# Patient Record
Sex: FEMALE | Race: WHITE | NOT HISPANIC OR LATINO | Employment: OTHER | URBAN - NONMETROPOLITAN AREA
[De-identification: names, ages, dates, MRNs, and addresses within clinical notes are randomized per-mention and may not be internally consistent; named-entity substitution may affect disease eponyms.]

---

## 2023-12-07 ENCOUNTER — HOSPITAL ENCOUNTER (INPATIENT)
Facility: HOSPITAL | Age: 88
LOS: 6 days | Discharge: HOME | End: 2023-12-14
Attending: EMERGENCY MEDICINE | Admitting: INTERNAL MEDICINE
Payer: MEDICARE

## 2023-12-07 ENCOUNTER — APPOINTMENT (OUTPATIENT)
Dept: CT IMAGING | Facility: HOSPITAL | Age: 88
End: 2023-12-07
Payer: MEDICARE

## 2023-12-07 ENCOUNTER — APPOINTMENT (OUTPATIENT)
Dept: GENERAL RADIOLOGY | Facility: HOSPITAL | Age: 88
End: 2023-12-07
Payer: MEDICARE

## 2023-12-07 ENCOUNTER — APPOINTMENT (OUTPATIENT)
Dept: ULTRASOUND IMAGING | Facility: HOSPITAL | Age: 88
End: 2023-12-07
Payer: MEDICARE

## 2023-12-07 DIAGNOSIS — R53.1 WEAKNESS: ICD-10-CM

## 2023-12-07 DIAGNOSIS — R06.00 DYSPNEA, UNSPECIFIED TYPE: Primary | ICD-10-CM

## 2023-12-07 LAB
A-A DO2: 27.2 MMHG (ref 0–300)
ALBUMIN SERPL-MCNC: 3.4 G/DL (ref 3.5–5.2)
ALBUMIN/GLOB SERPL: 1.2 G/DL
ALP SERPL-CCNC: 80 U/L (ref 39–117)
ALT SERPL W P-5'-P-CCNC: 9 U/L (ref 1–33)
ANION GAP SERPL CALCULATED.3IONS-SCNC: 10.5 MMOL/L (ref 5–15)
APTT PPP: 41.2 SECONDS (ref 26.5–34.5)
ARTERIAL PATENCY WRIST A: POSITIVE
AST SERPL-CCNC: 17 U/L (ref 1–32)
ATMOSPHERIC PRESS: 729 MMHG
BACTERIA UR QL AUTO: ABNORMAL /HPF
BASE EXCESS BLDA CALC-SCNC: 4 MMOL/L (ref 0–2)
BASOPHILS # BLD AUTO: 0.02 10*3/MM3 (ref 0–0.2)
BASOPHILS NFR BLD AUTO: 0.3 % (ref 0–1.5)
BDY SITE: ABNORMAL
BILIRUB SERPL-MCNC: 1 MG/DL (ref 0–1.2)
BILIRUB UR QL STRIP: NEGATIVE
BUN SERPL-MCNC: 20 MG/DL (ref 8–23)
BUN/CREAT SERPL: 15.9 (ref 7–25)
CALCIUM SPEC-SCNC: 9.8 MG/DL (ref 8.2–9.6)
CHLORIDE SERPL-SCNC: 103 MMOL/L (ref 98–107)
CLARITY UR: CLEAR
CO2 BLDA-SCNC: 30.6 MMOL/L (ref 22–33)
CO2 SERPL-SCNC: 27.5 MMOL/L (ref 22–29)
COHGB MFR BLD: 1.2 % (ref 0–5)
COLOR UR: YELLOW
CREAT SERPL-MCNC: 1.26 MG/DL (ref 0.57–1)
CRP SERPL-MCNC: 3.89 MG/DL (ref 0–0.5)
D-LACTATE SERPL-SCNC: 1.5 MMOL/L (ref 0.5–2)
DEPRECATED RDW RBC AUTO: 46.3 FL (ref 37–54)
EGFRCR SERPLBLD CKD-EPI 2021: 39.4 ML/MIN/1.73
EOSINOPHIL # BLD AUTO: 0.03 10*3/MM3 (ref 0–0.4)
EOSINOPHIL NFR BLD AUTO: 0.5 % (ref 0.3–6.2)
ERYTHROCYTE [DISTWIDTH] IN BLOOD BY AUTOMATED COUNT: 13.7 % (ref 12.3–15.4)
FLUAV RNA RESP QL NAA+PROBE: NOT DETECTED
FLUBV RNA RESP QL NAA+PROBE: NOT DETECTED
GEN 5 2HR TROPONIN T REFLEX: 25 NG/L
GLOBULIN UR ELPH-MCNC: 2.9 GM/DL
GLUCOSE SERPL-MCNC: 124 MG/DL (ref 65–99)
GLUCOSE UR STRIP-MCNC: NEGATIVE MG/DL
HCO3 BLDA-SCNC: 29.2 MMOL/L (ref 20–26)
HCT VFR BLD AUTO: 42.8 % (ref 34–46.6)
HCT VFR BLD CALC: 42.6 % (ref 38–51)
HGB BLD-MCNC: 13.9 G/DL (ref 12–15.9)
HGB BLDA-MCNC: 13.9 G/DL (ref 13.5–17.5)
HGB UR QL STRIP.AUTO: NEGATIVE
HYALINE CASTS UR QL AUTO: ABNORMAL /LPF
IMM GRANULOCYTES # BLD AUTO: 0.02 10*3/MM3 (ref 0–0.05)
IMM GRANULOCYTES NFR BLD AUTO: 0.3 % (ref 0–0.5)
INHALED O2 CONCENTRATION: 21 %
INR PPP: 2.2 (ref 0.9–1.1)
KETONES UR QL STRIP: NEGATIVE
LEUKOCYTE ESTERASE UR QL STRIP.AUTO: ABNORMAL
LYMPHOCYTES # BLD AUTO: 1.24 10*3/MM3 (ref 0.7–3.1)
LYMPHOCYTES NFR BLD AUTO: 20.7 % (ref 19.6–45.3)
Lab: ABNORMAL
MCH RBC QN AUTO: 29.6 PG (ref 26.6–33)
MCHC RBC AUTO-ENTMCNC: 32.5 G/DL (ref 31.5–35.7)
MCV RBC AUTO: 91.1 FL (ref 79–97)
METHGB BLD QL: 0.3 % (ref 0–3)
MODALITY: ABNORMAL
MONOCYTES # BLD AUTO: 0.83 10*3/MM3 (ref 0.1–0.9)
MONOCYTES NFR BLD AUTO: 13.8 % (ref 5–12)
NEUTROPHILS NFR BLD AUTO: 3.86 10*3/MM3 (ref 1.7–7)
NEUTROPHILS NFR BLD AUTO: 64.4 % (ref 42.7–76)
NITRITE UR QL STRIP: POSITIVE
NRBC BLD AUTO-RTO: 0 /100 WBC (ref 0–0.2)
NT-PROBNP SERPL-MCNC: 2957 PG/ML (ref 0–1800)
OXYHGB MFR BLDV: 92.1 % (ref 94–99)
PCO2 BLDA: 45.4 MM HG (ref 35–45)
PCO2 TEMP ADJ BLD: ABNORMAL MM[HG]
PH BLDA: 7.42 PH UNITS (ref 7.35–7.45)
PH UR STRIP.AUTO: 6 [PH] (ref 5–8)
PH, TEMP CORRECTED: ABNORMAL
PLATELET # BLD AUTO: 136 10*3/MM3 (ref 140–450)
PMV BLD AUTO: 11.7 FL (ref 6–12)
PO2 BLDA: 64.8 MM HG (ref 83–108)
PO2 TEMP ADJ BLD: ABNORMAL MM[HG]
POTASSIUM SERPL-SCNC: 3.6 MMOL/L (ref 3.5–5.2)
PROCALCITONIN SERPL-MCNC: 0.06 NG/ML (ref 0–0.25)
PROT SERPL-MCNC: 6.3 G/DL (ref 6–8.5)
PROT UR QL STRIP: NEGATIVE
PROTHROMBIN TIME: 25.1 SECONDS (ref 12.1–14.7)
QT INTERVAL: 438 MS
QTC INTERVAL: 462 MS
RBC # BLD AUTO: 4.7 10*6/MM3 (ref 3.77–5.28)
RBC # UR STRIP: ABNORMAL /HPF
REF LAB TEST METHOD: ABNORMAL
SAO2 % BLDCOA: 93.5 % (ref 94–99)
SARS-COV-2 RNA RESP QL NAA+PROBE: NOT DETECTED
SODIUM SERPL-SCNC: 141 MMOL/L (ref 136–145)
SP GR UR STRIP: 1.01 (ref 1–1.03)
SQUAMOUS #/AREA URNS HPF: ABNORMAL /HPF
TROPONIN T DELTA: 1 NG/L
TROPONIN T SERPL HS-MCNC: 24 NG/L
UROBILINOGEN UR QL STRIP: ABNORMAL
VENTILATOR MODE: ABNORMAL
WBC # UR STRIP: ABNORMAL /HPF
WBC NRBC COR # BLD AUTO: 6 10*3/MM3 (ref 3.4–10.8)

## 2023-12-07 PROCEDURE — 82805 BLOOD GASES W/O2 SATURATION: CPT

## 2023-12-07 PROCEDURE — 36415 COLL VENOUS BLD VENIPUNCTURE: CPT

## 2023-12-07 PROCEDURE — 83050 HGB METHEMOGLOBIN QUAN: CPT

## 2023-12-07 PROCEDURE — 80053 COMPREHEN METABOLIC PANEL: CPT | Performed by: NURSE PRACTITIONER

## 2023-12-07 PROCEDURE — 76775 US EXAM ABDO BACK WALL LIM: CPT

## 2023-12-07 PROCEDURE — 87077 CULTURE AEROBIC IDENTIFY: CPT | Performed by: NURSE PRACTITIONER

## 2023-12-07 PROCEDURE — 87186 SC STD MICRODIL/AGAR DIL: CPT | Performed by: NURSE PRACTITIONER

## 2023-12-07 PROCEDURE — 87040 BLOOD CULTURE FOR BACTERIA: CPT | Performed by: NURSE PRACTITIONER

## 2023-12-07 PROCEDURE — 25010000002 FUROSEMIDE PER 20 MG: Performed by: NURSE PRACTITIONER

## 2023-12-07 PROCEDURE — 94799 UNLISTED PULMONARY SVC/PX: CPT

## 2023-12-07 PROCEDURE — 83605 ASSAY OF LACTIC ACID: CPT | Performed by: NURSE PRACTITIONER

## 2023-12-07 PROCEDURE — 84145 PROCALCITONIN (PCT): CPT | Performed by: NURSE PRACTITIONER

## 2023-12-07 PROCEDURE — 71045 X-RAY EXAM CHEST 1 VIEW: CPT | Performed by: RADIOLOGY

## 2023-12-07 PROCEDURE — 81001 URINALYSIS AUTO W/SCOPE: CPT | Performed by: NURSE PRACTITIONER

## 2023-12-07 PROCEDURE — 85610 PROTHROMBIN TIME: CPT | Performed by: NURSE PRACTITIONER

## 2023-12-07 PROCEDURE — G0378 HOSPITAL OBSERVATION PER HR: HCPCS

## 2023-12-07 PROCEDURE — 36600 WITHDRAWAL OF ARTERIAL BLOOD: CPT

## 2023-12-07 PROCEDURE — 86140 C-REACTIVE PROTEIN: CPT | Performed by: NURSE PRACTITIONER

## 2023-12-07 PROCEDURE — 87086 URINE CULTURE/COLONY COUNT: CPT | Performed by: NURSE PRACTITIONER

## 2023-12-07 PROCEDURE — 84484 ASSAY OF TROPONIN QUANT: CPT | Performed by: EMERGENCY MEDICINE

## 2023-12-07 PROCEDURE — 99285 EMERGENCY DEPT VISIT HI MDM: CPT

## 2023-12-07 PROCEDURE — 71250 CT THORAX DX C-: CPT

## 2023-12-07 PROCEDURE — 85730 THROMBOPLASTIN TIME PARTIAL: CPT | Performed by: NURSE PRACTITIONER

## 2023-12-07 PROCEDURE — 85025 COMPLETE CBC W/AUTO DIFF WBC: CPT | Performed by: NURSE PRACTITIONER

## 2023-12-07 PROCEDURE — 71045 X-RAY EXAM CHEST 1 VIEW: CPT

## 2023-12-07 PROCEDURE — 94640 AIRWAY INHALATION TREATMENT: CPT

## 2023-12-07 PROCEDURE — 76775 US EXAM ABDO BACK WALL LIM: CPT | Performed by: RADIOLOGY

## 2023-12-07 PROCEDURE — 83880 ASSAY OF NATRIURETIC PEPTIDE: CPT | Performed by: NURSE PRACTITIONER

## 2023-12-07 PROCEDURE — 71250 CT THORAX DX C-: CPT | Performed by: RADIOLOGY

## 2023-12-07 PROCEDURE — 25010000002 CEFTRIAXONE PER 250 MG: Performed by: NURSE PRACTITIONER

## 2023-12-07 PROCEDURE — 93005 ELECTROCARDIOGRAM TRACING: CPT | Performed by: EMERGENCY MEDICINE

## 2023-12-07 PROCEDURE — 99223 1ST HOSP IP/OBS HIGH 75: CPT | Performed by: INTERNAL MEDICINE

## 2023-12-07 PROCEDURE — 93010 ELECTROCARDIOGRAM REPORT: CPT | Performed by: SPECIALIST

## 2023-12-07 PROCEDURE — 87636 SARSCOV2 & INF A&B AMP PRB: CPT | Performed by: NURSE PRACTITIONER

## 2023-12-07 PROCEDURE — 82375 ASSAY CARBOXYHB QUANT: CPT

## 2023-12-07 RX ORDER — LOSARTAN POTASSIUM 50 MG/1
50 TABLET ORAL 2 TIMES DAILY
Status: CANCELLED | OUTPATIENT
Start: 2023-12-07

## 2023-12-07 RX ORDER — FUROSEMIDE 40 MG/1
40 TABLET ORAL DAILY
Status: CANCELLED | OUTPATIENT
Start: 2023-12-08

## 2023-12-07 RX ORDER — CETIRIZINE HYDROCHLORIDE 10 MG/1
10 TABLET ORAL DAILY
COMMUNITY

## 2023-12-07 RX ORDER — SODIUM CHLORIDE 9 MG/ML
40 INJECTION, SOLUTION INTRAVENOUS AS NEEDED
Status: DISCONTINUED | OUTPATIENT
Start: 2023-12-07 | End: 2023-12-14 | Stop reason: HOSPADM

## 2023-12-07 RX ORDER — IPRATROPIUM BROMIDE AND ALBUTEROL SULFATE 2.5; .5 MG/3ML; MG/3ML
3 SOLUTION RESPIRATORY (INHALATION) ONCE
Status: COMPLETED | OUTPATIENT
Start: 2023-12-07 | End: 2023-12-07

## 2023-12-07 RX ORDER — BISACODYL 5 MG/1
5 TABLET, DELAYED RELEASE ORAL DAILY PRN
Status: DISCONTINUED | OUTPATIENT
Start: 2023-12-07 | End: 2023-12-14 | Stop reason: HOSPADM

## 2023-12-07 RX ORDER — CEPHALEXIN 500 MG/1
500 CAPSULE ORAL 2 TIMES DAILY
Status: ON HOLD | COMMUNITY
End: 2023-12-07

## 2023-12-07 RX ORDER — CETIRIZINE HYDROCHLORIDE 10 MG/1
10 TABLET ORAL DAILY
Status: ON HOLD | COMMUNITY
End: 2023-12-07

## 2023-12-07 RX ORDER — CETIRIZINE HYDROCHLORIDE 10 MG/1
5 TABLET ORAL DAILY
Status: CANCELLED | OUTPATIENT
Start: 2023-12-08

## 2023-12-07 RX ORDER — LOSARTAN POTASSIUM 50 MG/1
50 TABLET ORAL 2 TIMES DAILY
COMMUNITY

## 2023-12-07 RX ORDER — BISACODYL 10 MG
10 SUPPOSITORY, RECTAL RECTAL DAILY PRN
Status: DISCONTINUED | OUTPATIENT
Start: 2023-12-07 | End: 2023-12-14 | Stop reason: HOSPADM

## 2023-12-07 RX ORDER — FUROSEMIDE 40 MG/1
40 TABLET ORAL DAILY
COMMUNITY

## 2023-12-07 RX ORDER — SODIUM CHLORIDE 0.9 % (FLUSH) 0.9 %
10 SYRINGE (ML) INJECTION EVERY 12 HOURS SCHEDULED
Status: DISCONTINUED | OUTPATIENT
Start: 2023-12-07 | End: 2023-12-14 | Stop reason: HOSPADM

## 2023-12-07 RX ORDER — POTASSIUM CHLORIDE 20 MEQ/1
10 TABLET, EXTENDED RELEASE ORAL DAILY
Status: CANCELLED | OUTPATIENT
Start: 2023-12-08

## 2023-12-07 RX ORDER — POLYETHYLENE GLYCOL 3350 17 G/17G
17 POWDER, FOR SOLUTION ORAL DAILY PRN
Status: DISCONTINUED | OUTPATIENT
Start: 2023-12-07 | End: 2023-12-14 | Stop reason: HOSPADM

## 2023-12-07 RX ORDER — SODIUM CHLORIDE 0.9 % (FLUSH) 0.9 %
10 SYRINGE (ML) INJECTION AS NEEDED
Status: DISCONTINUED | OUTPATIENT
Start: 2023-12-07 | End: 2023-12-14 | Stop reason: HOSPADM

## 2023-12-07 RX ORDER — METOPROLOL TARTRATE 100 MG/1
100 TABLET ORAL EVERY 12 HOURS SCHEDULED
Status: CANCELLED | OUTPATIENT
Start: 2023-12-07

## 2023-12-07 RX ORDER — CINACALCET 30 MG/1
30 TABLET, FILM COATED ORAL DAILY
Status: CANCELLED | OUTPATIENT
Start: 2023-12-08

## 2023-12-07 RX ORDER — PANTOPRAZOLE SODIUM 40 MG/1
40 TABLET, DELAYED RELEASE ORAL DAILY
Status: CANCELLED | OUTPATIENT
Start: 2023-12-08

## 2023-12-07 RX ORDER — CETIRIZINE HYDROCHLORIDE 10 MG/1
10 TABLET ORAL DAILY
Status: CANCELLED | OUTPATIENT
Start: 2023-12-08

## 2023-12-07 RX ORDER — AMOXICILLIN 250 MG
2 CAPSULE ORAL 2 TIMES DAILY
Status: DISCONTINUED | OUTPATIENT
Start: 2023-12-07 | End: 2023-12-14 | Stop reason: HOSPADM

## 2023-12-07 RX ORDER — ERGOCALCIFEROL 1.25 MG/1
50000 CAPSULE ORAL WEEKLY
COMMUNITY

## 2023-12-07 RX ORDER — FUROSEMIDE 10 MG/ML
80 INJECTION INTRAMUSCULAR; INTRAVENOUS ONCE
Status: COMPLETED | OUTPATIENT
Start: 2023-12-07 | End: 2023-12-07

## 2023-12-07 RX ORDER — ERYTHROMYCIN 5 MG/G
1 OINTMENT OPHTHALMIC 3 TIMES DAILY PRN
COMMUNITY

## 2023-12-07 RX ORDER — ERYTHROMYCIN 5 MG/G
1 OINTMENT OPHTHALMIC 3 TIMES DAILY PRN
Status: CANCELLED | OUTPATIENT
Start: 2023-12-07

## 2023-12-07 RX ORDER — EZETIMIBE 10 MG/1
10 TABLET ORAL DAILY
COMMUNITY

## 2023-12-07 RX ADMIN — CEFTRIAXONE 1000 MG: 1 INJECTION, POWDER, FOR SOLUTION INTRAMUSCULAR; INTRAVENOUS at 16:56

## 2023-12-07 RX ADMIN — Medication 10 ML: at 20:44

## 2023-12-07 RX ADMIN — IPRATROPIUM BROMIDE AND ALBUTEROL SULFATE 3 ML: .5; 2.5 SOLUTION RESPIRATORY (INHALATION) at 11:09

## 2023-12-07 RX ADMIN — DOXYCYCLINE 100 MG: 100 INJECTION, POWDER, LYOPHILIZED, FOR SOLUTION INTRAVENOUS at 20:44

## 2023-12-07 RX ADMIN — FUROSEMIDE 80 MG: 10 INJECTION, SOLUTION INTRAMUSCULAR; INTRAVENOUS at 14:43

## 2023-12-07 RX ADMIN — DOCUSATE SODIUM 50 MG AND SENNOSIDES 8.6 MG 2 TABLET: 8.6; 5 TABLET, FILM COATED ORAL at 20:44

## 2023-12-07 NOTE — H&P
"    [facility] HOSPITALIST HISTORY AND PHYSICAL    Patient Identification:  Name:  Zeenat Dong  Age:  89 y.o.  Sex:  female  :  1928  MRN:  [id number]   Admit Date: 2023   Visit Number:  [id number]  Room number:  403/03  Primary Care Physician:  Fatmata Dong APRN     Subjective     Chief complaint:    Chief Complaint   Patient presents with    Shortness of Breath    Cough     History of presenting illness:   89F PMH GERD, Hypertension, Hyperlipidemia, Chronic HFunknownEF, Paroxysmal Atrial Fibrillation on chronic anticoagulation, presented to [facility] emergency room with complaints of shortness of breath.  Upon arrival patient afebrile, heart rate 50-60's, respiratory rate 20, mildly hypertensive, satting low 90's on room air.  Labs showed WBC count 6K, hemoglobin 13.9, procalcitonin 0.06, lactate 1.5, CRP 3.89, creatinine 1.26, HS Troponins 24->25, proBNP 2900, covid/flu negative, blood cultures pending.  EKG showed normal sinus rhythm, LAD, left ventricle hypertrophy.  CT Chest showed scarring vs minimal airspace disease L lung base, coronary artery calcifications. Emergency room provider gave antibiotics and lasix. Hospital Medicine consulted for admission.  Patient lethargic for the most part, family at bedside and history obtained from them, family notes patient is generally very functional, has been doing well at home and even singing in Oriental orthodox but since this past  has been more shortness of breath, wearing her as needed oxygen at home all the time, using home nebs, didn't smoke but lived with a smoker for many years, endorse mild sputum production at home following neb treatments, mild yellow tint, denied any fevers or chills at home, denied any chest pain, weight gain, increased lower extremity swelling, note she chronically sleeps in a recliner at night, takes 40mg lasix at home, unsure of her baseline creatinine at home but believe it to be \"lower\" than her " 1.2 here. We briefly discussed code status as they had already told emergency room she wanted to be full code but in describing chest compressions and being on a ventilator they seemed less sure about putting her through that, asked them to think about what patient would want for herself if she could choose and will have Palliative Care follow up with them tomorrow.   ---------------------------------------------------------------------------------------------------------------------   Review of Systems   Unable to perform ROS: Mental status change     ---------------------------------------------------------------------------------------------------------------------   Past Medical History:   Diagnosis Date    Hyperlipidemia      Past Surgical History:   Procedure Laterality Date    OTHER SURGICAL HISTORY      ear surgery    SKIN BIOPSY       Family History   Problem Relation Age of Onset    Other Father         cardiovascular disease    Kidney disease Sister      Social History     Socioeconomic History    Marital status:    Tobacco Use    Smoking status: Never   Substance and Sexual Activity    Alcohol use: No     ---------------------------------------------------------------------------------------------------------------------   Allergies:  Patient has no known allergies.  ---------------------------------------------------------------------------------------------------------------------   Medications below are reported home medications pulling from within the system; at this time, these medications have not been reconciled unless otherwise specified and are in the verification process for further verifcation as current home medications.    Prior to Admission Medications       Prescriptions Last Dose Informant Patient Reported? Taking?    apixaban (ELIQUIS) 2.5 MG tablet tablet Patient Taking Differently  Yes Yes    Take 2 tablets by mouth 2 (Two) Times a Day. For unspecified atrial fibrillation.     furosemide (LASIX) 20 MG tablet Patient Taking Differently  Yes Yes    Take 2 tablets by mouth Daily. For cardiac failure.    losartan (COZAAR) 100 MG tablet Patient Taking Differently  No Yes    Take 1 tablet by mouth daily. For hypertension.    Patient taking differently:  Take 0.5 tablets by mouth 2 (Two) Times a Day. For hypertension.    amLODIPine (NORVASC) 5 MG tablet   No No    Take 1 tablet by mouth 2 (two) times a day. For hypertension.    cetirizine (zyrTEC) 10 MG tablet   Yes No    Take 1 tablet by mouth Daily.    cinacalcet (SENSIPAR) 30 MG tablet   No No    Take 1 tablet by mouth daily.    ezetimibe (ZETIA) 10 MG tablet   Yes No    Take 1 tablet by mouth Daily.    metoprolol tartrate (LOPRESSOR) 100 MG tablet   No No    Take 1 tablet by mouth 2 (two) times a day. For hypertension.    olopatadine (PATANOL) 0.1 % ophthalmic solution   Yes No    Apply 1 drop to eye 2 (two) times a day. Apply to affected eye(s) as directed  for seasonal allergies.    ondansetron (ZOFRAN) 4 MG tablet   No No    Take 1 tablet by mouth 2 (two) times a day as needed for nausea or vomiting. For nausea and vomiting.    OXYGEN-HELIUM IN   Yes No    Oxygen; Patient Sig: Oxygen AT 2 L PER NC @ HS; 1; 0; 29-Mar-2016; Active    pantoprazole (PROTONIX) 40 MG EC tablet   No No    Take 1 tablet by mouth daily. For gerd without esophagitis.    potassium chloride (K-DUR,KLOR-CON) 10 MEQ CR tablet   No No    Take 1 tablet by mouth daily. For hypokalemia.    Probiotic Product (PROBIOTIC COLON SUPPORT) capsule   Yes No    Take 1 capsule by mouth 2 (Two) Times a Day. For health maintenance.    vitamin D (ERGOCALCIFEROL) 1.25 MG (20261 UT) capsule capsule   Yes No    Take 1 capsule by mouth 1 (One) Time Per Week.          Objective     Vital Signs:  Temp:  [97.3 °F (36.3 °C)] 97.3 °F (36.3 °C)  Heart Rate:  [50-70] 56  Resp:  [20] 20  BP: ()/(46-76) 175/58    Mean Arterial Pressure (Non-Invasive) for the past 24 hrs (Last 3 readings):    Noninvasive MAP (mmHg)   12/07/23 1645 72   12/07/23 1630 122   12/07/23 1615 109     SpO2:  [90 %-100 %] 98 %  on  Flow (L/min):  [2] 2;   Device (Oxygen Therapy): nasal cannula  Body mass index is 25.39 kg/m².    Wt Readings from Last 3 Encounters:   12/07/23 59 kg (130 lb)   06/21/16 67.6 kg (149 lb)   05/11/16 68 kg (150 lb)      ---------------------------------------------------------------------------------------------------------------------   Physical Exam:  Constitutional:  Elderly.  Mild acute distress.      HENT:  Head:  Normocephalic and atraumatic.  Mouth:  Moist mucous membranes.    Eyes:  Conjunctivae and EOM are normal. No scleral icterus.    Neck:  Neck supple.  No JVD present.    Cardiovascular:  bradycardic, regular rhythm and normal heart sounds with no murmur.  Pulmonary/Chest:  Mild respiratory distress, labored breathing, bilateral wheezes, bilateral crackles, on 2LNC  Abdominal:  Soft. No distension and no tenderness.   Musculoskeletal:  No tenderness, and no deformity.  No red or swollen joints anywhere.    Neurological:  lethargic, unable to assess further  Skin:  Skin is warm and dry. No rash noted. No pallor.   Peripheral vascular:  No clubbing, no cyanosis, no edema.  Psychiatric: unable to assess due to mentation     Edited by: Francisco Choi MD at 12/7/2023 1174  ---------------------------------------------------------------------------------------------------------------------  EKG:    ECG 12 Lead Dyspnea   Final Result   Test Reason : Dyspnea   Blood Pressure :   */*   mmHG   Vent. Rate :  67 BPM     Atrial Rate :  67 BPM      P-R Int : 188 ms          QRS Dur : 114 ms       QT Int : 438 ms       P-R-T Axes :  71 -51 107 degrees      QTc Int : 462 ms      Sinus rhythm with frequent premature ventricular complexes   Left axis deviation   Left ventricular hypertrophy with repolarization abnormality   Abnormal ECG   No previous ECGs available   Confirmed by Juani Cherry ([id number]) on  "12/7/2023 3:36:49 PM      Referred By: CURD           Confirmed By: Juani Cherry        Telemetry:  sinus bradycardia     I have personally looked at both the EKG and the telemetry strips.    Last echocardiogram:  Ordered and pending   --------------------------------------------------------------------------------------------------------------------  Labs:  Results from last 7 days   Lab Units 12/07/23  1219 12/07/23  1125   PROCALCITONIN ng/mL 0.06  --    LACTATE mmol/L  --  1.5   CRP mg/dL 3.89*  --    WBC 10*3/mm3  --  6.00   HEMOGLOBIN g/dL  --  13.9   HEMATOCRIT %  --  42.8   MCV fL  --  91.1   MCHC g/dL  --  32.5   PLATELETS 10*3/mm3  --  136*   INR   --  2.20*     Results from last 7 days   Lab Units 12/07/23  1117   PH, ARTERIAL pH units 7.417   PO2 ART mm Hg 64.8*   PCO2, ARTERIAL mm Hg 45.4*   HCO3 ART mmol/L 29.2*     Results from last 7 days   Lab Units 12/07/23  1219   SODIUM mmol/L 141   POTASSIUM mmol/L 3.6   CHLORIDE mmol/L 103   CO2 mmol/L 27.5   BUN mg/dL 20   CREATININE mg/dL 1.26*   CALCIUM mg/dL 9.8*   GLUCOSE mg/dL 124*   ALBUMIN g/dL 3.4*   BILIRUBIN mg/dL 1.0   ALK PHOS U/L 80   AST (SGOT) U/L 17   ALT (SGPT) U/L 9   Estimated Creatinine Clearance: 21.5 mL/min (A) (by C-G formula based on SCr of 1.26 mg/dL (H)).  No results found for: \"AMMONIA\"  Results from last 7 days   Lab Units 12/07/23  1514 12/07/23  1219   HSTROP T ng/L 25* 24*   PROBNP pg/mL  --  2,957.0*         No results found for: \"HGBA1C\", \"POCGLU\"  Lab Results   Component Value Date    TSH 3.752 08/17/2015    FREET4 1.09 08/17/2015     No results found for: \"PREGTESTUR\", \"PREGSERUM\", \"HCG\", \"HCGQUANT\"  Pain Management Panel          Latest Ref Rng & Units 8/17/2015   Pain Management Panel   Creatinine, Urine mg/dL  mg/dL 129.2  130.0      Brief Urine Lab Results  (Last result in the past 365 days)        Color   Clarity   Blood   Leuk Est   Nitrite   Protein   CREAT   Urine HCG        12/07/23 1742 Yellow   Clear   Negative   " "Small (1+)   Positive   Negative                 No results found for: \"BLOODCX\"  No results found for: \"URINECX\"  No results found for: \"WOUNDCX\"  No results found for: \"STOOLCX\"    I have personally looked at the labs and they are summarized above.  ----------------------------------------------------------------------------------------------------------------------  Detailed radiology reports for the last 24 hours:    Imaging Results (Last 24 Hours)       Procedure Component Value Units Date/Time    CT Chest Without Contrast Diagnostic [[id number]] Collected: 12/07/23 1507     Updated: 12/07/23 1512    Narrative:      EXAM: CT CHEST WO CONTRAST DIAGNOSTIC-      CLINICAL INDICATION:shortness of breath      COMPARISON: 8/17/2015     TECHNIQUE: Multiple axial CT images were obtained from lung apex through  upper abdomen without the administration of IV contrast. Reformatted  images in the coronal and/or sagittal plane(s) were generated from the  axial data set to facilitate diagnostic accuracy and/or surgical  planning.     Radiation dose reduction techniques were utilized per ALARA protocol.  Automated exposure control was initiated through either or [employer] or  [employer] software packages by  protocol.    DOSE (DLP mGy-cm):        FINDINGS:     LUNGS: Minimal airspace density in the left lung base may represent  scarring. Pneumonia not completely excluded.     HEART: Coronary artery calcifications     MEDIASTINUM: No masses. No enlarged lymph nodes.  No fluid collections.     PLEURA: No pleural effusion. No pleural mass or abnormal calcification.  No pneumothorax.     VASCULATURE: No evidence of aneurysm.     BONES: No acute bony abnormality.     VISUALIZED UPPER ABDOMEN:The upper abdomen is unremarkable as  visualized.     Other: None.       Impression:         1. Scarring versus minimal airspace disease in the left lung base  2. Coronary artery calcifications                 This report was finalized " on 12/7/2023 3:10 PM by Dr. Dany Isbell MD.       XR Chest AP [[id number]] Collected: 12/07/23 1214     Updated: 12/07/23 1217    Narrative:      XR CHEST AP-     CLINICAL INDICATION: shortness of breath        COMPARISON: 8/23/2015     TECHNIQUE: Single frontal view of the chest.     FINDINGS:     LUNGS: Lungs are adequately aerated.      HEART AND MEDIASTINUM: Heart and mediastinal contours are unremarkable        SKELETON: Bony and soft tissue structures are unremarkable.             Impression:      No radiographic evidence of acute cardiac or pulmonary disease.           This report was finalized on 12/7/2023 12:15 PM by Dr. Dany Isbell MD.             I have personally looked at the radiology images and read the final radiology report.    Assessment & Plan    89F Second Hand Smoke Exposure PMH GERD, Hypertension, Hyperlipidemia, Chronic HFunknownEF, Paroxysmal Atrial Fibrillation on chronic anticoagulation, presented to [facility] emergency room with complaints of shortness of breath    #Acute on as needed Hypoxic Respiratory Failure due to Acute on Chronic HFunknownEF  #Hypertension/Hyperlipidemia/Coronary Artery Disease  #Paroxysmal Atrial Fibrillation, on chronic anticoagulation  #Mildly elevated HS Troponins, suspected NSTEMI type II due to CHF  - Labs showed HS Troponins 24->25, proBNP 2900  - EKG showed normal sinus rhythm, LAD, left ventricle hypertrophy  - Formal echocardiogram ordered and pending, last was in 2015 and reviewed  - CT Chest showed coronary artery calcifications   - Cardiology consulted; Recommendations pending  - status post lasix in emergency room, trend creatinine and urine output, further diuresis tomorrow pending volume evaluation  - Review home medications pending medication reconciliation and resume as indicated  - Continue home anticoagulation pending medication reconciliation   - Continue to monitor on telemetry, strict I/O's, trend heart rate and blood pressure      #Probable Pneumonia, Bacterial, treating for CAP  - Have started on Ceftriaxone and Doxycycline, follow up cultures  - Continue Tylenol as needed for fevers, Duonebs as needed  - Will admit and monitor on telemetry, continue 02 but wean as able    #Acute Metabolic Encephalopathy, multifactorial  - Address Pneumonia with antibiotics, hypoxia and Congestive Heart Failure with lasix and 02.     #Chronic Kidney Disease vs Nonoliguric Acute Kidney Injury due to CHF  - Baseline creatinine in 2016 around 0.6-0.9, was up 1.26 on admission  - Trend Cr and urine output, avoid nephrotoxins, NSAIDs, dehydration and contrast as able, will consider Nephrology consult if worsening    #Gastroesophageal Reflux Disease  - Continue home PPI pending medication reconciliation    #Advanced Age/Age Related Debility  - Supportive Care, Consult PT/OT    #Debility  - Consult PT/OT, Social Work if placement needed      F: Oral  E: Monitor & Replace as needed   N: Cardiac Diet   Ppx: Resume home oral anticoagulation   Code Status (Patient has no pulse and is not breathing): CPR (Attempt to Resuscitate)  Medical Interventions (Patient has pulse or is breathing): Full Support  *Palliative Care consulted for goals of care conversations      Dispo: Pending workup and clinical improvement     *This patient is considered high risk due to Acute Congestive Heart Failure exacerbation, Acute Kidney Injury, probable Pneumonia.    Edited by: Francisco Choi MD at 12/7/2023 1806    Francisco Choi MD  [facility]  12/07/23  18:09 EST

## 2023-12-07 NOTE — ED NOTES
Pts family is requesting a bedside commode, due to pt condition at this time, we have explained that it would not be safe to have pt get up repeatedly to use the bathroom. Purewick has been placed at this time. Pts daughter verbalized understanding

## 2023-12-07 NOTE — ED NOTES
Provider at bedside with RN to explain why pt cannot get up to pee. Pts daughter verbalized understanding with hesitation. Pt cannot hear well and doesn't understand. Pts daughter has been educated on reorienting her to use the purewick

## 2023-12-07 NOTE — CASE MANAGEMENT/SOCIAL WORK
Discharge Planning Assessment   [place]     Patient Name: Zeenat Dong  MRN: [id number]  Today's Date: 12/7/2023    Admit Date: 12/7/2023    Plan: Spoke with patients dena Aguilar [phone] at bedside. Patient lives with daughter and will return at discharge. Patient has no POA or Living Will. Patient uses Oxygen 2L from [employer] and quad cane family bought. Patient has no Home Health. Patients PCP Klarissa Cummins and pharmacy [address]. Patient will return home at discharge and family will transport.   Discharge Needs Assessment       Row Name 12/07/23 1704       Living Environment    People in Home child(juliet), adult    Name(s) of People in Home Jeff Deras Daughter [phone]    Potentially Unsafe Housing Conditions none    In the past 12 months has the electric, gas, oil, or water company threatened to shut off services in your home? No    Primary Care Provided by child(juliet)    Provides Primary Care For no one    Family Caregiver if Needed child(juliet), adult    Family Caregiver Names Jeff Deras [phone]    Quality of Family Relationships helpful;involved;supportive    Able to Return to Prior Arrangements yes       Resource/Environmental Concerns    Resource/Environmental Concerns none    Transportation Concerns none       Food Insecurity    Within the past 12 months, you worried that your food would run out before you got the money to buy more. Never true    Within the past 12 months, the food you bought just didn't last and you didn't have money to get more. Never true       Transition Planning    Patient/Family Anticipates Transition to home with family    Patient/Family Anticipated Services at Transition none    Transportation Anticipated family or friend will provide       Discharge Needs Assessment    Readmission Within the Last 30 Days no previous admission in last 30 days    Equipment Currently Used at Home cane, quad;oxygen    Concerns to be Addressed discharge  planning    Anticipated Changes Related to Illness none    Equipment Needed After Discharge none                   Discharge Plan       Row Name 12/07/23 1701       Plan    Plan Spoke with patients daughter Jeff [phone] at bedside. Patient lives with daughter and will return at discharge. Patient has no POA or Living Will. Patient uses Oxygen 2L from [employer] and quad cane family bought. Patient has no Home Health. Patients PCP Klarissa Cummins and pharmacy [address]. Patient will return home at discharge and family will transport.    Patient/Family in Agreement with Plan yes                  Continued Care and Services - Admitted Since 12/7/2023    Coordination has not been started for this encounter.          Demographic Summary       Row Name 12/07/23 1709       General Information    Admission Type observation    Arrived From home    Referral Source emergency department    Reason for Consult discharge planning    Preferred Language English             Jolene Rosenthal

## 2023-12-07 NOTE — ED PROVIDER NOTES
Subjective   History of Present Illness  Patient is a 89-year-old female presents today complaining of shortness of breath and congestion.  Patient's daughter reports that patient has had some congestion since Sunday states that last night the patient was having worsening trouble breathing.  States patient has had wheezing and chest congestion.  Patient's daughter reports that the patient has also had some decreased responsiveness.  She reports that patient does have as needed oxygen at home and states that she has not been wearing it and been taking over-the-counter medications at improvement.    Shortness of Breath      Review of Systems   Constitutional: Negative.    HENT: Negative.     Eyes: Negative.    Respiratory:  Positive for shortness of breath.    Cardiovascular:  Positive for leg swelling.   Gastrointestinal: Negative.    Endocrine: Negative.    Genitourinary: Negative.    Musculoskeletal: Negative.    Skin: Negative.    Allergic/Immunologic: Negative.    Neurological: Negative.    Hematological: Negative.    Psychiatric/Behavioral: Negative.         Past Medical History:   Diagnosis Date    Hyperlipidemia        No Known Allergies    Past Surgical History:   Procedure Laterality Date    OTHER SURGICAL HISTORY      ear surgery    SKIN BIOPSY         Family History   Problem Relation Age of Onset    Other Father         cardiovascular disease    Kidney disease Sister        Social History     Socioeconomic History    Marital status:    Tobacco Use    Smoking status: Never   Vaping Use    Vaping Use: Never used   Substance and Sexual Activity    Alcohol use: No    Drug use: Never           Objective   Physical Exam  Vitals and nursing note reviewed.   Constitutional:       Appearance: She is well-developed.   HENT:      Head: Normocephalic.      Right Ear: External ear normal.      Left Ear: External ear normal.   Eyes:      Conjunctiva/sclera: Conjunctivae normal.      Pupils: Pupils are equal,  round, and reactive to light.   Cardiovascular:      Rate and Rhythm: Normal rate and regular rhythm.      Heart sounds: Normal heart sounds.   Pulmonary:      Effort: Pulmonary effort is normal. Tachypnea present.      Breath sounds: Wheezing present.   Abdominal:      General: Bowel sounds are normal.      Palpations: Abdomen is soft.   Musculoskeletal:         General: Normal range of motion.      Cervical back: Normal range of motion and neck supple.   Skin:     General: Skin is warm and dry.      Capillary Refill: Capillary refill takes less than 2 seconds.   Neurological:      Mental Status: She is alert and oriented to person, place, and time.   Psychiatric:         Behavior: Behavior normal.         Thought Content: Thought content normal.         Procedures           ED Course  ED Course as of 12/07/23 2221   Thu Dec 07, 2023   1208 ECG 12 Lead Dyspnea  Sinus rhythm with frequent PVCs.  Rate 67.  Left axis deviation.  Normal QT interval.  LVH with repolarization changes.  No acute ischemic changes.  Abnormal EKG.  Interpreted by me.  Electronically signed by Ricardo Harding MD, 12/07/23, 12:09 PM EST.   [BC]      ED Course User Index  [BC] Ricardo Harding MD                                        Results for orders placed or performed during the hospital encounter of 12/07/23   COVID-19 and FLU A/B PCR, 1 HR TAT - Swab, Nasopharynx    Specimen: Nasopharynx; Swab   Result Value Ref Range    COVID19 Not Detected Not Detected - Ref. Range    Influenza A PCR Not Detected Not Detected    Influenza B PCR Not Detected Not Detected   High Sensitivity Troponin T    Specimen: Arm, Left; Blood   Result Value Ref Range    HS Troponin T 24 (H) <14 ng/L   Comprehensive Metabolic Panel    Specimen: Arm, Left; Blood   Result Value Ref Range    Glucose 124 (H) 65 - 99 mg/dL    BUN 20 8 - 23 mg/dL    Creatinine 1.26 (H) 0.57 - 1.00 mg/dL    Sodium 141 136 - 145 mmol/L    Potassium 3.6 3.5 - 5.2 mmol/L    Chloride 103 98 - 107  mmol/L    CO2 27.5 22.0 - 29.0 mmol/L    Calcium 9.8 (H) 8.2 - 9.6 mg/dL    Total Protein 6.3 6.0 - 8.5 g/dL    Albumin 3.4 (L) 3.5 - 5.2 g/dL    ALT (SGPT) 9 1 - 33 U/L    AST (SGOT) 17 1 - 32 U/L    Alkaline Phosphatase 80 39 - 117 U/L    Total Bilirubin 1.0 0.0 - 1.2 mg/dL    Globulin 2.9 gm/dL    A/G Ratio 1.2 g/dL    BUN/Creatinine Ratio 15.9 7.0 - 25.0    Anion Gap 10.5 5.0 - 15.0 mmol/L    eGFR 39.4 (L) >60.0 mL/min/1.73   Protime-INR    Specimen: Blood   Result Value Ref Range    Protime 25.1 (H) 12.1 - 14.7 Seconds    INR 2.20 (H) 0.90 - 1.10   aPTT    Specimen: Blood   Result Value Ref Range    PTT 41.2 (H) 26.5 - 34.5 seconds   Lactic Acid, Plasma    Specimen: Blood   Result Value Ref Range    Lactate 1.5 0.5 - 2.0 mmol/L   BNP    Specimen: Arm, Left; Blood   Result Value Ref Range    proBNP 2,957.0 (H) 0.0 - 1,800.0 pg/mL   Procalcitonin    Specimen: Arm, Left; Blood   Result Value Ref Range    Procalcitonin 0.06 0.00 - 0.25 ng/mL   C-reactive Protein    Specimen: Arm, Left; Blood   Result Value Ref Range    C-Reactive Protein 3.89 (H) 0.00 - 0.50 mg/dL   CBC Auto Differential    Specimen: Blood   Result Value Ref Range    WBC 6.00 3.40 - 10.80 10*3/mm3    RBC 4.70 3.77 - 5.28 10*6/mm3    Hemoglobin 13.9 12.0 - 15.9 g/dL    Hematocrit 42.8 34.0 - 46.6 %    MCV 91.1 79.0 - 97.0 fL    MCH 29.6 26.6 - 33.0 pg    MCHC 32.5 31.5 - 35.7 g/dL    RDW 13.7 12.3 - 15.4 %    RDW-SD 46.3 37.0 - 54.0 fl    MPV 11.7 6.0 - 12.0 fL    Platelets 136 (L) 140 - 450 10*3/mm3    Neutrophil % 64.4 42.7 - 76.0 %    Lymphocyte % 20.7 19.6 - 45.3 %    Monocyte % 13.8 (H) 5.0 - 12.0 %    Eosinophil % 0.5 0.3 - 6.2 %    Basophil % 0.3 0.0 - 1.5 %    Immature Grans % 0.3 0.0 - 0.5 %    Neutrophils, Absolute 3.86 1.70 - 7.00 10*3/mm3    Lymphocytes, Absolute 1.24 0.70 - 3.10 10*3/mm3    Monocytes, Absolute 0.83 0.10 - 0.90 10*3/mm3    Eosinophils, Absolute 0.03 0.00 - 0.40 10*3/mm3    Basophils, Absolute 0.02 0.00 - 0.20 10*3/mm3     Immature Grans, Absolute 0.02 0.00 - 0.05 10*3/mm3    nRBC 0.0 0.0 - 0.2 /100 WBC   Blood Gas, Arterial With Co-Ox    Specimen: Arterial Blood   Result Value Ref Range    Site Left Radial     Braeden's Test Positive     pH, Arterial 7.417 7.350 - 7.450 pH units    pCO2, Arterial 45.4 (H) 35.0 - 45.0 mm Hg    pO2, Arterial 64.8 (L) 83.0 - 108.0 mm Hg    HCO3, Arterial 29.2 (H) 20.0 - 26.0 mmol/L    Base Excess, Arterial 4.0 (H) 0.0 - 2.0 mmol/L    O2 Saturation, Arterial 93.5 (L) 94.0 - 99.0 %    Hemoglobin, Blood Gas 13.9 13.5 - 17.5 g/dL    Hematocrit, Blood Gas 42.6 38.0 - 51.0 %    Oxyhemoglobin 92.1 (L) 94 - 99 %    Methemoglobin 0.30 0.00 - 3.00 %    Carboxyhemoglobin 1.2 0 - 5 %    A-a DO2 27.2 0.0 - 300.0 mmHg    CO2 Content 30.6 22 - 33 mmol/L    Barometric Pressure for Blood Gas 729 mmHg    Modality Room Air     FIO2 21 %    Ventilator Mode NA     Collected by [id number]     pH, Temp Corrected      pCO2, Temperature Corrected      pO2, Temperature Corrected     High Sensitivity Troponin T 2Hr    Specimen: Arm, Left; Blood   Result Value Ref Range    HS Troponin T 25 (H) <14 ng/L    Troponin T Delta 1 >=-4 - <+4 ng/L   Urinalysis With Culture If Indicated - Urine, Clean Catch    Specimen: Urine, Clean Catch   Result Value Ref Range    Color, UA Yellow Yellow, Straw    Appearance, UA Clear Clear    pH, UA 6.0 5.0 - 8.0    Specific Gravity, UA 1.009 1.005 - 1.030    Glucose, UA Negative Negative    Ketones, UA Negative Negative    Bilirubin, UA Negative Negative    Blood, UA Negative Negative    Protein, UA Negative Negative    Leuk Esterase, UA Small (1+) (A) Negative    Nitrite, UA Positive (A) Negative    Urobilinogen, UA 0.2 E.U./dL 0.2 - 1.0 E.U./dL   Urinalysis, Microscopic Only - Urine, Clean Catch    Specimen: Urine, Clean Catch   Result Value Ref Range    RBC, UA 0-2 None Seen, 0-2 /HPF    WBC, UA 6-10 (A) None Seen, 0-2 /HPF    Bacteria, UA 4+ (A) None Seen /HPF    Squamous Epithelial Cells, UA 0-2 None  Seen, 0-2 /HPF    Hyaline Casts, UA None Seen None Seen /LPF    Methodology Automated Microscopy    ECG 12 Lead Dyspnea   Result Value Ref Range    QT Interval 438 ms    QTC Interval 462 ms     CT Chest Without Contrast Diagnostic   Final Result       1. Scarring versus minimal airspace disease in the left lung base   2. Coronary artery calcifications                       This report was finalized on 12/7/2023 3:10 PM by Dr. Dany Isbell MD.          XR Chest AP   Final Result   No radiographic evidence of acute cardiac or pulmonary disease.               This report was finalized on 12/7/2023 12:15 PM by Dr. Dany Isbell MD.          US Renal Bilateral    (Results Pending)            Medical Decision Making  Problems Addressed:  Dyspnea, unspecified type: complicated acute illness or injury    Amount and/or Complexity of Data Reviewed  Labs: ordered.  Radiology: ordered.  ECG/medicine tests: ordered. Decision-making details documented in ED Course.    Risk  Prescription drug management.  Decision regarding hospitalization.        Final diagnoses:   Dyspnea, unspecified type       ED Disposition  ED Disposition       ED Disposition   Decision to Admit    Condition   --    Comment   Level of Care: Telemetry [5]   Diagnosis: Dyspnea [021102]   Admitting Physician: SANJUANA JI [[id number]]   Attending Physician: SANJUANA JI [[id number]]                 No follow-up provider specified.       Medication List        ASK your doctor about these medications      apixaban 5 MG tablet tablet  Commonly known as: ELIQUIS  Ask about: Which instructions should I use?     cetirizine 10 MG tablet  Commonly known as: zyrTEC  Ask about: Which instructions should I use?     furosemide 40 MG tablet  Commonly known as: LASIX  Ask about: Which instructions should I use?     losartan 50 MG tablet  Commonly known as: COZAAR  Ask about: Which instructions should I use?                 Nikolai Mack P, APRN  12/07/23 2224

## 2023-12-08 ENCOUNTER — APPOINTMENT (OUTPATIENT)
Dept: CARDIOLOGY | Facility: HOSPITAL | Age: 88
End: 2023-12-08
Payer: MEDICARE

## 2023-12-08 PROBLEM — J96.01 ACUTE HYPOXEMIC RESPIRATORY FAILURE: Status: ACTIVE | Noted: 2023-12-08

## 2023-12-08 LAB
ALBUMIN SERPL-MCNC: 3.1 G/DL (ref 3.5–5.2)
ALBUMIN/GLOB SERPL: 1.1 G/DL
ALP SERPL-CCNC: 74 U/L (ref 39–117)
ALT SERPL W P-5'-P-CCNC: 8 U/L (ref 1–33)
ANION GAP SERPL CALCULATED.3IONS-SCNC: 11.6 MMOL/L (ref 5–15)
AST SERPL-CCNC: 19 U/L (ref 1–32)
BASOPHILS # BLD AUTO: 0.02 10*3/MM3 (ref 0–0.2)
BASOPHILS NFR BLD AUTO: 0.4 % (ref 0–1.5)
BILIRUB SERPL-MCNC: 0.7 MG/DL (ref 0–1.2)
BUN SERPL-MCNC: 19 MG/DL (ref 8–23)
BUN/CREAT SERPL: 16 (ref 7–25)
CALCIUM SPEC-SCNC: 9.3 MG/DL (ref 8.2–9.6)
CHLORIDE SERPL-SCNC: 104 MMOL/L (ref 98–107)
CO2 SERPL-SCNC: 26.4 MMOL/L (ref 22–29)
CREAT SERPL-MCNC: 1.19 MG/DL (ref 0.57–1)
DEPRECATED RDW RBC AUTO: 45.3 FL (ref 37–54)
EGFRCR SERPLBLD CKD-EPI 2021: 42.2 ML/MIN/1.73
EOSINOPHIL # BLD AUTO: 0.04 10*3/MM3 (ref 0–0.4)
EOSINOPHIL NFR BLD AUTO: 0.7 % (ref 0.3–6.2)
ERYTHROCYTE [DISTWIDTH] IN BLOOD BY AUTOMATED COUNT: 13.5 % (ref 12.3–15.4)
GLOBULIN UR ELPH-MCNC: 2.8 GM/DL
GLUCOSE SERPL-MCNC: 160 MG/DL (ref 65–99)
HCT VFR BLD AUTO: 39.8 % (ref 34–46.6)
HGB BLD-MCNC: 13 G/DL (ref 12–15.9)
IMM GRANULOCYTES # BLD AUTO: 0.02 10*3/MM3 (ref 0–0.05)
IMM GRANULOCYTES NFR BLD AUTO: 0.4 % (ref 0–0.5)
LYMPHOCYTES # BLD AUTO: 1.1 10*3/MM3 (ref 0.7–3.1)
LYMPHOCYTES NFR BLD AUTO: 20.3 % (ref 19.6–45.3)
MCH RBC QN AUTO: 29.7 PG (ref 26.6–33)
MCHC RBC AUTO-ENTMCNC: 32.7 G/DL (ref 31.5–35.7)
MCV RBC AUTO: 90.9 FL (ref 79–97)
MONOCYTES # BLD AUTO: 0.57 10*3/MM3 (ref 0.1–0.9)
MONOCYTES NFR BLD AUTO: 10.5 % (ref 5–12)
NEUTROPHILS NFR BLD AUTO: 3.66 10*3/MM3 (ref 1.7–7)
NEUTROPHILS NFR BLD AUTO: 67.7 % (ref 42.7–76)
NRBC BLD AUTO-RTO: 0 /100 WBC (ref 0–0.2)
PLATELET # BLD AUTO: 124 10*3/MM3 (ref 140–450)
PMV BLD AUTO: 11.3 FL (ref 6–12)
POTASSIUM SERPL-SCNC: 3.1 MMOL/L (ref 3.5–5.2)
PROT SERPL-MCNC: 5.9 G/DL (ref 6–8.5)
RBC # BLD AUTO: 4.38 10*6/MM3 (ref 3.77–5.28)
SODIUM SERPL-SCNC: 142 MMOL/L (ref 136–145)
WBC NRBC COR # BLD AUTO: 5.41 10*3/MM3 (ref 3.4–10.8)

## 2023-12-08 PROCEDURE — 25010000002 CEFTRIAXONE PER 250 MG: Performed by: INTERNAL MEDICINE

## 2023-12-08 PROCEDURE — 92610 EVALUATE SWALLOWING FUNCTION: CPT

## 2023-12-08 PROCEDURE — 99233 SBSQ HOSP IP/OBS HIGH 50: CPT | Performed by: INTERNAL MEDICINE

## 2023-12-08 PROCEDURE — 97162 PT EVAL MOD COMPLEX 30 MIN: CPT

## 2023-12-08 PROCEDURE — 85025 COMPLETE CBC W/AUTO DIFF WBC: CPT | Performed by: INTERNAL MEDICINE

## 2023-12-08 PROCEDURE — 80053 COMPREHEN METABOLIC PANEL: CPT | Performed by: INTERNAL MEDICINE

## 2023-12-08 PROCEDURE — 97166 OT EVAL MOD COMPLEX 45 MIN: CPT

## 2023-12-08 RX ORDER — ASPIRIN 81 MG/1
81 TABLET ORAL DAILY
Status: DISCONTINUED | OUTPATIENT
Start: 2023-12-08 | End: 2023-12-14 | Stop reason: HOSPADM

## 2023-12-08 RX ORDER — PANTOPRAZOLE SODIUM 40 MG/1
40 TABLET, DELAYED RELEASE ORAL DAILY
Status: DISCONTINUED | OUTPATIENT
Start: 2023-12-08 | End: 2023-12-14 | Stop reason: HOSPADM

## 2023-12-08 RX ORDER — METOPROLOL TARTRATE 1 MG/ML
5 INJECTION, SOLUTION INTRAVENOUS ONCE
Status: DISCONTINUED | OUTPATIENT
Start: 2023-12-08 | End: 2023-12-08

## 2023-12-08 RX ORDER — POTASSIUM CHLORIDE 20 MEQ/1
10 TABLET, EXTENDED RELEASE ORAL DAILY
Status: DISCONTINUED | OUTPATIENT
Start: 2023-12-08 | End: 2023-12-14 | Stop reason: HOSPADM

## 2023-12-08 RX ORDER — CINACALCET 30 MG/1
30 TABLET, FILM COATED ORAL DAILY
Status: DISCONTINUED | OUTPATIENT
Start: 2023-12-08 | End: 2023-12-14 | Stop reason: HOSPADM

## 2023-12-08 RX ORDER — FUROSEMIDE 40 MG/1
40 TABLET ORAL DAILY
Status: CANCELLED | OUTPATIENT
Start: 2023-12-08

## 2023-12-08 RX ORDER — ATORVASTATIN CALCIUM 20 MG/1
20 TABLET, FILM COATED ORAL NIGHTLY
Status: DISCONTINUED | OUTPATIENT
Start: 2023-12-08 | End: 2023-12-14 | Stop reason: HOSPADM

## 2023-12-08 RX ORDER — CETIRIZINE HYDROCHLORIDE 10 MG/1
10 TABLET ORAL DAILY
Status: CANCELLED | OUTPATIENT
Start: 2023-12-08

## 2023-12-08 RX ORDER — LOSARTAN POTASSIUM 50 MG/1
50 TABLET ORAL 2 TIMES DAILY
Status: CANCELLED | OUTPATIENT
Start: 2023-12-08

## 2023-12-08 RX ORDER — CEPHALEXIN 500 MG/1
500 CAPSULE ORAL 2 TIMES DAILY
COMMUNITY
End: 2023-12-14 | Stop reason: HOSPADM

## 2023-12-08 RX ADMIN — DOXYCYCLINE 100 MG: 100 INJECTION, POWDER, LYOPHILIZED, FOR SOLUTION INTRAVENOUS at 09:11

## 2023-12-08 RX ADMIN — CEFTRIAXONE 1000 MG: 1 INJECTION, POWDER, FOR SOLUTION INTRAMUSCULAR; INTRAVENOUS at 17:28

## 2023-12-08 RX ADMIN — METOPROLOL TARTRATE 25 MG: 25 TABLET, FILM COATED ORAL at 21:11

## 2023-12-08 RX ADMIN — ATORVASTATIN CALCIUM 20 MG: 20 TABLET, FILM COATED ORAL at 21:11

## 2023-12-08 RX ADMIN — DOXYCYCLINE 100 MG: 100 INJECTION, POWDER, LYOPHILIZED, FOR SOLUTION INTRAVENOUS at 21:11

## 2023-12-08 RX ADMIN — PANTOPRAZOLE SODIUM 40 MG: 40 TABLET, DELAYED RELEASE ORAL at 13:33

## 2023-12-08 RX ADMIN — CINACALCET HYDROCHLORIDE 30 MG: 30 TABLET, FILM COATED ORAL at 13:32

## 2023-12-08 RX ADMIN — POTASSIUM CHLORIDE 10 MEQ: 1500 TABLET, EXTENDED RELEASE ORAL at 13:33

## 2023-12-08 RX ADMIN — DOCUSATE SODIUM 50 MG AND SENNOSIDES 8.6 MG 2 TABLET: 8.6; 5 TABLET, FILM COATED ORAL at 09:15

## 2023-12-08 RX ADMIN — DOCUSATE SODIUM 50 MG AND SENNOSIDES 8.6 MG 2 TABLET: 8.6; 5 TABLET, FILM COATED ORAL at 21:11

## 2023-12-08 RX ADMIN — Medication 10 ML: at 09:15

## 2023-12-08 RX ADMIN — APIXABAN 2.5 MG: 2.5 TABLET, FILM COATED ORAL at 21:11

## 2023-12-08 RX ADMIN — ASPIRIN 81 MG: 81 TABLET, COATED ORAL at 10:20

## 2023-12-08 RX ADMIN — METOPROLOL TARTRATE 25 MG: 25 TABLET, FILM COATED ORAL at 13:33

## 2023-12-08 RX ADMIN — Medication 10 ML: at 09:16

## 2023-12-08 RX ADMIN — Medication 10 ML: at 21:12

## 2023-12-08 RX ADMIN — APIXABAN 2.5 MG: 2.5 TABLET, FILM COATED ORAL at 13:33

## 2023-12-08 NOTE — PLAN OF CARE
Goal Outcome Evaluation:   Pt admitted to [address] [place] [address] overnight. VSS on 2 L nasal cannula, SB on the monitor. No indicators of acute distress noted. Family at bedside. Zheng APRN aware of elevated BP overnight.

## 2023-12-08 NOTE — THERAPY EVALUATION
"Acute Care - Speech Language Pathology   Swallow Initial Evaluation [facility]  CLINICAL DYSPHAGIA ASSESSMENT     Patient Name: Zeenat Dong  : 1928  MRN: [id number]  Today's Date: 2023             Admit Date: 2023    Zeenat Dong  was seen at bedside this am on 3S to assess safety/efficacy of swallowing fnx, determine safest/least restrictive diet tolerance. Her family was present for this assessment.    She presented to [facility] ED 23 for evaluation of shortness of breath. Upon arrival patient afebrile, heart rate 50-60's, respiratory rate 20, mildly hypertensive, satting low 90's on room air.  Labs showed WBC count 6K, hemoglobin 13.9, procalcitonin 0.06, lactate 1.5, CRP 3.89, creatinine 1.26, HS Troponins 24->25, proBNP 2900, covid/flu negative, blood cultures pending.  EKG showed normal sinus rhythm, LAD, left ventricle hypertrophy.  CT Chest showed scarring vs minimal airspace disease L lung base, coronary artery calcifications. Emergency room provider gave antibiotics and lasix. Hospital Medicine consulted for admission.  Patient lethargic for the most part, family at bedside and history obtained from them, family notes patient is generally very functional, has been doing well at home and even singing in [employer] but since this past  has been more shortness of breath, wearing her as needed oxygen at home all the time, using home nebs, didn't smoke but lived with a smoker for many years, endorse mild sputum production at home following neb treatments, mild yellow tint, denied any fevers or chills at home, denied any chest pain, weight gain, increased lower extremity swelling, note she chronically sleeps in a recliner at night, takes 40mg lasix at home, unsure of her baseline creatinine at home but believe it to be \"lower\" than her 1.2 here. We briefly discussed code status as they had already told emergency room she wanted to be full code but in describing chest compressions and being on " "a ventilator they seemed less sure about putting her through that, asked them to think about what patient would want for herself if she could choose and will have Palliative Care follow up with them tomorrow. She was admitted for further evaluation and management.     PMH significant for GERD, Hypertension, Hyperlipidemia, Chronic HFunknownEF, Paroxysmal Atrial Fibrillation on chronic anticoagulation.    She was referred to rule out dysphagia.     Social History     Socioeconomic History    Marital status:    Tobacco Use    Smoking status: Never   Vaping Use    Vaping Use: Never used   Substance and Sexual Activity    Alcohol use: No    Drug use: Never      Diet Orders (active) (From admission, onward)       Start     Ordered    12/08/23 1102  Diet: Regular/House Diet; No Mixed Consistencies, Feeding Assistance - Nursing; Texture: Soft to Chew (NDD 3); Soft to Chew: Chopped Meat; Fluid Consistency: Thin (IDDSI 0)  Diet Effective Now         12/08/23 1102                  She was observed on O2 via NC 2L.     Ms. Dong was repositioned to upright and centered in bed to accept multiple po presentations of thin water via teaspoon and straw, along with oatmeal x1 trial only. She declined to accept all other po attempts, stating, \"I just don't want it.\" Her family reported limited po desire/acceptance premorbidly.     Facial/oral structures were symmetrical upon observation. Lingual protrusion revealed no deviation. Oral mucosa were mildly dry, pink, and clean. Secretions were clear, tacky, and controlled. OROM/MARY was generally weak to  to imitate oral postures. Gag was not assessed. Volitional cough was intact w/ adequate  intensity, clear in quality, non-productive. Voice was adequate in intensity, clear in quality w/ intelligible speech. She was severe [place] bilaterally with hearing amplification in place, continued to require direct face to face communications for lip reading.     Upon po presentations, " adequate bolus anticipation and acceptance w/ good labial seal for bolus clearance via spoon bowl, and suction via straw. Bolus formation, manipulation and control were generally weak with mixed phasic rotary mastication pattern. A-p transit was timely w/o significant oral residue appreciated. No overt s/s aspiration before the swallow per this limited assessment.     Pharyngeal swallow was timely w/ adequate hyolaryngeal elevation per palpation. No overt s/s aspiration evidenced across this evaluation. No silent aspiration suspected. She denied odynophagia.Again, she reported minimal desire for po intake at this time.     Visit Dx:     ICD-10-CM ICD-9-CM   1. Dyspnea, unspecified type  R06.00 786.09     Patient Active Problem List   Diagnosis    Abdominal distension    Heart failure    Dry skin dermatitis    Gastroesophageal reflux disease without esophagitis    Hypercalcemia    Hyperparathyroidism    Hypertension    Hypokalemia    Hypomagnesemia    Seasonal allergic rhinitis    Shortness of breath    Atrial fibrillation    Weakness    Dyspnea     Past Medical History:   Diagnosis Date    Hyperlipidemia      Past Surgical History:   Procedure Laterality Date    OTHER SURGICAL HISTORY      ear surgery    SKIN BIOPSY       EDUCATION  The patient has been educated in the following areas:   Dysphagia (Swallowing Impairment) Oral Care/Hydration Modified Diet Instruction.     Impression: Ms. Dong presented w/generalized weakness, fatigue, limited po acceptance with verbal declination of most po trials. Per this limited assessment, she presented with increased oral prep time, WFL pharyngeal swallow w/o s/s aspiration.No s/s indicative of silent aspiration.  No odynophagia reported. Her family denied any concerns of premorbid dysphagia.     She is recommended modified po diet of soft to chew textures, chopped meats, thin liquids. Medications whole in puree/thins. Medications whole in puree/single pill presentations.  Upright and centered for all po intake, 1:1 assistance with po intake. Universal aspiration precautions, oral care protocol.     SLP Recommendation and Plan   1. Mechanical soft textures, chopped meats, thin liquids.    2. Medications whole in puree/thins. Single pill presentations only, please.   3. Upright and centered for all po intake with assistance to encourage po intake.   4. Universal aspiration precautions.   5. CLYDE precautions.  6. Oral care protocol.    D/w patient  and her family results and recommendations w/ verbal agreement.    Thank you for allowing me to participate in the care of your patient-   Chantell Max M.S., CCC/SLP                                                                                                     Time Calculation:       Therapy Charges for Today       Code Description Service Date Service Provider Modifiers Qty    [id number] HC ST EVAL ORAL PHARYNG SWALLOW 4 12/8/2023 Chantell Max, MS CCC-SLP GN 1                 Chantell Max, MS CCC-SLP  12/8/2023

## 2023-12-08 NOTE — CONSULTS
Cardiology  CONSULT NOTE    Consults    Patient Identification:  Name:  Zeenat Dong  Age:  89 y.o.  Sex:  female  :  1928  MRN:  [id number]  Visit Number:  [id number]  Primary care provider:  Fatmata Dong APRN    Subjective       Reason for the consult:  Elevated troponin T levels and heart failure    Chief complaints:  Shortness of breath      History of presenting illness:    Zeenat Dong is a 89 y.o. female has been admitted with chief complaint of shortness of breath and acute on chronic hypoxic respiratory failure.    Patient is confused.  She has bilateral deafness.  Right tube history from patient's granddaughter who was at bedside.  She helped in the communication.    I have been consulted for elevated troponin T levels.  There are elevated.  The levels are 24 and 45.  ECG showed sinus bradycardia with LVH.  No ischemia.  Chest pain.    proBNP level was around 2900.  CT of the chest showed no evidence of heart failure.  There was evidence of coronary artery calcification.    No history of known CAD.  History of paroxysmal atrial fibrillation and anticoagulated on Eliquis.  History of heart failure.  LVEF is not known.    She has history of hypertension, hyperlipidemia and CKD.      --------------------------------------------------------------------------------------------------------------------  Review of Systems:  Unable to perform review of system as patient is currently and has severe hearing loss.  Unable to communicate effectively.    ---------------------------------------------------------------------------------------------------------------------   Past History:  Family History   Problem Relation Age of Onset    Other Father         cardiovascular disease    Kidney disease Sister      Past Medical History:   Diagnosis Date    Hyperlipidemia      Past Surgical History:   Procedure Laterality Date    OTHER SURGICAL HISTORY      ear surgery    SKIN BIOPSY       Social History      Socioeconomic History    Marital status:    Tobacco Use    Smoking status: Never   Vaping Use    Vaping Use: Never used   Substance and Sexual Activity    Alcohol use: No    Drug use: Never     ---------------------------------------------------------------------------------------------------------------------   Allergies:  Patient has no known allergies.  ---------------------------------------------------------------------------------------------------------------------     Hospital Meds:  aspirin, 81 mg, Oral, Daily  atorvastatin, 20 mg, Oral, Nightly  cefTRIAXone, 1,000 mg, Intravenous, Q24H  doxycycline, 100 mg, Intravenous, Q12H  senna-docusate sodium, 2 tablet, Oral, BID  sodium chloride, 10 mL, Intravenous, Q12H         ---------------------------------------------------------------------------------------------------------------------     Objective     Vital Signs:  Temp:  [96.2 °F (35.7 °C)-97.6 °F (36.4 °C)] 96.2 °F (35.7 °C)  Heart Rate:  [50-80] 80  Resp:  [16-20] 16  BP: ()/(46-82) 160/82      12/07/23  0959 12/07/23  1917 12/08/23  0500   Weight: 59 kg (130 lb) 59.1 kg (130 lb 3.2 oz) 59.1 kg (130 lb 3.2 oz) (new admit less than 24)     Body mass index is 25.43 kg/m².  ---------------------------------------------------------------------------------------------------------------------   Physical exam:       General Appearance:  HEENT:   Neck:     Alert, confused  Bilateral hearing loss  No JVD    Lungs:   Bilateral equal air entry.  Wheezes are heard but not rales.     Heart:  Regular rhythm.  Bradycardia.  No S3 or S4.  Grade complaint 6 ejection systolic therapy..    Chest Wall:  No abnormalities observed    Abdomen   Soft, nontender, no masses, no organomegaly and bowel sounds are heard.     Extremities: No pedal edema    Pulses: Pulses palpable and equal bilaterally    Neurologic: Deferred.        Telemetry:  Sinus bradycardia.  Heart rate  50s.    ---------------------------------------------------------------------------------------------------------------------   ECG:   ECG/EMG Results (last 24 hours)       Procedure Component Value Units Date/Time    ECG 12 Lead Dyspnea [[id number]] Collected: 12/07/23 1040     Updated: 12/07/23 1537     QT Interval 438 ms      QTC Interval 462 ms     Narrative:      Test Reason : Dyspnea  Blood Pressure :   */*   mmHG  Vent. Rate :  67 BPM     Atrial Rate :  67 BPM     P-R Int : 188 ms          QRS Dur : 114 ms      QT Int : 438 ms       P-R-T Axes :  71 -51 107 degrees     QTc Int : 462 ms    Sinus rhythm with frequent premature ventricular complexes  Left axis deviation  Left ventricular hypertrophy with repolarization abnormality  Abnormal ECG  No previous ECGs available  Confirmed by Juani Cherry ([id number]) on 12/7/2023 3:36:49 PM    Referred By: CURD           Confirmed By: Juani Cherry    SCANNED - TELEMETRY   [[id number]] Resulted: 12/07/23     Updated: 12/08/23 0810            --------------------------------------------------------------------------------------------------------------------   Results from last 7 days   Lab Units 12/07/23  1219 12/07/23  1125   CRP mg/dL 3.89*  --    LACTATE mmol/L  --  1.5   WBC 10*3/mm3  --  6.00   HEMOGLOBIN g/dL  --  13.9   HEMATOCRIT %  --  42.8   MCV fL  --  91.1   MCHC g/dL  --  32.5   PLATELETS 10*3/mm3  --  136*   INR   --  2.20*     Results from last 7 days   Lab Units 12/07/23  1117   PH, ARTERIAL pH units 7.417   PO2 ART mm Hg 64.8*   PCO2, ARTERIAL mm Hg 45.4*   HCO3 ART mmol/L 29.2*     Results from last 7 days   Lab Units 12/07/23  1219   SODIUM mmol/L 141   POTASSIUM mmol/L 3.6   CHLORIDE mmol/L 103   CO2 mmol/L 27.5   BUN mg/dL 20   CREATININE mg/dL 1.26*   CALCIUM mg/dL 9.8*   GLUCOSE mg/dL 124*   ALBUMIN g/dL 3.4*   BILIRUBIN mg/dL 1.0   ALK PHOS U/L 80   AST (SGOT) U/L 17   ALT (SGPT) U/L 9   Estimated Creatinine Clearance: 21.5 mL/min (A) (by C-G formula  "based on SCr of 1.26 mg/dL (H)).  No results found for: \"AMMONIA\"  Results from last 7 days   Lab Units 12/07/23  1514 12/07/23  1219   HSTROP T ng/L 25* 24*     Results from last 7 days   Lab Units 12/07/23  1219   PROBNP pg/mL 2,957.0*     Lab Results   Component Value Date    HGBA1C 6.9 (H) 08/18/2015     Lab Results   Component Value Date    TSH 3.752 08/17/2015    FREET4 1.09 08/17/2015     No results found for: \"PREGTESTUR\", \"PREGSERUM\", \"HCG\", \"HCGQUANT\"  Pain Management Panel          Latest Ref Rng & Units 8/17/2015   Pain Management Panel   Creatinine, Urine mg/dL  mg/dL 129.2  130.0      No results found for: \"BLOODCX\"  No results found for: \"URINECX\"  No results found for: \"WOUNDCX\"  No results found for: \"STOOLCX\"      ---------------------------------------------------------------------------------------------------------------------   Radiology:    No results found for this or any previous visit.      No results found for this or any previous visit.      No results found for this or any previous visit.      No results found for this or any previous visit.      I have personally reviewed the radiology images and read the final radiology report.        Assessment      1.  NSTEMI.  Type II.  Due to hypoxia.  No chest pain.  ECG showed normal ischemic changes.  2.  History of heart failure.  Compensated.  No evidence of pulmonary or peripheral edema.  3.  Coronary artery calcification on CT exam.  4.  History of paroxysmal atrial fibrillation.  In sinus rhythm.  5.  Sinus bradycardia.  Asymptomatic.  Due to metoprolol.  6.  Hyperlipidemia  7.  Acute on chronic hypoxic respiratory failure    Recommendations     1.  No further ischemic workup is recommended because presents with chest pain and presence of borderline arrhythmias.  Patient also does not want to persue.  2.  Evidence of CAD on CT exam, will start aspirin and statin.  3.  HF is compensated.  Will hold Lasix.  4.  She has sinus bradycardia.  " Recommend to reduce the dose of metoprolol.  5.  Anticoagulated on Eliquis for history of PAF.   6.  Echocardiogram was ordered to evaluate LV function and valves.  7.  I discussed my findings and recommendations with the patient's granddaughter was at bedside.    Thank you      for the opportunity to participate in the care of your patient. Please do not hesitate to call with any questions or concerns.     Fanny Benitez MD, [facility],  12/08/23  08:31 EST

## 2023-12-08 NOTE — PROGRESS NOTES
[facility] HOSPITALIST PROGRESS NOTE     Patient Identification:  Name:  Zeenat Dong  Age:  89 y.o.  Sex:  female  :  1928  MRN:  [id number]  Visit Number:  [id number]  ROOM: [address] [place] [address]     Primary Care Provider:  Fatmata Dong APRN    Length of stay in inpatient status:  0    Subjective     Chief Compliant:    Chief Complaint   Patient presents with    Shortness of Breath    Cough     History of Presenting Illness:    Patient remains ill but stable today, no acute events overnight, more awake this AM but still confused and not at baseline, granddaughter at bedside, udpated on plan of care, following admission urine was checked and concern for Urinary Tract Infection which is also likely contributing to altered mental status, remains on antibiotics, status post lasix and creatinine improved, sounds less wet today, Cardiology agrees is unlikely in Congestive Heart Failure today, discussed with Palliative Care, code status update, prognosis guarded but is improved some today.    Objective     Current Hospital Meds:  apixaban, 2.5 mg, Oral, BID  aspirin, 81 mg, Oral, Daily  atorvastatin, 20 mg, Oral, Nightly  cefTRIAXone, 1,000 mg, Intravenous, Q24H  [START ON 12/10/2023] cholecalciferol, 50,000 Units, Oral, Weekly  cinacalcet, 30 mg, Oral, Daily  doxycycline, 100 mg, Intravenous, Q12H  metoprolol tartrate, 25 mg, Oral, Q12H  pantoprazole, 40 mg, Oral, Daily  potassium chloride, 10 mEq, Oral, Daily  senna-docusate sodium, 2 tablet, Oral, BID  sodium chloride, 10 mL, Intravenous, Q12H         ----------------------------------------------------------------------------------------------------------------------  Vital Signs:  Temp:  [96.2 °F (35.7 °C)-97.6 °F (36.4 °C)] 96.2 °F (35.7 °C)  Heart Rate:  [50-80] 80  Resp:  [16-20] 16  BP: ()/(46-82) 160/82  SpO2:  [90 %-100 %] 96 %  on  Flow (L/min):  [2] 2;   Device (Oxygen Therapy): nasal cannula  Body mass index is 25.43  "kg/m².      Intake/Output Summary (Last 24 hours) at 12/8/2023 1125  Last data filed at 12/8/2023 0900  Gross per 24 hour   Intake 220 ml   Output 200 ml   Net 20 ml      ----------------------------------------------------------------------------------------------------------------------  Physical exam:  Constitutional:  Elderly.  Improved acute distress.      HENT:  Head:  Normocephalic and atraumatic.  Mouth:  Moist mucous membranes.    Eyes:  Conjunctivae and EOM are normal. No scleral icterus.    Neck:  Neck supple.  No JVD present.    Cardiovascular: Regular rate, regular rhythm and normal heart sounds with no murmur.  Pulmonary/Chest:  Mild respiratory distress, less labored breathing, mild wheezes, improved crackles, on 2LNC  Abdominal:  Soft. No distension and no tenderness.   Musculoskeletal:  No tenderness, and no deformity.  No red or swollen joints anywhere.    Neurological:  Awake and looking around room, not following commands or verbal but cranial nerves appear to be grossly in tact.   Skin:  Skin is warm and dry. No rash noted. No pallor.   Peripheral vascular:  No clubbing, no cyanosis, no edema.  Psychiatric: unable to assess due to mentation       Edited by: Francisco Choi MD at 12/8/2023 1122  ----------------------------------------------------------------------------------------------------------------------  WBC/HGB/HCT/PLT   5.41/13.0/39.8/124 (12/08 1031)  BUN/CREAT/GLUC/ALT/AST/RIOS/LIP    19/1.19/160/8/19/--/-- (12/08 1031)  LYTES - Na/K/Cl/CO2: 142/3.1*/104/26.4 (12/08 1031)        No results found for: \"URINECX\"  No results found for: \"BLOODCX\"    I have personally looked at the labs and they are summarized above.  ----------------------------------------------------------------------------------------------------------------------  Detailed radiology reports for the last 24 hours:  US Renal Bilateral    Result Date: 12/8/2023  1. Equivocal dilatation of the right renal pelvis. This may " be artifact. Not seen on recent CT of the chest.   This report was finalized on 12/8/2023 7:43 AM by Dr. Dany Isbell MD.      CT Chest Without Contrast Diagnostic    Result Date: 12/7/2023   1. Scarring versus minimal airspace disease in the left lung base 2. Coronary artery calcifications      This report was finalized on 12/7/2023 3:10 PM by Dr. Dany Isbell MD.      XR Chest AP    Result Date: 12/7/2023  No radiographic evidence of acute cardiac or pulmonary disease.    This report was finalized on 12/7/2023 12:15 PM by Dr. Dany Isbell MD.     Assessment & Plan    89F Second Hand Smoke Exposure PMH GERD, Hypertension, Hyperlipidemia, Chronic HFunknownEF, Paroxysmal Atrial Fibrillation on chronic anticoagulation, presented to [facility] emergency room with complaints of shortness of breath    #Acute Metabolic Encephalopathy due to Acute Urinary Tract Infection and Probable Pneumonia, Bacterial, treating for CAP complicated by Acute Hypoxic Respiratory Failure   - Continue Ceftriaxone and Doxycycline, follow up cultures  - Continue Tylenol as needed for fevers, Duonebs as needed  - Monitor on telemetry, continue 02 but wean as able, monitor for improved mentation     #Mild Acute on Chronic HFunknownEF - Acute Resolved   #Hypertension/Hyperlipidemia/Coronary Artery Disease  #Paroxysmal Atrial Fibrillation, on chronic anticoagulation  #Mildly elevated HS Troponins, suspected NSTEMI type II due to CHF  - Labs showed HS Troponins 24->25, proBNP 2900  - EKG showed normal sinus rhythm, LAD, left ventricle hypertrophy  - Formal echocardiogram ordered and pending, last was in 2015 and reviewed  - CT Chest showed coronary artery calcifications   - Cardiology consulted and following, recommended no ischemic evaluation, hold further lasix  - Continue home Aspirin 81, statin  - Resumed home beta blocker at much lower dose, holding home ARB due to renal dysfunction, resume as indicated   - Continue to monitor on  telemetry, strict I/O's, trend heart rate and blood pressure     #Chronic Kidney Disease vs Nonoliguric Acute Kidney Injury due to CHF  - Baseline creatinine in 2016 around 0.6-0.9, was up 1.26 on admission  - Trend Cr and urine output, avoid nephrotoxins, NSAIDs, dehydration and contrast as able, will consider Nephrology consult if worsening    #Gastroesophageal Reflux Disease  - Continue home PPI    #Advanced Age/Age Related Debility  - Supportive Care, Consult PT/OT    #Debility  - Consult PT/OT, Social Work if placement needed      F: Oral  E: Monitor & Replace as needed   N: Diet: Regular/House Diet; No Mixed Consistencies, Feeding Assistance - Nursing; Texture: Soft to Chew (NDD 3); Soft to Chew: Chopped Meat; Fluid Consistency: Thin (IDDSI 0)   Ppx: Low dose Eliquis  Code Status (Patient has no pulse and is not breathing): No CPR   Medical Interventions (Patient has pulse or is breathing): Limited Support  Medical Intervention Limits: NO intubation (DNI)  *Palliative Care consulted for goals of care conversations      Dispo: Pending workup and clinical improvement     *This patient is considered high risk due to Acute Congestive Heart Failure exacerbation, Acute Kidney Injury, probable Pneumonia, Urinary Tract Infection     Edited by: Francisco Choi MD at 12/8/2023 1125  [facility]

## 2023-12-08 NOTE — CONSULTS
Palliative Care Initial Consult     Attending Physician: Francisco Choi MD  Referring Provider: Francisco Choi  assistance with advance directives, assistance with clarification of goals of care, and psychosocial support  Code Status:   Code Status and Medical Interventions:   Ordered at: 12/08/23 1005     Medical Intervention Limits:    NO intubation (DNI)     Level Of Support Discussed With:    Next of Kin (If No Surrogate)     Code Status (Patient has no pulse and is not breathing):    No CPR (Do Not Attempt to Resuscitate)     Medical Interventions (Patient has pulse or is breathing):    Limited Support     Comments:    Daughter Jeff      Advanced Directives: Advance Directive Status: Patient has advance directive, copy requested   Healthcare surrogate: Jeff Deras daughter and HCS  Goals of Care: .As Zeenat is extremely [place] and pleasantly confused her daughter/HCS Jeff states her mother would not want to be resuscitated or to be intubated and placed on a ventilator, she also presented her mothers living will to palliative, which was scanned into medical records.    HPI:  Zeenat Dong is a 89 y.o. female admitted on 12/7/2023 due to shortness of breath and cough.Zeenat has a medical history of GERD, Hypertension, Hyperlipidemia, Chronic HFunknownEF, Paroxysmal Atrial Fibrillation on chronic anticoagulation. EKG showed normal sinus rhythm, LAD, left ventricle hypertrophy.  CT Chest showed scarring vs minimal airspace disease L lung base, coronary artery calcifications. Per discussion with Jeff, she states that Zeenat lives with her and is normally very independent, ambulates on her own, goes to bathroom, even making her own food, but states sincelast Sunday after [employer] she has been becoming more short of breath and has been wearing her oxygen all the time when it is only ordered as needed.        ROS: Negative except as above in HPI.     Past Medical History:   Diagnosis Date    Hyperlipidemia      Past Surgical  History:   Procedure Laterality Date    OTHER SURGICAL HISTORY      ear surgery    SKIN BIOPSY       Social History     Socioeconomic History    Marital status:    Tobacco Use    Smoking status: Never   Vaping Use    Vaping Use: Never used   Substance and Sexual Activity    Alcohol use: No    Drug use: Never     Family History   Problem Relation Age of Onset    Other Father         cardiovascular disease    Kidney disease Sister        No Known Allergies    Current Facility-Administered Medications   Medication Dose Route Frequency Provider Last Rate Last Admin    aspirin EC tablet 81 mg  81 mg Oral Daily Fanny Benitez MD        atorvastatin (LIPITOR) tablet 20 mg  20 mg Oral Nightly Fanny Benitez MD        sennosides-docusate (PERICOLACE) 8.6-50 MG per tablet 2 tablet  2 tablet Oral BID Francisco Choi MD   2 tablet at 12/08/23 0915    And    polyethylene glycol (MIRALAX) packet 17 g  17 g Oral Daily PRN Francisco Choi MD        And    bisacodyl (DULCOLAX) EC tablet 5 mg  5 mg Oral Daily PRN Francisco Choi MD        And    bisacodyl (DULCOLAX) suppository 10 mg  10 mg Rectal Daily PRN Francisco Choi MD        cefTRIAXone (ROCEPHIN) 1,000 mg in sodium chloride 0.9 % 100 mL IVPB-VTB  1,000 mg Intravenous Q24H Francisco Choi MD        doxycycline (VIBRAMYCIN) 100 mg in sodium chloride 0.9 % 100 mL IVPB-VTB  100 mg Intravenous Q12H Francisco Choi MD 0 mL/hr at 12/07/23 2130 100 mg at 12/08/23 0911    sodium chloride 0.9 % flush 10 mL  10 mL Intravenous PRN Francisco Choi MD   10 mL at 12/08/23 0915    sodium chloride 0.9 % flush 10 mL  10 mL Intravenous Q12H Francisco Choi MD   10 mL at 12/08/23 0916    sodium chloride 0.9 % flush 10 mL  10 mL Intravenous PRN Francisco Choi MD        sodium chloride 0.9 % infusion 40 mL  40 mL Intravenous PRN Francisco Choi MD              senna-docusate sodium **AND** polyethylene glycol **AND** bisacodyl **AND** bisacodyl    [COMPLETED] Insert peripheral IV **AND**  "sodium chloride    sodium chloride    sodium chloride    Current medication reviewed for route, type, dose and frequency and are current per MAR.    Palliative Performance Scale Score:     /82 (BP Location: Right arm, Patient Position: Lying)   Pulse 80   Temp 96.2 °F (35.7 °C) (Oral)   Resp 16   Ht 152.4 cm (60\")   Wt 59.1 kg (130 lb 3.2 oz) Comment: new admit less than 24  SpO2 96%   BMI 25.43 kg/m²     Intake/Output Summary (Last 24 hours) at 12/8/2023 1007  Last data filed at 12/8/2023 0900  Gross per 24 hour   Intake 220 ml   Output 200 ml   Net 20 ml       PE:  General Appearance:    Chronically ill appearing, alert to self , cooperative, NAD   HEENT:    NC/AT, without obvious abnormality, EOMI, anicteric    Neck:   supple, trachea midline, no JVD   Lungs:     Unlabored respirations, Bl wheezes noted and faint BL crackles on 2 L NC    Heart:    RRR, normal S1 and S2, no M/R/G   Abdomen:     Soft, NT, ND, NABS    Extremities:   Moves all extremities, no edema   Pulses:   Pulses palpable and equal bilaterally   Skin:   Warm, dry   Neurologic:   Alert to self, unable to determine if to place time, due to severe [place]   Psych:   Calm, pleasantly confused       Labs:   Results from last 7 days   Lab Units 12/07/23  1125   WBC 10*3/mm3 6.00   HEMOGLOBIN g/dL 13.9   HEMATOCRIT % 42.8   PLATELETS 10*3/mm3 136*     Results from last 7 days   Lab Units 12/07/23  1219   SODIUM mmol/L 141   POTASSIUM mmol/L 3.6   CHLORIDE mmol/L 103   CO2 mmol/L 27.5   BUN mg/dL 20   CREATININE mg/dL 1.26*   GLUCOSE mg/dL 124*   CALCIUM mg/dL 9.8*     Results from last 7 days   Lab Units 12/07/23  1219   SODIUM mmol/L 141   POTASSIUM mmol/L 3.6   CHLORIDE mmol/L 103   CO2 mmol/L 27.5   BUN mg/dL 20   CREATININE mg/dL 1.26*   CALCIUM mg/dL 9.8*   BILIRUBIN mg/dL 1.0   ALK PHOS U/L 80   ALT (SGPT) U/L 9   AST (SGOT) U/L 17   GLUCOSE mg/dL 124*     Imaging Results (Last 72 Hours)       Procedure Component Value Units Date/Time    " US Renal Bilateral [[id number]] Collected: 12/08/23 0741     Updated: 12/08/23 0745    Narrative:      EXAMINATION: US RENAL BILATERAL-      CLINICAL INDICATION: [place]; R06.00-Dyspnea, unspecified        COMPARISON: None immediately available     PROCEDURE: Sonographic imaging of the kidneys     FINDINGS:  Imaging of the right kidney demonstrates the right kidney measuring 6.68  x 3.12 x 3.57 cm.. No solid mass. Equivocal dilatation of the right  renal pelvis..     Left kidney measures 7.54 x 4.00 x 3.63 cm. No solid mass or  hydronephrosis       Impression:      1. Equivocal dilatation of the right renal pelvis. This may be artifact.  Not seen on recent CT of the chest.        This report was finalized on 12/8/2023 7:43 AM by Dr. Dany Isbell MD.       CT Chest Without Contrast Diagnostic [[id number]] Collected: 12/07/23 1507     Updated: 12/07/23 1512    Narrative:      EXAM: CT CHEST WO CONTRAST DIAGNOSTIC-      CLINICAL INDICATION:shortness of breath      COMPARISON: 8/17/2015     TECHNIQUE: Multiple axial CT images were obtained from lung apex through  upper abdomen without the administration of IV contrast. Reformatted  images in the coronal and/or sagittal plane(s) were generated from the  axial data set to facilitate diagnostic accuracy and/or surgical  planning.     Radiation dose reduction techniques were utilized per ALARA protocol.  Automated exposure control was initiated through either or [employer]ose or  [employer] software packages by  protocol.    DOSE (DLP mGy-cm):        FINDINGS:     LUNGS: Minimal airspace density in the left lung base may represent  scarring. Pneumonia not completely excluded.     HEART: Coronary artery calcifications     MEDIASTINUM: No masses. No enlarged lymph nodes.  No fluid collections.     PLEURA: No pleural effusion. No pleural mass or abnormal calcification.  No pneumothorax.     VASCULATURE: No evidence of aneurysm.     BONES: No acute bony abnormality.      VISUALIZED UPPER ABDOMEN:The upper abdomen is unremarkable as  visualized.     Other: None.       Impression:         1. Scarring versus minimal airspace disease in the left lung base  2. Coronary artery calcifications                 This report was finalized on 12/7/2023 3:10 PM by Dr. Dany Isbell MD.       XR Chest AP [[id number]] Collected: 12/07/23 1214     Updated: 12/07/23 1217    Narrative:      XR CHEST AP-     CLINICAL INDICATION: shortness of breath        COMPARISON: 8/23/2015     TECHNIQUE: Single frontal view of the chest.     FINDINGS:     LUNGS: Lungs are adequately aerated.      HEART AND MEDIASTINUM: Heart and mediastinal contours are unremarkable        SKELETON: Bony and soft tissue structures are unremarkable.             Impression:      No radiographic evidence of acute cardiac or pulmonary disease.           This report was finalized on 12/7/2023 12:15 PM by Dr. Dany Isbell MD.               Diagnostics: Reviewed    A: Zeenat Dong is a 89 y.o. female admitted on 12/7/2023 due to shortness of breath and cough.Zeenat has a medical history of GERD, Hypertension, Hyperlipidemia, Chronic HFunknownEF, Paroxysmal Atrial Fibrillation on chronic anticoagulation. EKG showed normal sinus rhythm, LAD, left ventricle hypertrophy.  CT Chest showed scarring vs minimal airspace disease L lung base, coronary artery calcifications. Per discussion with Jeff, she states that Zeenat lives with her and is normally very independent, ambulates on her own, goes to bathroom, even making her own food, but states sincelast Sunday after [employer] she has been becoming more short of breath and has been wearing her oxygen all the time when it is only ordered as needed.           P: Palliative was consulted for GOC/ACP and support for patient and family. As Zeenat is extremely [place] and pleasantly confused her daughter/HCS Jeff states her mother would not want to be resuscitated or to be intubated and placed on a  ventilator, she also presented her mothers living will to palliative, which was scanned into medical records. I discussed this conversation and code status changed with Dr Choi and RN Shweta.      We appreciate the consult and the opportunity to participate in Zeenat Dong's care. We will continue to follow along. Please do not hesitate to contact us regarding further symptom management or goals of care needs, including after hours or on weekends via our on call provider at [phone].     Time: 55 minutes spent reviewing medical and medication records, assessing and examining patient, discussing with family, answering questions, providing some guidance about a plan and documentation of care, and coordinating care with other healthcare members, with > 50% time spent face to face.     Kelly Pastrana, APRN    12/8/2023

## 2023-12-08 NOTE — PLAN OF CARE
Goal Outcome Evaluation:      Patient lying comfortably in bed at this time with family at bedside. Patient has been pleasant and cooperative with care this shift. No complaints or concerns currently being voiced. No s/s of acute distress at this time. Will continue with plan of care.

## 2023-12-08 NOTE — THERAPY EVALUATION
Acute Care - Physical Therapy Initial Evaluation   [facility]     Patient Name: Zeenat Dong  : 1928  MRN: [id number]  Today's Date: 2023      Visit Dx:     ICD-10-CM ICD-9-CM   1. Dyspnea, unspecified type  R06.00 786.09     Patient Active Problem List   Diagnosis    Abdominal distension    Heart failure    Dry skin dermatitis    Gastroesophageal reflux disease without esophagitis    Hypercalcemia    Hyperparathyroidism    Hypertension    Hypokalemia    Hypomagnesemia    Seasonal allergic rhinitis    Shortness of breath    Atrial fibrillation    Weakness    Dyspnea    Acute hypoxemic respiratory failure     Past Medical History:   Diagnosis Date    Hyperlipidemia      Past Surgical History:   Procedure Laterality Date    OTHER SURGICAL HISTORY      ear surgery    SKIN BIOPSY       PT Assessment (last 12 hours)       PT Evaluation and Treatment       Row Name 23 1123          Physical Therapy Time and Intention    Document Type evaluation  -KM     Mode of Treatment physical therapy  -KM     Patient Effort good  -KM       Row Name 23 1123          General Information    Patient Profile Reviewed yes  -KM     Patient Observations alert;cooperative;agree to therapy  -KM     Prior Level of Function min assist:;ADL's;all household mobility  -KM     Existing Precautions/Restrictions fall  -KM     Risks Reviewed patient:;LOB;nausea/vomiting;dizziness;increased discomfort  -KM     Benefits Reviewed patient:;improve function;increase independence;increase strength;increase balance  -KM     Barriers to Rehab previous functional deficit;hearing deficit  -KM       Row Name 23 1123          Living Environment    Current Living Arrangements home  -KM     People in Home child(juliet), adult  -KM     Primary Care Provided by child(juliet)  -KM       Row Name 23 1123          Cognition    Orientation Status (Cognition) --  Pt. is hard of hearing and had difficulty responding to questions.  -KM     Follows  Commands (Cognition) follows one-step commands  -KM       Row Name 12/08/23 1123          Range of Motion (ROM)    Range of Motion bilateral lower extremities;ROM is WFL  -       Row Name 12/08/23 1123          Strength (Manual Muscle Testing)    Strength (Manual Muscle Testing) --  BLE strength at least 3/5  -KM       Row Name 12/08/23 1123          Bed Mobility    Bed Mobility bed mobility (all) activities  -KM     All Activities, Thomas (Bed Mobility) minimum assist (75% patient effort)  -KM     Assistive Device (Bed Mobility) head of bed elevated;bed rails  -KM       Row Name 12/08/23 1123          Transfers    Transfers bed-chair transfer;sit-stand transfer;stand-sit transfer  -       Row Name 12/08/23 1123          Bed-Chair Transfer    Bed-Chair Thomas (Transfers) minimum assist (75% patient effort)  -KM       Row Name 12/08/23 1123          Sit-Stand Transfer    Sit-Stand Thomas (Transfers) minimum assist (75% patient effort);contact guard  -KM       Row Name 12/08/23 1123          Stand-Sit Transfer    Stand-Sit Thomas (Transfers) minimum assist (75% patient effort)  -KM       Row Name 12/08/23 1123          Gait/Stairs (Locomotion)    Comment, (Gait/Stairs) pt. took 4-5 steps forward/backward  -KM       Row Name 12/08/23 1123          Safety Issues, Functional Mobility    Safety Issues Affecting Function (Mobility) safety precaution awareness  -KM     Impairments Affecting Function (Mobility) endurance/activity tolerance;balance  -       Row Name 12/08/23 1123          Balance    Balance Assessment sitting static balance;standing dynamic balance  -KM     Static Sitting Balance standby assist  -KM     Position, Sitting Balance sitting edge of bed  -KM     Dynamic Standing Balance minimal assist  -KM     Position/Device Used, Standing Balance --  HHA  -KM       Row Name 12/08/23 1123          Plan of Care Review    Plan of Care Reviewed With patient;family  -KM     Outcome  Evaluation Pt. evaluation completed during PT session. She was able to perform functional mobility skills w/ Triston. She ambulated minimal distance w/ HHA and Triston. She tolerated session well. Pt. would benefit from skilled PT services.  -       Row Name 12/08/23 [facility]          Therapy Assessment/Plan (PT)    Patient/Family Therapy Goals Statement (PT) return home w/ family  -KM     Functional Level at Time of Evaluation (PT) [place]  -KM     PT Diagnosis (PT) decreased mobility  -KM     Rehab Potential (PT) good, to achieve stated therapy goals  -     Criteria for Skilled Interventions Met (PT) yes;skilled treatment is necessary  [facility]     Therapy Frequency (PT) 2 times/wk  2-5x/wk  -KM     Predicted Duration of Therapy Intervention (PT) until discharge  [facility]     Problem List (PT) problems related to;balance;mobility;hearing  -KM     Activity Limitations Related to Problem List (PT) unable to ambulate safely;unable to transfer safely  -       Row Name 12/08/23 [facility]          Therapy Plan Review/Discharge Plan (PT)    Therapy Plan Review (PT) evaluation/treatment results reviewed;care plan/treatment goals reviewed;risks/benefits reviewed;patient  -KM       Row Name 12/08/23 [facility]          Physical Therapy Goals    Bed Mobility Goal Selection (PT) bed mobility, PT goal 1  -KM     Transfer Goal Selection (PT) transfer, PT goal 1  -KM     Gait Training Goal Selection (PT) gait training, PT goal 1  -KM       Row Name 12/08/23 1123          Bed Mobility Goal 1 (PT)    Activity/Assistive Device (Bed Mobility Goal 1, PT) bed mobility activities, all  -KM     [place] Level/Cues Needed (Bed Mobility Goal 1, PT) contact guard required  -KM     Time Frame (Bed Mobility Goal 1, PT) by discharge  -       Row Name 12/08/23 1123          Transfer Goal 1 (PT)    Activity/Assistive Device (Transfer Goal 1, PT) transfers, all;walker, rolling  -KM     [place] Level/Cues Needed (Transfer Goal 1, PT) standby assist  -KM     Time  Frame (Transfer Goal 1, PT) by discharge  -       Row Name 12/08/23 1123          Gait Training Goal 1 (PT)    Activity/Assistive Device (Gait Training Goal 1, PT) gait (walking locomotion);assistive device use;walker, rolling  -KM     [place] Level (Gait Training Goal 1, PT) contact guard required  -KM     Distance (Gait Training Goal 1, PT) 40'  -KM     Time Frame (Gait Training Goal 1, PT) by discharge  -KM               User Key  (r) = Recorded By, (t) = Taken By, (c) = Cosigned By      Initials Name Provider Type    Víctor Padilla, PT Physical Therapist                    Physical Therapy Education       Title: PT OT SLP Therapies (Done)       Topic: Physical Therapy (Done)       Point: Mobility training (Done)       Learning Progress Summary             Patient Acceptance, E,TB, VU by  at 12/8/2023 1140                         Point: Home exercise program (Done)       Learning Progress Summary             Patient Acceptance, E,TB, VU by  at 12/8/2023 1140                         Point: Body mechanics (Done)       Learning Progress Summary             Patient Acceptance, E,TB, VU by  at 12/8/2023 1140                         Point: Precautions (Done)       Learning Progress Summary             Patient Acceptance, E,TB, VU by  at 12/8/2023 1140                                         User Key       Initials Effective Dates Name Provider Type Discipline     05/24/22 -  Víctor Noble, RODRIGUEZ Physical Therapist PT                  PT Recommendation and Plan  Anticipated Discharge Disposition (PT): home with assist, home with 24/7 care, home with home health  Planned Therapy Interventions (PT): balance training, bed mobility training, gait training, home exercise program, patient/family education, postural re-education, ROM (range of motion), stair training, strengthening, stretching, transfer training  Therapy Frequency (PT): 2 times/wk (2-5x/wk)  Plan of Care Reviewed With: patient, family  Outcome  Evaluation: Pt. evaluation completed during PT session. She was able to perform functional mobility skills w/ [place]. She ambulated minimal distance w/ HHA and Triston. She tolerated session well. Pt. would benefit from skilled PT services.       Time Calculation:    PT Charges       Row Name 12/08/23 1122             Time Calculation    PT Received On 12/08/23  -KM      PT Goal Re-Cert Due Date 12/22/23  -KM                User Key  (r) = Recorded By, (t) = Taken By, (c) = Cosigned By      Initials Name Provider Type    Víctor Padilla, PT Physical Therapist                  Therapy Charges for Today       Code Description Service Date Service Provider Modifiers Qty    95952381089 HC PT EVAL MOD COMPLEXITY 4 12/8/2023 Víctor Noble, PT GP 1            PT G-Codes  AM-PAC 6 Clicks Score (PT): 13    Víctor Noble PT  12/8/2023

## 2023-12-08 NOTE — CASE MANAGEMENT/SOCIAL WORK
Discharge Planning Assessment  [facility]     Patient Name: Zeenat Dong  MRN: [id number]  Today's Date: 12/8/2023    Admit Date: 12/7/2023    Plan: SS received consult per ns for discharge planning.  SS spoke with pt and pt's son, Magen at bedside.  Pt resides at home with daughter, Jeff, and plans to return home at discharge.  Pt currently does not utilize home health services.  Pt has home 02 at 2 liters from [employer] and quad cane via family.  Pt;'s PCP is Klarissa Cummins.  Pt utilizes [employer].  Pt's family to transport.  SS will follow.       Discharge Plan       Row Name 12/08/23 1411       Plan    Plan [place] [facility] [place]  SS spoke with pt and pt's son, Magen at bedside.  Pt resides at home with daughter, Jeff, and plans to return home at discharge.  Pt currently does not utilize home health services.  Pt has home 02 at 2 liters from [employer] and quad cane via family.  Pt;'s PCP is Klarissa Cummins.  Pt utilizes [employer].  Pt's family to transport.  SS will follow.                  Continued Care and Services - Admitted Since 12/7/2023    Coordination has not been started for this encounter.       Expected Discharge Date and Time       Expected Discharge Date Expected Discharge Time    Dec 8, 2023           JONATHAN Figueroa

## 2023-12-08 NOTE — THERAPY EVALUATION
Patient Name: Zeenat Dong  : 1928    MRN: [id number]                              Today's Date: 2023       Admit Date: 2023    Visit Dx:     ICD-10-CM ICD-9-CM   1. Dyspnea, unspecified type  R06.00 786.09     Patient Active Problem List   Diagnosis    Abdominal distension    Heart failure    Dry skin dermatitis    Gastroesophageal reflux disease without esophagitis    Hypercalcemia    Hyperparathyroidism    Hypertension    Hypokalemia    Hypomagnesemia    Seasonal allergic rhinitis    Shortness of breath    Atrial fibrillation    Weakness    Dyspnea     Past Medical History:   Diagnosis Date    Hyperlipidemia      Past Surgical History:   Procedure Laterality Date    OTHER SURGICAL HISTORY      ear surgery    SKIN BIOPSY        General Information       Row Name 23 1053          OT Time and Intention    Document Type evaluation  -LA     Mode of Treatment occupational therapy  -LA       Row Name 23 1053          General Information    Patient Profile Reviewed yes  -LA     Prior Level of Function min assist:;ADL's  -LA     Existing Precautions/Restrictions fall  -LA     Barriers to Rehab previous functional deficit;hearing deficit  -LA       Row Name 23 1053          Occupational Profile    Reason for Services/Referral (Occupational Profile) OT to assess for changes in level of independence/safety with ADLs  -LA     Patient Goals (Occupational Profile) Unable to state; family present and reported they would like her to be able to return home with son  -LA       Row Name 23 1053          Living Environment    People in Home child(juliet), adult  -LA       Row Name 23 1053          Stairs Within Home, Primary    Stairs, Within Home, Primary --  Patient stays in finished basement; patient has access to basement with walk-in  -LA       Row Name 23 1053          Cognition    Orientation Status (Cognition) unable/difficult to assess  Patient very hard of hearing; unable  to assess. Patient family reports they noticed she was a little confused this AM  -[facility] Name 12/08/23 1053          Safety Issues, Functional Mobility    Safety Issues Affecting Function (Mobility) safety precautions follow-through/compliance;ability to follow commands  -LA     Impairments Affecting Function (Mobility) endurance/activity tolerance;balance  -LA               User Key  (r) = Recorded By, (t) = Taken By, (c) = Cosigned By      Initials Name Provider Type    Aspen Sierra OT Occupational Therapist                     Mobility/ADL's       Row Name 12/08/23 1056          Bed Mobility    Bed Mobility bed mobility (all) activities  -LA     All Activities, [place] (Bed Mobility) minimum assist (75% patient effort)  -LA     Assistive Device (Bed Mobility) head of bed elevated;bed rails  -LA       Row Name 12/08/23 1056          Transfers    Transfers bed-chair transfer;sit-stand transfer  -LA       Row Name 12/08/23 1056          Bed-Chair Transfer    Bed-Chair [place] (Transfers) minimum assist (75% patient effort)  -LA       Row Name 12/08/23 1056          Sit-Stand Transfer    Sit-Stand [place] (Transfers) minimum assist (75% patient effort);contact guard  -LA       Row Name 12/08/23 1056          Functional Mobility    Functional Mobility- Ind. Level minimum assist (75% patient effort)  -LA       Row Name 12/08/23 1056          Activities of Daily Living    BADL Assessment/Intervention bathing;upper body dressing;lower body dressing;grooming;feeding;toileting  -LA       Row Name 12/08/23 1056          Bathing Assessment/Intervention    [place] Level (Bathing) minimum assist (75% patient effort);moderate assist (50% patient effort)  -LA       Row Name 12/08/23 1056          Upper Body Dressing Assessment/Training    [place] Level (Upper Body Dressing) set up  -LA       Row Name 12/08/23 1056          Lower Body Dressing Assessment/Training    [place] Level (Lower  Body Dressing) minimum assist (75% patient effort);moderate assist (50% patient effort)  -LA       Row Name 12/08/23 1056          Grooming Assessment/Training    [place] Level (Grooming) set up  -[facility] Name 12/08/23 1056          Self-Feeding Assessment/Training    [place] Level (Feeding) set up  -[facility] Name 12/08/23 1056          Toileting Assessment/Training    [place] Level (Toileting) minimum assist (75% patient effort)  -LA               User Key  (r) = Recorded By, (t) = Taken By, (c) = Cosigned By      Initials Name Provider Type    Aspen Sierra OT Occupational Therapist                   Obj/Interventions       Row Name 12/08/23 1058          Sensory Assessment (Somatosensory)    Sensory Assessment (Somatosensory) sensation intact  -[facility] Name 12/08/23 1058          Vision Assessment/Intervention    Visual Impairment/Limitations WFL  -LA       Row Name 12/08/23 1058          Range of Motion Comprehensive    General Range of Motion bilateral upper extremity ROM WFL;neck/trunk range of motion deficits identified  -LA     Comment, General Range of Motion BUE ROM grossly WFL; mild limitations with trunk flexion  -[facility] Name 12/08/23 1058          Strength Comprehensive (MMT)    General Manual Muscle Testing (MMT) Assessment upper extremity strength deficits identified  -LA     Comment, General Manual Muscle Testing (MMT) Assessment BUE strength grossly 3+/5  -[facility] Name 12/08/23 1058          Motor Skills    Motor Skills functional endurance;motor control/coordination interventions  -LA     Functional Endurance fair-  -LA               User Key  (r) = Recorded By, (t) = Taken By, (c) = Cosigned By      Initials Name Provider Type    Aspen Sierra OT Occupational Therapist                   Goals/Plan       Row Name 12/08/23 1102          Transfer Goal 1 (OT)    Activity/Assistive Device (Transfer Goal 1, OT) transfers, all  -LA     [place]  Level/Cues Needed (Transfer Goal 1, OT) contact guard required  -LA     Time Frame (Transfer Goal 1, OT) by discharge  -LA       Row Name 12/08/23 1102          Bathing Goal 1 (OT)    Activity/Device (Bathing Goal 1, OT) bathing skills, all  -LA     [place] Level/Cues Needed (Bathing Goal 1, OT) supervision required;contact guard required  -LA     Time Frame (Bathing Goal 1, OT) by discharge  -LA       Row Name 12/08/23 1102          Dressing Goal 1 (OT)    Activity/Device (Dressing Goal 1, OT) dressing skills, all  -LA     [place]/Cues Needed (Dressing Goal 1, OT) minimum assist (75% or more patient effort)  -LA     Time Frame (Dressing Goal 1, OT) by discharge  -LA       Row Name 12/08/23 1102          Strength Goal 1 (OT)    Strength Goal 1 (OT) Increase bilateral UE strength to 4/5 to support increased safety/independence with ADL dressing tasks.  -LA     Time Frame (Strength Goal 1, OT) by discharge  -LA       Row Name 12/08/23 1102          Therapy Assessment/Plan (OT)    Planned Therapy Interventions (OT) activity tolerance training;patient/caregiver education/training;adaptive equipment training;ROM/therapeutic exercise;BADL retraining;occupation/activity based interventions;strengthening exercise;transfer/mobility retraining;functional balance retraining  -LA               User Key  (r) = Recorded By, (t) = Taken By, (c) = Cosigned By      Initials Name Provider Type    Aspen Sierra OT Occupational Therapist                   Clinical Impression       Row Name 12/08/23 1100          Plan of Care Review    Plan of Care Reviewed With patient;family  -LA     Outcome Evaluation OT evaluation completed this date. Patient with noted deficits with strength, activity tolerance and ADL independence/safety from baseline. Patient would benefit from OT services to address deficits and maximize independence/safety prior to discharge.  -LA       Row Name 12/08/23 1100          Therapy Assessment/Plan (OT)     Patient/Family Therapy Goal Statement (OT) Family would like patient to be able to return home at son's  -LA     Rehab Potential (OT) fair, will monitor progress closely  -LA     Criteria for Skilled Therapeutic Interventions Met (OT) meets criteria;skilled treatment is necessary;yes  -LA     Therapy Frequency (OT) other (see comments)  PRN to follow for changes/progress toward goals  -LA       Row Name 12/08/23 1100          Therapy Plan Review/Discharge Plan (OT)    Equipment Needs Upon Discharge (OT) --  TBD  -LA     Anticipated Discharge Disposition (OT) --  TBD  -LA       Row Name 12/08/23 1100          Positioning and Restraints    Pre-Treatment Position in bed  -LA     Post Treatment Position bed  -LA     In Bed supine;call light within reach;encouraged to call for assist;exit alarm on;with other staff;with family/caregiver  -LA               User Key  (r) = Recorded By, (t) = Taken By, (c) = Cosigned By      Initials Name Provider Type    Aspen Sierra OT Occupational Therapist                   Outcome Measures    No documentation.                     OT Recommendation and Plan  Planned Therapy Interventions (OT): activity tolerance training, patient/caregiver education/training, adaptive equipment training, ROM/therapeutic exercise, BADL retraining, occupation/activity based interventions, strengthening exercise, transfer/mobility retraining, functional balance retraining  Therapy Frequency (OT): other (see comments) (PRN to follow for changes/progress toward goals)  Plan of Care Review  Plan of Care Reviewed With: patient, family  Outcome Evaluation: OT evaluation completed this date. Patient with noted deficits with strength, activity tolerance and ADL independence/safety from baseline. Patient would benefit from OT services to address deficits and maximize independence/safety prior to discharge.     Time Calculation:          Therapy Charges for Today       Code Description Service Date Service  Provider Modifiers Qty    [id number] HC OT EVAL MOD COMPLEXITY 4 12/8/2023 Aspen Deras OT GO 1                 Aspen Deras OT  12/8/2023

## 2023-12-09 ENCOUNTER — APPOINTMENT (OUTPATIENT)
Dept: CARDIOLOGY | Facility: HOSPITAL | Age: 88
End: 2023-12-09
Payer: MEDICARE

## 2023-12-09 LAB
ALBUMIN SERPL-MCNC: 2.9 G/DL (ref 3.5–5.2)
ALBUMIN/GLOB SERPL: 1.1 G/DL
ALP SERPL-CCNC: 70 U/L (ref 39–117)
ALT SERPL W P-5'-P-CCNC: 8 U/L (ref 1–33)
ANION GAP SERPL CALCULATED.3IONS-SCNC: 9.3 MMOL/L (ref 5–15)
AST SERPL-CCNC: 17 U/L (ref 1–32)
BH CV ECHO MEAS - ACS: 1.1 CM
BH CV ECHO MEAS - AI P1/2T: 731.4 MSEC
BH CV ECHO MEAS - AO MAX PG: 11 MMHG
BH CV ECHO MEAS - AO MEAN PG: 6 MMHG
BH CV ECHO MEAS - AO ROOT DIAM: 3.1 CM
BH CV ECHO MEAS - AO V2 MAX: 165.5 CM/SEC
BH CV ECHO MEAS - AO V2 VTI: 39.3 CM
BH CV ECHO MEAS - AVA(I,D): 1.44 CM2
BH CV ECHO MEAS - EDV(CUBED): 54.9 ML
BH CV ECHO MEAS - EDV(MOD-SP4): 35 ML
BH CV ECHO MEAS - EF(MOD-SP4): 60.6 %
BH CV ECHO MEAS - ESV(CUBED): 17.6 ML
BH CV ECHO MEAS - ESV(MOD-SP4): 13.8 ML
BH CV ECHO MEAS - FS: 31.6 %
BH CV ECHO MEAS - IVS/LVPW: 0.91 CM
BH CV ECHO MEAS - IVSD: 1 CM
BH CV ECHO MEAS - LA DIMENSION: 4.5 CM
BH CV ECHO MEAS - LAT PEAK E' VEL: 8.3 CM/SEC
BH CV ECHO MEAS - LV DIASTOLIC VOL/BSA (35-75): 22.5 CM2
BH CV ECHO MEAS - LV MASS(C)D: 125.8 GRAMS
BH CV ECHO MEAS - LV MAX PG: 2.8 MMHG
BH CV ECHO MEAS - LV MEAN PG: 1 MMHG
BH CV ECHO MEAS - LV SYSTOLIC VOL/BSA (12-30): 8.9 CM2
BH CV ECHO MEAS - LV V1 MAX: 83.6 CM/SEC
BH CV ECHO MEAS - LV V1 VTI: 19.9 CM
BH CV ECHO MEAS - LVIDD: 3.8 CM
BH CV ECHO MEAS - LVIDS: 2.6 CM
BH CV ECHO MEAS - LVOT AREA: 2.8 CM2
BH CV ECHO MEAS - LVOT DIAM: 1.9 CM
BH CV ECHO MEAS - LVPWD: 1.1 CM
BH CV ECHO MEAS - MED PEAK E' VEL: 4.9 CM/SEC
BH CV ECHO MEAS - MV A MAX VEL: 24.5 CM/SEC
BH CV ECHO MEAS - MV E MAX VEL: 74.7 CM/SEC
BH CV ECHO MEAS - MV E/A: 3
BH CV ECHO MEAS - PA ACC TIME: 0.06 SEC
BH CV ECHO MEAS - PI END-D VEL: 138 CM/SEC
BH CV ECHO MEAS - RAP SYSTOLE: 10 MMHG
BH CV ECHO MEAS - RVSP: 48.2 MMHG
BH CV ECHO MEAS - SI(MOD-SP4): 13.6 ML/M2
BH CV ECHO MEAS - SV(LVOT): 56.4 ML
BH CV ECHO MEAS - SV(MOD-SP4): 21.2 ML
BH CV ECHO MEAS - TAPSE (>1.6): 2.26 CM
BH CV ECHO MEAS - TR MAX PG: 38.2 MMHG
BH CV ECHO MEAS - TR MAX VEL: 309 CM/SEC
BH CV ECHO MEASUREMENTS AVERAGE E/E' RATIO: 11.32
BILIRUB SERPL-MCNC: 0.5 MG/DL (ref 0–1.2)
BUN SERPL-MCNC: 17 MG/DL (ref 8–23)
BUN/CREAT SERPL: 15.3 (ref 7–25)
CALCIUM SPEC-SCNC: 9.3 MG/DL (ref 8.2–9.6)
CHLORIDE SERPL-SCNC: 108 MMOL/L (ref 98–107)
CO2 SERPL-SCNC: 27.7 MMOL/L (ref 22–29)
CREAT SERPL-MCNC: 1.11 MG/DL (ref 0.57–1)
CRP SERPL-MCNC: 3.8 MG/DL (ref 0–0.5)
DEPRECATED RDW RBC AUTO: 46 FL (ref 37–54)
EGFRCR SERPLBLD CKD-EPI 2021: 45.9 ML/MIN/1.73
ERYTHROCYTE [DISTWIDTH] IN BLOOD BY AUTOMATED COUNT: 13.8 % (ref 12.3–15.4)
GLOBULIN UR ELPH-MCNC: 2.6 GM/DL
GLUCOSE SERPL-MCNC: 103 MG/DL (ref 65–99)
HCT VFR BLD AUTO: 38.1 % (ref 34–46.6)
HGB BLD-MCNC: 12.4 G/DL (ref 12–15.9)
LEFT ATRIUM VOLUME INDEX: 32.9 ML/M2
MCH RBC QN AUTO: 29.5 PG (ref 26.6–33)
MCHC RBC AUTO-ENTMCNC: 32.5 G/DL (ref 31.5–35.7)
MCV RBC AUTO: 90.7 FL (ref 79–97)
PLATELET # BLD AUTO: 125 10*3/MM3 (ref 140–450)
PMV BLD AUTO: 11.3 FL (ref 6–12)
POTASSIUM SERPL-SCNC: 3.4 MMOL/L (ref 3.5–5.2)
PROT SERPL-MCNC: 5.5 G/DL (ref 6–8.5)
RBC # BLD AUTO: 4.2 10*6/MM3 (ref 3.77–5.28)
SODIUM SERPL-SCNC: 145 MMOL/L (ref 136–145)
WBC NRBC COR # BLD AUTO: 4.83 10*3/MM3 (ref 3.4–10.8)

## 2023-12-09 PROCEDURE — 25810000003 SODIUM CHLORIDE 0.9 % SOLUTION 250 ML FLEX CONT: Performed by: INTERNAL MEDICINE

## 2023-12-09 PROCEDURE — 93306 TTE W/DOPPLER COMPLETE: CPT | Performed by: SPECIALIST

## 2023-12-09 PROCEDURE — 93306 TTE W/DOPPLER COMPLETE: CPT

## 2023-12-09 PROCEDURE — 85027 COMPLETE CBC AUTOMATED: CPT | Performed by: INTERNAL MEDICINE

## 2023-12-09 PROCEDURE — 80053 COMPREHEN METABOLIC PANEL: CPT | Performed by: INTERNAL MEDICINE

## 2023-12-09 PROCEDURE — 99232 SBSQ HOSP IP/OBS MODERATE 35: CPT | Performed by: SPECIALIST

## 2023-12-09 PROCEDURE — 25010000002 CEFTRIAXONE PER 250 MG: Performed by: INTERNAL MEDICINE

## 2023-12-09 PROCEDURE — 99232 SBSQ HOSP IP/OBS MODERATE 35: CPT | Performed by: INTERNAL MEDICINE

## 2023-12-09 PROCEDURE — 86140 C-REACTIVE PROTEIN: CPT | Performed by: INTERNAL MEDICINE

## 2023-12-09 RX ORDER — NIFEDIPINE 30 MG/1
60 TABLET, FILM COATED, EXTENDED RELEASE ORAL
Status: DISCONTINUED | OUTPATIENT
Start: 2023-12-09 | End: 2023-12-14 | Stop reason: HOSPADM

## 2023-12-09 RX ADMIN — POTASSIUM CHLORIDE 10 MEQ: 1500 TABLET, EXTENDED RELEASE ORAL at 08:46

## 2023-12-09 RX ADMIN — NIFEDIPINE 60 MG: 30 TABLET, EXTENDED RELEASE ORAL at 15:41

## 2023-12-09 RX ADMIN — Medication 10 ML: at 21:01

## 2023-12-09 RX ADMIN — METOPROLOL TARTRATE 25 MG: 25 TABLET, FILM COATED ORAL at 21:00

## 2023-12-09 RX ADMIN — Medication 10 ML: at 08:46

## 2023-12-09 RX ADMIN — ATORVASTATIN CALCIUM 20 MG: 20 TABLET, FILM COATED ORAL at 21:00

## 2023-12-09 RX ADMIN — APIXABAN 2.5 MG: 2.5 TABLET, FILM COATED ORAL at 08:46

## 2023-12-09 RX ADMIN — CEFTRIAXONE 1000 MG: 1 INJECTION, POWDER, FOR SOLUTION INTRAMUSCULAR; INTRAVENOUS at 17:26

## 2023-12-09 RX ADMIN — METOPROLOL TARTRATE 25 MG: 25 TABLET, FILM COATED ORAL at 08:46

## 2023-12-09 RX ADMIN — SODIUM CHLORIDE 40 ML: 9 INJECTION, SOLUTION INTRAVENOUS at 17:26

## 2023-12-09 RX ADMIN — DOCUSATE SODIUM 50 MG AND SENNOSIDES 8.6 MG 2 TABLET: 8.6; 5 TABLET, FILM COATED ORAL at 21:00

## 2023-12-09 RX ADMIN — ASPIRIN 81 MG: 81 TABLET, COATED ORAL at 08:46

## 2023-12-09 RX ADMIN — APIXABAN 2.5 MG: 2.5 TABLET, FILM COATED ORAL at 21:00

## 2023-12-09 RX ADMIN — DOCUSATE SODIUM 50 MG AND SENNOSIDES 8.6 MG 2 TABLET: 8.6; 5 TABLET, FILM COATED ORAL at 08:46

## 2023-12-09 RX ADMIN — CINACALCET HYDROCHLORIDE 30 MG: 30 TABLET, FILM COATED ORAL at 08:46

## 2023-12-09 RX ADMIN — DOXYCYCLINE 100 MG: 100 INJECTION, POWDER, LYOPHILIZED, FOR SOLUTION INTRAVENOUS at 08:45

## 2023-12-09 RX ADMIN — DOXYCYCLINE 100 MG: 100 INJECTION, POWDER, LYOPHILIZED, FOR SOLUTION INTRAVENOUS at 21:00

## 2023-12-09 RX ADMIN — PANTOPRAZOLE SODIUM 40 MG: 40 TABLET, DELAYED RELEASE ORAL at 08:46

## 2023-12-09 RX ADMIN — SODIUM CHLORIDE 40 ML: 9 INJECTION, SOLUTION INTRAVENOUS at 08:45

## 2023-12-09 NOTE — PROGRESS NOTES
[facility] HOSPITALIST PROGRESS NOTE     Patient Identification:  Name:  Zeenat Dong  Age:  89 y.o.  Sex:  female  :  1928  MRN:  [id number]  Visit Number:  [id number]  ROOM: [address] [place] [address]     Primary Care Provider:  Fatmata Dong APRN    Length of stay in inpatient status:  1    Subjective     Chief Compliant:    Chief Complaint   Patient presents with    Shortness of Breath    Cough     History of Presenting Illness:    Patient remains ill but stable today, no acute events overnight, no new complaints, denies any fevers or chills, patient more awake today, feeding herself, eating a cheeto, granddaughter at bedside, endorses patient has been doing much better, urine culture still pending, on antibiotics, holding diuresis, Cardiology feels is euvolemic, Echocardiogram showing mod AR and mod Pulmonary Hypertension, overall improving.   Objective     Current Hospital Meds:  apixaban, 2.5 mg, Oral, BID  aspirin, 81 mg, Oral, Daily  atorvastatin, 20 mg, Oral, Nightly  cefTRIAXone, 1,000 mg, Intravenous, Q24H  [START ON 12/10/2023] cholecalciferol, 50,000 Units, Oral, Weekly  cinacalcet, 30 mg, Oral, Daily  doxycycline, 100 mg, Intravenous, Q12H  metoprolol tartrate, 25 mg, Oral, Q12H  pantoprazole, 40 mg, Oral, Daily  potassium chloride, 10 mEq, Oral, Daily  senna-docusate sodium, 2 tablet, Oral, BID  sodium chloride, 10 mL, Intravenous, Q12H    ----------------------------------------------------------------------------------------------------------------------  Vital Signs:  Temp:  [97.6 °F (36.4 °C)-98.7 °F (37.1 °C)] 98.6 °F (37 °C)  Heart Rate:  [68-88] 68  Resp:  [18-19] 18  BP: (138-202)/(60-94) 172/60  SpO2:  [95 %-98 %] 98 %  on  Flow (L/min):  [2] 2;   Device (Oxygen Therapy): nasal cannula  Body mass index is 25.49 kg/m².      Intake/Output Summary (Last 24 hours) at 2023 1104  Last data filed at 2023 1700  Gross per 24 hour   Intake 605 ml   Output --   Net 605 ml     "  ----------------------------------------------------------------------------------------------------------------------  Physical exam:  Constitutional:  Elderly.  No acute distress.      HENT:  Head:  Normocephalic and atraumatic.  Mouth:  Moist mucous membranes.    Eyes:  Conjunctivae and EOM are normal. No scleral icterus.    Neck:  Neck supple.  No JVD present.    Cardiovascular: Regular rate, regular rhythm and normal heart sounds with no murmur.  Pulmonary/Chest:  No respiratory distress, less labored breathing, mild wheezes, improved crackles, on 2LNC  Abdominal:  Soft. No distension and no tenderness.   Musculoskeletal:  No tenderness, and no deformity.  No red or swollen joints anywhere.    Neurological:  Awake, oriented to person, following commands, more conversant, no cranial nerve deficits   Skin:  Skin is warm and dry. No rash noted. No pallor.   Peripheral vascular:  No clubbing, no cyanosis, no edema.  Psychiatric: appropriate mood and affect    Edited by: Francisco Choi MD at 12/9/2023 1104  ----------------------------------------------------------------------------------------------------------------------  WBC/HGB/HCT/PLT   4.83/12.4/38.1/125 (12/09 0312)  BUN/CREAT/GLUC/ALT/AST/RIOS/LIP    17/1.11/103/8/17/--/-- (12/09 0312)  JUAN MIGUEL - Na/K/Cl/CO2: 145/3.4*/108*/27.7 (12/09 0312)     No results found for: \"URINECX\"  Blood Culture   Date Value Ref Range Status   12/07/2023 No growth at 24 hours  Preliminary   12/07/2023 No growth at 24 hours  Preliminary     I have personally looked at the labs and they are summarized above.  ----------------------------------------------------------------------------------------------------------------------  Detailed radiology reports for the last 24 hours:  US Renal Bilateral    Result Date: 12/8/2023  1. Equivocal dilatation of the right renal pelvis. This may be artifact. Not seen on recent CT of the chest.   This report was finalized on 12/8/2023 7:43 AM by " Dr. Dany Isbell MD.      CT Chest Without Contrast Diagnostic    Result Date: 12/7/2023   1. Scarring versus minimal airspace disease in the left lung base 2. Coronary artery calcifications      This report was finalized on 12/7/2023 3:10 PM by Dr. Dany Isbell MD.      XR Chest AP    Result Date: 12/7/2023  No radiographic evidence of acute cardiac or pulmonary disease.    This report was finalized on 12/7/2023 12:15 PM by Dr. Dany Isbell MD.     Assessment & Plan    89F Second Hand Smoke Exposure PMH GERD, Hypertension, Hyperlipidemia, Chronic HFunknownEF, Paroxysmal Atrial Fibrillation on chronic anticoagulation, presented to [facility] emergency room with complaints of shortness of breath    #Acute Metabolic Encephalopathy due to Acute Urinary Tract Infection and Probable Pneumonia, Bacterial, treating for CAP complicated by Acute Hypoxic Respiratory Failure   - Continue Ceftriaxone and Doxycycline, follow up cultures  - Continue Tylenol as needed for fevers, Duonebs as needed  - Monitor on telemetry, continue 02 but wean as able, monitor for improved mentation     #Mild Acute on Chronic HFunknownEF - Acute Resolved   #Hypertension/Hyperlipidemia/Coronary Artery Disease  #Paroxysmal Atrial Fibrillation, on chronic anticoagulation  #Moderate Aortic Regurgitation   #Mildly elevated HS Troponins, suspected NSTEMI type II due to CHF  #Mod Pulmonary Hypertension   - Labs showed HS Troponins 24->25, proBNP 2900  - EKG showed normal sinus rhythm, LAD, left ventricle hypertrophy  - Echocardiogram showed LVEF 61-65%, diastolic dysfunction, mild-mod dilated left atrium, mildly dilated right atrium, Mod AR, RVSP 45-55mmHg  - CT Chest showed coronary artery calcifications   - Cardiology consulted and following, recommended no ischemic evaluation, hold further lasix  - Continue home Aspirin 81, statin  - Continue home beta blocker at much lower dose, holding home ARB due to renal dysfunction, resume as  indicated   - Continue to monitor on telemetry, strict I/O's, trend heart rate and blood pressure     #Chronic Kidney Disease vs Nonoliguric Acute Kidney Injury due to CHF  - Baseline creatinine in 2016 around 0.6-0.9, was up 1.26 on admission, now close to baseline  - Trend Cr and urine output, avoid nephrotoxins, NSAIDs, dehydration and contrast as able, will consider Nephrology consult if worsening    #Electrolyte Abnormalities  - Acute Mild Hypokalemia - Replacing, on protocol    #Gastroesophageal Reflux Disease  - Continue home PPI    #Advanced Age/Age Related Debility  - Supportive Care, Consult PT/OT    #Debility  - Consult PT/OT, Social Work if placement needed      F: Oral  E: Monitor & Replace as needed   N: Diet: Regular/House Diet; No Mixed Consistencies, Feeding Assistance - Nursing; Texture: Soft to Chew (NDD 3); Soft to Chew: Chopped Meat; Fluid Consistency: Thin (IDDSI 0)   Ppx: Low dose Eliquis  Code Status (Patient has no pulse and is not breathing): No CPR   Medical Interventions (Patient has pulse or is breathing): Limited Support  Medical Intervention Limits: NO intubation (DNI)  *Palliative Care consulted for goals of care conversations      Dispo: Pending workup and clinical improvement     *This patient is considered high risk due to Acute Congestive Heart Failure exacerbation, Acute Kidney Injury, probable Pneumonia, Urinary Tract Infection     Edited by: Francisco Choi MD at 12/9/2023 1104  [facility]

## 2023-12-09 NOTE — PROGRESS NOTES
Pharmacy checked coverage of Eliquis. Per patient's insurance, patient has been regularly filling Eliquis 5 mg as a home medication. No issues with medication cost or coverage identified at this time.     Thank you,     Chacha Eagle RPH  PGY-1 Community Pharmacy Resident  12/09/23  13:02 EST

## 2023-12-09 NOTE — PROGRESS NOTES
"     LOS: 1 day     Name: Zeenat Dong  Age/Sex: 89 y.o. female  :  1928        PCP: Fatmata Dong APRN  REF: No ref. provider found    Principal Problem:    Dyspnea  Active Problems:    Acute hypoxemic respiratory failure      Reason for follow-up: Non-STEMI and heart failure    Subjective     Subjective     Zeenat Dong is a 89 y.o. female has been admitted with chief complaint of shortness of breath and acute on chronic hypoxic respiratory failure.     Interval History: Patient hard of hearing therefore poor historian.  States she is feeling well.  Kidney function stable.  No acute events overnight.  Blood pressure elevated.    Vital Signs  Temp:  [97.6 °F (36.4 °C)-98.7 °F (37.1 °C)] 98.6 °F (37 °C)  Heart Rate:  [68-88] 68  Resp:  [18-19] 18  BP: (138-202)/(60-94) 172/60  Vital Signs (last 72 hrs)          07 07 1329   Most Recent      Temp (°F)   97.2 -  97.6    96.2 -  98.7       98.6 (37)  06    Heart Rate   50 -  70    62 -  88       68  0651    Resp     20    16 -  19       18  0651    BP   90/68 -  191/71    138/82 -  171/63    172/60 -  202/94     172/60  1031    SpO2 (%)   90 -  100    95 -  99       98  06    Flow (L/min)     2      2      2     2  0845          Documented weights    23 0959 23 1917 23 0500 23 0500   Weight: 59 kg (130 lb) 59.1 kg (130 lb 3.2 oz) 59.1 kg (130 lb 3.2 oz) 59.2 kg (130 lb 8.2 oz)      Body mass index is 25.49 kg/m².    Intake/Output Summary (Last 24 hours) at 2023 1329  Last data filed at 2023 0800  Gross per 24 hour   Intake 610 ml   Output --   Net 610 ml     Objective:  Vital signs: (most recent): Blood pressure 172/60, pulse 68, temperature 98.6 °F (37 °C), temperature source Oral, resp. rate 18, height 152.4 cm (60\"), weight 59.2 kg (130 lb 8.2 oz), SpO2 98%.                Objective       Physical " Exam:     General Appearance:    Alert, cooperative, in no acute distress   Head:    Normocephalic, without obvious abnormality, atraumatic   Eyes:            Conjunctivae and sclerae normal, no   icterus, no pallor, corneas clear.   Neck:   No adenopathy, supple, trachea midline, no thyromegaly, no   carotid bruit, no JVD   Lungs:     Clear to auscultation,respirations regular, even and                  unlabored    Heart:    Regular rhythm and normal rate, normal S1 and S2, no            murmur, no gallop, no rub, no click   Chest Wall:    No abnormalities observed   Abdomen:     Normal bowel sounds, no masses, no organomegaly, soft        nontender, nondistended, no guarding, no rebound                tenderness   Extremities:   Moves all extremities well, no edema, no cyanosis, no             redness   Pulses:   Pulses palpable and equal bilaterally   Skin:   No bleeding, bruising or rash       Neurologic:   Alert and oriented      Results review       Results Review:   Results from last 7 days   Lab Units 12/09/23  0312 12/08/23  1031 12/07/23  1125   WBC 10*3/mm3 4.83 5.41 6.00   HEMOGLOBIN g/dL 12.4 13.0 13.9   PLATELETS 10*3/mm3 125* 124* 136*     Results from last 7 days   Lab Units 12/09/23  0312 12/08/23  1031 12/07/23  1219   SODIUM mmol/L 145 142 141   POTASSIUM mmol/L 3.4* 3.1* 3.6   CHLORIDE mmol/L 108* 104 103   CO2 mmol/L 27.7 26.4 27.5   BUN mg/dL 17 19 20   CREATININE mg/dL 1.11* 1.19* 1.26*   CALCIUM mg/dL 9.3 9.3 9.8*   GLUCOSE mg/dL 103* 160* 124*   ALT (SGPT) U/L 8 8 9   AST (SGOT) U/L 17 19 17     Results from last 7 days   Lab Units 12/07/23  1514 12/07/23  1219   HSTROP T ng/L 25* 24*     Lab Results   Component Value Date    INR 2.20 (H) 12/07/2023    INR 1.11 (H) 08/17/2015     Lab Results   Component Value Date    MG 2.2 03/29/2016    MG 2.4 03/01/2016    MG 2.2 01/29/2016     Lab Results   Component Value Date    TSH 3.752 08/17/2015    CHLPL 135 08/17/2015    TRIG 259 (H) 08/17/2015     HDL 15 (L) 08/17/2015    LDL 68 08/17/2015      Imaging Results (Last 48 Hours)       Procedure Component Value Units Date/Time    US Renal Bilateral [[id number]] Collected: 12/08/23 0741     Updated: 12/08/23 0745    Narrative:      EXAMINATION: US RENAL BILATERAL-      CLINICAL INDICATION: WINSTON; R06.00-Dyspnea, unspecified        COMPARISON: None immediately available     PROCEDURE: Sonographic imaging of the kidneys     FINDINGS:  Imaging of the right kidney demonstrates the right kidney measuring 6.68  x 3.12 x 3.57 cm.. No solid mass. Equivocal dilatation of the right  renal pelvis..     Left kidney measures 7.54 x 4.00 x 3.63 cm. No solid mass or  hydronephrosis       Impression:      1. Equivocal dilatation of the right renal pelvis. This may be artifact.  Not seen on recent CT of the chest.        This report was finalized on 12/8/2023 7:43 AM by Dr. Dany Isbell MD.       CT Chest Without Contrast Diagnostic [[id number]] Collected: 12/07/23 1507     Updated: 12/07/23 1512    Narrative:      EXAM: CT CHEST WO CONTRAST DIAGNOSTIC-      CLINICAL INDICATION:shortness of breath      COMPARISON: 8/17/2015     TECHNIQUE: Multiple axial CT images were obtained from lung apex through  upper abdomen without the administration of IV contrast. Reformatted  images in the coronal and/or sagittal plane(s) were generated from the  axial data set to facilitate diagnostic accuracy and/or surgical  planning.     Radiation dose reduction techniques were utilized per ALARA protocol.  Automated exposure control was initiated through either or CareDose or  DoseRight software packages by  protocol.    DOSE (DLP mGy-cm):        FINDINGS:     LUNGS: Minimal airspace density in the left lung base may represent  scarring. Pneumonia not completely excluded.     HEART: Coronary artery calcifications     MEDIASTINUM: No masses. No enlarged lymph nodes.  No fluid collections.     PLEURA: No pleural effusion. No pleural mass or  abnormal calcification.  No pneumothorax.     VASCULATURE: No evidence of aneurysm.     BONES: No acute bony abnormality.     VISUALIZED UPPER ABDOMEN:The upper abdomen is unremarkable as  visualized.     Other: None.       Impression:         1. Scarring versus minimal airspace disease in the left lung base  2. Coronary artery calcifications                 This report was finalized on 12/7/2023 3:10 PM by Dr. Dany Isbell MD.             Lab Results   Component Value Date     (H) 03/29/2016              Echo   Results for orders placed during the hospital encounter of 12/07/23    Adult Transthoracic Echo Complete W/ Cont if Necessary Per Protocol    Interpretation Summary    Left ventricular systolic function is normal. Left ventricular ejection fraction appears to be 61 - 65%.    Left ventricular diastolic function is consistent with (grade II w/high LAP) pseudonormalization.    The left atrial cavity is mild to moderately dilated.    The right atrial cavity is mildly  dilated.    Moderate aortic valve regurgitation is present.    Estimated right ventricular systolic pressure from tricuspid regurgitation is moderately elevated (45-55 mmHg).       I reviewed the patient's new clinical results.    Telemetry: NSR 50-60 bpm      Medication Review:   apixaban, 2.5 mg, Oral, BID  aspirin, 81 mg, Oral, Daily  atorvastatin, 20 mg, Oral, Nightly  cefTRIAXone, 1,000 mg, Intravenous, Q24H  [START ON 12/10/2023] cholecalciferol, 50,000 Units, Oral, Weekly  cinacalcet, 30 mg, Oral, Daily  doxycycline, 100 mg, Intravenous, Q12H  metoprolol tartrate, 25 mg, Oral, Q12H  pantoprazole, 40 mg, Oral, Daily  potassium chloride, 10 mEq, Oral, Daily  senna-docusate sodium, 2 tablet, Oral, BID  sodium chloride, 10 mL, Intravenous, Q12H             Assessment    [Covering for Dr. Benitez]  Assessment:  NSTEMI type II secondary to hypoxia  Paroxysmal atrial fibrillation currently in sinus rhythm  Asymptomatic sinus bradycardia  secondary to metoprolol  Dyslipidemia  Acute on chronic hypoxic respiratory failure  Moderate aortic regurgitation  Essential hypertension        Plan     Recommendations:  Echocardiogram was done with normal left ventricular systolic function no clinical CHF  Now heart rate is in the 50s to 60s asymptomatic we will continue current management  Continue with anticoagulation for thromboembolic prophylaxis  Blood pressure is elevated will add nifedipine 60 mg/day    I discussed the patient's findings and my recommendations with patient and family    Electronically signed by FIORELLA Krishnamurthy, 12/09/23, 1:29 PM EST.     Electronically signed by Jo Ann Gloria MD, 12/09/23, 1:53 PM EST.    Please note that portions of this note were completed with a voice recognition program.

## 2023-12-09 NOTE — PLAN OF CARE
Problem: Fall Injury Risk  Goal: Absence of Fall and Fall-Related Injury  Intervention: Identify and Manage Contributors  Recent Flowsheet Documentation  Taken 12/8/2023 1905 by Shemar Pizarro RN  Medication Review/Management: medications reviewed     Problem: Adult Inpatient Plan of Care  Goal: Absence of Hospital-Acquired Illness or Injury  Intervention: Prevent Skin Injury  Recent Flowsheet Documentation  Taken 12/8/2023 1905 by Shemar Pizarro RN  Body Position: position changed independently  Skin Protection:   adhesive use limited   incontinence pads utilized   Goal Outcome Evaluation:

## 2023-12-10 LAB
ABSOLUTE LUNG FLUID CONTENT: 30 % (ref 20–35)
ALBUMIN SERPL-MCNC: 3 G/DL (ref 3.5–5.2)
ALBUMIN/GLOB SERPL: 1 G/DL
ALP SERPL-CCNC: 85 U/L (ref 39–117)
ALT SERPL W P-5'-P-CCNC: 9 U/L (ref 1–33)
ANION GAP SERPL CALCULATED.3IONS-SCNC: 10.1 MMOL/L (ref 5–15)
AST SERPL-CCNC: 18 U/L (ref 1–32)
BACTERIA SPEC AEROBE CULT: ABNORMAL
BILIRUB SERPL-MCNC: 0.5 MG/DL (ref 0–1.2)
BUN SERPL-MCNC: 19 MG/DL (ref 8–23)
BUN/CREAT SERPL: 18.1 (ref 7–25)
CALCIUM SPEC-SCNC: 9.9 MG/DL (ref 8.2–9.6)
CHLORIDE SERPL-SCNC: 110 MMOL/L (ref 98–107)
CO2 SERPL-SCNC: 25.9 MMOL/L (ref 22–29)
CREAT SERPL-MCNC: 1.05 MG/DL (ref 0.57–1)
DEPRECATED RDW RBC AUTO: 46.8 FL (ref 37–54)
EGFRCR SERPLBLD CKD-EPI 2021: 49 ML/MIN/1.73
ERYTHROCYTE [DISTWIDTH] IN BLOOD BY AUTOMATED COUNT: 13.9 % (ref 12.3–15.4)
GLOBULIN UR ELPH-MCNC: 3.1 GM/DL
GLUCOSE SERPL-MCNC: 127 MG/DL (ref 65–99)
HCT VFR BLD AUTO: 40.5 % (ref 34–46.6)
HGB BLD-MCNC: 13.1 G/DL (ref 12–15.9)
MCH RBC QN AUTO: 29.6 PG (ref 26.6–33)
MCHC RBC AUTO-ENTMCNC: 32.3 G/DL (ref 31.5–35.7)
MCV RBC AUTO: 91.6 FL (ref 79–97)
PLATELET # BLD AUTO: 157 10*3/MM3 (ref 140–450)
PMV BLD AUTO: 11.3 FL (ref 6–12)
POTASSIUM SERPL-SCNC: 3.6 MMOL/L (ref 3.5–5.2)
PROT SERPL-MCNC: 6.1 G/DL (ref 6–8.5)
RBC # BLD AUTO: 4.42 10*6/MM3 (ref 3.77–5.28)
SODIUM SERPL-SCNC: 146 MMOL/L (ref 136–145)
WBC NRBC COR # BLD AUTO: 7.97 10*3/MM3 (ref 3.4–10.8)

## 2023-12-10 PROCEDURE — 94726 PLETHYSMOGRAPHY LUNG VOLUMES: CPT

## 2023-12-10 PROCEDURE — 25010000002 LORAZEPAM PER 2 MG: Performed by: INTERNAL MEDICINE

## 2023-12-10 PROCEDURE — 99232 SBSQ HOSP IP/OBS MODERATE 35: CPT | Performed by: INTERNAL MEDICINE

## 2023-12-10 PROCEDURE — 25010000002 HALOPERIDOL LACTATE PER 5 MG: Performed by: INTERNAL MEDICINE

## 2023-12-10 PROCEDURE — 85027 COMPLETE CBC AUTOMATED: CPT | Performed by: INTERNAL MEDICINE

## 2023-12-10 PROCEDURE — 80053 COMPREHEN METABOLIC PANEL: CPT | Performed by: INTERNAL MEDICINE

## 2023-12-10 PROCEDURE — 25010000002 CEFTRIAXONE PER 250 MG: Performed by: INTERNAL MEDICINE

## 2023-12-10 PROCEDURE — 99232 SBSQ HOSP IP/OBS MODERATE 35: CPT | Performed by: SPECIALIST

## 2023-12-10 RX ORDER — LORAZEPAM 2 MG/ML
0.25 INJECTION INTRAMUSCULAR ONCE
Status: COMPLETED | OUTPATIENT
Start: 2023-12-10 | End: 2023-12-10

## 2023-12-10 RX ORDER — HALOPERIDOL 5 MG/ML
0.5 INJECTION INTRAMUSCULAR ONCE
Qty: 1 ML | Refills: 0 | Status: COMPLETED | OUTPATIENT
Start: 2023-12-10 | End: 2023-12-10

## 2023-12-10 RX ADMIN — HALOPERIDOL LACTATE 0.5 MG: 5 INJECTION, SOLUTION INTRAMUSCULAR at 12:05

## 2023-12-10 RX ADMIN — APIXABAN 2.5 MG: 2.5 TABLET, FILM COATED ORAL at 20:57

## 2023-12-10 RX ADMIN — DOCUSATE SODIUM 50 MG AND SENNOSIDES 8.6 MG 2 TABLET: 8.6; 5 TABLET, FILM COATED ORAL at 20:57

## 2023-12-10 RX ADMIN — POTASSIUM CHLORIDE 10 MEQ: 1500 TABLET, EXTENDED RELEASE ORAL at 08:36

## 2023-12-10 RX ADMIN — CEFTRIAXONE 1000 MG: 1 INJECTION, POWDER, FOR SOLUTION INTRAMUSCULAR; INTRAVENOUS at 17:06

## 2023-12-10 RX ADMIN — Medication 10 ML: at 08:35

## 2023-12-10 RX ADMIN — ATORVASTATIN CALCIUM 20 MG: 20 TABLET, FILM COATED ORAL at 20:57

## 2023-12-10 RX ADMIN — PANTOPRAZOLE SODIUM 40 MG: 40 TABLET, DELAYED RELEASE ORAL at 08:35

## 2023-12-10 RX ADMIN — METOPROLOL TARTRATE 25 MG: 25 TABLET, FILM COATED ORAL at 08:35

## 2023-12-10 RX ADMIN — DOXYCYCLINE 100 MG: 100 INJECTION, POWDER, LYOPHILIZED, FOR SOLUTION INTRAVENOUS at 08:34

## 2023-12-10 RX ADMIN — DOXYCYCLINE 100 MG: 100 INJECTION, POWDER, LYOPHILIZED, FOR SOLUTION INTRAVENOUS at 20:57

## 2023-12-10 RX ADMIN — SODIUM CHLORIDE 40 ML: 9 INJECTION, SOLUTION INTRAVENOUS at 20:57

## 2023-12-10 RX ADMIN — Medication 10 ML: at 20:58

## 2023-12-10 RX ADMIN — APIXABAN 2.5 MG: 2.5 TABLET, FILM COATED ORAL at 08:35

## 2023-12-10 RX ADMIN — ASPIRIN 81 MG: 81 TABLET, COATED ORAL at 08:35

## 2023-12-10 RX ADMIN — HALOPERIDOL LACTATE 0.5 MG: 5 INJECTION, SOLUTION INTRAMUSCULAR at 10:28

## 2023-12-10 RX ADMIN — CINACALCET HYDROCHLORIDE 30 MG: 30 TABLET, FILM COATED ORAL at 08:35

## 2023-12-10 RX ADMIN — NIFEDIPINE 60 MG: 30 TABLET, EXTENDED RELEASE ORAL at 08:35

## 2023-12-10 RX ADMIN — LORAZEPAM 0.25 MG: 2 INJECTION INTRAMUSCULAR; INTRAVENOUS at 13:16

## 2023-12-10 RX ADMIN — OFLOXACIN 50000 UNITS: 300 TABLET, COATED ORAL at 08:35

## 2023-12-10 RX ADMIN — LORAZEPAM 0.25 MG: 2 INJECTION INTRAMUSCULAR; INTRAVENOUS at 14:42

## 2023-12-10 RX ADMIN — DOCUSATE SODIUM 50 MG AND SENNOSIDES 8.6 MG 2 TABLET: 8.6; 5 TABLET, FILM COATED ORAL at 08:35

## 2023-12-10 NOTE — PLAN OF CARE
Goal Outcome Evaluation:      Patient resting comfortably in bed at this time with family at bedside. Patient has been pleasant and cooperative with care this shift. No complaints or concerns being voiced at this time. No s/s of acute distress noted at this time. Will continue with plan of care.

## 2023-12-10 NOTE — PROGRESS NOTES
LOS: 2 days     Name: Zeenat Dong  Age/Sex: 89 y.o. female  :  1928        PCP: Fatmata Dong APRN  REF: No ref. provider found    Principal Problem:    Dyspnea  Active Problems:    Acute hypoxemic respiratory failure      Reason for follow-up: NSTEMI and heart failure    Subjective     Subjective     Zeenat Dong is a 89 y.o. female has been admitted with chief complaint of shortness of breath and acute on chronic hypoxic respiratory failure.     Interval History: Patient with some mild confusion on examination today.  No acute events overnight.  Nifedipine was added yesterday with some improvement of blood pressure.  Kidney function stable.    Vital Signs  Temp:  [97.8 °F (36.6 °C)-99 °F (37.2 °C)] 98 °F (36.7 °C)  Heart Rate:  [70-96] 96  Resp:  [18] 18  BP: (107-172)/(46-77) 158/77  Vital Signs (last 72 hrs)         12/07 0700  12/08 0659 12/08 0700  12/09 0659 12/09 0700  12/10 0659 12/10 0700  12/10 0954   Most Recent      Temp (°F) 97.2 -  97.6    96.2 -  98.7    97.8 -  99      98     98 (36.7) 12/10 0727    Heart Rate 50 -  70    62 -  88    70 -  87      96     96 12/10 0727    Resp   20    16 -  19      18      18     18 12/10 0727    BP 90/68 -  191/71    138/82 -  171/63    107/46 -  202/94      158/77     158/77 12/10 0727    SpO2 (%) 90 -  100    95 -  99      95      97     97 12/10 0727    Flow (L/min)   2      2      2      2     2 12/10 0835          Documented weights    23 0959 23 1917 23 0500 23 050   Weight: 59 kg (130 lb) 59.1 kg (130 lb 3.2 oz) 59.1 kg (130 lb 3.2 oz) 59.2 kg (130 lb 8.2 oz)    12/10/23 050   Weight: 59.5 kg (131 lb 1.6 oz)      Body mass index is 25.6 kg/m².    Intake/Output Summary (Last 24 hours) at 12/10/2023 0954  Last data filed at 12/10/2023 0900  Gross per 24 hour   Intake 640 ml   Output --   Net 640 ml     Objective:  Vital signs: (most recent): Blood pressure 141/55, pulse 89, temperature 98 °F (36.7 °C), temperature  "source Oral, resp. rate 18, height 152.4 cm (60\"), weight 59.5 kg (131 lb 1.6 oz), SpO2 95%.                Objective       Physical Exam:     General Appearance:    Alert, cooperative, in no acute distress   Head:    Normocephalic, without obvious abnormality, atraumatic   Eyes:            Conjunctivae and sclerae normal, no   icterus, no pallor, corneas clear.   Neck:   No adenopathy, supple, trachea midline, no thyromegaly, no   carotid bruit, no JVD   Lungs:     Clear to auscultation,respirations regular, even and                  unlabored    Heart:    Regular rhythm and normal rate, normal S1 and S2, no            murmur, no gallop, no rub, no click   Chest Wall:    No abnormalities observed   Abdomen:     Normal bowel sounds, no masses, no organomegaly, soft        nontender, nondistended, no guarding, no rebound                tenderness   Extremities:   Moves all extremities well, no edema, no cyanosis, no             redness   Pulses:   Pulses palpable and equal bilaterally   Skin:   No bleeding, bruising or rash       Neurologic:   Alert with intermittent confusion     Results review       Results Review:   Results from last 7 days   Lab Units 12/10/23  0206 12/09/23  0312 12/08/23  1031 12/07/23  1125   WBC 10*3/mm3 7.97 4.83 5.41 6.00   HEMOGLOBIN g/dL 13.1 12.4 13.0 13.9   PLATELETS 10*3/mm3 157 125* 124* 136*     Results from last 7 days   Lab Units 12/10/23  0206 12/09/23  0312 12/08/23  1031 12/07/23  1219   SODIUM mmol/L 146* 145 142 141   POTASSIUM mmol/L 3.6 3.4* 3.1* 3.6   CHLORIDE mmol/L 110* 108* 104 103   CO2 mmol/L 25.9 27.7 26.4 27.5   BUN mg/dL 19 17 19 20   CREATININE mg/dL 1.05* 1.11* 1.19* 1.26*   CALCIUM mg/dL 9.9* 9.3 9.3 9.8*   GLUCOSE mg/dL 127* 103* 160* 124*   ALT (SGPT) U/L 9 8 8 9   AST (SGOT) U/L 18 17 19 17     Results from last 7 days   Lab Units 12/07/23  1514 12/07/23  1219   HSTROP T ng/L 25* 24*     Lab Results   Component Value Date    INR 2.20 (H) 12/07/2023    INR 1.11 " (H) 08/17/2015     Lab Results   Component Value Date    MG 2.2 03/29/2016    MG 2.4 03/01/2016    MG 2.2 01/29/2016     Lab Results   Component Value Date    TSH 3.752 08/17/2015    CHLPL 135 08/17/2015    TRIG 259 (H) 08/17/2015    HDL 15 (L) 08/17/2015    LDL 68 08/17/2015      Imaging Results (Last 48 Hours)       ** No results found for the last 48 hours. **          Lab Results   Component Value Date     (H) 03/29/2016              Echo   Results for orders placed during the hospital encounter of 12/07/23    Adult Transthoracic Echo Complete W/ Cont if Necessary Per Protocol    Interpretation Summary    Left ventricular systolic function is normal. Left ventricular ejection fraction appears to be 61 - 65%.    Left ventricular diastolic function is consistent with (grade II w/high LAP) pseudonormalization.    The left atrial cavity is mild to moderately dilated.    The right atrial cavity is mildly  dilated.    Moderate aortic valve regurgitation is present.    Estimated right ventricular systolic pressure from tricuspid regurgitation is moderately elevated (45-55 mmHg).       I reviewed the patient's new clinical results.    Telemetry: NSR 70-80 bpm      Medication Review:   apixaban, 2.5 mg, Oral, BID  aspirin, 81 mg, Oral, Daily  atorvastatin, 20 mg, Oral, Nightly  cefTRIAXone, 1,000 mg, Intravenous, Q24H  cholecalciferol, 50,000 Units, Oral, Weekly  cinacalcet, 30 mg, Oral, Daily  doxycycline, 100 mg, Intravenous, Q12H  metoprolol tartrate, 25 mg, Oral, Q12H  NIFEdipine CC, 60 mg, Oral, Q24H  pantoprazole, 40 mg, Oral, Daily  potassium chloride, 10 mEq, Oral, Daily  senna-docusate sodium, 2 tablet, Oral, BID  sodium chloride, 10 mL, Intravenous, Q12H             Assessment    [Covering for Dr. Benitez]  Assessment:  NSTEMI type II secondary to hypoxia  Paroxysmal atrial fibrillation currently in sinus rhythm  Asymptomatic sinus bradycardia secondary to metoprolol  Dyslipidemia  Acute on chronic  hypoxic respiratory failure  Moderate aortic regurgitation  Essential hypertension         Plan     Recommendations:  Nifedipine started for blood pressure control still blood pressure is a bit labile will monitor for 24 hours more consider advancing the medications further if blood pressure still not well-controlled  Continue anticoagulation for thromboembolic prophylaxis    I discussed the patient's findings and my recommendations with patient and family    Electronically signed by FIORELLA Krishnamurthy, 12/10/23, 9:54 AM EST.   Electronically signed by Jo Ann Gloria MD, 12/10/23, 1:17 PM EST.      Please note that portions of this note were completed with a voice recognition program.

## 2023-12-10 NOTE — PROGRESS NOTES
[facility] HOSPITALIST PROGRESS NOTE     Patient Identification:  Name:  Zeenat Dong  Age:  89 y.o.  Sex:  female  :  1928  MRN:  [id number]  Visit Number:  [id number]  ROOM: [address] [place] [address]     Primary Care Provider:  Fatmata Dong APRN    Length of stay in inpatient status:  2    Subjective     Chief Compliant:    Chief Complaint   Patient presents with    Shortness of Breath    Cough     History of Presenting Illness:    Patient remains ill but stable today, no acute events overnight, no new complaints, denies any fevers or chills, patient is much more alert and active today, breathing is improved, Cardiology added Nifedipine for blood pressure, creatinine is stable, volume is stable, on antibiotics for Pneumonia, though also Urinary Tract Infection as urine culture with > 100K CFU's GN Bacilli, speciation pending, blood cultures negative to date, granddaughter at bedside, updated on plan of care, patient getting more agitated as the morning went on, required low dose Haldol, discussed with family possibly home in a day or so if she has continued improvement, though also need to follow up how she works with therapy and if might need any placement.   Objective     Current Hospital Meds:  apixaban, 2.5 mg, Oral, BID  aspirin, 81 mg, Oral, Daily  atorvastatin, 20 mg, Oral, Nightly  cefTRIAXone, 1,000 mg, Intravenous, Q24H  cholecalciferol, 50,000 Units, Oral, Weekly  cinacalcet, 30 mg, Oral, Daily  doxycycline, 100 mg, Intravenous, Q12H  metoprolol tartrate, 25 mg, Oral, Q12H  NIFEdipine CC, 60 mg, Oral, Q24H  pantoprazole, 40 mg, Oral, Daily  potassium chloride, 10 mEq, Oral, Daily  senna-docusate sodium, 2 tablet, Oral, BID  sodium chloride, 10 mL, Intravenous, Q12H    ----------------------------------------------------------------------------------------------------------------------  Vital Signs:  Temp:  [97.8 °F (36.6 °C)-99 °F (37.2 °C)] 98 °F (36.7 °C)  Heart Rate:  [70-96] 96  Resp:   [18] 18  BP: (107-158)/(46-77) 158/77  SpO2:  [95 %-97 %] 97 %  on  Flow (L/min):  [2] 2;   Device (Oxygen Therapy): nasal cannula  Body mass index is 25.6 kg/m².      Intake/Output Summary (Last 24 hours) at 12/10/2023 1118  Last data filed at 12/10/2023 0900  Gross per 24 hour   Intake 640 ml   Output --   Net 640 ml      ----------------------------------------------------------------------------------------------------------------------  Physical exam:  Constitutional:  Elderly.  No acute distress.      HENT:  Head:  Normocephalic and atraumatic.  Mouth:  Moist mucous membranes.    Eyes:  Conjunctivae and EOM are normal. No scleral icterus.    Neck:  Neck supple.  No JVD present.    Cardiovascular: Regular rate, regular rhythm and normal heart sounds with no murmur.  Pulmonary/Chest:  No respiratory distress, no wheezes, no crackles, on 2LNC  Abdominal:  Soft. No distension and no tenderness.   Musculoskeletal:  No tenderness, and no deformity.  No red or swollen joints anywhere.    Neurological:  Awake, oriented to person, place, no gross focal deficits   Skin:  Skin is warm and dry. No rash noted. No pallor.   Peripheral vascular:  No clubbing, no cyanosis, no edema.  Psychiatric: appropriate mood and affect    Edited by: Francisco Choi MD at 12/10/2023 1117  ----------------------------------------------------------------------------------------------------------------------  WBC/HGB/HCT/PLT   7.97/13.1/40.5/157 (12/10 0206)  BUN/CREAT/GLUC/ALT/AST/RIOS/LIP    19/1.05/127/9/18/--/-- (12/10 0206)  LYTES - Na/K/Cl/CO2: 146*/3.6/110*/25.9 (12/10 0206)     Urine Culture   Date Value Ref Range Status   12/07/2023 >100,000 CFU/mL Gram Negative Bacilli (A)  Preliminary     Blood Culture   Date Value Ref Range Status   12/07/2023 No growth at 2 days  Preliminary   12/07/2023 No growth at 2 days  Preliminary     I have personally looked at the labs and they are summarized  above.  ----------------------------------------------------------------------------------------------------------------------  Detailed radiology reports for the last 24 hours:  No radiology results for the last day  Assessment & Plan    89F Second Hand Smoke Exposure PMH GERD, Hypertension, Hyperlipidemia, Chronic HFunknownEF, Paroxysmal Atrial Fibrillation on chronic anticoagulation, presented to [facility] emergency room with complaints of shortness of breath    #Acute Metabolic Encephalopathy due to Acute Urinary Tract Infection and Probable Pneumonia, Bacterial, treating for CAP complicated by Acute Hypoxic Respiratory Failure   - Continue Ceftriaxone and Doxycycline, follow up cultures  - Continue Tylenol as needed for fevers, Duonebs as needed  - Monitor on telemetry, continue 02 but wean as able, continues to improve    #Mild Acute on Chronic HFunknownEF - Acute Resolved   #Hypertension/Hyperlipidemia/Coronary Artery Disease  #Paroxysmal Atrial Fibrillation, on chronic anticoagulation  #Moderate Aortic Regurgitation   #Mildly elevated HS Troponins, suspected NSTEMI type II due to CHF  #Mod Pulmonary Hypertension   - Labs showed HS Troponins 24->25, proBNP 2900  - EKG showed normal sinus rhythm, LAD, left ventricle hypertrophy  - Echocardiogram showed LVEF 61-65%, diastolic dysfunction, mild-mod dilated left atrium, mildly dilated right atrium, Mod AR, RVSP 45-55mmHg  - CT Chest showed coronary artery calcifications   - Cardiology consulted and following, recommended no ischemic evaluation, hold further lasix  - Continue home Aspirin 81, statin  - Continue home beta blocker at much lower dose, holding home ARB due to renal dysfunction, Cardiology added new Nifedipine, titrate as indicated   - Continue to monitor on telemetry, strict I/O's, trend heart rate and blood pressure     #Chronic Kidney Disease vs Nonoliguric Acute Kidney Injury due to CHF  - Baseline creatinine in 2016 around 0.6-0.9, was  up 1.26 on admission, now close to baseline  - Trend Cr and urine output, avoid nephrotoxins, NSAIDs, dehydration and contrast as able, will consider Nephrology consult if worsening    #Electrolyte Abnormalities  - Acute Mild Hypokalemia - Replacing, on protocol    #Gastroesophageal Reflux Disease  - Continue home PPI    #Advanced Age/Age Related Debility  - Supportive Care, Consult PT/OT    #Debility  - Consult PT/OT, Social Work if placement needed      F: Oral  E: Monitor & Replace as needed   N: Diet: Regular/House Diet; No Mixed Consistencies, Feeding Assistance - Nursing; Texture: Soft to Chew (NDD 3); Soft to Chew: Chopped Meat; Fluid Consistency: Thin (IDDSI 0)   Ppx: Low dose Eliquis  Code Status (Patient has no pulse and is not breathing): No CPR   Medical Interventions (Patient has pulse or is breathing): Limited Support  Medical Intervention Limits: NO intubation (DNI)  *Palliative Care consulted for goals of care conversations      Dispo: Pending workup and clinical improvement     *This patient is considered high risk due to Acute Congestive Heart Failure exacerbation, Acute Kidney Injury, probable Pneumonia, Urinary Tract Infection     Edited by: Francisco Choi MD at 12/10/2023 1117  [facility]

## 2023-12-10 NOTE — PLAN OF CARE
Problem: Fall Injury Risk  Goal: Absence of Fall and Fall-Related Injury  Intervention: Identify and Manage Contributors  Recent Flowsheet Documentation  Taken 12/9/2023 1917 by Shemar Pizarro RN  Medication Review/Management: medications reviewed     Problem: Adult Inpatient Plan of Care  Goal: Absence of Hospital-Acquired Illness or Injury  Intervention: Prevent Skin Injury  Recent Flowsheet Documentation  Taken 12/9/2023 1917 by Shemar Pizarro RN  Body Position: supine  Skin Protection:   adhesive use limited   incontinence pads utilized   Goal Outcome Evaluation:

## 2023-12-11 LAB
ALBUMIN SERPL-MCNC: 3.2 G/DL (ref 3.5–5.2)
ALBUMIN/GLOB SERPL: 1 G/DL
ALP SERPL-CCNC: 84 U/L (ref 39–117)
ALT SERPL W P-5'-P-CCNC: 11 U/L (ref 1–33)
ANION GAP SERPL CALCULATED.3IONS-SCNC: 8.6 MMOL/L (ref 5–15)
AST SERPL-CCNC: 21 U/L (ref 1–32)
BILIRUB SERPL-MCNC: 0.5 MG/DL (ref 0–1.2)
BUN SERPL-MCNC: 16 MG/DL (ref 8–23)
BUN/CREAT SERPL: 17 (ref 7–25)
CALCIUM SPEC-SCNC: 9.7 MG/DL (ref 8.2–9.6)
CHLORIDE SERPL-SCNC: 110 MMOL/L (ref 98–107)
CO2 SERPL-SCNC: 24.4 MMOL/L (ref 22–29)
CREAT SERPL-MCNC: 0.94 MG/DL (ref 0.57–1)
DEPRECATED RDW RBC AUTO: 46.8 FL (ref 37–54)
EGFRCR SERPLBLD CKD-EPI 2021: 56 ML/MIN/1.73
ERYTHROCYTE [DISTWIDTH] IN BLOOD BY AUTOMATED COUNT: 13.9 % (ref 12.3–15.4)
GLOBULIN UR ELPH-MCNC: 3.2 GM/DL
GLUCOSE SERPL-MCNC: 135 MG/DL (ref 65–99)
HCT VFR BLD AUTO: 40.6 % (ref 34–46.6)
HGB BLD-MCNC: 13.1 G/DL (ref 12–15.9)
MCH RBC QN AUTO: 29.6 PG (ref 26.6–33)
MCHC RBC AUTO-ENTMCNC: 32.3 G/DL (ref 31.5–35.7)
MCV RBC AUTO: 91.6 FL (ref 79–97)
PLATELET # BLD AUTO: 152 10*3/MM3 (ref 140–450)
PMV BLD AUTO: 10.7 FL (ref 6–12)
POTASSIUM SERPL-SCNC: 3.4 MMOL/L (ref 3.5–5.2)
PROT SERPL-MCNC: 6.4 G/DL (ref 6–8.5)
RBC # BLD AUTO: 4.43 10*6/MM3 (ref 3.77–5.28)
SODIUM SERPL-SCNC: 143 MMOL/L (ref 136–145)
WBC NRBC COR # BLD AUTO: 6.2 10*3/MM3 (ref 3.4–10.8)

## 2023-12-11 PROCEDURE — 80053 COMPREHEN METABOLIC PANEL: CPT | Performed by: INTERNAL MEDICINE

## 2023-12-11 PROCEDURE — 25010000002 FUROSEMIDE PER 20 MG: Performed by: INTERNAL MEDICINE

## 2023-12-11 PROCEDURE — 25010000002 ZIPRASIDONE MESYLATE PER 10 MG

## 2023-12-11 PROCEDURE — 85027 COMPLETE CBC AUTOMATED: CPT | Performed by: INTERNAL MEDICINE

## 2023-12-11 PROCEDURE — 25010000002 CEFTRIAXONE PER 250 MG: Performed by: INTERNAL MEDICINE

## 2023-12-11 PROCEDURE — 99232 SBSQ HOSP IP/OBS MODERATE 35: CPT | Performed by: INTERNAL MEDICINE

## 2023-12-11 RX ORDER — FUROSEMIDE 10 MG/ML
20 INJECTION INTRAMUSCULAR; INTRAVENOUS ONCE
Status: COMPLETED | OUTPATIENT
Start: 2023-12-11 | End: 2023-12-11

## 2023-12-11 RX ADMIN — Medication 10 ML: at 09:24

## 2023-12-11 RX ADMIN — APIXABAN 2.5 MG: 2.5 TABLET, FILM COATED ORAL at 09:23

## 2023-12-11 RX ADMIN — Medication 10 ML: at 21:35

## 2023-12-11 RX ADMIN — ASPIRIN 81 MG: 81 TABLET, COATED ORAL at 09:23

## 2023-12-11 RX ADMIN — CINACALCET HYDROCHLORIDE 30 MG: 30 TABLET, FILM COATED ORAL at 09:23

## 2023-12-11 RX ADMIN — FUROSEMIDE 20 MG: 10 INJECTION, SOLUTION INTRAMUSCULAR; INTRAVENOUS at 12:00

## 2023-12-11 RX ADMIN — METOPROLOL TARTRATE 25 MG: 25 TABLET, FILM COATED ORAL at 21:35

## 2023-12-11 RX ADMIN — POTASSIUM CHLORIDE 10 MEQ: 1500 TABLET, EXTENDED RELEASE ORAL at 09:23

## 2023-12-11 RX ADMIN — PANTOPRAZOLE SODIUM 40 MG: 40 TABLET, DELAYED RELEASE ORAL at 09:23

## 2023-12-11 RX ADMIN — METOPROLOL TARTRATE 25 MG: 25 TABLET, FILM COATED ORAL at 09:23

## 2023-12-11 RX ADMIN — ATORVASTATIN CALCIUM 20 MG: 20 TABLET, FILM COATED ORAL at 21:35

## 2023-12-11 RX ADMIN — DOCUSATE SODIUM 50 MG AND SENNOSIDES 8.6 MG 2 TABLET: 8.6; 5 TABLET, FILM COATED ORAL at 21:35

## 2023-12-11 RX ADMIN — DOXYCYCLINE 100 MG: 100 INJECTION, POWDER, LYOPHILIZED, FOR SOLUTION INTRAVENOUS at 21:35

## 2023-12-11 RX ADMIN — NIFEDIPINE 60 MG: 30 TABLET, EXTENDED RELEASE ORAL at 09:23

## 2023-12-11 RX ADMIN — DOCUSATE SODIUM 50 MG AND SENNOSIDES 8.6 MG 2 TABLET: 8.6; 5 TABLET, FILM COATED ORAL at 09:23

## 2023-12-11 RX ADMIN — CEFTRIAXONE 1000 MG: 1 INJECTION, POWDER, FOR SOLUTION INTRAMUSCULAR; INTRAVENOUS at 17:42

## 2023-12-11 RX ADMIN — SODIUM CHLORIDE 40 ML: 9 INJECTION, SOLUTION INTRAVENOUS at 21:35

## 2023-12-11 RX ADMIN — APIXABAN 2.5 MG: 2.5 TABLET, FILM COATED ORAL at 21:35

## 2023-12-11 RX ADMIN — DOXYCYCLINE 100 MG: 100 INJECTION, POWDER, LYOPHILIZED, FOR SOLUTION INTRAVENOUS at 09:23

## 2023-12-11 RX ADMIN — WATER 10 MG: 1 INJECTION INTRAMUSCULAR; INTRAVENOUS; SUBCUTANEOUS at 02:04

## 2023-12-11 NOTE — NURSING NOTE
Received response from Belgica HERNÁNDEZ. Stated that she has a dose of Potassium scheduled for 0900 and not to replace the current Potassium of 3.4. Will pass along to next shift.

## 2023-12-11 NOTE — PLAN OF CARE
Goal Outcome Evaluation: Patient remains pleasantly confused. Compliant with all medications and care as ordered. She remains on 2L/NC, saturations maintaining well above 90%. Family remains at bedside, updated on plan of care. External cath placed this AM, good UO noted. K+ replaced per protocol. She has been setup and assisted with each meal, fair PO intake. She is currently resting in bed. Will continue with plan of care.

## 2023-12-11 NOTE — PLAN OF CARE
Goal Outcome Evaluation:  Plan of Care Reviewed With: patient, family  Progress: declining     Patient resting in bed. Family at bedside. Patient agitated earlier in shift. IM Geodon given. Patient oxygen 3L nasal cannula and tolerating well. Patient room air baseline. Patient has made multiple attempts out of bed this shift. IV antibiotics continued. Potassium resulted at 3.4. Firing a BPA under nurse driven protocol due to Creatinine Clearance being elevated. Awaiting response from [facility]. Plan for home on discharge. Will continue to follow with plan of care.

## 2023-12-11 NOTE — PROGRESS NOTES
"  Patient Identification:  Name:  Zeenat Dong  Age:  89 y.o.  Sex:  female  :  1928  MRN:  [id number]  Visit Number:  [id number]  Primary care provider:  Fatmata Dong APRN    Reason for follow-up:  Acute on chronic heart failure with preserved EF and NSTEMI type II    Subjective     History of confusion last night and was better when I evaluated her this morning around 8 AM.  Her daughter was at bedside.  She is concerned about' rattling sound and' being not received Lasix yesterday.  Patient was not in distress and laying down comfortably with no orthopnea.  Telemetry showed sinus rhythm with intermittent episodes of sinus tachycardia up to 130 bpm.      Medication Review:   apixaban, 2.5 mg, Oral, BID  aspirin, 81 mg, Oral, Daily  atorvastatin, 20 mg, Oral, Nightly  cefTRIAXone, 1,000 mg, Intravenous, Q24H  cholecalciferol, 50,000 Units, Oral, Weekly  cinacalcet, 30 mg, Oral, Daily  doxycycline, 100 mg, Intravenous, Q12H  metoprolol tartrate, 25 mg, Oral, Q12H  NIFEdipine CC, 60 mg, Oral, Q24H  pantoprazole, 40 mg, Oral, Daily  potassium chloride, 10 mEq, Oral, Daily  senna-docusate sodium, 2 tablet, Oral, BID  sodium chloride, 10 mL, Intravenous, Q12H              Vital Sign Min/Max for last 24 hours  Temp  Min: 97.3 °F (36.3 °C)  Max: 98.8 °F (37.1 °C)   BP  Min: 130/56  Max: 158/75   Pulse  Min: 92  Max: 108   Resp  Min: 20  Max: 22   SpO2  Min: 92 %  Max: 96 %   No data recorded   Weight  Min: 61.1 kg (134 lb 9.6 oz)  Max: 61.1 kg (134 lb 9.6 oz)     Flowsheet Rows      Flowsheet Row First Filed Value   Admission Height 152.4 cm (60\") Documented at 2023   Admission Weight 59 kg (130 lb) Documented at 2023 0959            Objective       Physical Exam:         General Appearance:  HEENT:   Neck:     Alert, cooperative, in no acute distress  Grossly normal   No JVD    Lungs:   Bilateral equal air entry.  Bibasilar rales were heard.     Heart:  Regular rhythm and normal rate, " normal S1 and S2,.  Systolic murmur was heard in the aortic area.    Chest Wall:  No abnormalities observed    Abdomen   Soft, nontender, no masses, no organomegaly and bowel sounds are heard.     Extremities: No pedal edema    Pulses: Deferred    Neurologic: Deferred         Telemetry:  Sinus rhythm    Chest x-ray done recently is reviewed.  It showed evidence of pulmonary edema.    Labs  Results from last 7 days   Lab Units 12/11/23  0503 12/10/23  0206 12/09/23 0312 12/08/23  1031 12/07/23  1125   WBC 10*3/mm3 6.20 7.97 4.83 5.41 6.00   HEMOGLOBIN g/dL 13.1 13.1 12.4 13.0 13.9   HEMATOCRIT % 40.6 40.5 38.1 39.8 42.8   PLATELETS 10*3/mm3 152 157 125* 124* 136*     Results from last 7 days   Lab Units 12/11/23  0503 12/10/23  0206 12/09/23 0312 12/08/23  1031 12/07/23  1219   SODIUM mmol/L 143 146* 145 142 141   POTASSIUM mmol/L 3.4* 3.6 3.4* 3.1* 3.6   CHLORIDE mmol/L 110* 110* 108* 104 103   CO2 mmol/L 24.4 25.9 27.7 26.4 27.5   BUN mg/dL 16 19 17 19 20   CREATININE mg/dL 0.94 1.05* 1.11* 1.19* 1.26*   CALCIUM mg/dL 9.7* 9.9* 9.3 9.3 9.8*   GLUCOSE mg/dL 135* 127* 103* 160* 124*     Results from last 7 days   Lab Units 12/11/23  0503 12/10/23  0206 12/09/23 0312 12/08/23  1031 12/07/23  1219   BILIRUBIN mg/dL 0.5 0.5 0.5 0.7 1.0   ALK PHOS U/L 84 85 70 74 80   AST (SGOT) U/L 21 18 17 19 17   ALT (SGPT) U/L 11 9 8 8 9         Results from last 7 days   Lab Units 12/07/23  1125   INR  2.20*     Results from last 7 days   Lab Units 12/09/23  0312 12/07/23  1219   CRP mg/dL 3.80* 3.89*     Results from last 7 days   Lab Units 12/07/23  1514 12/07/23  1219   HSTROP T ng/L 25* 24*     Results from last 7 days   Lab Units 12/07/23  1117   PH, ARTERIAL pH units 7.417   PO2 ART mm Hg 64.8*   PCO2, ARTERIAL mm Hg 45.4*   HCO3 ART mmol/L 29.2*       Assessment      1.  Acute on chronic heart failure with preserved EF.  Symptomatic.  2.  NSTEMI.  Type II.  Due to hypoxia.  3.  Coronary artery calcification on CT exam  4.   History of paroxysmal A-fib.  In sinus rhythm  5.  Sinus bradycardia-resolved.  6.  Acute on chronic hypoxic respiratory failure.    Plan     1.  Will administer Lasix 20 mg IV x 1.  Higher dose is not preferred because of poor oral intake and to avoid electrolyte imbalance as sodium level is already 134.  2.  Continue metoprolol and Eliquis for history of PAF.  3.  No further workup is considered for the evidence of CAD on CT exam due to comorbidities.  On aspirin and atorvastatin.  4.  I discussed my findings and plan of care with the patient's daughter was at bedside.      Fanny Benitez MD [facility]  12/11/23  14:52 EST

## 2023-12-11 NOTE — PROGRESS NOTES
[facility] HOSPITALIST PROGRESS NOTE     Patient Identification:  Name:  Zeenat Dong  Age:  89 y.o.  Sex:  female  :  1928  MRN:  [id number]  Visit Number:  [id number]  Primary Care Provider:  Fatmata Dong APRN    Length of stay:  3    Chief complaint: Altered mental status    Subjective:    Patient seen and examined at bedside with patient's family present.  Patient was altered overnight and did require IM Geodon.  Patient is very lethargic this morning and hard of hearing.  This makes communication quite difficult and patient is not able to answer questions appropriately this morning.  Did discuss with family member that patient's urine culture had resulted and patient can be de-escalated to Omnicef and Rocephin.  However, patient's family was concerned that the patient is nonambulatory and not back to her baseline physical status and is requesting inpatient rehab or possible skilled nursing facility placement as the family is concerned they cannot take care of her at home in her current condition.  ----------------------------------------------------------------------------------------------------------------------  Current Hospital Meds:  apixaban, 2.5 mg, Oral, BID  aspirin, 81 mg, Oral, Daily  atorvastatin, 20 mg, Oral, Nightly  cefTRIAXone, 1,000 mg, Intravenous, Q24H  cholecalciferol, 50,000 Units, Oral, Weekly  cinacalcet, 30 mg, Oral, Daily  doxycycline, 100 mg, Intravenous, Q12H  metoprolol tartrate, 25 mg, Oral, Q12H  NIFEdipine CC, 60 mg, Oral, Q24H  pantoprazole, 40 mg, Oral, Daily  potassium chloride, 10 mEq, Oral, Daily  senna-docusate sodium, 2 tablet, Oral, BID  sodium chloride, 10 mL, Intravenous, Q12H         ----------------------------------------------------------------------------------------------------------------------  Vital Signs:  Temp:  [97.3 °F (36.3 °C)-98.8 °F (37.1 °C)] 97.5 °F (36.4 °C)  Heart Rate:  [] 92  Resp:  [20-22] 20  BP:  (130-158)/(56-75) 146/61      12/09/23  0500 12/10/23  0500 12/11/23  0500   Weight: 59.2 kg (130 lb 8.2 oz) 59.5 kg (131 lb 1.6 oz) 61.1 kg (134 lb 9.6 oz)     Body mass index is 26.29 kg/m².    Intake/Output Summary (Last 24 hours) at 12/11/2023 1457  Last data filed at 12/11/2023 1312  Gross per 24 hour   Intake 541.02 ml   Output --   Net 541.02 ml     Diet: Regular/House Diet; No Mixed Consistencies, Feeding Assistance - Nursing; Texture: Soft to Chew (NDD 3); Soft to Chew: Chopped Meat; Fluid Consistency: Thin (IDDSI 0)  ----------------------------------------------------------------------------------------------------------------------  Physical exam:  Constitutional: Elderly appearing  female in no apparent distress, currently confused..     HENT:  Head:  Normocephalic and atraumatic.  Mouth:  Moist mucous membranes.    Eyes:  Conjunctivae and EOM are normal.  Pupils are equal, round, and reactive to light.  No scleral icterus.    Neck:  Neck supple. No thyromegaly.  No JVD present.    Cardiovascular:  Regular rate and rhythm with no murmurs, rubs, clicks or gallops appreciated.  Pulmonary/Chest:  Clear to auscultation bilaterally with no crackles, wheezes or rhonchi appreciated.  Abdominal:  Soft. Nontender. Nondistended  Bowel sounds are normal in all four quadrants. No organomegally appreciated.   Musculoskeletal:  No edema, no tenderness, and no deformity.  No red or swollen joints anywhere.    Neurological:  Alert, Cranial nerves II-XII intact with no focal deficits.  No facial droop.  No slurred speech.   Skin:  Warm and dry to palpation with no rashes or lesions appreciated.  Peripheral vascular:  2+ radial and pedal pulses in bilateral upper and lower extremities.  Psychiatric:  Alert but unable to answer orientation questions at this  "time  ------------------------------------------------------------------------------------------------------------  ----------------------------------------------------------------------------------------------------------------------  Results from last 7 days   Lab Units 12/07/23  1514 12/07/23  1219   HSTROP T ng/L 25* 24*     Results from last 7 days   Lab Units 12/11/23  0503 12/10/23  0206 12/09/23  0312 12/08/23  1031 12/07/23  1219 12/07/23  1125   CRP mg/dL  --   --  3.80*  --  3.89*  --    LACTATE mmol/L  --   --   --   --   --  1.5   WBC 10*3/mm3 6.20 7.97 4.83   < >  --  6.00   HEMOGLOBIN g/dL 13.1 13.1 12.4   < >  --  13.9   HEMATOCRIT % 40.6 40.5 38.1   < >  --  42.8   MCV fL 91.6 91.6 90.7   < >  --  91.1   MCHC g/dL 32.3 32.3 32.5   < >  --  32.5   PLATELETS 10*3/mm3 152 157 125*   < >  --  136*   INR   --   --   --   --   --  2.20*    < > = values in this interval not displayed.     Results from last 7 days   Lab Units 12/07/23  1117   PH, ARTERIAL pH units 7.417   PO2 ART mm Hg 64.8*   PCO2, ARTERIAL mm Hg 45.4*   HCO3 ART mmol/L 29.2*     Results from last 7 days   Lab Units 12/11/23  0503 12/10/23  0206 12/09/23  0312   SODIUM mmol/L 143 146* 145   POTASSIUM mmol/L 3.4* 3.6 3.4*   CHLORIDE mmol/L 110* 110* 108*   CO2 mmol/L 24.4 25.9 27.7   BUN mg/dL 16 19 17   CREATININE mg/dL 0.94 1.05* 1.11*   CALCIUM mg/dL 9.7* 9.9* 9.3   GLUCOSE mg/dL 135* 127* 103*   ALBUMIN g/dL 3.2* 3.0* 2.9*   BILIRUBIN mg/dL 0.5 0.5 0.5   ALK PHOS U/L 84 85 70   AST (SGOT) U/L 21 18 17   ALT (SGPT) U/L 11 9 8   Estimated Creatinine Clearance: 29.2 mL/min (by C-G formula based on SCr of 0.94 mg/dL).    No results found for: \"AMMONIA\"      Blood Culture   Date Value Ref Range Status   12/07/2023 No growth at 4 days  Preliminary   12/07/2023 No growth at 4 days  Preliminary     Urine Culture   Date Value Ref Range Status   12/07/2023 >100,000 CFU/mL Escherichia coli (A)  Final     No results found for: \"WOUNDCX\"  No results " "found for: \"STOOLCX\"    I have personally looked at the labs and they are summarized above.  ----------------------------------------------------------------------------------------------------------------------  Imaging Results (Last 24 Hours)       ** No results found for the last 24 hours. **          ----------------------------------------------------------------------------------------------------------------------  Assessment and Plan:    Acute metabolic encephalopathy -patient did require IM Geodon overnight.  Patient likely suffering from acute encephalopathy from acute cystitis in combination with hospital delirium.  Will continue to attempt reorientation and keep lights on during the day with shades up allowing sunlight in and turning lights off and keeping the room quiet during the night.    2.  E. coli acute cystitis -urine culture sensitivities demonstrate pan sensitivity, will continue Rocephin 1 g IV every 24 hours.    3.  Suspected pneumonia -will repeat chest x-ray tomorrow, continue empiric antibiotic therapy with Rocephin and doxycycline today.    4.  Acute on chronic HFpEF -have given Lasix 20 mg IV x 1 dose today, will repeat chest x-ray in the morning.  Appreciate cardiology recommendations.    5.  Essential hypertension -well-controlled    6.  Hyperlipidemia -statin    7.  General debility -continue PT/OT, will consult case management to assist with potential need for placement    Disposition will likely benefit from an additional 24 to 48 hours of hospitalization.    Sunil Craig,    12/11/23   14:57 EST     "

## 2023-12-11 NOTE — CASE MANAGEMENT/SOCIAL WORK
Discharge Planning Assessment  [facility]     Patient Name: Zeenat Dong  MRN: [id number]  Today's Date: 12/11/2023    Admit Date: 12/7/2023    Plan: SS spoke with pt's daughter, Jeff, at bedside.  Pt resides at home with daughter Jeff in [place].  Pt currently does not utilize home health services.  Pt has home 02 at 2 liters from [facility] and quad cane via family.  SS discussed discharge plans with daughter Jeff.  Pt's daughter requests inpt rehab consult rather than short term nursing home placement for rehab.  Pt's daughter stated goal would be for pt to be able to return home with her at discharge.  SS notified Physician. SS will follow.       Discharge Plan       Row Name 12/11/23 7916       Plan    Plan SS spoke with pt's daughter, Jeff, at bedside.  Pt resides at home with daughter Jeff in [place].  Pt currently does not utilize home health services.  Pt has home 02 at 2 liters from [facility] and quad cane via family.  SS discussed discharge plans with daughter Jeff.  Pt's daughter requests inpt rehab consult rather than short term nursing home placement for rehab.  Pt's daughter stated goal would be for pt to be able to return home with her at discharge.  SS notified Physician. SS will follow.      Row Name 12/11/23 0807                  Continued Care and Services - Admitted Since 12/7/2023    Coordination has not been started for this encounter.       Expected Discharge Date and Time       Expected Discharge Date Expected Discharge Time    Dec 12, 2023             Azul Hand, JONATHAN

## 2023-12-12 ENCOUNTER — APPOINTMENT (OUTPATIENT)
Dept: GENERAL RADIOLOGY | Facility: HOSPITAL | Age: 88
End: 2023-12-12
Payer: MEDICARE

## 2023-12-12 LAB
ANION GAP SERPL CALCULATED.3IONS-SCNC: 9.4 MMOL/L (ref 5–15)
BACTERIA SPEC AEROBE CULT: NORMAL
BACTERIA SPEC AEROBE CULT: NORMAL
BUN SERPL-MCNC: 20 MG/DL (ref 8–23)
BUN/CREAT SERPL: 19 (ref 7–25)
CALCIUM SPEC-SCNC: 9.7 MG/DL (ref 8.2–9.6)
CHLORIDE SERPL-SCNC: 108 MMOL/L (ref 98–107)
CO2 SERPL-SCNC: 23.6 MMOL/L (ref 22–29)
CREAT SERPL-MCNC: 1.05 MG/DL (ref 0.57–1)
DEPRECATED RDW RBC AUTO: 48.3 FL (ref 37–54)
EGFRCR SERPLBLD CKD-EPI 2021: 49 ML/MIN/1.73
ERYTHROCYTE [DISTWIDTH] IN BLOOD BY AUTOMATED COUNT: 13.9 % (ref 12.3–15.4)
GLUCOSE SERPL-MCNC: 134 MG/DL (ref 65–99)
HCT VFR BLD AUTO: 45.3 % (ref 34–46.6)
HGB BLD-MCNC: 14.1 G/DL (ref 12–15.9)
MCH RBC QN AUTO: 29.4 PG (ref 26.6–33)
MCHC RBC AUTO-ENTMCNC: 31.1 G/DL (ref 31.5–35.7)
MCV RBC AUTO: 94.6 FL (ref 79–97)
PLATELET # BLD AUTO: 146 10*3/MM3 (ref 140–450)
PMV BLD AUTO: 11.6 FL (ref 6–12)
POTASSIUM SERPL-SCNC: 4 MMOL/L (ref 3.5–5.2)
RBC # BLD AUTO: 4.79 10*6/MM3 (ref 3.77–5.28)
SODIUM SERPL-SCNC: 141 MMOL/L (ref 136–145)
WBC NRBC COR # BLD AUTO: 9.66 10*3/MM3 (ref 3.4–10.8)

## 2023-12-12 PROCEDURE — 80048 BASIC METABOLIC PNL TOTAL CA: CPT | Performed by: INTERNAL MEDICINE

## 2023-12-12 PROCEDURE — 85027 COMPLETE CBC AUTOMATED: CPT | Performed by: INTERNAL MEDICINE

## 2023-12-12 PROCEDURE — 97116 GAIT TRAINING THERAPY: CPT

## 2023-12-12 PROCEDURE — 97535 SELF CARE MNGMENT TRAINING: CPT

## 2023-12-12 PROCEDURE — 71045 X-RAY EXAM CHEST 1 VIEW: CPT | Performed by: RADIOLOGY

## 2023-12-12 PROCEDURE — 97530 THERAPEUTIC ACTIVITIES: CPT

## 2023-12-12 PROCEDURE — 97110 THERAPEUTIC EXERCISES: CPT

## 2023-12-12 PROCEDURE — 25010000002 ZIPRASIDONE MESYLATE PER 10 MG

## 2023-12-12 PROCEDURE — 94799 UNLISTED PULMONARY SVC/PX: CPT

## 2023-12-12 PROCEDURE — 99232 SBSQ HOSP IP/OBS MODERATE 35: CPT | Performed by: INTERNAL MEDICINE

## 2023-12-12 PROCEDURE — 71045 X-RAY EXAM CHEST 1 VIEW: CPT

## 2023-12-12 RX ADMIN — POTASSIUM CHLORIDE 10 MEQ: 1500 TABLET, EXTENDED RELEASE ORAL at 09:33

## 2023-12-12 RX ADMIN — Medication 10 ML: at 21:12

## 2023-12-12 RX ADMIN — WATER 10 MG: 1 INJECTION INTRAMUSCULAR; INTRAVENOUS; SUBCUTANEOUS at 02:14

## 2023-12-12 RX ADMIN — DOCUSATE SODIUM 50 MG AND SENNOSIDES 8.6 MG 2 TABLET: 8.6; 5 TABLET, FILM COATED ORAL at 09:33

## 2023-12-12 RX ADMIN — CINACALCET HYDROCHLORIDE 30 MG: 30 TABLET, FILM COATED ORAL at 09:32

## 2023-12-12 RX ADMIN — APIXABAN 2.5 MG: 2.5 TABLET, FILM COATED ORAL at 09:33

## 2023-12-12 RX ADMIN — PANTOPRAZOLE SODIUM 40 MG: 40 TABLET, DELAYED RELEASE ORAL at 09:33

## 2023-12-12 RX ADMIN — SODIUM CHLORIDE 40 ML: 9 INJECTION, SOLUTION INTRAVENOUS at 09:35

## 2023-12-12 RX ADMIN — METOPROLOL TARTRATE 25 MG: 25 TABLET, FILM COATED ORAL at 21:12

## 2023-12-12 RX ADMIN — NIFEDIPINE 60 MG: 30 TABLET, EXTENDED RELEASE ORAL at 09:33

## 2023-12-12 RX ADMIN — DOXYCYCLINE 100 MG: 100 INJECTION, POWDER, LYOPHILIZED, FOR SOLUTION INTRAVENOUS at 09:34

## 2023-12-12 RX ADMIN — APIXABAN 2.5 MG: 2.5 TABLET, FILM COATED ORAL at 21:12

## 2023-12-12 RX ADMIN — DOCUSATE SODIUM 50 MG AND SENNOSIDES 8.6 MG 2 TABLET: 8.6; 5 TABLET, FILM COATED ORAL at 21:12

## 2023-12-12 RX ADMIN — Medication 10 ML: at 09:37

## 2023-12-12 RX ADMIN — METOPROLOL TARTRATE 25 MG: 25 TABLET, FILM COATED ORAL at 09:33

## 2023-12-12 RX ADMIN — ASPIRIN 81 MG: 81 TABLET, COATED ORAL at 09:33

## 2023-12-12 RX ADMIN — ATORVASTATIN CALCIUM 20 MG: 20 TABLET, FILM COATED ORAL at 21:12

## 2023-12-12 NOTE — CASE MANAGEMENT/SOCIAL WORK
Discharge Planning Assessment  [facility]     Patient Name: Zeenat Dong  MRN: [id number]  Today's Date: 12/12/2023    Admit Date: 12/7/2023    Plan: SS spoke with inpt rehab per Memo who stated that inpt rehab has received pt's referral and will observe pt's progress for possible admit to [facility] inpt rehab.  SS will follow.       Discharge Plan       Row Name 12/12/23 1151       Plan    Plan SS spoke with inpt rehab per Memo who stated that inpt rehab has received pt's referral and will observe pt's progress for possible admit to [facility] inpt rehab.  SS will follow.                  Continued Care and Services - Admitted Since 12/7/2023    Coordination has not been started for this encounter.       Expected Discharge Date and Time       Expected Discharge Date Expected Discharge Time    Dec 12, 2023             PAT FigueroaW

## 2023-12-12 NOTE — PLAN OF CARE
Goal Outcome Evaluation:  Plan of Care Reviewed With: patient  Progress: no change   Patient resting in bed. Agitated during the night that has since resolved. PRN medication provided. Patient remains disoriented. Patient frequently reoriented this shift. Educated and encouraged to ring for assistance. Bed alarm remains in place. Patient on 3L and tolerating well; maintaining oxygen saturation above 90 percent. Unable to titrate down at this time. Room air is patient baseline. Potassium currently 4.0. Plan for inpatient rehab or home with family per MD note. Will continue to follow with plan of care.

## 2023-12-12 NOTE — CASE MANAGEMENT/SOCIAL WORK
Discharge Planning Assessment  [facility]     Patient Name: Zeenat Dong  MRN: [id number]  Today's Date: 12/12/2023    Admit Date: 12/7/2023    Plan: SS spoke with pt's daughter Jeff at bedside.  Pt's family stated discharge plan is for pt to return home at discharge with daughter Jeff in [facility] with home health.  SS will provide pt family referral to [facility] and assist with resources.  SS notified Physician.  SS will follow.       Discharge Plan       Row Name 12/12/23 1301       Plan    Plan SS spoke with pt's daughter Jeff at bedside.  Pt's family stated discharge plan is for pt to return home at discharge with daughter Jeff in [facility] with home health.  SS will provide pt family referral to [facility] and assist with resources.  SS notified Physician.  SS will follow.      Row Name 12/12/23 1638       Plan    Plan [facility] inpt rehab per Memo declined pt admit.  SS spoke with pt's family at bedside and attempted several times to contact pt's daughter/caregiver Jeff without success to further discuss discharge options.  SS will continue attempts to contact Jeff.                  Continued Care and Services - Admitted Since 12/7/2023    Coordination has not been started for this encounter.       Expected Discharge Date and Time       Expected Discharge Date Expected Discharge Time    Dec 12, 2023           JONATHAN Figueroa

## 2023-12-12 NOTE — THERAPY TREATMENT NOTE
Acute Care - Physical Therapy Treatment Note   [place]     Patient Name: Zeenat Dong  : 1928  MRN: [id number]  Today's Date: 2023      Visit Dx:     ICD-10-CM ICD-9-CM   1. Dyspnea, unspecified type  R06.00 786.09     Patient Active Problem List   Diagnosis    Abdominal distension    Heart failure    Dry skin dermatitis    Gastroesophageal reflux disease without esophagitis    Hypercalcemia    Hyperparathyroidism    Hypertension    Hypokalemia    Hypomagnesemia    Seasonal allergic rhinitis    Shortness of breath    Atrial fibrillation    Weakness    Dyspnea    Acute hypoxemic respiratory failure     Past Medical History:   Diagnosis Date    Hyperlipidemia      Past Surgical History:   Procedure Laterality Date    OTHER SURGICAL HISTORY      ear surgery    SKIN BIOPSY       PT Assessment (last 12 hours)       PT Evaluation and Treatment       Row Name 23 1129          Physical Therapy Time and Intention    Document Type therapy note (daily note)  -KM     Mode of Treatment individual therapy;physical therapy  -KM     Patient Effort good  -KM     Symptoms Noted During/After Treatment fatigue  -KM       Row Name 23 1129          General Information    Patient Profile Reviewed yes  -KM     Patient Observations alert;cooperative;agree to therapy  -KM     Existing Precautions/Restrictions fall  -KM       Row Name 23 1129          Cognition    Orientation Status (Cognition) --  difficulty hearing makes orientation difficult to assess  -KM     Follows Commands (Cognition) follows one-step commands;physical/tactile prompts required;verbal cues/prompting required;visual cue  -KM       Row Name 23 1129          Bed Mobility    Bed Mobility bed mobility (all) activities  -KM     All Activities, [place] (Bed Mobility) minimum assist (75% patient effort);moderate assist (50% patient effort)  -KM     Assistive Device (Bed Mobility) head of bed elevated;bed rails  -KM       Row Name  12/12/23 1129          Transfers    Transfers sit-stand transfer;stand-sit transfer;bed-chair transfer;chair-bed transfer  -KM       Row Name 12/12/23 1129          Bed-Chair Transfer    Bed-Chair [place] (Transfers) minimum assist (75% patient effort);moderate assist (50% patient effort)  -KM       Row Name 12/12/23 1129          Chair-Bed Transfer    Chair-Bed [place] (Transfers) minimum assist (75% patient effort);moderate assist (50% patient effort)  -KM       Row Name 12/12/23 1129          Sit-Stand Transfer    Sit-Stand [place] (Transfers) minimum assist (75% patient effort);moderate assist (50% patient effort)  -KM       Row Name 12/12/23 1129          Stand-Sit Transfer    Stand-Sit [place] (Transfers) minimum assist (75% patient effort)  -       Row Name 12/12/23 1129          Gait/Stairs (Locomotion)    Gait/Stairs Locomotion gait/ambulation independence;gait/ambulation assistive device;distance ambulated  -KM     [place] Level (Gait) minimum assist (75% patient effort)  -KM     Assistive Device (Gait) cane, quad tip  -KM     Patient was able to Ambulate yes  -KM     Distance in Feet (Gait) 6  -KM     Pattern (Gait) step-to  -KM     Deviations/Abnormal Patterns (Gait) gait speed decreased;festinating/shuffling;base of support, narrow;stride length decreased  -KM     Bilateral Gait Deviations forward flexed posture  -KM       Row Name 12/12/23 1129          Safety Issues, Functional Mobility    Impairments Affecting Function (Mobility) endurance/activity tolerance;balance  -KM       Row Name 12/12/23 1129          Motor Skills    Comments, Therapeutic Exercise seated ther-ex  -KM     Additional Documentation Comments, Therapeutic Exercise (Row)  -       Row Name 12/12/23 1129          Progress Summary (PT)    Daily Progress Summary (PT) Pt. was able to perform functional mobility skills w/ Triston-modA. She ambulated short distance w/ QPC and Triston for steadying. Pt. would continue  to benefit from PT services.  -               User Key  (r) = Recorded By, (t) = Taken By, (c) = Cosigned By      Initials Name Provider Type    Víctor Padilla, RODRIGUEZ Physical Therapist                    Physical Therapy Education       Title: PT OT SLP Therapies (In Progress)       Topic: Physical Therapy (In Progress)       Point: Mobility training (In Progress)       Learning Progress Summary             Patient Acceptance, E, NR by RD at 12/11/2023 0948    Acceptance, E,TB, NR by JOSE E at 12/10/2023 0935    Acceptance, E,TB, VU by KM at 12/8/2023 1140                         Point: Home exercise program (In Progress)       Learning Progress Summary             Patient Acceptance, E, NR by RD at 12/11/2023 0948    Acceptance, E,TB, NR by JOSE E at 12/10/2023 0935    Acceptance, E,TB, VU by KM at 12/8/2023 1140                         Point: Body mechanics (In Progress)       Learning Progress Summary             Patient Acceptance, E, NR by RD at 12/11/2023 0948    Acceptance, E,TB, NR by JOSE E at 12/10/2023 0935    Acceptance, E,TB, VU by KM at 12/8/2023 1140                         Point: Precautions (In Progress)       Learning Progress Summary             Patient Acceptance, E, NR by RD at 12/11/2023 0948    Acceptance, E,TB, NR by MP at 12/10/2023 0935    Acceptance, E,TB, VU by KM at 12/8/2023 1140                                         User Key       Initials Effective Dates Name Provider Type Discipline     06/16/21 -  Deborah Alvarez, RN Registered Nurse Nurse    ISRRAEL 06/16/21 -  Meggan Hope RN Registered Nurse Nurse    HUNG 05/24/22 -  Víctor Noble, RODRIGUEZ Physical Therapist PT                  PT Recommendation and Plan  Anticipated Discharge Disposition (PT): home with assist, home with 24/7 care, home with home health  Planned Therapy Interventions (PT): balance training, bed mobility training, gait training, home exercise program, patient/family education, postural re-education, ROM (range of motion), stair  training, strengthening, stretching, transfer training  Therapy Frequency (PT): 2 times/wk (2-5x/wk)  Progress Summary (PT)  Daily Progress Summary (PT): Pt. was able to perform functional mobility skills w/ Triston-modA. She ambulated short distance w/ QPC and Triston for steadying. Pt. would continue to benefit from PT services.  Plan of Care Reviewed With: patient, family  Outcome Evaluation: Pt. evaluation completed during PT session. She was able to perform functional mobility skills w/ Tr[place]. She ambulated minimal distance w/ HHA and Triston. She tolerated session well. Pt. would benefit from skilled PT services.       Time Calculation:    PT Charges       Row Name 12/12/23 1128             Time Calculation    PT Received On 12/12/23  -KM         Time Calculation- PT    Total Timed Code Minutes- PT 38 minute(s)  -KM                User Key  (r) = Recorded By, (t) = Taken By, (c) = Cosigned By      Initials Name Provider Type     Víctor Noble, PT Physical Therapist                  Therapy Charges for Today       Code Description Service Date Service Provider Modifiers Qty    47767526359 HC GAIT TRAINING EA 15 MIN 12/12/2023 Víctor Noble, PT GP 1    74690874645 HC PT THER PROC EA 15 MIN 12/12/2023 Víctor Noble, PT GP 1    08167447739 HC PT THERAPEUTIC ACT EA 15 MIN 12/12/2023 Víctor Noble, PT GP 1            PT G-Codes  AM-PAC 6 Clicks Score (PT): 17    Víctor Noble PT  12/12/2023

## 2023-12-12 NOTE — PROGRESS NOTES
"  Patient Identification:  Name:  Zeenat Dong  Age:  89 y.o.  Sex:  female  :  1928  MRN:  [id number]  Visit Number:  [id number]  Primary care provider:  Fatmata Dong APRN    Reason for follow-up:  Acute on chronic heart failure with preserved EF and NSTEMI type II    Subjective     He is still confused and not fully oriented.  Not in apparent distress.  No major events happened in the past 24 hours.  She had adequate urinary output-1.45 L in 24 hours.  Electrolytes and renal function are stable.  Telemetry showed sinus rhythm.  BP is high.      Medication Review:   apixaban, 2.5 mg, Oral, BID  aspirin, 81 mg, Oral, Daily  atorvastatin, 20 mg, Oral, Nightly  cholecalciferol, 50,000 Units, Oral, Weekly  cinacalcet, 30 mg, Oral, Daily  doxycycline, 100 mg, Intravenous, Q12H  metoprolol tartrate, 25 mg, Oral, Q12H  NIFEdipine CC, 60 mg, Oral, Q24H  pantoprazole, 40 mg, Oral, Daily  potassium chloride, 10 mEq, Oral, Daily  senna-docusate sodium, 2 tablet, Oral, BID  sodium chloride, 10 mL, Intravenous, Q12H              Vital Sign Min/Max for last 24 hours  Temp  Min: 97.5 °F (36.4 °C)  Max: 98.4 °F (36.9 °C)   BP  Min: 146/61  Max: 175/68   Pulse  Min: 81  Max: 92   Resp  Min: 20  Max: 20   SpO2  Min: 92 %  Max: 94 %   No data recorded   No data recorded     Flowsheet Rows      Flowsheet Row First Filed Value   Admission Height 152.4 cm (60\") Documented at 2023 0959   Admission Weight 59 kg (130 lb) Documented at 2023 0959            Objective       Physical Exam:         General Appearance:  HEENT:   Neck:     Alert, confused.  Not in apparent distress.  Grossly normal   No JVD    Lungs:   Bilateral equal air entry.  No rales are heard today.     Heart:  Regular rhythm and normal rate, normal S1 and S2, no        murmur, no gallop, no rub, no click    Chest Wall:  No abnormalities observed    Abdomen   Soft, nontender, no masses, no organomegaly and bowel sounds are heard.     Extremities: " No pedal edema    Pulses: Deferred    Neurologic: Deferred       1.  Acute on chronic  Telemetry:  Sinus rhythm      Labs  Results from last 7 days   Lab Units 12/12/23  0149 12/11/23  0503 12/10/23  0206 12/09/23 0312 12/08/23  1031 12/07/23  1125   WBC 10*3/mm3 9.66 6.20 7.97 4.83 5.41 6.00   HEMOGLOBIN g/dL 14.1 13.1 13.1 12.4 13.0 13.9   HEMATOCRIT % 45.3 40.6 40.5 38.1 39.8 42.8   PLATELETS 10*3/mm3 146 152 157 125* 124* 136*     Results from last 7 days   Lab Units 12/12/23  0149 12/11/23  0503 12/10/23  0206 12/09/23 0312 12/08/23  1031 12/07/23  1219   SODIUM mmol/L 141 143 146* 145 142 141   POTASSIUM mmol/L 4.0 3.4* 3.6 3.4* 3.1* 3.6   CHLORIDE mmol/L 108* 110* 110* 108* 104 103   CO2 mmol/L 23.6 24.4 25.9 27.7 26.4 27.5   BUN mg/dL 20 16 19 17 19 20   CREATININE mg/dL 1.05* 0.94 1.05* 1.11* 1.19* 1.26*   CALCIUM mg/dL 9.7* 9.7* 9.9* 9.3 9.3 9.8*   GLUCOSE mg/dL 134* 135* 127* 103* 160* 124*     Results from last 7 days   Lab Units 12/11/23  0503 12/10/23  0206 12/09/23 0312 12/08/23  1031 12/07/23  1219   BILIRUBIN mg/dL 0.5 0.5 0.5 0.7 1.0   ALK PHOS U/L 84 85 70 74 80   AST (SGOT) U/L 21 18 17 19 17   ALT (SGPT) U/L 11 9 8 8 9         Results from last 7 days   Lab Units 12/07/23  1125   INR  2.20*     Results from last 7 days   Lab Units 12/09/23  0312 12/07/23  1219   CRP mg/dL 3.80* 3.89*     Results from last 7 days   Lab Units 12/07/23  1514 12/07/23  1219   HSTROP T ng/L 25* 24*     Results from last 7 days   Lab Units 12/07/23  1117   PH, ARTERIAL pH units 7.417   PO2 ART mm Hg 64.8*   PCO2, ARTERIAL mm Hg 45.4*   HCO3 ART mmol/L 29.2*       Assessment      1.  Acute on chronic heart failure with preserved EF.  Clinically improved with diuresis done yesterday.  2.  NSTEMI type II.  Due to hypoxia.  3.  History of PAF-in sinus rhythm  4.  Acute on chronic hypoxic respiratory failure  5.  Dementia with episodes of confusional status  6.  Hypertension    Plan     1.  Heart failure-clinically  improved.  Will hold diuretic today.  2.  BP is high today.  On nifedipine and metoprolol.  Will monitor closely and will adjust the drug therapy accordingly.  3.  Anticoagulated on Eliquis for history of A-fib.  4.  I discussed my findings and recommendations with patient's son who was at bedside.      Fanny Benitez MD [facility]  12/12/23  09:20 EST

## 2023-12-12 NOTE — PROGRESS NOTES
"Palliative Care Daily Progress Note     S: Medical record reviewed, followed up with Primary RN Khushi and Dr Craig regarding patient's condition. When palliative entered the room Zeenat was awake and was playing with her baby doll. She was able to tell me that she was not in pain and did not have an upset stomach. Se did not appear to be short of breath nor was she in distress otherwise. Son Magen was at bedside      O:   Palliative Performance Scale Score:     /48 (BP Location: Right arm, Patient Position: Lying)   Pulse 95   Temp 98 °F (36.7 °C) (Oral)   Resp 18   Ht 152.4 cm (60\")   Wt 61.1 kg (134 lb 9.6 oz)   SpO2 96%   BMI 26.29 kg/m²     Intake/Output Summary (Last 24 hours) at 12/12/2023 1720  Last data filed at 12/12/2023 0400  Gross per 24 hour   Intake 575.76 ml   Output 670 ml   Net -94.24 ml       PE:  General Appearance:    Chronically ill appearing, alert to self , cooperative, NAD   HEENT:    NC/AT, without obvious abnormality, EOMI, anicteric    Neck:   supple, trachea midline, no JVD   Lungs:     Unlabored respirations, Bl wheezes noted and faint BL crackles on 2 L NC    Heart:    RRR, normal S1 and S2, no M/R/G   Abdomen:     Soft, NT, ND, NABS    Extremities:   Moves all extremities, no edema   Pulses:   Pulses palpable and equal bilaterally   Skin:   Warm, dry   Neurologic:   Alert to self, unable to determine if to place time, due to severe [place], she is pleasantly confused   Psych:   Calm, pleasantly confused          Meds: Reviewed and changes noted    Labs:   Results from last 7 days   Lab Units 12/12/23 0149   WBC 10*3/mm3 9.66   HEMOGLOBIN g/dL 14.1   HEMATOCRIT % 45.3   PLATELETS 10*3/mm3 146     Results from last 7 days   Lab Units 12/12/23 0149   SODIUM mmol/L 141   POTASSIUM mmol/L 4.0   CHLORIDE mmol/L 108*   CO2 mmol/L 23.6   BUN mg/dL 20   CREATININE mg/dL 1.05*   GLUCOSE mg/dL 134*   CALCIUM mg/dL 9.7*     Results from last 7 days   Lab Units 12/12/23  0149 " 12/11/23  0503   SODIUM mmol/L 141 143   POTASSIUM mmol/L 4.0 3.4*   CHLORIDE mmol/L 108* 110*   CO2 mmol/L 23.6 24.4   BUN mg/dL 20 16   CREATININE mg/dL 1.05* 0.94   CALCIUM mg/dL 9.7* 9.7*   BILIRUBIN mg/dL  --  0.5   ALK PHOS U/L  --  84   ALT (SGPT) U/L  --  11   AST (SGOT) U/L  --  21   GLUCOSE mg/dL 134* 135*     Imaging Results (Last 72 Hours)       Procedure Component Value Units Date/Time    XR Chest 1 View [[id number]] Collected: 12/12/23 0701     Updated: 12/12/23 0704    Narrative:      EXAM:    XR Chest, 1 View     EXAM DATE:    12/12/2023 6:45 AM     CLINICAL HISTORY:    possible pna; R06.00-Dyspnea, unspecified     TECHNIQUE:    Frontal view of the chest.     COMPARISON:    12/7/2023     FINDINGS:    Lungs and pleural spaces:  Interstitial edema.  Pulmonary vascular  congestion.  No pneumothorax.    Heart:  Cardiomegaly is noted.    Mediastinum:  Unremarkable as visualized.    Bones/joints:  Bony structures are stable.  No effusions.       Impression:      1.  Changes of mild CHF stable from previous exam.  2.  No focal consolidative airspace disease identified.        This report was finalized on 12/12/2023 7:02 AM by Dr. Marquis Ruiz MD.                 Diagnostics: Reviewed    A: Zeenat Dong is a 89 y.o. female admitted on 12/7/2023 due to shortness of breath and cough.Zeenat has a medical history of GERD, Hypertension, Hyperlipidemia, Chronic HFunknownEF, Paroxysmal Atrial Fibrillation on chronic anticoagulation. EKG showed normal sinus rhythm, LAD, left ventricle hypertrophy.  CT Chest showed scarring vs minimal airspace disease L lung base, coronary artery calcifications. Per discussion with Jeff, she states that Zeenat lives with her and is normally very independent, ambulates on her own, goes to bathroom, even making her own food, but states sincelast Sunday after [facility] she has been becoming more short of breath and has been wearing her oxygen all the time when it is only ordered as  needed.       P:  I was able to touch base with patients son Magen at bedside, he stated that he felt Zeenat was slowly becoming less confused, however had her moments. He did say that they were planning on at least trying to get short term rehab, as prior to this Zeenat had been able get around on her own at home with her cane.  is working with family for rehab placement.    We will continue to follow along. Please do not hesitate to contact us regarding further sx mgmt or GOC needs, including after hours or on weekends via our on call provider at [phone].     Kelly Pastrana, APRN    12/12/2023

## 2023-12-12 NOTE — PROGRESS NOTES
[facility] HOSPITALIST PROGRESS NOTE     Patient Identification:  Name:  Zeenat Dong  Age:  89 y.o.  Sex:  female  :  1928  MRN:  [id number]  Visit Number:  [id number]  Primary Care Provider:  Fatmata Dong APRN    Length of stay:  4    Chief complaint: Altered mental status    Subjective:    Patient seen and examined at bedside with no nursing staff present.  CNA is present as well as patient's son.  Patient is awake and appears calm and comfortable.  Patient's hearing aid has malfunctioned and she is unable to communicate effectively.  When son was asked how they communicate with the patient when her hearing aid malfunctions, he simply replied they do not communicate.  Overall, mentation appears to be slightly improving.  Patient's son states the patient asked how his wife was doing, naming his wife but her actual name.  This is improvement considering patient did not recognize her own son earlier this morning.  ----------------------------------------------------------------------------------------------------------------------  Current [facility] Meds:  apixaban, 2.5 mg, Oral, BID  aspirin, 81 mg, Oral, Daily  atorvastatin, 20 mg, Oral, Nightly  cholecalciferol, 50,000 Units, Oral, Weekly  cinacalcet, 30 mg, Oral, Daily  metoprolol tartrate, 25 mg, Oral, Q12H  NIFEdipine CC, 60 mg, Oral, Q24H  pantoprazole, 40 mg, Oral, Daily  potassium chloride, 10 mEq, Oral, Daily  senna-docusate sodium, 2 tablet, Oral, BID  sodium chloride, 10 mL, Intravenous, Q12H         ----------------------------------------------------------------------------------------------------------------------  Vital Signs:  Temp:  [98 °F (36.7 °C)-98.4 °F (36.9 °C)] 98 °F (36.7 °C)  Heart Rate:  [81-95] 95  Resp:  [18-20] 18  BP: (144-175)/(48-74) 144/48      23  0500 12/10/23  0500 23  0500   Weight: 59.2 kg (130 lb 8.2 oz) 59.5 kg (131 lb 1.6 oz) 61.1 kg (134 lb 9.6 oz)     Body mass index is 26.29  kg/m².    Intake/Output Summary (Last 24 hours) at 12/12/2023 1225  Last data filed at 12/12/2023 0400  Gross per 24 hour   Intake 815.76 ml   Output 670 ml   Net 145.76 ml     Diet: Regular/House Diet; No Mixed Consistencies, Feeding Assistance - Nursing; Texture: Soft to Chew (NDD 3); Soft to Chew: Chopped Meat; Fluid Consistency: Thin (IDDSI 0)  ----------------------------------------------------------------------------------------------------------------------  Physical exam:  Constitutional: Elderly appearing  female in no apparent distress, currently confused..     HENT:  Head:  Normocephalic and atraumatic.  Mouth:  Moist mucous membranes.    Eyes:  Conjunctivae and EOM are normal.  Pupils are equal, round, and reactive to light.  No scleral icterus.    Neck:  Neck supple. No thyromegaly.  No JVD present.    Cardiovascular:  Regular rate and rhythm with no murmurs, rubs, clicks or gallops appreciated.  Pulmonary/Chest:  Clear to auscultation bilaterally with no crackles, wheezes or rhonchi appreciated.  Abdominal:  Soft. Nontender. Nondistended  Bowel sounds are normal in all four quadrants. No organomegally appreciated.   Musculoskeletal:  No edema, no tenderness, and no deformity.  No red or swollen joints anywhere.    Neurological:  Alert, Cranial nerves II-XII intact with no focal deficits.  No facial droop.  No slurred speech.   Skin:  Warm and dry to palpation with no rashes or lesions appreciated.  Peripheral vascular:  2+ radial and pedal pulses in bilateral upper and lower extremities.  ------------------------------------------------------------------------------------------------------------  ----------------------------------------------------------------------------------------------------------------------  Results from last 7 days   Lab Units 12/07/23  1514 12/07/23  1219   HSTROP T ng/L 25* 24*     Results from last 7 days   Lab Units 12/12/23  0149 12/11/23  0503 12/10/23  0206  "12/09/23  0312 12/08/23  1031 12/07/23  1219 12/07/23  1125   CRP mg/dL  --   --   --  3.80*  --  3.89*  --    LACTATE mmol/L  --   --   --   --   --   --  1.5   WBC 10*3/mm3 9.66 6.20 7.97 4.83   < >  --  6.00   HEMOGLOBIN g/dL 14.1 13.1 13.1 12.4   < >  --  13.9   HEMATOCRIT % 45.3 40.6 40.5 38.1   < >  --  42.8   MCV fL 94.6 91.6 91.6 90.7   < >  --  91.1   MCHC g/dL 31.1* 32.3 32.3 32.5   < >  --  32.5   PLATELETS 10*3/mm3 146 152 157 125*   < >  --  136*   INR   --   --   --   --   --   --  2.20*    < > = values in this interval not displayed.     Results from last 7 days   Lab Units 12/07/23  1117   PH, ARTERIAL pH units 7.417   PO2 ART mm Hg 64.8*   PCO2, ARTERIAL mm Hg 45.4*   HCO3 ART mmol/L 29.2*     Results from last 7 days   Lab Units 12/12/23  0149 12/11/23  0503 12/10/23  0206 12/09/23  0312   SODIUM mmol/L 141 143 146* 145   POTASSIUM mmol/L 4.0 3.4* 3.6 3.4*   CHLORIDE mmol/L 108* 110* 110* 108*   CO2 mmol/L 23.6 24.4 25.9 27.7   BUN mg/dL 20 16 19 17   CREATININE mg/dL 1.05* 0.94 1.05* 1.11*   CALCIUM mg/dL 9.7* 9.7* 9.9* 9.3   GLUCOSE mg/dL 134* 135* 127* 103*   ALBUMIN g/dL  --  3.2* 3.0* 2.9*   BILIRUBIN mg/dL  --  0.5 0.5 0.5   ALK PHOS U/L  --  84 85 70   AST (SGOT) U/L  --  21 18 17   ALT (SGPT) U/L  --  11 9 8   Estimated Creatinine Clearance: 26.2 mL/min (A) (by C-G formula based on SCr of 1.05 mg/dL (H)).    No results found for: \"AMMONIA\"      Blood Culture   Date Value Ref Range Status   12/07/2023 No growth at 4 days  Preliminary   12/07/2023 No growth at 4 days  Preliminary     Urine Culture   Date Value Ref Range Status   12/07/2023 >100,000 CFU/mL Escherichia coli (A)  Final     No results found for: \"WOUNDCX\"  No results found for: \"STOOLCX\"    I have personally looked at the labs and they are summarized above.  ----------------------------------------------------------------------------------------------------------------------  Imaging Results (Last 24 Hours)       Procedure " Component Value Units Date/Time    XR Chest 1 View [[id number]] Collected: 12/12/23 0701     Updated: 12/12/23 0704    Narrative:      EXAM:    XR Chest, 1 View     EXAM DATE:    12/12/2023 6:45 AM     CLINICAL HISTORY:    possible pna; R06.00-Dyspnea, unspecified     TECHNIQUE:    Frontal view of the chest.     COMPARISON:    12/7/2023     FINDINGS:    Lungs and pleural spaces:  Interstitial edema.  Pulmonary vascular  congestion.  No pneumothorax.    Heart:  Cardiomegaly is noted.    Mediastinum:  Unremarkable as visualized.    Bones/joints:  Bony structures are stable.  No effusions.       Impression:      1.  Changes of mild CHF stable from previous exam.  2.  No focal consolidative airspace disease identified.        This report was finalized on 12/12/2023 7:02 AM by Dr. Marquis Ruiz MD.             ----------------------------------------------------------------------------------------------------------------------  Assessment and Plan:    Acute metabolic encephalopathy -encephalopathy appears to be slowly improving.  Patient likely suffering from acute encephalopathy from acute cystitis in combination with hospital delirium.  Will continue to attempt reorientation and keep lights on during the day with shades up allowing sunlight in and turning lights off and keeping the room quiet during the night.    2.  E. coli acute cystitis -urine culture sensitivities demonstrate pan sensitive E. coli, will continue Rocephin 1 g IV every 24 hours.    3.  Suspected pneumonia -repeat chest x-ray demonstrates no evidence of pneumonia, there is some evidence of mild volume overload.  Will discontinue doxycycline today and continue Rocephin for treatment of acute cystitis.      4.  Acute on chronic HFpEF -patient responded well to Lasix 20 mg IV x 1 dose yesterday.  Patient has slight increase in serum creatinine today, will hold all diuretic therapy at this time.    5.  Essential hypertension -well-controlled    6.   Hyperlipidemia -statin    7.  General debility -continue PT/OT, will consult case management to assist with potential need for placement    Disposition patient appears stable from a medical standpoint.  Given overall general decline, patient would likely benefit from inpatient rehab or skilled nursing facility placement.  Case management has been consulted and is currently assisting in placement.    Sunil Craig, DO   12/12/23   12:25 EST

## 2023-12-12 NOTE — THERAPY TREATMENT NOTE
Acute Care - Occupational Therapy Treatment Note   [place]     Patient Name: Zeenat Dong  : 1928  MRN: [id number]  Today's Date: 2023             Admit Date: 2023       ICD-10-CM ICD-9-CM   1. Dyspnea, unspecified type  R06.00 786.09     Patient Active Problem List   Diagnosis    Abdominal distension    Heart failure    Dry skin dermatitis    Gastroesophageal reflux disease without esophagitis    Hypercalcemia    Hyperparathyroidism    Hypertension    Hypokalemia    Hypomagnesemia    Seasonal allergic rhinitis    Shortness of breath    Atrial fibrillation    Weakness    Dyspnea    Acute hypoxemic respiratory failure     Past Medical History:   Diagnosis Date    Hyperlipidemia      Past Surgical History:   Procedure Laterality Date    OTHER SURGICAL HISTORY      ear surgery    SKIN BIOPSY           OT ASSESSMENT FLOWSHEET (last 12 hours)       OT Evaluation and Treatment       Row Name 23 1137                   OT Time and Intention    Subjective Information no complaints  -LM        Document Type therapy note (daily note)  -LM        Mode of Treatment occupational therapy  -LM        Patient Effort good  -LM        Comment Patient seen this date for adl retraining/education and fxl mobility.  son present and supportive.  son provides history that patient was able to perform fxl transfers to bathroom using quad cane with modified independence and perform basic adl tasks with modified independence/supervision  prior to admission.  Patient currently requires min/mod assist with transfer, max assist with feeding, grooming, bathing, dressing, and toileting tasks overall.  [place] and requires max cues.  -LM           Cognition    Affect/Mental Status (Cognition) confused  -LM        Orientation Status (Cognition) oriented to;person  -LM           Positioning and Restraints    Post Treatment Position chair  -LM        In Chair call light within reach;encouraged to call for assist;with  family/caregiver;patient within staff view  -LM                  User Key  (r) = Recorded By, (t) = Taken By, (c) = Cosigned By      Initials Name Effective Dates    Stephanie Cheung OT 06/16/21 -                            OT Recommendation and Plan              Time Calculation:     Therapy Charges for Today       Code Description Service Date Service Provider Modifiers Qty    [id number] HC OT SELF CARE/MGMT/TRAIN EA 15 MIN 12/12/2023 Stephanie Hills OT GO 2                 Stephanie Hills OT  12/12/2023

## 2023-12-12 NOTE — CASE MANAGEMENT/SOCIAL WORK
Discharge Planning Assessment  [facility]     Patient Name: Zeenat Dong  MRN: [id number]  Today's Date: 12/12/2023    Admit Date: 12/7/2023    Plan: [facility] inpt rehab per Memo declined pt admit.  SS spoke with pt's family at bedside and attempted several times to contact pt's daughter/caregiver Jeff without success to further discuss discharge options.  SS will continue attempts to contact Jeff.       Discharge Plan       Row Name 12/12/23 1633       Plan    Plan [facility] inpt rehab per Memo declined pt admit.  SS spoke with pt's family at bedside and attempted several times to contact pt's daughter/caregiver Jeff without success to further discuss discharge options.  SS will continue attempts to contact Jeff.                  Continued Care and Services - Admitted Since 12/7/2023    Coordination has not been started for this encounter.       Expected Discharge Date and Time       Expected Discharge Date Expected Discharge Time    Dec 12, 2023           Azul Hand, BSW

## 2023-12-12 NOTE — CASE MANAGEMENT/SOCIAL WORK
Discharge Planning Assessment  [facility]     Patient Name: Zeenat Dong  MRN: [id number]  Today's Date: 12/12/2023    Admit Date: 12/7/2023    Plan: Pt's georgeugher Jeff is aware of pt possible discharge in am.  SS will follow.       Discharge Plan       Row Name 12/12/23 1740       Plan    Plan Pt's asiya Armstrongne is aware of pt possible discharge in am.  SS will follow.      Row Name 12/12/23 3864       Plan    Plan SS spoke with pt's daughter Jeff at bedside.  Pt's family stated discharge plan is for pt to return home at discharge with daughter Jeff in [facility] with home health.  SS will provide pt family referral to [facility] and assist with resources.  SS notified Physician.  SS will follow.      Row Name 12/12/23 1635       Plan    Plan [facility] inpt rehab per Memo declined pt admit.  SS spoke with pt's family at bedside and attempted several times to contact pt's daughter/caregiver Jeff without success to further discuss discharge options.  SS will continue attempts to contact Jeff.                  Continued Care and Services - Admitted Since 12/7/2023    Coordination has not been started for this encounter.       Expected Discharge Date and Time       Expected Discharge Date Expected Discharge Time    Dec 12, 2023           JONATHAN Figueroa

## 2023-12-13 ENCOUNTER — APPOINTMENT (OUTPATIENT)
Dept: GENERAL RADIOLOGY | Facility: HOSPITAL | Age: 88
End: 2023-12-13
Payer: MEDICARE

## 2023-12-13 LAB
ANION GAP SERPL CALCULATED.3IONS-SCNC: 12.2 MMOL/L (ref 5–15)
BUN SERPL-MCNC: 24 MG/DL (ref 8–23)
BUN/CREAT SERPL: 25.3 (ref 7–25)
CALCIUM SPEC-SCNC: 10 MG/DL (ref 8.2–9.6)
CHLORIDE SERPL-SCNC: 113 MMOL/L (ref 98–107)
CO2 SERPL-SCNC: 18.8 MMOL/L (ref 22–29)
CREAT SERPL-MCNC: 0.95 MG/DL (ref 0.57–1)
DEPRECATED RDW RBC AUTO: 47.2 FL (ref 37–54)
EGFRCR SERPLBLD CKD-EPI 2021: 55.3 ML/MIN/1.73
ERYTHROCYTE [DISTWIDTH] IN BLOOD BY AUTOMATED COUNT: 13.9 % (ref 12.3–15.4)
GLUCOSE SERPL-MCNC: 105 MG/DL (ref 65–99)
HCT VFR BLD AUTO: 42.8 % (ref 34–46.6)
HGB BLD-MCNC: 13.7 G/DL (ref 12–15.9)
MCH RBC QN AUTO: 29.5 PG (ref 26.6–33)
MCHC RBC AUTO-ENTMCNC: 32 G/DL (ref 31.5–35.7)
MCV RBC AUTO: 92.2 FL (ref 79–97)
PLATELET # BLD AUTO: 185 10*3/MM3 (ref 140–450)
PMV BLD AUTO: 11.3 FL (ref 6–12)
POTASSIUM SERPL-SCNC: 4.5 MMOL/L (ref 3.5–5.2)
RBC # BLD AUTO: 4.64 10*6/MM3 (ref 3.77–5.28)
SODIUM SERPL-SCNC: 144 MMOL/L (ref 136–145)
WBC NRBC COR # BLD AUTO: 11.56 10*3/MM3 (ref 3.4–10.8)

## 2023-12-13 PROCEDURE — 93005 ELECTROCARDIOGRAM TRACING: CPT

## 2023-12-13 PROCEDURE — 25010000002 ZIPRASIDONE MESYLATE PER 10 MG

## 2023-12-13 PROCEDURE — 93010 ELECTROCARDIOGRAM REPORT: CPT | Performed by: INTERNAL MEDICINE

## 2023-12-13 PROCEDURE — 80048 BASIC METABOLIC PNL TOTAL CA: CPT | Performed by: INTERNAL MEDICINE

## 2023-12-13 PROCEDURE — 85027 COMPLETE CBC AUTOMATED: CPT | Performed by: INTERNAL MEDICINE

## 2023-12-13 PROCEDURE — 25010000002 LABETALOL 5 MG/ML SOLUTION: Performed by: INTERNAL MEDICINE

## 2023-12-13 PROCEDURE — 99232 SBSQ HOSP IP/OBS MODERATE 35: CPT | Performed by: INTERNAL MEDICINE

## 2023-12-13 PROCEDURE — 71045 X-RAY EXAM CHEST 1 VIEW: CPT | Performed by: RADIOLOGY

## 2023-12-13 PROCEDURE — 25010000002 HYDRALAZINE PER 20 MG

## 2023-12-13 PROCEDURE — 71045 X-RAY EXAM CHEST 1 VIEW: CPT

## 2023-12-13 PROCEDURE — 94799 UNLISTED PULMONARY SVC/PX: CPT

## 2023-12-13 RX ORDER — LABETALOL HYDROCHLORIDE 5 MG/ML
10 INJECTION, SOLUTION INTRAVENOUS EVERY 6 HOURS PRN
Status: DISCONTINUED | OUTPATIENT
Start: 2023-12-13 | End: 2023-12-14 | Stop reason: HOSPADM

## 2023-12-13 RX ORDER — HYDRALAZINE HYDROCHLORIDE 20 MG/ML
10 INJECTION INTRAMUSCULAR; INTRAVENOUS ONCE
Status: COMPLETED | OUTPATIENT
Start: 2023-12-13 | End: 2023-12-13

## 2023-12-13 RX ORDER — METOPROLOL TARTRATE 50 MG/1
50 TABLET, FILM COATED ORAL EVERY 12 HOURS SCHEDULED
Status: DISCONTINUED | OUTPATIENT
Start: 2023-12-13 | End: 2023-12-14 | Stop reason: HOSPADM

## 2023-12-13 RX ADMIN — LABETALOL HYDROCHLORIDE 10 MG: 5 INJECTION, SOLUTION INTRAVENOUS at 14:52

## 2023-12-13 RX ADMIN — WATER 10 MG: 1 INJECTION INTRAMUSCULAR; INTRAVENOUS; SUBCUTANEOUS at 07:21

## 2023-12-13 RX ADMIN — Medication 10 ML: at 21:31

## 2023-12-13 RX ADMIN — HYDRALAZINE HYDROCHLORIDE 10 MG: 20 INJECTION INTRAMUSCULAR; INTRAVENOUS at 00:39

## 2023-12-13 RX ADMIN — METOPROLOL TARTRATE 50 MG: 50 TABLET, FILM COATED ORAL at 21:31

## 2023-12-13 RX ADMIN — ATORVASTATIN CALCIUM 20 MG: 20 TABLET, FILM COATED ORAL at 21:31

## 2023-12-13 RX ADMIN — Medication 10 ML: at 09:04

## 2023-12-13 RX ADMIN — DOCUSATE SODIUM 50 MG AND SENNOSIDES 8.6 MG 2 TABLET: 8.6; 5 TABLET, FILM COATED ORAL at 21:31

## 2023-12-13 RX ADMIN — APIXABAN 2.5 MG: 2.5 TABLET, FILM COATED ORAL at 21:31

## 2023-12-13 NOTE — PROGRESS NOTES
"  Patient Identification:  Name:  Zeenat Dong  Age:  89 y.o.  Sex:  female  :  1928  MRN:  [id number]  Visit Number:  [id number]  Primary care provider:  Fatmata Dong APRN    Reason for follow-up:  Acute on chronic heart failure with preserved EF and NSTEMI type II    Subjective     She was drowsy when I evaluated her around 7:30 AM today.Geodon was administered and she was combative.  No distress.  O2 sat > 95%.  Chest x-ray showed mild CHF.  Telemetry showed sinus rhythm and episodes of sinus tachycardia.  BP is elevated.  Renal function and electrolytes are normal.      Medication Review:   apixaban, 2.5 mg, Oral, BID  aspirin, 81 mg, Oral, Daily  atorvastatin, 20 mg, Oral, Nightly  cholecalciferol, 50,000 Units, Oral, Weekly  cinacalcet, 30 mg, Oral, Daily  metoprolol tartrate, 25 mg, Oral, Q12H  NIFEdipine CC, 60 mg, Oral, Q24H  pantoprazole, 40 mg, Oral, Daily  potassium chloride, 10 mEq, Oral, Daily  senna-docusate sodium, 2 tablet, Oral, BID  sodium chloride, 10 mL, Intravenous, Q12H              Vital Sign Min/Max for last 24 hours  Temp  Min: 97.1 °F (36.2 °C)  Max: 98.2 °F (36.8 °C)   BP  Min: 144/48  Max: 176/62   Pulse  Min: 68  Max: 95   Resp  Min: 16  Max: 18   SpO2  Min: 96 %  Max: 99 %   No data recorded   Weight  Min: 61 kg (134 lb 6.4 oz)  Max: 61 kg (134 lb 6.4 oz)     Flowsheet Rows      Flowsheet Row First Filed Value   Admission Height 152.4 cm (60\") Documented at 2023 0959   Admission Weight 59 kg (130 lb) Documented at 2023 0959            Objective       Physical Exam:         General Appearance:  HEENT:   Neck:     Drowsy due to the influence of Geodon.  No distress.  Grossly normal   No JVD    Lungs:   Bilateral equal air entry.  I did not hear any rales.     Heart:  Regular rhythm and normal rate, normal S1 and S2, no        murmur, no gallop, no rub, no click    Chest Wall:  No abnormalities observed    Abdomen   Soft, nontender, no masses, no organomegaly and " bowel sounds are heard.     Extremities: No pedal edema    Pulses: Deferred    Neurologic: Deferred due to AMS         Telemetry:  Sinus rhythm/sinus tachycardia      Labs  Results from last 7 days   Lab Units 12/13/23 0224 12/12/23 0149 12/11/23  0503 12/10/23  0206 12/09/23  0312 12/08/23  1031 12/07/23  1125   WBC 10*3/mm3 11.56* 9.66 6.20 7.97 4.83 5.41 6.00   HEMOGLOBIN g/dL 13.7 14.1 13.1 13.1 12.4 13.0 13.9   HEMATOCRIT % 42.8 45.3 40.6 40.5 38.1 39.8 42.8   PLATELETS 10*3/mm3 185 146 152 157 125* 124* 136*     Results from last 7 days   Lab Units 12/13/23 0224 12/12/23 0149 12/11/23  0503 12/10/23  0206 12/09/23  0312 12/08/23  1031 12/07/23  1219   SODIUM mmol/L 144 141 143 146* 145 142 141   POTASSIUM mmol/L 4.5 4.0 3.4* 3.6 3.4* 3.1* 3.6   CHLORIDE mmol/L 113* 108* 110* 110* 108* 104 103   CO2 mmol/L 18.8* 23.6 24.4 25.9 27.7 26.4 27.5   BUN mg/dL 24* 20 16 19 17 19 20   CREATININE mg/dL 0.95 1.05* 0.94 1.05* 1.11* 1.19* 1.26*   CALCIUM mg/dL 10.0* 9.7* 9.7* 9.9* 9.3 9.3 9.8*   GLUCOSE mg/dL 105* 134* 135* 127* 103* 160* 124*     Results from last 7 days   Lab Units 12/11/23  0503 12/10/23  0206 12/09/23 0312 12/08/23  1031 12/07/23  1219   BILIRUBIN mg/dL 0.5 0.5 0.5 0.7 1.0   ALK PHOS U/L 84 85 70 74 80   AST (SGOT) U/L 21 18 17 19 17   ALT (SGPT) U/L 11 9 8 8 9         Results from last 7 days   Lab Units 12/07/23  1125   INR  2.20*     Results from last 7 days   Lab Units 12/09/23  0312 12/07/23  1219   CRP mg/dL 3.80* 3.89*     Results from last 7 days   Lab Units 12/07/23  1514 12/07/23  1219   HSTROP T ng/L 25* 24*     Results from last 7 days   Lab Units 12/07/23  1117   PH, ARTERIAL pH units 7.417   PO2 ART mm Hg 64.8*   PCO2, ARTERIAL mm Hg 45.4*   HCO3 ART mmol/L 29.2*       Assessment      1.  Acute on chronic heart failure with preserved EF.  Clinically stable.  2.  Hypertension-uncontrolled  3.  History of PAF-in sinus rhythm  4.  NSTEMI type II.  Due to hypoxia.  5.  Dementia with  confusional status    Plan     1.  HF is clinically stable.  Although chest x-ray showed mild CHF, she is not in respiratory distress and O2 sat is good.  History of poor oral intake.  Will hold diuretic today.  2.  BP is high and slightly tachycardic.  Increased  metoprolol to tartrate to 50 mg twice daily.  3.  Anticoagulated on Eliquis for history of PAF.      Fanny Benitez MD [facility]  12/13/23  09:08 EST

## 2023-12-13 NOTE — NURSING NOTE
Received consult for fissure to sacrum.      Perineal dermatitis orders initiated.    Jarrett score 14.  PI prevention orders initiated.

## 2023-12-13 NOTE — PROGRESS NOTES
[facility] HOSPITALIST PROGRESS NOTE     Patient Identification:  Name:  Zeenat Dong  Age:  89 y.o.  Sex:  female  :  1928  MRN:  [id number]  Visit Number:  [id number]  Primary Care Provider:  Fatmata Dong APRN    Length of stay:  5    Chief complaint: Altered mental status    Subjective:    Patient seen and examined at bedside with patient's daughter and grandson present.  Patient is acutely altered and lying in bed moaning and will not follow commands.  She is easily agitated but not combative.  After further discussion with patient's grandson, it does appear patient has had a slowly declining mental status over the past 5 to 6 years with intermittent confusion and patient seeing people and hearing things that are not real, especially at night.  Did have lengthy discussion with patient's family regarding suspected underlying diagnosis of dementia which was previously undiagnosed.  Also discussed the difficult situation of patients with acute illnesses with underlying dementia which may exacerbate hospital delirium.  Patient does appear to be in the throes of hospital delirium and has not responded to attempts to reorient her to her immediate surroundings.  Patient's family states that the patient will sleep the majority of the day and has had some decreased oral intake over the past several months.  ----------------------------------------------------------------------------------------------------------------------  Current Hospital Meds:  apixaban, 2.5 mg, Oral, BID  aspirin, 81 mg, Oral, Daily  atorvastatin, 20 mg, Oral, Nightly  cholecalciferol, 50,000 Units, Oral, Weekly  cinacalcet, 30 mg, Oral, Daily  metoprolol tartrate, 50 mg, Oral, Q12H  NIFEdipine CC, 60 mg, Oral, Q24H  pantoprazole, 40 mg, Oral, Daily  potassium chloride, 10 mEq, Oral, Daily  senna-docusate sodium, 2 tablet, Oral, BID  sodium chloride, 10 mL, Intravenous, Q12H          ----------------------------------------------------------------------------------------------------------------------  Vital Signs:  Temp:  [97.1 °F (36.2 °C)-98.2 °F (36.8 °C)] 97.5 °F (36.4 °C)  Heart Rate:  [] 98  Resp:  [16-18] 18  BP: (154-197)/(60-78) 180/76      12/10/23  0500 12/11/23  0500 12/13/23  0500   Weight: 59.5 kg (131 lb 1.6 oz) 61.1 kg (134 lb 9.6 oz) 61 kg (134 lb 6.4 oz)     Body mass index is 26.25 kg/m².    Intake/Output Summary (Last 24 hours) at 12/13/2023 1621  Last data filed at 12/13/2023 1409  Gross per 24 hour   Intake 50 ml   Output 1850 ml   Net -1800 ml     Diet: Regular/House Diet; No Mixed Consistencies, Feeding Assistance - Nursing; Texture: Soft to Chew (NDD 3); Soft to Chew: Chopped Meat; Fluid Consistency: Thin (IDDSI 0)  ----------------------------------------------------------------------------------------------------------------------  Physical exam:  Constitutional: Elderly appearing  female in no apparent distress, currently confused, lying in bed and occasionally moaning  HENT:  Head:  Normocephalic and atraumatic.  Mouth:  Moist mucous membranes.    Eyes:  Conjunctivae and EOM are normal.  Pupils are equal, round, and reactive to light.  No scleral icterus.    Neck:  Neck supple. No thyromegaly.  No JVD present.    Cardiovascular:  Regular rate and rhythm with no murmurs, rubs, clicks or gallops appreciated.  Pulmonary/Chest:  Clear to auscultation bilaterally with no crackles, wheezes or rhonchi appreciated.  Abdominal:  Soft. Nontender. Nondistended  Bowel sounds are normal in all four quadrants. No organomegally appreciated.   Musculoskeletal:  No edema, no tenderness, and no deformity.  No red or swollen joints anywhere.    Neurological: Patient will not participate with cranial nerve testing, no facial droop.  No slurred speech.   Skin:  Warm and dry to palpation with no rashes or lesions appreciated.  Peripheral vascular:  2+ radial and pedal  pulses in bilateral upper and lower extremities.  ------------------------------------------------------------------------------------------------------------  ----------------------------------------------------------------------------------------------------------------------  Results from last 7 days   Lab Units 12/07/23  1514 12/07/23  1219   HSTROP T ng/L 25* 24*     Results from last 7 days   Lab Units 12/13/23  0224 12/12/23  0149 12/11/23  0503 12/10/23  0206 12/09/23  0312 12/08/23  1031 12/07/23  1219 12/07/23  1125   CRP mg/dL  --   --   --   --  3.80*  --  3.89*  --    LACTATE mmol/L  --   --   --   --   --   --   --  1.5   WBC 10*3/mm3 11.56* 9.66 6.20   < > 4.83   < >  --  6.00   HEMOGLOBIN g/dL 13.7 14.1 13.1   < > 12.4   < >  --  13.9   HEMATOCRIT % 42.8 45.3 40.6   < > 38.1   < >  --  42.8   MCV fL 92.2 94.6 91.6   < > 90.7   < >  --  91.1   MCHC g/dL 32.0 31.1* 32.3   < > 32.5   < >  --  32.5   PLATELETS 10*3/mm3 185 146 152   < > 125*   < >  --  136*   INR   --   --   --   --   --   --   --  2.20*    < > = values in this interval not displayed.     Results from last 7 days   Lab Units 12/07/23  1117   PH, ARTERIAL pH units 7.417   PO2 ART mm Hg 64.8*   PCO2, ARTERIAL mm Hg 45.4*   HCO3 ART mmol/L 29.2*     Results from last 7 days   Lab Units 12/13/23  0224 12/12/23  0149 12/11/23  0503 12/10/23  0206 12/09/23  0312   SODIUM mmol/L 144 141 143 146* 145   POTASSIUM mmol/L 4.5 4.0 3.4* 3.6 3.4*   CHLORIDE mmol/L 113* 108* 110* 110* 108*   CO2 mmol/L 18.8* 23.6 24.4 25.9 27.7   BUN mg/dL 24* 20 16 19 17   CREATININE mg/dL 0.95 1.05* 0.94 1.05* 1.11*   CALCIUM mg/dL 10.0* 9.7* 9.7* 9.9* 9.3   GLUCOSE mg/dL 105* 134* 135* 127* 103*   ALBUMIN g/dL  --   --  3.2* 3.0* 2.9*   BILIRUBIN mg/dL  --   --  0.5 0.5 0.5   ALK PHOS U/L  --   --  84 85 70   AST (SGOT) U/L  --   --  21 18 17   ALT (SGPT) U/L  --   --  11 9 8   Estimated Creatinine Clearance: 28.9 mL/min (by C-G formula based on SCr of 0.95  "mg/dL).    No results found for: \"AMMONIA\"      Blood Culture   Date Value Ref Range Status   12/07/2023 No growth at 4 days  Preliminary   12/07/2023 No growth at 4 days  Preliminary     Urine Culture   Date Value Ref Range Status   12/07/2023 >100,000 CFU/mL Escherichia coli (A)  Final     No results found for: \"WOUNDCX\"  No results found for: \"STOOLCX\"    I have personally looked at the labs and they are summarized above.  ----------------------------------------------------------------------------------------------------------------------  Imaging Results (Last 24 Hours)       ** No results found for the last 24 hours. **          ----------------------------------------------------------------------------------------------------------------------  Assessment and Plan:    Acute metabolic encephalopathy -unfortunately, patient's mentation appears slightly worse today in comparison to yesterday.  She does not recognize any family members, not even her daughter at bedside.  Patient will not follow commands and is resistant to instruction.  However, patient is not combative and is content to lie in bed undisturbed.  Suspect etiology of encephalopathy likely a complication from acute cystitis with underlying previously undiagnosed dementia resulting in acute hospital delirium.  Will continue to attempt reorientation and keep lights on during the day with shades up allowing sunlight in and turning lights off and keeping the room quiet during the night.    2.  E. coli acute cystitis -urine culture sensitivities demonstrate pan sensitive E. coli, will continue Rocephin 1 g IV every 24 hours.    3.  Suspected pneumonia -repeat chest x-ray demonstrates no evidence of pneumonia, there is some evidence of mild volume overload.  Will discontinue doxycycline today and continue Rocephin for treatment of acute cystitis.      4.  Acute on chronic HFpEF -acute phase appears resolved, will hold diuretics today.    5.  Essential " hypertension -well-controlled    6.  Hyperlipidemia -statin    7.  General debility -continue PT/OT, will consult case management to assist with potential need for placement    Disposition patient appears stable from a medical standpoint.  Given overall general decline, patient would likely benefit from inpatient rehab or skilled nursing facility placement.  Case management has been consulted and is currently assisting in placement.    Sunil Craig, DO   12/13/23   16:21 EST

## 2023-12-13 NOTE — PLAN OF CARE
Goal Outcome Evaluation:      At beginning of shift, patient was combative, anxious, and agitated, PRN meds given; see MAR. Patient refused to take PO medications, Craig, DO made aware. Patient was hypertensive this shift, PRN meds given; see MAR. Patient is currently pleasantly resting in bed with family at bedside. No s/s of acute distress noted at this time. Will continue to follow plan of care.

## 2023-12-13 NOTE — PLAN OF CARE
Pt rested in bed this shift without complaints. Pt moved to different room; family at bedside during transfer and aware. No s/s of acute distress. VSS

## 2023-12-13 NOTE — CASE MANAGEMENT/SOCIAL WORK
Discharge Planning Assessment   [facility]     Patient Name: Zeenat Dong  MRN: [id number]  Today's Date: 12/13/2023    Admit Date: 12/7/2023    Plan: SS spoke with pt's daughter Jeff at bedside.  Pt's daughter continues to refuse nursing home placement and stated pt to return home at discharge.  Pt will need home health services.  SS will make referral to [facility] at discharge.  SS will follow and assist with discharge needs.       Discharge Plan       Row Name 12/13/23 1801       Plan    Plan SS spoke with pt's daughter Jeff at bedside.  Pt's daughter continues to refuse nursing home placement and stated pt to return home at discharge.  Pt will need home health services.  SS will make referral to [facility] at discharge.  SS will follow and assist with discharge needs.                  Continued Care and Services - Admitted Since 12/7/2023    Coordination has not been started for this encounter.       Expected Discharge Date and Time       Expected Discharge Date Expected Discharge Time    Dec 12, 2023           PAT [email or web address]

## 2023-12-14 ENCOUNTER — READMISSION MANAGEMENT (OUTPATIENT)
Dept: CALL CENTER | Facility: HOSPITAL | Age: 88
End: 2023-12-14
Payer: MEDICARE

## 2023-12-14 VITALS
HEART RATE: 106 BPM | HEIGHT: 60 IN | WEIGHT: 133.8 LBS | OXYGEN SATURATION: 95 % | BODY MASS INDEX: 26.27 KG/M2 | DIASTOLIC BLOOD PRESSURE: 81 MMHG | RESPIRATION RATE: 20 BRPM | SYSTOLIC BLOOD PRESSURE: 173 MMHG | TEMPERATURE: 98.2 F

## 2023-12-14 PROBLEM — J96.01 ACUTE HYPOXEMIC RESPIRATORY FAILURE: Status: RESOLVED | Noted: 2023-12-08 | Resolved: 2023-12-14

## 2023-12-14 PROBLEM — R06.00 DYSPNEA: Status: RESOLVED | Noted: 2023-12-07 | Resolved: 2023-12-14

## 2023-12-14 LAB
ANION GAP SERPL CALCULATED.3IONS-SCNC: 8.8 MMOL/L (ref 5–15)
BUN SERPL-MCNC: 24 MG/DL (ref 8–23)
BUN/CREAT SERPL: 25.8 (ref 7–25)
CALCIUM SPEC-SCNC: 10.1 MG/DL (ref 8.2–9.6)
CHLORIDE SERPL-SCNC: 113 MMOL/L (ref 98–107)
CO2 SERPL-SCNC: 27.2 MMOL/L (ref 22–29)
CREAT SERPL-MCNC: 0.93 MG/DL (ref 0.57–1)
DEPRECATED RDW RBC AUTO: 46.3 FL (ref 37–54)
EGFRCR SERPLBLD CKD-EPI 2021: 56.7 ML/MIN/1.73
ERYTHROCYTE [DISTWIDTH] IN BLOOD BY AUTOMATED COUNT: 13.9 % (ref 12.3–15.4)
GLUCOSE SERPL-MCNC: 138 MG/DL (ref 65–99)
HCT VFR BLD AUTO: 40.4 % (ref 34–46.6)
HGB BLD-MCNC: 13.1 G/DL (ref 12–15.9)
MCH RBC QN AUTO: 29.4 PG (ref 26.6–33)
MCHC RBC AUTO-ENTMCNC: 32.4 G/DL (ref 31.5–35.7)
MCV RBC AUTO: 90.8 FL (ref 79–97)
PLATELET # BLD AUTO: 193 10*3/MM3 (ref 140–450)
PMV BLD AUTO: 11.1 FL (ref 6–12)
POTASSIUM SERPL-SCNC: 3.8 MMOL/L (ref 3.5–5.2)
QT INTERVAL: 398 MS
QTC INTERVAL: 417 MS
RBC # BLD AUTO: 4.45 10*6/MM3 (ref 3.77–5.28)
SODIUM SERPL-SCNC: 149 MMOL/L (ref 136–145)
WBC NRBC COR # BLD AUTO: 9.73 10*3/MM3 (ref 3.4–10.8)

## 2023-12-14 PROCEDURE — 85027 COMPLETE CBC AUTOMATED: CPT | Performed by: INTERNAL MEDICINE

## 2023-12-14 PROCEDURE — 25010000002 LABETALOL 5 MG/ML SOLUTION: Performed by: INTERNAL MEDICINE

## 2023-12-14 PROCEDURE — 99239 HOSP IP/OBS DSCHRG MGMT >30: CPT | Performed by: INTERNAL MEDICINE

## 2023-12-14 PROCEDURE — 80048 BASIC METABOLIC PNL TOTAL CA: CPT | Performed by: INTERNAL MEDICINE

## 2023-12-14 RX ORDER — SODIUM CHLORIDE 9 MG/ML
75 INJECTION, SOLUTION INTRAVENOUS CONTINUOUS
Status: DISCONTINUED | OUTPATIENT
Start: 2023-12-14 | End: 2023-12-14 | Stop reason: HOSPADM

## 2023-12-14 RX ADMIN — PANTOPRAZOLE SODIUM 40 MG: 40 TABLET, DELAYED RELEASE ORAL at 08:15

## 2023-12-14 RX ADMIN — DOCUSATE SODIUM 50 MG AND SENNOSIDES 8.6 MG 2 TABLET: 8.6; 5 TABLET, FILM COATED ORAL at 08:15

## 2023-12-14 RX ADMIN — ASPIRIN 81 MG: 81 TABLET, COATED ORAL at 08:15

## 2023-12-14 RX ADMIN — CINACALCET HYDROCHLORIDE 30 MG: 30 TABLET, FILM COATED ORAL at 08:15

## 2023-12-14 RX ADMIN — APIXABAN 2.5 MG: 2.5 TABLET, FILM COATED ORAL at 08:15

## 2023-12-14 RX ADMIN — METOPROLOL TARTRATE 50 MG: 50 TABLET, FILM COATED ORAL at 08:15

## 2023-12-14 RX ADMIN — Medication 10 ML: at 08:16

## 2023-12-14 RX ADMIN — LABETALOL HYDROCHLORIDE 10 MG: 5 INJECTION, SOLUTION INTRAVENOUS at 05:37

## 2023-12-14 RX ADMIN — NIFEDIPINE 60 MG: 30 TABLET, EXTENDED RELEASE ORAL at 08:15

## 2023-12-14 RX ADMIN — POTASSIUM CHLORIDE 10 MEQ: 1500 TABLET, EXTENDED RELEASE ORAL at 08:15

## 2023-12-14 NOTE — DISCHARGE PLACEMENT REQUEST
"Zeenat Henriquez (89 y.o. Female)       Date of Birth   06/09/1928    Social Security Number       Address   [address] [place] [address]    Home Phone   [phone]    MRN   [id number]       [facility]   [facility]    Marital Status                               Admission Date   12/7/23    Admission Type   Emergency    Admitting Provider   Francisco Choi MD    Attending Provider   Sunil Craig DO    Department, Room/Bed   [address], 3311/1S       Discharge Date       Discharge Disposition   Home or Self Care    Discharge Destination                                 Attending Provider: Sunil Craig DO    Allergies: No Known Allergies    Isolation: None   Infection: None   Code Status: No CPR    Ht: 152.4 cm (60\")   Wt: 60.7 kg (133 lb 12.8 oz)    Admission Cmt: None   Principal Problem: Dyspnea [R06.00]                   Active Insurance as of 12/7/2023       Primary Coverage       Payor Plan Insurance Group Employer/Plan Group    MEDICARE MEDICARE A & B        Payor Plan Address Payor Plan Phone Number Payor Plan Fax Number Effective Dates    [address] [phone]  6/1/1993 - None Entered    [place] [id number]         Subscriber Name Subscriber Birth Date Member ID       ZEENAT HENRIQUEZ 6/9/1928 [id number]                     Emergency Contacts        (Rel.) Home Phone Work Phone Mobile Phone    Jeff Deras (Daughter) [phone] -- --              Emergency Contact Information       Name Relation Home Work Mobile    Jeff Deras Daughter [phone]            Insurance Information                  MEDICARE/MEDICARE A & B Phone: [phone]    Subscriber: Letitia, Zeenat Subscriber#: [id number]    Group#: -- Precert#: --          Problem List             Codes Noted - Resolved       Non-Hospital    Abdominal distension ICD-10-CM: R14.0  ICD-9-CM: 787.3 5/12/2016 - Present    Heart failure ICD-10-CM: I50.9  ICD-9-CM: 428.9 5/12/2016 - " Present    Dry skin dermatitis ICD-10-CM: L85.3  ICD-9-CM: 692.89 2016 - Present    Gastroesophageal reflux disease without esophagitis ICD-10-CM: K21.9  ICD-9-CM: 530.81 2016 - Present    Hypercalcemia ICD-10-CM: E83.52  ICD-9-CM: 275.42 2016 - Present    Hyperparathyroidism ICD-10-CM: E21.3  ICD-9-CM: 252.00 2016 - Present    Hypertension ICD-10-CM: I10  ICD-9-CM: 401.9 2016 - Present    Hypokalemia ICD-10-CM: E87.6  ICD-9-CM: 276.8 2016 - Present    Hypomagnesemia ICD-10-CM: E83.42  ICD-9-CM: 275.2 2016 - Present    Seasonal allergic rhinitis ICD-10-CM: J30.2  ICD-9-CM: 477.9 2016 - Present    Shortness of breath ICD-10-CM: R06.02  ICD-9-CM: 786.05 2016 - Present    Atrial fibrillation ICD-10-CM: I48.91  ICD-9-CM: 427.31 2016 - Present    Weakness ICD-10-CM: R53.1  ICD-9-CM: 780.79 2016 - Present        History & Physical        Francisco Choi MD at 23 1809              [facility] HOSPITALIST HISTORY AND PHYSICAL    Patient Identification:  Name:  Zeenat Dong  Age:  89 y.o.  Sex:  female  :  1928  MRN:  [id number]   Admit Date: 2023   Visit Number:  [id number]  Room number:  403/03  Primary Care Physician:  Fatmata Dong APRN     Subjective     Chief complaint:    Chief Complaint   Patient presents with    Shortness of Breath    Cough     History of presenting illness:   89F PMH GERD, Hypertension, Hyperlipidemia, Chronic HFunknownEF, Paroxysmal Atrial Fibrillation on chronic anticoagulation, presented to [facility] emergency room with complaints of shortness of breath.  Upon arrival patient afebrile, heart rate 50-60's, respiratory rate 20, mildly hypertensive, satting low 90's on room air.  Labs showed WBC count 6K, hemoglobin 13.9, procalcitonin 0.06, lactate 1.5, CRP 3.89, creatinine 1.26, HS Troponins 24->25, proBNP 2900, covid/flu negative, blood cultures pending.  EKG showed normal sinus rhythm, LAD, left  "ventricle hypertrophy.  CT Chest showed scarring vs minimal airspace disease L lung base, coronary artery calcifications. Emergency room provider gave antibiotics and lasix. Hospital Medicine consulted for admission.  Patient lethargic for the most part, family at bedside and history obtained from them, family notes patient is generally very functional, has been doing well at home and even singing in Sikh but since this past Sunday has been more shortness of breath, wearing her as needed oxygen at home all the time, using home nebs, didn't smoke but lived with a smoker for many years, endorse mild sputum production at home following neb treatments, mild yellow tint, denied any fevers or chills at home, denied any chest pain, weight gain, increased lower extremity swelling, note she chronically sleeps in a recliner at night, takes 40mg lasix at home, unsure of her baseline creatinine at home but believe it to be \"lower\" than her 1.2 here. We briefly discussed code status as they had already told emergency room she wanted to be full code but in describing chest compressions and being on a ventilator they seemed less sure about putting her through that, asked them to think about what patient would want for herself if she could choose and will have Palliative Care follow up with them tomorrow.   ---------------------------------------------------------------------------------------------------------------------   Review of Systems   Unable to perform ROS: Mental status change     ---------------------------------------------------------------------------------------------------------------------   Past Medical History:   Diagnosis Date    Hyperlipidemia      Past Surgical History:   Procedure Laterality Date    OTHER SURGICAL HISTORY      ear surgery    SKIN BIOPSY       Family History   Problem Relation Age of Onset    Other Father         cardiovascular disease    Kidney disease Sister      Social History "     Socioeconomic History    Marital status:    Tobacco Use    Smoking status: Never   Substance and Sexual Activity    Alcohol use: No     ---------------------------------------------------------------------------------------------------------------------   Allergies:  Patient has no known allergies.  ---------------------------------------------------------------------------------------------------------------------   Medications below are reported home medications pulling from within the system; at this time, these medications have not been reconciled unless otherwise specified and are in the verification process for further verifcation as current home medications.    Prior to Admission Medications       Prescriptions Last Dose Informant Patient Reported? Taking?    apixaban (ELIQUIS) 2.5 MG tablet tablet Patient Taking Differently  Yes Yes    Take 2 tablets by mouth 2 (Two) Times a Day. For unspecified atrial fibrillation.    furosemide (LASIX) 20 MG tablet Patient Taking Differently  Yes Yes    Take 2 tablets by mouth Daily. For cardiac failure.    losartan (COZAAR) 100 MG tablet Patient Taking Differently  No Yes    Take 1 tablet by mouth daily. For hypertension.    Patient taking differently:  Take 0.5 tablets by mouth 2 (Two) Times a Day. For hypertension.    amLODIPine (NORVASC) 5 MG tablet   No No    Take 1 tablet by mouth 2 (two) times a day. For hypertension.    cetirizine (zyrTEC) 10 MG tablet   Yes No    Take 1 tablet by mouth Daily.    cinacalcet (SENSIPAR) 30 MG tablet   No No    Take 1 tablet by mouth daily.    ezetimibe (ZETIA) 10 MG tablet   Yes No    Take 1 tablet by mouth Daily.    metoprolol tartrate (LOPRESSOR) 100 MG tablet   No No    Take 1 tablet by mouth 2 (two) times a day. For hypertension.    olopatadine (PATANOL) 0.1 % ophthalmic solution   Yes No    Apply 1 drop to eye 2 (two) times a day. Apply to affected eye(s) as directed  for seasonal allergies.    ondansetron (ZOFRAN) 4  MG tablet   No No    Take 1 tablet by mouth 2 (two) times a day as needed for nausea or vomiting. For nausea and vomiting.    OXYGEN-HELIUM IN   Yes No    Oxygen; Patient Sig: Oxygen AT 2 L PER NC @ HS; 1; 0; 29-Mar-2016; Active    pantoprazole (PROTONIX) 40 MG EC tablet   No No    Take 1 tablet by mouth daily. For gerd without esophagitis.    potassium chloride (K-DUR,KLOR-CON) 10 MEQ CR tablet   No No    Take 1 tablet by mouth daily. For hypokalemia.    Probiotic Product (PROBIOTIC COLON SUPPORT) capsule   Yes No    Take 1 capsule by mouth 2 (Two) Times a Day. For health maintenance.    vitamin D (ERGOCALCIFEROL) 1.25 MG (16512 UT) capsule capsule   Yes No    Take 1 capsule by mouth 1 (One) Time Per Week.          Objective     Vital Signs:  Temp:  [97.3 °F (36.3 °C)] 97.3 °F (36.3 °C)  Heart Rate:  [50-70] 56  Resp:  [20] 20  BP: ()/(46-76) 175/58    Mean Arterial Pressure (Non-Invasive) for the past 24 hrs (Last 3 readings):   Noninvasive MAP (mmHg)   12/07/23 1645 72   12/07/23 1630 122   12/07/23 1615 109     SpO2:  [90 %-100 %] 98 %  on  Flow (L/min):  [2] 2;   Device (Oxygen Therapy): nasal cannula  Body mass index is 25.39 kg/m².    Wt Readings from Last 3 Encounters:   12/07/23 59 kg (130 lb)   06/21/16 67.6 kg (149 lb)   05/11/16 68 kg (150 lb)      ---------------------------------------------------------------------------------------------------------------------   Physical Exam:  Constitutional:  Elderly.  Mild acute distress.      HENT:  Head:  Normocephalic and atraumatic.  Mouth:  Moist mucous membranes.    Eyes:  Conjunctivae and EOM are normal. No scleral icterus.    Neck:  Neck supple.  No JVD present.    Cardiovascular:  bradycardic, regular rhythm and normal heart sounds with no murmur.  Pulmonary/Chest:  Mild respiratory distress, labored breathing, bilateral wheezes, bilateral crackles, on 2LNC  Abdominal:  Soft. No distension and no tenderness.   Musculoskeletal:  No tenderness, and no  deformity.  No red or swollen joints anywhere.    Neurological:  lethargic, unable to assess further  Skin:  Skin is warm and dry. No rash noted. No pallor.   Peripheral vascular:  No clubbing, no cyanosis, no edema.  Psychiatric: unable to assess due to mentation     Edited by: Francisco Choi MD at 12/7/2023 1809  ---------------------------------------------------------------------------------------------------------------------  EKG:    ECG 12 Lead Dyspnea   Final Result   Test Reason : Dyspnea   Blood Pressure :   */*   mmHG   Vent. Rate :  67 BPM     Atrial Rate :  67 BPM      P-R Int : 188 ms          QRS Dur : 114 ms       QT Int : 438 ms       P-R-T Axes :  71 -51 107 degrees      QTc Int : 462 ms      Sinus rhythm with frequent premature ventricular complexes   Left axis deviation   Left ventricular hypertrophy with repolarization abnormality   Abnormal ECG   No previous ECGs available   Confirmed by Juani Cherry ([id number]) on 12/7/2023 3:36:49 PM      Referred By: CURD           Confirmed By: Juani Cherry        Telemetry:  sinus bradycardia     I have personally looked at both the EKG and the telemetry strips.    Last echocardiogram:  Ordered and pending   --------------------------------------------------------------------------------------------------------------------  Labs:  Results from last 7 days   Lab Units 12/07/23  1219 12/07/23  1125   PROCALCITONIN ng/mL 0.06  --    LACTATE mmol/L  --  1.5   CRP mg/dL 3.89*  --    WBC 10*3/mm3  --  6.00   HEMOGLOBIN g/dL  --  13.9   HEMATOCRIT %  --  42.8   MCV fL  --  91.1   MCHC g/dL  --  32.5   PLATELETS 10*3/mm3  --  136*   INR   --  2.20*     Results from last 7 days   Lab Units 12/07/23  1117   PH, ARTERIAL pH units 7.417   PO2 ART mm Hg 64.8*   PCO2, ARTERIAL mm Hg 45.4*   HCO3 ART mmol/L 29.2*     Results from last 7 days   Lab Units 12/07/23  1219   SODIUM mmol/L 141   POTASSIUM mmol/L 3.6   CHLORIDE mmol/L 103   CO2 mmol/L 27.5   BUN mg/dL 20  "  CREATININE mg/dL 1.26*   CALCIUM mg/dL 9.8*   GLUCOSE mg/dL 124*   ALBUMIN g/dL 3.4*   BILIRUBIN mg/dL 1.0   ALK PHOS U/L 80   AST (SGOT) U/L 17   ALT (SGPT) U/L 9   Estimated Creatinine Clearance: 21.5 mL/min (A) (by C-G formula based on SCr of 1.26 mg/dL (H)).  No results found for: \"AMMONIA\"  Results from last 7 days   Lab Units 12/07/23  1514 12/07/23  1219   HSTROP T ng/L 25* 24*   PROBNP pg/mL  --  2,957.0*         No results found for: \"HGBA1C\", \"POCGLU\"  Lab Results   Component Value Date    TSH 3.752 08/17/2015    FREET4 1.09 08/17/2015     No results found for: \"PREGTESTUR\", \"PREGSERUM\", \"HCG\", \"HCGQUANT\"  Pain Management Panel          Latest Ref Rng & Units 8/17/2015   Pain Management Panel   Creatinine, Urine mg/dL  mg/dL 129.2  130.0      Brief Urine Lab Results  (Last result in the past 365 days)        Color   Clarity   Blood   Leuk Est   Nitrite   Protein   CREAT   Urine HCG        12/07/23 1742 Yellow   Clear   Negative   Small (1+)   Positive   Negative                 No results found for: \"BLOODCX\"  No results found for: \"URINECX\"  No results found for: \"WOUNDCX\"  No results found for: \"STOOLCX\"    I have personally looked at the labs and they are summarized above.  ----------------------------------------------------------------------------------------------------------------------  Detailed radiology reports for the last 24 hours:    Imaging Results (Last 24 Hours)       Procedure Component Value Units Date/Time    CT Chest Without Contrast Diagnostic [[id number]] Collected: 12/07/23 1507     Updated: 12/07/23 1512    Narrative:      EXAM: CT CHEST WO CONTRAST DIAGNOSTIC-      CLINICAL INDICATION:shortness of breath      COMPARISON: 8/17/2015     TECHNIQUE: Multiple axial CT images were obtained from lung apex through  upper abdomen without the administration of IV contrast. Reformatted  images in the coronal and/or sagittal plane(s) were generated from the  axial data set to facilitate " diagnostic accuracy and/or surgical  planning.     Radiation dose reduction techniques were utilized per ALARA protocol.  Automated exposure control was initiated through either or Numerify or  DoseRight software packages by  protocol.    DOSE (DLP mGy-cm):        FINDINGS:     LUNGS: Minimal airspace density in the left lung base may represent  scarring. Pneumonia not completely excluded.     HEART: Coronary artery calcifications     MEDIASTINUM: No masses. No enlarged lymph nodes.  No fluid collections.     PLEURA: No pleural effusion. No pleural mass or abnormal calcification.  No pneumothorax.     VASCULATURE: No evidence of aneurysm.     BONES: No acute bony abnormality.     VISUALIZED UPPER ABDOMEN:The upper abdomen is unremarkable as  visualized.     Other: None.       Impression:         1. Scarring versus minimal airspace disease in the left lung base  2. Coronary artery calcifications                 This report was finalized on 12/7/2023 3:10 PM by Dr. Dany Isbell MD.       XR Chest AP [[id number]] Collected: 12/07/23 1214     Updated: 12/07/23 1217    Narrative:      XR CHEST AP-     CLINICAL INDICATION: shortness of breath        COMPARISON: 8/23/2015     TECHNIQUE: Single frontal view of the chest.     FINDINGS:     LUNGS: Lungs are adequately aerated.      HEART AND MEDIASTINUM: Heart and mediastinal contours are unremarkable        SKELETON: Bony and soft tissue structures are unremarkable.             Impression:      No radiographic evidence of acute cardiac or pulmonary disease.           This report was finalized on 12/7/2023 12:15 PM by Dr. Dany Isbell MD.             I have personally looked at the radiology images and read the final radiology report.    Assessment & Plan    89F Second Hand Smoke Exposure PMH GERD, Hypertension, Hyperlipidemia, Chronic HFunknownEF, Paroxysmal Atrial Fibrillation on chronic anticoagulation, presented to [facility] emergency room with  complaints of shortness of breath    #Acute on as needed Hypoxic Respiratory Failure due to Acute on Chronic HFunknownEF  #Hypertension/Hyperlipidemia/Coronary Artery Disease  #Paroxysmal Atrial Fibrillation, on chronic anticoagulation  #Mildly elevated HS Troponins, suspected NSTEMI type II due to CHF  - Labs showed HS Troponins 24->25, proBNP 2900  - EKG showed normal sinus rhythm, LAD, left ventricle hypertrophy  - Formal echocardiogram ordered and pending, last was in 2015 and reviewed  - CT Chest showed coronary artery calcifications   - Cardiology consulted; Recommendations pending  - status post lasix in emergency room, trend creatinine and urine output, further diuresis tomorrow pending volume evaluation  - Review home medications pending medication reconciliation and resume as indicated  - Continue home anticoagulation pending medication reconciliation   - Continue to monitor on telemetry, strict I/O's, trend heart rate and blood pressure     #Probable Pneumonia, Bacterial, treating for CAP  - Have started on Ceftriaxone and Doxycycline, follow up cultures  - Continue Tylenol as needed for fevers, Duonebs as needed  - Will admit and monitor on telemetry, continue 02 but wean as able    #Acute Metabolic Encephalopathy, multifactorial  - Address Pneumonia with antibiotics, hypoxia and Congestive Heart Failure with lasix and 02.     #Chronic Kidney Disease vs Nonoliguric Acute Kidney Injury due to CHF  - Baseline creatinine in 2016 around 0.6-0.9, was up 1.26 on admission  - Trend Cr and urine output, avoid nephrotoxins, NSAIDs, dehydration and contrast as able, will consider Nephrology consult if worsening    #Gastroesophageal Reflux Disease  - Continue home PPI pending medication reconciliation    #Advanced Age/Age Related Debility  - Supportive Care, Consult PT/OT    #Debility  - Consult PT/OT, Social Work if placement needed      F: Oral  E: Monitor & Replace as needed   N: Cardiac Diet   Ppx: Resume home  oral anticoagulation   Code Status (Patient has no pulse and is not breathing): CPR (Attempt to Resuscitate)  Medical Interventions (Patient has pulse or is breathing): Full Support  *Palliative Care consulted for goals of care conversations      Dispo: Pending workup and clinical improvement     *This patient is considered high risk due to Acute Congestive Heart Failure exacerbation, Acute Kidney Injury, probable Pneumonia.    Edited by: Francisco Choi MD at 12/7/2023 1809    Francisco Choi MD  [facility] Hospitalist  12/07/23  18:09 EST        Electronically signed by Francisco Choi MD at 12/07/23 1810       Lines, Drains & Airways       Active LDAs       Name Placement date Placement time Site Days    Peripheral IV 12/11/23 0351 Posterior;Right Forearm 12/11/23  0351  Forearm  3    External Urinary Catheter 12/11/23  0910  --  3                  Current Facility-Administered Medications   Medication Dose Route Frequency Provider Last Rate Last Admin    apixaban (ELIQUIS) tablet 2.5 mg  2.5 mg Oral BID Francisco Choi MD   2.5 mg at 12/14/23 0815    aspirin EC tablet 81 mg  81 mg Oral Daily Fanny Benitez MD   81 mg at 12/14/23 0815    atorvastatin (LIPITOR) tablet 20 mg  20 mg Oral Nightly Fanny Benitez MD   20 mg at 12/13/23 2131    sennosides-docusate (PERICOLACE) 8.6-50 MG per tablet 2 tablet  2 tablet Oral BID Francisco Choi MD   2 tablet at 12/14/23 0815    And    polyethylene glycol (MIRALAX) packet 17 g  17 g Oral Daily PRN Francisco Choi MD        And    bisacodyl (DULCOLAX) EC tablet 5 mg  5 mg Oral Daily PRN Francisco Choi MD        And    bisacodyl (DULCOLAX) suppository 10 mg  10 mg Rectal Daily PRN Francisco Choi MD        Calcium Replacement - Follow Nurse / BPA Driven Protocol   Does not apply PRN Francisco Choi MD        cholecalciferol (VITAMIN D3) capsule 50,000 Units  50,000 Units Oral Weekly Francisco Choi MD   50,000 Units at 12/10/23 0835    cinacalcet (SENSIPAR) tablet 30 mg   30 mg Oral Daily Francisco Choi MD   30 mg at 12/14/23 0815    labetalol (NORMODYNE,TRANDATE) injection 10 mg  10 mg Intravenous Q6H PRN Sunil Craig DO   10 mg at 12/14/23 0537    Magnesium Standard Dose Replacement - Follow Nurse / BPA Driven Protocol   Does not apply PRN Francisco Choi MD        metoprolol tartrate (LOPRESSOR) tablet 50 mg  50 mg Oral Q12H Fanny Benitez MD   50 mg at 12/14/23 0815    NIFEdipine CC (ADALAT CC) 24 hr tablet 60 mg  60 mg Oral Q24H Jo Ann Gloria MD   60 mg at 12/14/23 0815    pantoprazole (PROTONIX) EC tablet 40 mg  40 mg Oral Daily Francisco Choi MD   40 mg at 12/14/23 0815    Phosphorus Replacement - Follow Nurse / BPA Driven Protocol   Does not apply PRN Francisco Choi MD        potassium chloride (K-DUR,KLOR-CON) CR tablet 10 mEq  10 mEq Oral Daily Francisco Choi MD   10 mEq at 12/14/23 0815    Potassium Replacement - Follow Nurse / BPA Driven Protocol   Does not apply PRN Francisco Choi MD        sodium chloride 0.9 % flush 10 mL  10 mL Intravenous PRN Francicso Choi MD   10 mL at 12/08/23 0915    sodium chloride 0.9 % flush 10 mL  10 mL Intravenous Q12H Francisco Choi MD   10 mL at 12/14/23 0816    sodium chloride 0.9 % flush 10 mL  10 mL Intravenous PRN Francisco Choi MD        sodium chloride 0.9 % infusion 40 mL  40 mL Intravenous PRN Francisco Choi MD   40 mL at 12/12/23 0935    sodium chloride 0.9 % infusion  75 mL/hr Intravenous Continuous Sunil Craig DO        ziprasidone (GEODON) 10 mg in sterile water (preservative free) 0.5 mL injection  10 mg Intramuscular Q4H PRN Lilli Sharpe PA-C   10 mg at 12/13/23 0721     Lab Results (most recent)       Procedure Component Value Units Date/Time    Basic Metabolic Panel [[id number]]  (Abnormal) Collected: 12/14/23 0044    Specimen: Blood Updated: 12/14/23 0143     Glucose 138 mg/dL      BUN 24 mg/dL      Creatinine 0.93 mg/dL      Sodium 149 mmol/L      Potassium 3.8 mmol/L      Chloride 113  mmol/L      CO2 27.2 mmol/L      Calcium 10.1 mg/dL      BUN/Creatinine Ratio 25.8     Anion Gap 8.8 mmol/L      eGFR 56.7 mL/min/1.73     Narrative:      GFR Normal >60  Chronic Kidney Disease <60  Kidney Failure <15    The GFR formula is only valid for adults with stable renal function between ages 18 and 70.    CBC (No Diff) [[id number]]  (Normal) Collected: 12/14/23 0044    Specimen: Blood Updated: 12/14/23 0125     WBC 9.73 10*3/mm3      RBC 4.45 10*6/mm3      Hemoglobin 13.1 g/dL      Hematocrit 40.4 %      MCV 90.8 fL      MCH 29.4 pg      MCHC 32.4 g/dL      RDW 13.9 %      RDW-SD 46.3 fl      MPV 11.1 fL      Platelets 193 10*3/mm3     Basic Metabolic Panel [[id number]]  (Abnormal) Collected: 12/13/23 0224    Specimen: Blood Updated: 12/13/23 0340     Glucose 105 mg/dL      BUN 24 mg/dL      Creatinine 0.95 mg/dL      Sodium 144 mmol/L      Potassium 4.5 mmol/L      Comment: 1+ Specimen hemolyzed.  Result may be falsely elevated.        Chloride 113 mmol/L      CO2 18.8 mmol/L      Calcium 10.0 mg/dL      BUN/Creatinine Ratio 25.3     Anion Gap 12.2 mmol/L      eGFR 55.3 mL/min/1.73     Narrative:      GFR Normal >60  Chronic Kidney Disease <60  Kidney Failure <15    The GFR formula is only valid for adults with stable renal function between ages 18 and 70.    CBC (No Diff) [[id number]]  (Abnormal) Collected: 12/13/23 0224    Specimen: Blood Updated: 12/13/23 0311     WBC 11.56 10*3/mm3      RBC 4.64 10*6/mm3      Hemoglobin 13.7 g/dL      Hematocrit 42.8 %      MCV 92.2 fL      MCH 29.5 pg      MCHC 32.0 g/dL      RDW 13.9 %      RDW-SD 47.2 fl      MPV 11.3 fL      Platelets 185 10*3/mm3     Blood Culture - Blood, Arm, Left [[id number]]  (Normal) Collected: 12/07/23 1133    Specimen: Blood from Arm, Left Updated: 12/12/23 1200     Blood Culture No growth at 5 days    Blood Culture - Blood, Arm, Right [[id number]]  (Normal) Collected: 12/07/23 1133    Specimen: Blood from Arm, Right Updated: 12/12/23 1200      Blood Culture No growth at 5 days    Comprehensive Metabolic Panel [[id number]]  (Abnormal) Collected: 12/11/23 0503    Specimen: Blood Updated: 12/11/23 0537     Glucose 135 mg/dL      BUN 16 mg/dL      Creatinine 0.94 mg/dL      Sodium 143 mmol/L      Potassium 3.4 mmol/L      Comment: Slight hemolysis detected by analyzer. Result may be falsely elevated.        Chloride 110 mmol/L      CO2 24.4 mmol/L      Calcium 9.7 mg/dL      Total Protein 6.4 g/dL      Albumin 3.2 g/dL      ALT (SGPT) 11 U/L      AST (SGOT) 21 U/L      Alkaline Phosphatase 84 U/L      Total Bilirubin 0.5 mg/dL      Globulin 3.2 gm/dL      A/G Ratio 1.0 g/dL      BUN/Creatinine Ratio 17.0     Anion Gap 8.6 mmol/L      eGFR 56.0 mL/min/1.73     Narrative:      GFR Normal >60  Chronic Kidney Disease <60  Kidney Failure <15    The GFR formula is only valid for adults with stable renal function between ages 18 and 70.    Urine Culture - Urine, Urine, Clean Catch [[id number]]  (Abnormal)  (Susceptibility) Collected: 12/07/23 1742    Specimen: Urine, Clean Catch Updated: 12/10/23 1211     Urine Culture >100,000 CFU/mL Escherichia coli    Narrative:      Colonization of the urinary tract without infection is common. Treatment is discouraged unless the patient is symptomatic, pregnant, or undergoing an invasive urologic procedure.      Susceptibility        Escherichia coli      VÍCTOR      Ampicillin Intermediate      Ampicillin + Sulbactam Susceptible      Cefazolin Susceptible      Cefepime Susceptible      Ceftazidime Susceptible      Ceftriaxone Susceptible      Gentamicin Susceptible      Levofloxacin Susceptible      Nitrofurantoin Susceptible      Piperacillin + Tazobactam Susceptible      Trimethoprim + Sulfamethoxazole Susceptible                           Comprehensive Metabolic Panel [[id number]]  (Abnormal) Collected: 12/10/23 0206    Specimen: Blood Updated: 12/10/23 0333     Glucose 127 mg/dL      BUN 19 mg/dL      Creatinine 1.05 mg/dL       Sodium 146 mmol/L      Potassium 3.6 mmol/L      Chloride 110 mmol/L      CO2 25.9 mmol/L      Calcium 9.9 mg/dL      Total Protein 6.1 g/dL      Albumin 3.0 g/dL      ALT (SGPT) 9 U/L      AST (SGOT) 18 U/L      Alkaline Phosphatase 85 U/L      Total Bilirubin 0.5 mg/dL      Globulin 3.1 gm/dL      A/G Ratio 1.0 g/dL      BUN/Creatinine Ratio 18.1     Anion Gap 10.1 mmol/L      eGFR 49.0 mL/min/1.73     Narrative:      GFR Normal >60  Chronic Kidney Disease <60  Kidney Failure <15    The GFR formula is only valid for adults with stable renal function between ages 18 and 70.    C-reactive Protein [[id number]]  (Abnormal) Collected: 12/09/23 0312    Specimen: Blood Updated: 12/09/23 0757     C-Reactive Protein 3.80 mg/dL     CBC & Differential [[id number]]  (Abnormal) Collected: 12/08/23 1031    Specimen: Blood Updated: 12/08/23 1043    Narrative:      The following orders were created for panel order CBC & Differential.  Procedure                               Abnormality         Status                     ---------                               -----------         ------                     CBC Auto Differential[[id number]]        Abnormal            Final result               Scan Slide[[id number]]                                                                    Please view results for these tests on the individual orders.    CBC Auto Differential [[id number]]  (Abnormal) Collected: 12/08/23 1031    Specimen: Blood Updated: 12/08/23 1043     WBC 5.41 10*3/mm3      RBC 4.38 10*6/mm3      Hemoglobin 13.0 g/dL      Hematocrit 39.8 %      MCV 90.9 fL      MCH 29.7 pg      MCHC 32.7 g/dL      RDW 13.5 %      RDW-SD 45.3 fl      MPV 11.3 fL      Platelets 124 10*3/mm3      Neutrophil % 67.7 %      Lymphocyte % 20.3 %      Monocyte % 10.5 %      Eosinophil % 0.7 %      Basophil % 0.4 %      Immature Grans % 0.4 %      Neutrophils, Absolute 3.66 10*3/mm3      Lymphocytes, Absolute 1.10 10*3/mm3      Monocytes, Absolute 0.57  10*3/mm3      Eosinophils, Absolute 0.04 10*3/mm3      Basophils, Absolute 0.02 10*3/mm3      Immature Grans, Absolute 0.02 10*3/mm3      nRBC 0.0 /100 WBC     Urinalysis With Culture If Indicated - Urine, Clean Catch [[id number]]  (Abnormal) Collected: 12/07/23 1742    Specimen: Urine, Clean Catch Updated: 12/07/23 1807     Color, UA Yellow     Appearance, UA Clear     pH, UA 6.0     Specific Gravity, UA 1.009     Glucose, UA Negative     Ketones, UA Negative     Bilirubin, UA Negative     Blood, UA Negative     Protein, UA Negative     Leuk Esterase, UA Small (1+)     Nitrite, UA Positive     Urobilinogen, UA 0.2 E.U./dL    Narrative:      In absence of clinical symptoms, the presence of pyuria, bacteria, and/or nitrites on the urinalysis result does not correlate with infection.    Urinalysis, Microscopic Only - Urine, Clean Catch [[id number]]  (Abnormal) Collected: 12/07/23 1742    Specimen: Urine, Clean Catch Updated: 12/07/23 1807     RBC, UA 0-2 /HPF      WBC, UA 6-10 /HPF      Bacteria, UA 4+ /HPF      Squamous Epithelial Cells, UA 0-2 /HPF      Hyaline Casts, UA None Seen /LPF      Methodology Automated Microscopy    High Sensitivity Troponin T 2Hr [[id number]]  (Abnormal) Collected: 12/07/23 1514    Specimen: Blood from Arm, Left Updated: 12/07/23 1547     HS Troponin T 25 ng/L      Troponin T Delta 1 ng/L     Narrative:      High Sensitive Troponin T Reference Range:  <14.0 ng/L- Negative Female for AMI  <22.0 ng/L- Negative Male for AMI  >=14 - Abnormal Female indicating possible myocardial injury.  >=22 - Abnormal Male indicating possible myocardial injury.   Clinicians would have to utilize clinical acumen, EKG, Troponin, and serial changes to determine if it is an Acute Myocardial Infarction or myocardial injury due to an underlying chronic condition.         Procalcitonin [[id number]]  (Normal) Collected: 12/07/23 1219    Specimen: Blood from Arm, Left Updated: 12/07/23 1303     Procalcitonin 0.06 ng/mL   "   Narrative:      As a Marker for Sepsis (Non-Neonates):    1. <0.5 ng/mL represents a low risk of severe sepsis and/or septic shock.  2. >2 ng/mL represents a high risk of severe sepsis and/or septic shock.    As a Marker for Lower Respiratory Tract Infections that require antibiotic therapy:    PCT on Admission    Antibiotic Therapy       6-12 Hrs later    >0.5                Strongly Recommended  >0.25 - <0.5        Recommended   0.1 - 0.25          Discouraged              Remeasure/reassess PCT  <0.1                Strongly Discouraged     Remeasure/reassess PCT    As 28 day mortality risk marker: \"Change in Procalcitonin Result\" (>80% or <=80%) if Day 0 (or Day 1) and Day 4 values are available. Refer to http://[email or web address] [email or web address]p[email or web address]    Change in PCT <=80%  A decrease of PCT levels below or equal to 80% defines a positive change in PCT test result representing a higher risk for 28-day all-cause mortality of patients diagnosed with severe sepsis for septic shock.    Change in PCT >80%  A decrease of PCT levels of more than 80% defines a negative change in PCT result representing a lower risk for 28-day all-cause mortality of patients diagnosed with severe sepsis or septic shock.       High Sensitivity Troponin T [[id number]]  (Abnormal) Collected: 12/07/23 1219    Specimen: Blood from Arm, Left Updated: 12/07/23 1257     HS Troponin T 24 ng/L     Narrative:      High Sensitive Troponin T Reference Range:  <14.0 ng/L- Negative Female for AMI  <22.0 ng/L- Negative Male for AMI  >=14 - Abnormal Female indicating possible myocardial injury.  >=22 - Abnormal Male indicating possible myocardial injury.   Clinicians would have to utilize clinical acumen, EKG, Troponin, and serial changes to determine if it is an Acute Myocardial Infarction or myocardial injury due to an underlying chronic condition.         BNP [[id number]]  (Abnormal) Collected: 12/07/23 1219    Specimen: Blood from Arm, Left Updated: 12/07/23 " 1257     proBNP 2,957.0 pg/mL     Narrative:      This assay is used as an aid in the diagnosis of individuals suspected of having heart failure. It can be used as an aid in the diagnosis of acute decompensated heart failure (ADHF) in patients presenting with signs and symptoms of ADHF to the emergency department (ED). In addition, NT-proBNP of <300 pg/mL indicates ADHF is not likely.    Age Range Result Interpretation  NT-proBNP Concentration (pg/mL:      <50             Positive            >450                   Gray                 300-450                    Negative             <300    50-75           Positive            >900                  Gray                300-900                  Negative            <300      >75             Positive            >1800                  Gray                300-1800                  Negative            <300    C-reactive Protein [[id number]]  (Abnormal) Collected: 12/07/23 1219    Specimen: Blood from Arm, Left Updated: 12/07/23 1253     C-Reactive Protein 3.89 mg/dL     COVID-19 and FLU A/B PCR, 1 HR TAT - Swab, Nasopharynx [[id number]]  (Normal) Collected: 12/07/23 1134    Specimen: Swab from Nasopharynx Updated: 12/07/23 1231     COVID19 Not Detected     Influenza A PCR Not Detected     Influenza B PCR Not Detected    Narrative:      Fact sheet for providers: https://www.fda.gov/media/628346/download    Fact sheet for patients: https://www.fda.gov/media/124133/download    Test performed by PCR.    Lactic Acid, Plasma [[id number]]  (Normal) Collected: 12/07/23 1125    Specimen: Blood Updated: 12/07/23 1216     Lactate 1.5 mmol/L     Protime-INR [[id number]]  (Abnormal) Collected: 12/07/23 1125    Specimen: Blood Updated: 12/07/23 1157     Protime 25.1 Seconds      INR 2.20    Narrative:      Suggested INR therapeutic range for stable oral anticoagulant therapy:    Low Intensity therapy:   1.5-2.0  Moderate Intensity therapy:   2.0-3.0  High Intensity therapy:   2.5-4.0    aPTT  [[id number]]  (Abnormal) Collected: 12/07/23 1125    Specimen: Blood Updated: 12/07/23 1157     PTT 41.2 seconds     Narrative:      PTT Heparin Therapeutic Range:  59 - 95 seconds      CBC & Differential [[id number]]  (Abnormal) Collected: 12/07/23 1125    Specimen: Blood Updated: 12/07/23 1151    Narrative:      The following orders were created for panel order CBC & Differential.  Procedure                               Abnormality         Status                     ---------                               -----------         ------                     CBC Auto Differential[[id number]]        Abnormal            Final result               Scan Slide[[id number]]                                                                    Please view results for these tests on the individual orders.    CBC Auto Differential [[id number]]  (Abnormal) Collected: 12/07/23 1125    Specimen: Blood Updated: 12/07/23 1151     WBC 6.00 10*3/mm3      RBC 4.70 10*6/mm3      Hemoglobin 13.9 g/dL      Hematocrit 42.8 %      MCV 91.1 fL      MCH 29.6 pg      MCHC 32.5 g/dL      RDW 13.7 %      RDW-SD 46.3 fl      MPV 11.7 fL      Platelets 136 10*3/mm3      Neutrophil % 64.4 %      Lymphocyte % 20.7 %      Monocyte % 13.8 %      Eosinophil % 0.5 %      Basophil % 0.3 %      Immature Grans % 0.3 %      Neutrophils, Absolute 3.86 10*3/mm3      Lymphocytes, Absolute 1.24 10*3/mm3      Monocytes, Absolute 0.83 10*3/mm3      Eosinophils, Absolute 0.03 10*3/mm3      Basophils, Absolute 0.02 10*3/mm3      Immature Grans, Absolute 0.02 10*3/mm3      nRBC 0.0 /100 WBC     Blood Gas, Arterial With Co-Ox [[id number]]  (Abnormal) Collected: 12/07/23 1117    Specimen: Arterial Blood Updated: 12/07/23 1117     Site Left Radial     Braeden's Test Positive     pH, Arterial 7.417 pH units      pCO2, Arterial 45.4 mm Hg      pO2, Arterial 64.8 mm Hg      Comment: 84 Value below reference range        HCO3, Arterial 29.2 mmol/L      Comment: 83 Value above reference  range        Base Excess, Arterial 4.0 mmol/L      O2 Saturation, Arterial 93.5 %      Comment: 84 Value below reference range        Hemoglobin, Blood Gas 13.9 g/dL      Hematocrit, Blood Gas 42.6 %      Oxyhemoglobin 92.1 %      Comment: 84 Value below reference range        Methemoglobin 0.30 %      Carboxyhemoglobin 1.2 %      A-a DO2 27.2 mmHg      CO2 Content 30.6 mmol/L      Barometric Pressure for Blood Gas 729 mmHg      Modality Room Air     FIO2 21 %      Ventilator Mode NA     Collected by [id number]     Comment: Meter: [id number]     :  [id number]        pH, Temp Corrected --     pCO2, Temperature Corrected --     pO2, Temperature Corrected --          Orders (last 24 hrs)        Start     Ordered    12/14/23 0900  sodium chloride 0.9 % infusion  Continuous         12/14/23 0830    12/14/23 0852  Discontinue IV  Once         12/14/23 0853    12/14/23 0849  Discharge patient  Once         12/14/23 0853    12/14/23 0600  CBC (No Diff)  Morning Draw         12/13/23 1626    12/14/23 0600  Basic Metabolic Panel  Morning Draw         12/13/23 1626    12/14/23 0000  Discharge Follow-up with PCP         12/14/23 0853    12/14/23 0000  Ambulatory Referral to Home Health         12/14/23 0853    12/13/23 2305  ECG 12 Lead Rhythm Change  STAT         12/13/23 2305    12/13/23 2100  metoprolol tartrate (LOPRESSOR) tablet 50 mg  Every 12 Hours Scheduled         12/13/23 0913    12/13/23 1627  XR Chest 1 View  1 Time Imaging         12/13/23 1626    12/13/23 1417  labetalol (NORMODYNE,TRANDATE) injection 10 mg  Every 6 Hours PRN         12/13/23 1421    12/13/23 1400  Elevate Heels Off of Bed  Until Discontinued         12/13/23 1359    12/13/23 1400  Turn Patient  Now Then Every 2 Hours         12/13/23 1359    12/13/23 1400  Use Repositioning Wedge to Position Patient  Continuous         12/13/23 1359    12/13/23 1400  Use Seat Cushion When Up In Chair  Continuous         12/13/23 1359    12/13/23 1400   Head of Bed 30 Degrees or Less  Until Discontinued         12/13/23 1359    12/13/23 1400  Do NOT Rub or Massage Any Bony Prominence  Continuous         12/13/23 1359    12/13/23 1400  Perineal Dermatitis Care Q12H  Every 12 Hours        Comments: - Gently Cleanse Area With Perineal Foam Cleanser or Gentle Perineal Cleanser  - Pat Dry  - Gently Apply Protective Moisture Barrier BID & After Each Incontinence Episode  - Notify Wound Care if No Improvement After 3 Days    12/13/23 1359    12/11/23 0642  Calcium Replacement - Follow Nurse / BPA Driven Protocol  As Needed         12/11/23 0642    12/11/23 0642  Phosphorus Replacement - Follow Nurse / BPA Driven Protocol  As Needed         12/11/23 0642    12/11/23 0642  Magnesium Standard Dose Replacement - Follow Nurse / BPA Driven Protocol  As Needed         12/11/23 0642    12/11/23 0642  Potassium Replacement - Follow Nurse / BPA Driven Protocol  As Needed         12/11/23 0642    12/11/23 0149  ziprasidone (GEODON) 10 mg in sterile water (preservative free) 0.5 mL injection  Every 4 Hours PRN         12/11/23 0150    12/10/23 0900  cholecalciferol (VITAMIN D3) capsule 50,000 Units  Weekly         12/08/23 1123    12/09/23 1500  NIFEdipine CC (ADALAT CC) 24 hr tablet 60 mg  Every 24 Hours Scheduled         12/09/23 1359    12/08/23 2100  atorvastatin (LIPITOR) tablet 20 mg  Nightly         12/08/23 0831    12/08/23 1300  apixaban (ELIQUIS) tablet 2.5 mg  2 Times Daily         12/08/23 1123    12/08/23 1300  cinacalcet (SENSIPAR) tablet 30 mg  Daily         12/08/23 1123    12/08/23 1300  pantoprazole (PROTONIX) EC tablet 40 mg  Daily         12/08/23 1123    12/08/23 1300  potassium chloride (K-DUR,KLOR-CON) CR tablet 10 mEq  Daily         12/08/23 1123    12/08/23 1030  aspirin EC tablet 81 mg  Daily         12/08/23 0831    12/08/23 0800  Oral Care  2 Times Daily       12/07/23 1932    12/07/23 2100  sodium chloride 0.9 % flush 10 mL  Every 12 Hours Scheduled          12/07/23 1932 12/07/23 2100  sennosides-docusate (PERICOLACE) 8.6-50 MG per tablet 2 tablet  2 Times Daily        See Hyperspace for full Linked Orders Report.    12/07/23 1932    12/07/23 2000  Vital Signs  Every 4 Hours       12/07/23 1932 12/07/23 1933  Intake & Output  Every Shift       12/07/23 1932 12/07/23 1932  sodium chloride 0.9 % flush 10 mL  As Needed         12/07/23 1932 12/07/23 1932  sodium chloride 0.9 % infusion 40 mL  As Needed         12/07/23 1932 12/07/23 1932  polyethylene glycol (MIRALAX) packet 17 g  Daily PRN        See Hyperspace for full Linked Orders Report.    12/07/23 1932 12/07/23 1932  bisacodyl (DULCOLAX) EC tablet 5 mg  Daily PRN        See Hyperspace for full Linked Orders Report.    12/07/23 1932 12/07/23 1932  bisacodyl (DULCOLAX) suppository 10 mg  Daily PRN        See Hyperspace for full Linked Orders Report.    12/07/23 1932 12/07/23 1102  sodium chloride 0.9 % flush 10 mL  As Needed        See Hyperspace for full Linked Orders Report.    12/07/23 1103    Unscheduled  Oxygen Therapy- Nasal Cannula; Titrate 1-6 LPM Per SpO2; 90 - 95%  Continuous PRN       12/07/23 1424    Unscheduled  Wound Care  As Needed       12/13/23 1359    Unscheduled  Perineal Dermatitis Care PRN  As Needed      Comments: - Gently Cleanse Area With Perineal Foam Cleanser or Gentle Perineal Cleanser  - Pat Dry  - Gently Apply Protective Moisture Barrier BID & After Each Incontinence Episode  - Notify Wound Care if No Improvement After 3 Days    12/13/23 1359    --  ezetimibe (ZETIA) 10 MG tablet  Daily         12/07/23 1108    --  vitamin D (ERGOCALCIFEROL) 1.25 MG (08294 UT) capsule capsule  Weekly         12/07/23 1108    --  apixaban (ELIQUIS) 5 MG tablet tablet  2 Times Daily         12/07/23 1943    --  erythromycin (ROMYCIN) 5 MG/GM ophthalmic ointment  3 Times Daily PRN         12/07/23 1945    --  furosemide (LASIX) 40 MG tablet  Daily         12/07/23 1946    --   losartan (COZAAR) 50 MG tablet  2 Times Daily         23    --  cetirizine (zyrTEC) 10 MG tablet  Daily         23    Pending  cinacalcet (SENSIPAR) tablet 30 mg  Daily         Pending    Pending  erythromycin (ROMYCIN) ophthalmic ointment 1 application   3 Times Daily PRN         Pending    Pending  furosemide (LASIX) tablet 40 mg  Daily         Pending    Pending  metoprolol tartrate (LOPRESSOR) tablet 100 mg  Every 12 Hours Scheduled         Pending    Pending  pantoprazole (PROTONIX) EC tablet 40 mg  Daily         Pending    Pending  potassium chloride (K-DUR,KLOR-CON) CR tablet 10 mEq  Daily         Pending    Pending  cholecalciferol (VITAMIN D3) capsule 50,000 Units  Weekly         Pending    Pending  apixaban (ELIQUIS) tablet 2.5 mg  Every 12 Hours Scheduled         Pending    Pending  cetirizine (zyrTEC) tablet 5 mg  Daily         Pending    --  cephalexin (KEFLEX) 500 MG capsule  2 Times Daily         23    --  SCANNED - TELEMETRY           23 0000    --  SCANNED - TELEMETRY           23 0000    --  SCANNED - TELEMETRY           23 0000    --  SCANNED - TELEMETRY           23 0000    --  SCANNED - TELEMETRY           23 0000    --  SCANNED - TELEMETRY           23 0000    --  SCANNED - TELEMETRY           23 0000    --  SCANNED - TELEMETRY           23 0000    --  SCANNED - TELEMETRY           23 0000                     Physician Progress Notes (most recent note)        Sunil Craig DO at 23 1621              [facility]     Patient Identification:  Name:  Zeenat Dong  Age:  89 y.o.  Sex:  female  :  1928  MRN:  [id number]  Visit Number:  [id number]  Primary Care Provider:  Fatmata Dong APRN    Length of stay:  5    Chief complaint: Altered mental status    Subjective:    Patient seen and examined at bedside with patient's daughter and grandson  present.  Patient is acutely altered and lying in bed moaning and will not follow commands.  She is easily agitated but not combative.  After further discussion with patient's grandson, it does appear patient has had a slowly declining mental status over the past 5 to 6 years with intermittent confusion and patient seeing people and hearing things that are not real, especially at night.  Did have lengthy discussion with patient's family regarding suspected underlying diagnosis of dementia which was previously undiagnosed.  Also discussed the difficult situation of patients with acute illnesses with underlying dementia which may exacerbate hospital delirium.  Patient does appear to be in the throes of hospital delirium and has not responded to attempts to reorient her to her immediate surroundings.  Patient's family states that the patient will sleep the majority of the day and has had some decreased oral intake over the past several months.  ----------------------------------------------------------------------------------------------------------------------  Current Hospital Meds:  apixaban, 2.5 mg, Oral, BID  aspirin, 81 mg, Oral, Daily  atorvastatin, 20 mg, Oral, Nightly  cholecalciferol, 50,000 Units, Oral, Weekly  cinacalcet, 30 mg, Oral, Daily  metoprolol tartrate, 50 mg, Oral, Q12H  NIFEdipine CC, 60 mg, Oral, Q24H  pantoprazole, 40 mg, Oral, Daily  potassium chloride, 10 mEq, Oral, Daily  senna-docusate sodium, 2 tablet, Oral, BID  sodium chloride, 10 mL, Intravenous, Q12H         ----------------------------------------------------------------------------------------------------------------------  Vital Signs:  Temp:  [97.1 °F (36.2 °C)-98.2 °F (36.8 °C)] 97.5 °F (36.4 °C)  Heart Rate:  [] 98  Resp:  [16-18] 18  BP: (154-197)/(60-78) 180/76      12/10/23  0500 12/11/23  0500 12/13/23  0500   Weight: 59.5 kg (131 lb 1.6 oz) 61.1 kg (134 lb 9.6 oz) 61 kg (134 lb 6.4 oz)     Body mass index is 26.25  kg/m².    Intake/Output Summary (Last 24 hours) at 12/13/2023 1621  Last data filed at 12/13/2023 1409  Gross per 24 hour   Intake 50 ml   Output 1850 ml   Net -1800 ml     Diet: Regular/House Diet; No Mixed Consistencies, Feeding Assistance - Nursing; Texture: Soft to Chew (NDD 3); Soft to Chew: Chopped Meat; Fluid Consistency: Thin (IDDSI 0)  ----------------------------------------------------------------------------------------------------------------------  Physical exam:  Constitutional: Elderly appearing  female in no apparent distress, currently confused, lying in bed and occasionally moaning  HENT:  Head:  Normocephalic and atraumatic.  Mouth:  Moist mucous membranes.    Eyes:  Conjunctivae and EOM are normal.  Pupils are equal, round, and reactive to light.  No scleral icterus.    Neck:  Neck supple. No thyromegaly.  No JVD present.    Cardiovascular:  Regular rate and rhythm with no murmurs, rubs, clicks or gallops appreciated.  Pulmonary/Chest:  Clear to auscultation bilaterally with no crackles, wheezes or rhonchi appreciated.  Abdominal:  Soft. Nontender. Nondistended  Bowel sounds are normal in all four quadrants. No organomegally appreciated.   Musculoskeletal:  No edema, no tenderness, and no deformity.  No red or swollen joints anywhere.    Neurological: Patient will not participate with cranial nerve testing, no facial droop.  No slurred speech.   Skin:  Warm and dry to palpation with no rashes or lesions appreciated.  Peripheral vascular:  2+ radial and pedal pulses in bilateral upper and lower extremities.  ------------------------------------------------------------------------------------------------------------  ----------------------------------------------------------------------------------------------------------------------  Results from last 7 days   Lab Units 12/07/23  1514 12/07/23  1219   HSTROP T ng/L 25* 24*     Results from last 7 days   Lab Units 12/13/23  0221  "12/12/23  0149 12/11/23  0503 12/10/23  0206 12/09/23  0312 12/08/23  1031 12/07/23  1219 12/07/23  1125   CRP mg/dL  --   --   --   --  3.80*  --  3.89*  --    LACTATE mmol/L  --   --   --   --   --   --   --  1.5   WBC 10*3/mm3 11.56* 9.66 6.20   < > 4.83   < >  --  6.00   HEMOGLOBIN g/dL 13.7 14.1 13.1   < > 12.4   < >  --  13.9   HEMATOCRIT % 42.8 45.3 40.6   < > 38.1   < >  --  42.8   MCV fL 92.2 94.6 91.6   < > 90.7   < >  --  91.1   MCHC g/dL 32.0 31.1* 32.3   < > 32.5   < >  --  32.5   PLATELETS 10*3/mm3 185 146 152   < > 125*   < >  --  136*   INR   --   --   --   --   --   --   --  2.20*    < > = values in this interval not displayed.     Results from last 7 days   Lab Units 12/07/23  1117   PH, ARTERIAL pH units 7.417   PO2 ART mm Hg 64.8*   PCO2, ARTERIAL mm Hg 45.4*   HCO3 ART mmol/L 29.2*     Results from last 7 days   Lab Units 12/13/23  0224 12/12/23  0149 12/11/23  0503 12/10/23  0206 12/09/23  0312   SODIUM mmol/L 144 141 143 146* 145   POTASSIUM mmol/L 4.5 4.0 3.4* 3.6 3.4*   CHLORIDE mmol/L 113* 108* 110* 110* 108*   CO2 mmol/L 18.8* 23.6 24.4 25.9 27.7   BUN mg/dL 24* 20 16 19 17   CREATININE mg/dL 0.95 1.05* 0.94 1.05* 1.11*   CALCIUM mg/dL 10.0* 9.7* 9.7* 9.9* 9.3   GLUCOSE mg/dL 105* 134* 135* 127* 103*   ALBUMIN g/dL  --   --  3.2* 3.0* 2.9*   BILIRUBIN mg/dL  --   --  0.5 0.5 0.5   ALK PHOS U/L  --   --  84 85 70   AST (SGOT) U/L  --   --  21 18 17   ALT (SGPT) U/L  --   --  11 9 8   Estimated Creatinine Clearance: 28.9 mL/min (by C-G formula based on SCr of 0.95 mg/dL).    No results found for: \"AMMONIA\"      Blood Culture   Date Value Ref Range Status   12/07/2023 No growth at 4 days  Preliminary   12/07/2023 No growth at 4 days  Preliminary     Urine Culture   Date Value Ref Range Status   12/07/2023 >100,000 CFU/mL Escherichia coli (A)  Final     No results found for: \"WOUNDCX\"  No results found for: \"STOOLCX\"    I have personally looked at the labs and they are summarized " above.  ----------------------------------------------------------------------------------------------------------------------  Imaging Results (Last 24 Hours)       ** No results found for the last 24 hours. **          ----------------------------------------------------------------------------------------------------------------------  Assessment and Plan:    Acute metabolic encephalopathy -unfortunately, patient's mentation appears slightly worse today in comparison to yesterday.  She does not recognize any family members, not even her daughter at bedside.  Patient will not follow commands and is resistant to instruction.  However, patient is not combative and is content to lie in bed undisturbed.  Suspect etiology of encephalopathy likely a complication from acute cystitis with underlying previously undiagnosed dementia resulting in acute hospital delirium.  Will continue to attempt reorientation and keep lights on during the day with shades up allowing sunlight in and turning lights off and keeping the room quiet during the night.    2.  E. coli acute cystitis -urine culture sensitivities demonstrate pan sensitive E. coli, will continue Rocephin 1 g IV every 24 hours.    3.  Suspected pneumonia -repeat chest x-ray demonstrates no evidence of pneumonia, there is some evidence of mild volume overload.  Will discontinue doxycycline today and continue Rocephin for treatment of acute cystitis.      4.  Acute on chronic HFpEF -acute phase appears resolved, will hold diuretics today.    5.  Essential hypertension -well-controlled    6.  Hyperlipidemia -statin    7.  General debility -continue PT/OT, will consult case management to assist with potential need for placement    Disposition patient appears stable from a medical standpoint.  Given overall general decline, patient would likely benefit from inpatient rehab or skilled nursing facility placement.  Case management has been consulted and is currently assisting  in placement.    Sunil Craig DO   12/13/23   16:21 EST       Electronically signed by Sunil Craig DO at 12/13/23 1626          Consult Notes (most recent note)        Kelly Pastrana APRN at 12/08/23 0950        Consult Orders    1. Inpatient Palliative Care MD Consult [[id number]] ordered by Francisco Choi MD at 12/07/23 1641                 Palliative Care Initial Consult     Attending Physician: Francisco Choi MD  Referring Provider: Francisco Choi  assistance with advance directives, assistance with clarification of goals of care, and psychosocial support  Code Status:   Code Status and Medical Interventions:   Ordered at: 12/08/23 1005     Medical Intervention Limits:    NO intubation (DNI)     Level Of Support Discussed With:    Next of Kin (If No Surrogate)     Code Status (Patient has no pulse and is not breathing):    No CPR (Do Not Attempt to Resuscitate)     Medical Interventions (Patient has pulse or is breathing):    Limited Support     Comments:    Daughter Jeff      Advanced Directives: Advance Directive Status: Patient has advance directive, copy requested   Healthcare surrogate: Jeff Deras daughter and [facility]  Goals of Care: .As Zeenat is extremely [place] and pleasantly confused her daughter/HCS Jeff states her mother would not want to be resuscitated or to be intubated and placed on a ventilator, she also presented her mothers living will to palliative, which was scanned into medical records.    HPI:  Zeenat Dong is a 89 y.o. female admitted on 12/7/2023 due to shortness of breath and cough.Zeenat has a medical history of GERD, Hypertension, Hyperlipidemia, Chronic HFunknownEF, Paroxysmal Atrial Fibrillation on chronic anticoagulation. EKG showed normal sinus rhythm, LAD, left ventricle hypertrophy.  CT Chest showed scarring vs minimal airspace disease L lung base, coronary artery calcifications. Per discussion with Jeff, she states that Zeenat lives with her and is normally  very independent, ambulates on her own, goes to bathroom, even making her own food, but states sincelast Sunday after [place] she has been becoming more short of breath and has been wearing her oxygen all the time when it is only ordered as needed.        ROS: Negative except as above in HPI.     Past Medical History:   Diagnosis Date    Hyperlipidemia      Past Surgical History:   Procedure Laterality Date    OTHER SURGICAL HISTORY      ear surgery    SKIN BIOPSY       Social History     Socioeconomic History    Marital status:    Tobacco Use    Smoking status: Never   Vaping Use    Vaping Use: Never used   Substance and Sexual Activity    Alcohol use: No    Drug use: Never     Family History   Problem Relation Age of Onset    Other Father         cardiovascular disease    Kidney disease Sister        No Known Allergies    Current Facility-Administered Medications   Medication Dose Route Frequency Provider Last Rate Last Admin    aspirin EC tablet 81 mg  81 mg Oral Daily Fanny Benitez MD        atorvastatin (LIPITOR) tablet 20 mg  20 mg Oral Nightly Fanny Benitez MD        sennosides-docusate (PERICOLACE) 8.6-50 MG per tablet 2 tablet  2 tablet Oral BID Francisco Choi MD   2 tablet at 12/08/23 0915    And    polyethylene glycol (MIRALAX) packet 17 g  17 g Oral Daily PRN Francisco Choi MD        And    bisacodyl (DULCOLAX) EC tablet 5 mg  5 mg Oral Daily PRN Francisco Choi MD        And    bisacodyl (DULCOLAX) suppository 10 mg  10 mg Rectal Daily PRN Francisco Choi MD        cefTRIAXone (ROCEPHIN) 1,000 mg in sodium chloride 0.9 % 100 mL IVPB-VTB  1,000 mg Intravenous Q24H Francisco Choi MD        doxycycline (VIBRAMYCIN) 100 mg in sodium chloride 0.9 % 100 mL IVPB-VTB  100 mg Intravenous Q12H Francisco Choi MD 0 mL/hr at 12/07/23 2130 100 mg at 12/08/23 0911    sodium chloride 0.9 % flush 10 mL  10 mL Intravenous PRN Francisco Choi MD   10 mL at 12/08/23 0915    sodium chloride 0.9 % flush 10 mL  10  "mL Intravenous Q12H Francisco Choi MD   10 mL at 12/08/23 0916    sodium chloride 0.9 % flush 10 mL  10 mL Intravenous PRN Francisco Choi MD        sodium chloride 0.9 % infusion 40 mL  40 mL Intravenous PRN Francisco Choi MD              senna-docusate sodium **AND** polyethylene glycol **AND** bisacodyl **AND** bisacodyl    [COMPLETED] Insert peripheral IV **AND** sodium chloride    sodium chloride    sodium chloride    Current medication reviewed for route, type, dose and frequency and are current per MAR.    Palliative Performance Scale Score:     /82 (BP Location: Right arm, Patient Position: Lying)   Pulse 80   Temp 96.2 °F (35.7 °C) (Oral)   Resp 16   Ht 152.4 cm (60\")   Wt 59.1 kg (130 lb 3.2 oz) Comment: new admit less than 24  SpO2 96%   BMI 25.43 kg/m²     Intake/Output Summary (Last 24 hours) at 12/8/2023 1007  Last data filed at 12/8/2023 0900  Gross per 24 hour   Intake 220 ml   Output 200 ml   Net 20 ml       PE:  General Appearance:    Chronically ill appearing, alert to self , cooperative, NAD   HEENT:    NC/AT, without obvious abnormality, EOMI, anicteric    Neck:   supple, trachea midline, no JVD   Lungs:     Unlabored respirations, Bl wheezes noted and faint BL crackles on 2 L NC    Heart:    RRR, normal S1 and S2, no M/R/G   Abdomen:     Soft, NT, ND, NABS    Extremities:   Moves all extremities, no edema   Pulses:   Pulses palpable and equal bilaterally   Skin:   Warm, dry   Neurologic:   Alert to self, unable to determine if to place time, due to severe [place]   Psych:   Calm, pleasantly confused       Labs:   Results from last 7 days   Lab Units 12/07/23  1125   WBC 10*3/mm3 6.00   HEMOGLOBIN g/dL 13.9   HEMATOCRIT % 42.8   PLATELETS 10*3/mm3 136*     Results from last 7 days   Lab Units 12/07/23  1219   SODIUM mmol/L 141   POTASSIUM mmol/L 3.6   CHLORIDE mmol/L 103   CO2 mmol/L 27.5   BUN mg/dL 20   CREATININE mg/dL 1.26*   GLUCOSE mg/dL 124*   CALCIUM mg/dL 9.8*     Results from " last 7 days   Lab Units 12/07/23  1219   SODIUM mmol/L 141   POTASSIUM mmol/L 3.6   CHLORIDE mmol/L 103   CO2 mmol/L 27.5   BUN mg/dL 20   CREATININE mg/dL 1.26*   CALCIUM mg/dL 9.8*   BILIRUBIN mg/dL 1.0   ALK PHOS U/L 80   ALT (SGPT) U/L 9   AST (SGOT) U/L 17   GLUCOSE mg/dL 124*     Imaging Results (Last 72 Hours)       Procedure Component Value Units Date/Time    US Renal Bilateral [[id number]] Collected: 12/08/23 0741     Updated: 12/08/23 0745    Narrative:      EXAMINATION: US RENAL BILATERAL-      CLINICAL INDICATION: [place]; R06.00-Dyspnea, unspecified        COMPARISON: None immediately available     PROCEDURE: Sonographic imaging of the kidneys     FINDINGS:  Imaging of the right kidney demonstrates the right kidney measuring 6.68  x 3.12 x 3.57 cm.. No solid mass. Equivocal dilatation of the right  renal pelvis..     Left kidney measures 7.54 x 4.00 x 3.63 cm. No solid mass or  hydronephrosis       Impression:      1. Equivocal dilatation of the right renal pelvis. This may be artifact.  Not seen on recent CT of the chest.        This report was finalized on 12/8/2023 7:43 AM by Dr. Dany Isbell MD.       CT Chest Without Contrast Diagnostic [[id number]] Collected: 12/07/23 1507     Updated: 12/07/23 1512    Narrative:      EXAM: CT CHEST WO CONTRAST DIAGNOSTIC-      CLINICAL INDICATION:shortness of breath      COMPARISON: 8/17/2015     TECHNIQUE: Multiple axial CT images were obtained from lung apex through  upper abdomen without the administration of IV contrast. Reformatted  images in the coronal and/or sagittal plane(s) were generated from the  axial data set to facilitate diagnostic accuracy and/or surgical  planning.     Radiation dose reduction techniques were utilized per ALARA protocol.  Automated exposure control was initiated through either or [employer] or  [employer] software packages by  protocol.    DOSE (DLP mGy-cm):        FINDINGS:     LUNGS: Minimal airspace density in the left  lung base may represent  scarring. Pneumonia not completely excluded.     HEART: Coronary artery calcifications     MEDIASTINUM: No masses. No enlarged lymph nodes.  No fluid collections.     PLEURA: No pleural effusion. No pleural mass or abnormal calcification.  No pneumothorax.     VASCULATURE: No evidence of aneurysm.     BONES: No acute bony abnormality.     VISUALIZED UPPER ABDOMEN:The upper abdomen is unremarkable as  visualized.     Other: None.       Impression:         1. Scarring versus minimal airspace disease in the left lung base  2. Coronary artery calcifications                 This report was finalized on 12/7/2023 3:10 PM by Dr. Dany Isbell MD.       XR Chest AP [[id number]] Collected: 12/07/23 1214     Updated: 12/07/23 1217    Narrative:      XR CHEST AP-     CLINICAL INDICATION: shortness of breath        COMPARISON: 8/23/2015     TECHNIQUE: Single frontal view of the chest.     FINDINGS:     LUNGS: Lungs are adequately aerated.      HEART AND MEDIASTINUM: Heart and mediastinal contours are unremarkable        SKELETON: Bony and soft tissue structures are unremarkable.             Impression:      No radiographic evidence of acute cardiac or pulmonary disease.           This report was finalized on 12/7/2023 12:15 PM by Dr. Dany Isbell MD.               Diagnostics: Reviewed    A: Zeenat Dong is a 89 y.o. female admitted on 12/7/2023 due to shortness of breath and cough.Zeenat has a medical history of GERD, Hypertension, Hyperlipidemia, Chronic HFunknownEF, Paroxysmal Atrial Fibrillation on chronic anticoagulation. EKG showed normal sinus rhythm, LAD, left ventricle hypertrophy.  CT Chest showed scarring vs minimal airspace disease L lung base, coronary artery calcifications. Per discussion with Jeff, she states that Zeenat lives with her and is normally very independent, ambulates on her own, goes to bathroom, even making her own food, but states sincelast Sunday after [place] she has been  becoming more short of breath and has been wearing her oxygen all the time when it is only ordered as needed.           P: Palliative was consulted for GOC/ACP and support for patient and family. As Zeenat is extremely [place] and pleasantly confused her daughter/HCS Jeff states her mother would not want to be resuscitated or to be intubated and placed on a ventilator, she also presented her mothers living will to palliative, which was scanned into medical records. I discussed this conversation and code status changed with Dr Choi and MARIO Rock.      We appreciate the consult and the opportunity to participate in Zeenat Dong's care. We will continue to follow along. Please do not hesitate to contact us regarding further symptom management or goals of care needs, including after hours or on weekends via our on call provider at [phone].     Time: 55 minutes spent reviewing medical and medication records, assessing and examining patient, discussing with family, answering questions, providing some guidance about a plan and documentation of care, and coordinating care with other healthcare members, with > 50% time spent face to face.     FIORELLA Casiano    2023      Electronically signed by Kelly Pastrana APRN at 23 1445          Physical Therapy Notes (most recent note)        Víctor Noble, PT at 23 1133  Version 1 of 1         Acute Care - Physical Therapy Treatment Note  FAIZA Clinton     Patient Name: Zeenat Dong  : 1928  MRN: [id number]  Today's Date: 2023      Visit Dx:     ICD-10-CM ICD-9-CM   1. Dyspnea, unspecified type  R06.00 786.09     Patient Active Problem List   Diagnosis    Abdominal distension    Heart failure    Dry skin dermatitis    Gastroesophageal reflux disease without esophagitis    Hypercalcemia    Hyperparathyroidism    Hypertension    Hypokalemia    Hypomagnesemia    Seasonal allergic rhinitis    Shortness of breath    Atrial fibrillation    Weakness     Dyspnea    Acute hypoxemic respiratory failure     Past Medical History:   Diagnosis Date    Hyperlipidemia      Past Surgical History:   Procedure Laterality Date    OTHER SURGICAL HISTORY      ear surgery    SKIN BIOPSY       PT Assessment (last 12 hours)       PT Evaluation and Treatment       Row Name 12/12/23 [facility]9          Physical Therapy Time and Intention    Document Type therapy note (daily note)  -KM     Mode of Treatment individual therapy;physical therapy  -KM     Patient Effort good  -KM     Symptoms Noted During/After Treatment fatigue  -KM       Row Name 12/12/23 1129          General Information    Patient Profile Reviewed yes  -KM     Patient Observations alert;cooperative;agree to therapy  -KM     Existing Precautions/Restrictions fall  -KM       Row Name 12/12/23 1129          Cognition    Orientation Status (Cognition) --  difficulty hearing makes orientation difficult to assess  -KM     Follows Commands (Cognition) follows one-step commands;physical/tactile prompts required;verbal cues/prompting required;visual cue  -       Row Name 12/12/23 1129          Bed Mobility    Bed Mobility bed mobility (all) activities  -KM     All Activities, [place] (Bed Mobility) minimum assist (75% patient effort);moderate assist (50% patient effort)  -     Assistive Device (Bed Mobility) head of bed elevated;bed rails  -       Row Name 12/12/23 1129          Transfers    Transfers sit-stand transfer;stand-sit transfer;bed-chair transfer;chair-bed transfer  -       Row Name 12/12/23 1129          Bed-Chair Transfer    Bed-Chair [place] (Transfers) minimum assist (75% patient effort);moderate assist (50% patient effort)  -       Row Name 12/12/23 1129          Chair-Bed Transfer    Chair-Bed [place] (Transfers) minimum assist (75% patient effort);moderate assist (50% patient effort)  -       Row Name 12/12/23 1129          Sit-Stand Transfer    Sit-Stand [place] (Transfers)  minimum assist (75% patient effort);moderate assist (50% patient effort)  -KM       Row Name 12/12/23 1129          Stand-Sit Transfer    Stand-Sit [place] (Transfers) minimum assist (75% patient effort)  -KM       Row Name 12/12/23 1129          Gait/Stairs (Locomotion)    Gait/Stairs Locomotion gait/ambulation independence;gait/ambulation assistive device;distance ambulated  -KM     [place] Level (Gait) minimum assist (75% patient effort)  -KM     Assistive Device (Gait) cane, quad tip  -KM     Patient was able to Ambulate yes  -KM     Distance in Feet (Gait) 6  -KM     Pattern (Gait) step-to  -KM     Deviations/Abnormal Patterns (Gait) gait speed decreased;festinating/shuffling;base of support, narrow;stride length decreased  -KM     Bilateral Gait Deviations forward flexed posture  -KM       Row Name 12/12/23 1129          Safety Issues, Functional Mobility    Impairments Affecting Function (Mobility) endurance/activity tolerance;balance  -KM       Row Name 12/12/23 1129          Motor Skills    Comments, Therapeutic Exercise seated ther-ex  -KM     Additional Documentation Comments, Therapeutic Exercise (Row)  -       Row Name 12/12/23 1129          Progress Summary (PT)    Daily Progress Summary (PT) Pt. was able to perform functional mobility skills w/ Tr[place]-modA. She ambulated short distance w/ QPC and Triston for steadying. Pt. would continue to benefit from PT services.  -               User Key  (r) = Recorded By, (t) = Taken By, (c) = Cosigned By      Initials Name Provider Type    Víctor Padilla, RODRIGUEZ Physical Therapist                    Physical Therapy Education       Title: PT OT SLP Therapies (In Progress)       Topic: Physical Therapy (In Progress)       Point: Mobility training (In Progress)       Learning Progress Summary             Patient Acceptance, E, NR by RD at 12/11/2023 0948    Acceptance, E,TB, NR by JOSE E at 12/10/2023 0935    Acceptance, E,TB, VU by HUNG at 12/8/2023 1140                          Point: Home exercise program (In Progress)       Learning Progress Summary             Patient Acceptance, E, NR by RD at 12/11/2023 0948    Acceptance, E,TB, NR by MP at 12/10/2023 0935    Acceptance, E,TB, VU by KM at 12/8/2023 1140                         Point: Body mechanics (In Progress)       Learning Progress Summary             Patient Acceptance, E, NR by RD at 12/11/2023 0948    Acceptance, E,TB, NR by MP at 12/10/2023 0935    Acceptance, E,TB, VU by KM at 12/8/2023 1140                         Point: Precautions (In Progress)       Learning Progress Summary             Patient Acceptance, E, NR by RD at 12/11/2023 0948    Acceptance, E,TB, NR by JOSE E at 12/10/2023 0935    Acceptance, E,TB, VU by KM at 12/8/2023 1140                                         User Key       Initials Effective Dates Name Provider Type Discipline    JOSE E 06/16/21 -  Deborah Alvarez, RN Registered Nurse Nurse    ISRRAEL 06/16/21 -  Meggan Hope RN Registered Nurse Nurse    KM 05/24/22 -  Víctor Noble, PT Physical Therapist PT                  PT Recommendation and Plan  Anticipated Discharge Disposition (PT): home with assist, home with 24/7 care, home with home health  Planned Therapy Interventions (PT): balance training, bed mobility training, gait training, home exercise program, patient/family education, postural re-education, ROM (range of motion), stair training, strengthening, stretching, transfer training  Therapy Frequency (PT): 2 times/wk (2-5x/wk)  Progress Summary (PT)  Daily Progress Summary (PT): Pt. was able to perform functional mobility skills w/ Tr[place]-modA. She ambulated short distance w/ QPC and Triston for steadying. Pt. would continue to benefit from PT services.  Plan of Care Reviewed With: patient, family  Outcome Evaluation: Pt. evaluation completed during PT session. She was able to perform functional mobility skills w/ [place]. She ambulated minimal distance w/ HHA and Triston. She tolerated session  well. Pt. would benefit from skilled PT services.       Time Calculation:    PT Charges       Row Name 23 1128             Time Calculation    PT Received On 23  -KM         Time Calculation- PT    Total Timed Code Minutes- PT 38 minute(s)  -KM                User Key  (r) = Recorded By, (t) = Taken By, (c) = Cosigned By      Initials Name Provider Type    KM Víctor Noble, PT Physical Therapist                  Therapy Charges for Today       Code Description Service Date Service Provider Modifiers Qty    62144959918 HC GAIT TRAINING EA 15 MIN 2023 Víctor Noble, PT GP 1    91146816043 HC PT THER PROC EA 15 MIN 2023 Víctor Noble, PT GP 1    06802240065 HC PT THERAPEUTIC ACT EA 15 MIN 2023 Víctor Noble, PT GP 1            PT G-Codes  AM-PAC 6 Clicks Score (PT): 17    Víctor Noble PT  2023      Electronically signed by Víctor Noble, PT at 23 1133          Occupational Therapy Notes (most recent note)        Stephanie Hills, OT at 23 1145          Acute Care - Occupational Therapy Treatment Note  FAIZA Cassatt     Patient Name: Zeenat Dong  : 1928  MRN: [id number]  Today's Date: 2023             Admit Date: 2023       ICD-10-CM ICD-9-CM   1. Dyspnea, unspecified type  R06.00 786.09     Patient Active Problem List   Diagnosis    Abdominal distension    Heart failure    Dry skin dermatitis    Gastroesophageal reflux disease without esophagitis    Hypercalcemia    Hyperparathyroidism    Hypertension    Hypokalemia    Hypomagnesemia    Seasonal allergic rhinitis    Shortness of breath    Atrial fibrillation    Weakness    Dyspnea    Acute hypoxemic respiratory failure     Past Medical History:   Diagnosis Date    Hyperlipidemia      Past Surgical History:   Procedure Laterality Date    OTHER SURGICAL HISTORY      ear surgery    SKIN BIOPSY           OT ASSESSMENT FLOWSHEET (last 12 hours)       OT Evaluation and Treatment       Row Name 23 1133                    OT Time and Intention    Subjective Information no complaints  -LM        Document Type therapy note (daily note)  -LM        Mode of Treatment occupational therapy  -LM        Patient Effort good  -LM        Comment Patient seen this date for adl retraining/education and fxl mobility.  son present and supportive.  son provides history that patient was able to perform fxl transfers to bathroom using quad cane with modified independence and perform basic adl tasks with modified independence/supervision  prior to admission.  Patient currently requires min/mod assist with transfer, max assist with feeding, grooming, bathing, dressing, and toileting tasks overall.  Council and requires max cues.  -LM           Cognition    Affect/Mental Status (Cognition) confused  -LM        Orientation Status (Cognition) oriented to;person  -LM           Positioning and Restraints    Post Treatment Position chair  -LM        In Chair call light within reach;encouraged to call for assist;with family/caregiver;patient within staff view  -LM                  User Key  (r) = Recorded By, (t) = Taken By, (c) = Cosigned By      Initials Name Effective Dates    LM Stephanie Hills OT 21 -                            OT Recommendation and Plan              Time Calculation:     Therapy Charges for Today       Code Description Service Date Service Provider Modifiers Qty    74086403004  OT SELF CARE/MGMT/TRAIN EA 15 MIN 2023 Stephanie Hills OT GO 2                 Stephanie Hills OT  2023    Electronically signed by Stephanie Hills OT at 23 1146          Speech Language Pathology Notes (most recent note)        Chantell Max, MS CCC-SLP at 23 1102          Acute Care - Speech Language Pathology   Swallow Initial Evaluation  [place]  CLINICAL DYSPHAGIA ASSESSMENT     Patient Name: Zeenat Dong  : 1928  MRN: [id number]  Today's Date: 2023             Admit Date: 2023    Zeenat  "Letitia  was seen at bedside this am on 3S to assess safety/efficacy of swallowing fnx, determine safest/least restrictive diet tolerance. Her family was present for this assessment.    She presented to [facility] ED 12/7/23 for evaluation of shortness of breath. Upon arrival patient afebrile, heart rate 50-60's, respiratory rate 20, mildly hypertensive, satting low 90's on room air.  Labs showed WBC count 6K, hemoglobin 13.9, procalcitonin 0.06, lactate 1.5, CRP 3.89, creatinine 1.26, HS Troponins 24->25, proBNP 2900, covid/flu negative, blood cultures pending.  EKG showed normal sinus rhythm, LAD, left ventricle hypertrophy.  CT Chest showed scarring vs minimal airspace disease L lung base, coronary artery calcifications. Emergency room provider gave antibiotics and lasix. Hospital Medicine consulted for admission.  Patient lethargic for the most part, family at bedside and history obtained from them, family notes patient is generally very functional, has been doing well at home and even singing in [employer] but since this past Sunday has been more shortness of breath, wearing her as needed oxygen at home all the time, using home nebs, didn't smoke but lived with a smoker for many years, endorse mild sputum production at home following neb treatments, mild yellow tint, denied any fevers or chills at home, denied any chest pain, weight gain, increased lower extremity swelling, note she chronically sleeps in a recliner at night, takes 40mg lasix at home, unsure of her baseline creatinine at home but believe it to be \"lower\" than her 1.2 here. We briefly discussed code status as they had already told emergency room she wanted to be full code but in describing chest compressions and being on a ventilator they seemed less sure about putting her through that, asked them to think about what patient would want for herself if she could choose and will have Palliative Care follow up with them tomorrow. She was admitted for further " "evaluation and management.     PMH significant for GERD, Hypertension, Hyperlipidemia, Chronic HFunknownEF, Paroxysmal Atrial Fibrillation on chronic anticoagulation.    She was referred to rule out dysphagia.     Social History     Socioeconomic History    Marital status:    Tobacco Use    Smoking status: Never   Vaping Use    Vaping Use: Never used   Substance and Sexual Activity    Alcohol use: No    Drug use: Never      Diet Orders (active) (From admission, onward)       Start     Ordered    12/08/23 1102  Diet: Regular/House Diet; No Mixed Consistencies, Feeding Assistance - Nursing; Texture: Soft to Chew (NDD 3); Soft to Chew: Chopped Meat; Fluid Consistency: Thin (IDDSI 0)  Diet Effective Now         12/08/23 1102                  She was observed on O2 via NC 2L.     Ms. Dong was repositioned to upright and centered in bed to accept multiple po presentations of thin water via teaspoon and straw, along with oatmeal x1 trial only. She declined to accept all other po attempts, stating, \"I just don't want it.\" Her family reported limited po desire/acceptance premorbidly.     Facial/oral structures were symmetrical upon observation. Lingual protrusion revealed no deviation. Oral mucosa were mildly dry, pink, and clean. Secretions were clear, tacky, and controlled. OROM/MARY was generally weak to  to imitate oral postures. Gag was not assessed. Volitional cough was intact w/ adequate  intensity, clear in quality, non-productive. Voice was adequate in intensity, clear in quality w/ intelligible speech. She was severe [place] bilaterally with hearing amplification in place, continued to require direct face to face communications for lip reading.     Upon po presentations, adequate bolus anticipation and acceptance w/ good labial seal for bolus clearance via spoon bowl, and suction via straw. Bolus formation, manipulation and control were generally weak with mixed phasic rotary mastication pattern. A-p transit " was timely w/o significant oral residue appreciated. No overt s/s aspiration before the swallow per this limited assessment.     Pharyngeal swallow was timely w/ adequate hyolaryngeal elevation per palpation. No overt s/s aspiration evidenced across this evaluation. No silent aspiration suspected. She denied odynophagia.Again, she reported minimal desire for po intake at this time.     Visit Dx:     ICD-10-CM ICD-9-CM   1. Dyspnea, unspecified type  R06.00 786.09     Patient Active Problem List   Diagnosis    Abdominal distension    Heart failure    Dry skin dermatitis    Gastroesophageal reflux disease without esophagitis    Hypercalcemia    Hyperparathyroidism    Hypertension    Hypokalemia    Hypomagnesemia    Seasonal allergic rhinitis    Shortness of breath    Atrial fibrillation    Weakness    Dyspnea     Past Medical History:   Diagnosis Date    Hyperlipidemia      Past Surgical History:   Procedure Laterality Date    OTHER SURGICAL HISTORY      ear surgery    SKIN BIOPSY       EDUCATION  The patient has been educated in the following areas:   Dysphagia (Swallowing Impairment) Oral Care/Hydration Modified Diet Instruction.     Impression: Ms. Dong presented w/generalized weakness, fatigue, limited po acceptance with verbal declination of most po trials. Per this limited assessment, she presented with increased oral prep time, WFL pharyngeal swallow w/o s/s aspiration.No s/s indicative of silent aspiration.  No odynophagia reported. Her family denied any concerns of premorbid dysphagia.     She is recommended modified po diet of soft to chew textures, chopped meats, thin liquids. Medications whole in puree/thins. Medications whole in puree/single pill presentations. Upright and centered for all po intake, 1:1 assistance with po intake. Universal aspiration precautions, oral care protocol.     SLP Recommendation and Plan   1. Mechanical soft textures, chopped meats, thin liquids.    2. Medications whole in  puree/thins. Single pill presentations only, please.   3. Upright and centered for all po intake with assistance to encourage po intake.   4. Universal aspiration precautions.   5. CLYDE precautions.  6. Oral care protocol.    D/w patient  and her family results and recommendations w/ verbal agreement.    Thank you for allowing me to participate in the care of your patient-   Chantell Max M.S., CCC/SLP                                                                                                     Time Calculation:       Therapy Charges for Today       Code Description Service Date Service Provider Modifiers Qty    [id number] HC ST EVAL ORAL PHARYNG SWALLOW 4 2023 Chantell Max, MS CCC-SLP GN 1                 Chantell Max MS CCC-SLP  2023    Electronically signed by Chantell Max, MS CCC-SLP at 23 1115       ADL Documentation (most recent)      Flowsheet Row Most Recent Value   Transferring 3 - assistive equipment and person   Toileting 3 - assistive equipment and person   Bathing 2 - assistive person   Dressing 2 - assistive person   Eating 2 - assistive person   Communication 2 - difficulty understanding (not related to language barrier)   Swallowing 2 - difficulty swallowing liquids/foods   Equipment Currently Used at Home wheelchair, cane, quad, oxygen          [facility]  [address] [place] [id number]  Phone:  [phone]  Fax:  [phone] Date: Dec 14, 2023      Ambulatory Referral to Home Health     Patient:  Zeenat Dong MRN:  [id number]   [address]   [facility] [address] :  1928  SSN:    Phone: [phone] Sex:  F      INSURANCE PAYOR PLAN GROUP # SUBSCRIBER ID   Primary:    MEDICARE [id number]   [id number]      Referring Provider Information:  MARTÍNEZ BENNETT Phone: [phone] Fax: [phone]       Referral Information:   # Visits:  999 Referral Type: Home Health [42]   Urgency:  Routine Referral Reason: Specialty  Services Required   Start Date: Dec 14, 2023 End Date:  To be determined by Insurer   Diagnosis: Weakness (R53.1 [ICD-10-CM] 780.79 [ICD-9-CM])      Refer to Dept:   Refer to Provider:   Refer to Provider Phone:   Refer to Facility:       Face to Face Visit Date: 12/14/2023  Follow-up provider for Plan of Care? I treated the patient in an acute care facility and will not continue treatment after discharge.  Follow-up provider: JUSTIN NUÑEZ [[id number]]  Reason/Clinical Findings: general debility  Describe mobility limitations that make leaving home difficult: general debility  Nursing/Therapeutic Services Requested: Skilled Nursing  Nursing/Therapeutic Services Requested: Physical Therapy  Skilled nursing orders: CHF management  PT orders: Strengthening  Frequency: 1 Week 1     This document serves as a request of services and does not constitute Insurance authorization or approval of services.  To determine eligibility, please contact the members Insurance carrier to verify and review coverage.     If you have medical questions regarding this request for services. Please contact [address] at [phone] during normal business hours.        Authorizing Provider:Sunil Bennett DO  Authorizing Provider's NPI: [id number]  Order Entered By: Sunil Bennett DO 12/14/2023  8:53 AM     Electronically signed by: Sunil Bennett DO 12/14/2023  8:53 AM       Discharge Summary    No notes of this type exist for this encounter.       Discharge Order (From admission, onward)       Start     Ordered    12/14/23 0849  Discharge patient  Once        Expected Discharge Date: 12/14/23   Expected Discharge Time: Morning   Discharge Disposition: Home or Self Care   Physician of Record for Attribution - Please select from Treatment Team: SUNIL BENNETT [[id number]]   Review needed by CMO to determine Physician of Record: No      Question Answer Comment   Physician of Record for  Attribution - Please select from Treatment Team MARTÍNEZ BENNETT    Review needed by CMO to determine Physician of Record No        12/14/23 0867

## 2023-12-14 NOTE — CASE MANAGEMENT/SOCIAL WORK
Discharge Planning Assessment   [facility]     Patient Name: Zeenat Dong  MRN: [id number]  Today's Date: 12/14/2023    Admit Date: 12/7/2023    Plan: SS spoke with pt's family at bedside.  Pt to be discharged home on this date with Physician ordered home health. Pt family stated no preference on home health provider.  SS made referral to [facility] at [phone] and faxed pt information to [phone].  SS provided pt's family with community resources including [employer], [facility], [employer], [place] Medicaid contact numbers. Pt family stated understanding.       Discharge Plan       Row Name 12/14/23 6325       Plan    Plan SS spoke with pt's family at bedside.  Pt to be discharged home on this date with Physician ordered home health. Pt family stated no preference on home health provider.  SS made referral to [facility] at [phone] and faxed pt information to [phone].  SS provided pt's family with community resources including [employer], [facility], [employer], [place] Medicaid contact numbers. Pt family stated understanding.    Final Discharge Disposition Code 06 - home with home health care    Final Note Pt to be discharged home with [facility].                  Continued Care and Services - Admitted Since 12/7/2023    Coordination has not been started for this encounter.       Expected Discharge Date and Time       Expected Discharge Date Expected Discharge Time    Dec 14, 2023               JONATHAN Figueroa

## 2023-12-14 NOTE — PLAN OF CARE
Problem: Fall Injury Risk  Goal: Absence of Fall and Fall-Related Injury  Intervention: Identify and Manage Contributors  Recent Flowsheet Documentation  Taken 12/13/2023 1919 by Shemar Pizarro RN  Medication Review/Management: medications reviewed     Problem: Adult Inpatient Plan of Care  Goal: Absence of Hospital-Acquired Illness or Injury  Intervention: Prevent Skin Injury  Recent Flowsheet Documentation  Taken 12/13/2023 1919 by Shemar Pizarro RN  Body Position: supine  Skin Protection:   adhesive use limited   incontinence pads utilized   Goal Outcome Evaluation:

## 2023-12-14 NOTE — DISCHARGE INSTR - APPOINTMENTS
Pt  has  an  apointment  with  cruz guajardo  for  December 22  at  1:30  in  [place]  office

## 2023-12-14 NOTE — DISCHARGE SUMMARY
[facility] HOSPITALIST MEDICINE DISCHARGE SUMMARY    Patient Identification:  Name:  Zeenat Dong  Age:  89 y.o.  Sex:  female  :  1928  MRN:  [id number]  Visit Number:  [id number]    Date of Admission: 2023  Date of Discharge: 2023    PCP: Fatmata Dong APRN    DISCHARGE DIAGNOSIS   Acute metabolic encephalopathy  E. coli acute cystitis  Community-acquired pneumonia  Acute on chronic HFpEF  Essential hypertension  Hyperlipidemia  General debility      CONSULTS  Dr. Benitez, Cardiology      PROCEDURES PERFORMED   None      HOSPITAL COURSE  Ms. Dong is a 89 y.o. female who presented to [facility] ED on 2023 with a chief complaint of shortness of breath with cough.  Patient has a past medical history remarkable for essential hypertension, hyperlipidemia, paroxysmal A-fib and GERD.  Patient did report several day history of shortness of breath with nonproductive cough and for this reason did present to the emergency department for further treatment and evaluation.  During initial interview, patient was quite lethargic and most history is obtained from family.  Patient's family reported she was generally very active and had been doing well at home but since the  prior to admission she had been weak with increasing shortness of breath and wearing oxygen at home .  Patient's family denied any fevers, chills, chest pain, weight gain or increased lower extremity edema.  Secondary to shortness of breath, patient did present to the emergency department for further treatment and evaluation.  Initial evaluation in the emergency department did consist of basic laboratory work as well as physical exam and vital signs.  Please see initial H&P for specific details.  After thorough evaluation, patient was diagnosed with acute on chronic hypoxic respiratory failure secondary to suspected acute CHF.  Patient was admitted for further treatment and  evaluation.    Transthoracic echocardiogram was obtained and cardiology consultation was obtained as well.  Transthoracic echocardiogram demonstrates normal left ventricular systolic function with estimated EF 60 to 65% with grade 2 diastolic dysfunction.  Cardiology did evaluate the patient and felt no further ischemic workup was warranted.  Secondary to evidence of CAD on CT exam, recommendation was made to start aspirin and statin.  Patient's heart failure appears compensated on admission and recommendation was made to hold diuretics at that time.  Chest x-ray was obtained which appeared suspicious for possible community-acquired pneumonia.  As such, Rocephin and doxycycline were started.  Urine analysis was obtained which was suspicious for acute cystitis and urine culture did grow greater than 100,000 CFU of E. coli.  Patient was continued on Rocephin and doxycycline throughout the remainder of her hospital stay and she did complete a full course of antibiotic therapy prior to discharge.  Patient did require intermittent diuresis throughout her hospitalization for waxing and waning volume status with known history of chronic HFpEF.  Unfortunately, during patient's hospitalization she did become encephalopathic which was felt secondary to hospital delirium.  Upon initial discussion, patient's family did report the patient was fully functional and had no evidence of underlying dementia.  However, as patient's hospitalization progressed, further conversations with the patient's family reveals patient does appear to have symptoms suggestive of previously undiagnosed dementia.  Patient's family did report decrease in oral intake over the past several months as well as sleeping throughout the majority of the day and then having both auditory and visual hallucinations, especially in the evening/night hours.  Attempts were made at reorienting the patient for several days which were unsuccessful.  However, patient did  have dramatic improvement in overall mental status on 12/14/2023 where she was awake, alert and able to answer all questions appropriately.  She was oriented to her immediate surroundings although did demonstrate some mild residual confusion.  Multiple discussions were held with patient's family regarding discharge disposition.  Patient's family has requested to take patient back home with home health.  As such, it is felt patient has reached maximum medical benefit of current hospitalization and will be discharged home in stable condition today.  The beforementioned plan was thoroughly discussed with the patient and her family at bedside who both expressed their understanding and willingness to proceed with the beforementioned plan.    VITAL SIGNS:      12/11/23  0500 12/13/23  0500 12/14/23  0500   Weight: 61.1 kg (134 lb 9.6 oz) 61 kg (134 lb 6.4 oz) 60.7 kg (133 lb 12.8 oz)     Body mass index is 26.13 kg/m².    PHYSICAL EXAM:  Constitutional: Elderly appearing  female in no apparent distress, much more awake today in comparison to yesterday.  Conversant and oriented to immediate surroundings  HENT:  Head:  Normocephalic and atraumatic.  Mouth:  Moist mucous membranes.    Eyes:  Conjunctivae and EOM are normal.  Pupils are equal, round, and reactive to light.  No scleral icterus.    Neck:  Neck supple. No thyromegaly.  No JVD present.    Cardiovascular:  Regular rate and rhythm with no murmurs, rubs, clicks or gallops appreciated.  Pulmonary/Chest:  Clear to auscultation bilaterally with no crackles, wheezes or rhonchi appreciated.  Abdominal:  Soft. Nontender. Nondistended  Bowel sounds are normal in all four quadrants. No organomegally appreciated.   Musculoskeletal:  No edema, no tenderness, and no deformity.  No red or swollen joints anywhere.    Neurological: Cranial nerves II through XII intact with no focal deficit or unintentional motor movement appreciated  Skin:  Warm and dry to palpation with  no rashes or lesions appreciated.  Peripheral vascular:  2+ radial and pedal pulses in bilateral upper and lower extremities.    DISCHARGE DISPOSITION   Stable    DISCHARGE MEDICATIONS:     Discharge Medications        Continue These Medications        Instructions Start Date   apixaban 5 MG tablet tablet  Commonly known as: ELIQUIS   5 mg, Oral, 2 Times Daily      cetirizine 10 MG tablet  Commonly known as: zyrTEC   10 mg, Oral, Daily      cinacalcet 30 MG tablet  Commonly known as: Sensipar   30 mg, Oral, Daily      erythromycin 5 MG/GM ophthalmic ointment  Commonly known as: ROMYCIN   1 g, Both Eyes, 3 Times Daily PRN      ezetimibe 10 MG tablet  Commonly known as: ZETIA   10 mg, Oral, Daily      furosemide 40 MG tablet  Commonly known as: LASIX   40 mg, Oral, Daily      losartan 50 MG tablet  Commonly known as: COZAAR   50 mg, Oral, 2 Times Daily      metoprolol tartrate 100 MG tablet  Commonly known as: LOPRESSOR   100 mg, Oral, 2 Times Daily, For hypertension.       pantoprazole 40 MG EC tablet  Commonly known as: PROTONIX   40 mg, Oral, Daily, For gerd without esophagitis.       potassium chloride 10 MEQ CR tablet  Commonly known as: K-DUR,KLOR-CON   10 mEq, Oral, Daily, For hypokalemia.       vitamin D 1.25 MG (82135 UT) capsule capsule  Commonly known as: ERGOCALCIFEROL   50,000 Units, Oral, Weekly             Stop These Medications      cephalexin 500 MG capsule  Commonly known as: KEFLEX              Diet Instructions    Continue Regular diet with chopped meats         Your Scheduled Appointments    Pt  has  an  apointment  with  cruz guajardo  for  December 22  at  1:30  in  [place]  office         Activity Instructions    Advance activity as tolerated          Additional Instructions for the Follow-ups that You Need to Schedule       Ambulatory Referral to Home Health   As directed      Face to Face Visit Date: 12/14/2023   Follow-up provider for Plan of Care?: I treated the patient in an acute care  facility and will not continue treatment after discharge.   Follow-up provider: JUSTIN NUÑEZ [[id number]]   Reason/Clinical Findings: general debility   Describe mobility limitations that make leaving home difficult: general debility   Nursing/Therapeutic Services Requested: Skilled Nursing Physical Therapy   Skilled nursing orders: CHF management   PT orders: Strengthening   Frequency: 1 Week 1        Discharge Follow-up with PCP   As directed       Currently Documented PCP:    Fatmata Dong APRN    PCP Phone Number:    [phone]     Follow Up Details: please follow up with pcp in 1 week               Follow-up Information       Fatmata Dong APRN .    Specialty: Family Medicine  Why: please follow up with pcp in 1 week  Contact information:  [address]   [facility] [address] [phone]               Justin Nuñez MD .    Specialty: Internal Medicine  Why: please follow up with pcp in 1 week  Contact information:  [address]  [facility] [address] [phone]               Megan Bui MD Follow up in 1 week(s).    Specialty: Family Medicine  Why: pt  has  an  apointment  with  megan verduzco  for  december 22   at  1;30  in  [place]  office  Contact information:  [address]  [address]  [facility] [id number] [phone]                             TEST  RESULTS PENDING AT DISCHARGE       Sunil Craig DO  12/14/23  11:04 EST    Please note that this discharge summary required more than 30 minutes to complete.    Please send a copy of this dictation to the following providers:  Fatmata Dong APRN

## 2023-12-14 NOTE — PLAN OF CARE
Goal Outcome Evaluation:   Patient is being DC on this date. IV and tele box removed as ordered. Called and gave report to [facility]. Went over DC paperwork with patient and family.       Patient ambulated to the bathroom, x2 assist tolerated well, O2 saturations maintaining above 95%, on RA.

## 2023-12-15 NOTE — OUTREACH NOTE
Prep Survey      Flowsheet Row Responses   [facility] facility patient discharged from? [facility]   Is LACE score < 7 ? No   Eligibility Readm Mgmt   Discharge diagnosis Acute metabolic encephalopathy  E. coli acute cystitis  Community-acquired pneumonia  Acute on chronic HFpEF   Does the patient have one of the following disease processes/diagnoses(primary or secondary)? Pneumonia   Does the patient have Home health ordered? Yes   What is the Home health agency?  [facility]   Is there a DME ordered? No   Prep survey completed? Yes            GLENNY OLGUIN - Registered Nurse

## 2023-12-18 ENCOUNTER — READMISSION MANAGEMENT (OUTPATIENT)
Dept: CALL CENTER | Facility: HOSPITAL | Age: 88
End: 2023-12-18
Payer: MEDICARE

## 2023-12-18 NOTE — OUTREACH NOTE
COPD/PN Week 1 Survey      Flowsheet Row Responses   [facility] patient discharged from? [place]   Does the patient have one of the following disease processes/diagnoses(primary or secondary)? Pneumonia   Week 1 attempt successful? Yes   Call start time 1603   Call end time 1607   Discharge diagnosis Acute metabolic encephalopathy  E. coli acute cystitis  Community-acquired pneumonia  Acute on chronic HFpEF   Person spoke with today (if not patient) and relationship Daughter   Meds reviewed with patient/caregiver? Yes   Is the patient having any side effects they believe may be caused by any medication additions or changes? No   Does the patient have all medications ordered at discharge? Yes   Is the patient taking all medications as directed (includes completed medication regime)? Yes   Does the patient have a primary care provider?  Yes   Does the patient have an appointment with their PCP or specialist within 7 days of discharge? No   Comments regarding PCP dtr states previous NP has left the practice and patient has been assigned   What is preventing the patient from scheduling follow up appointments within 7 days of discharge? Haven't had time   What is the Home health agency?  [facility]   Has home health visited the patient within 72 hours of discharge? Yes   Home health comments  is visiting   Psychosocial issues? No   Did the patient receive a copy of their discharge instructions? Yes   Nursing interventions Reviewed instructions with patient   What is the patient's perception of their health status since discharge? Same   Is the patient/caregiver able to teach back the hierarchy of who to call/visit for symptoms/problems? PCP, Specialist, Home health nurse, Urgent Care, ED, 911 Yes   Additional teach back comments Patient isnt eating very much per daughter.   Week 1 call completed? Yes   Wrap up additional comments Dtr states HH is on her way.  She states her mother is doing ok, better  than when in hospital.  No needs.   Call end time [id number]            SEAN GUTIERREZ - Registered Nurse

## 2024-01-04 ENCOUNTER — READMISSION MANAGEMENT (OUTPATIENT)
Dept: CALL CENTER | Facility: HOSPITAL | Age: 89
End: 2024-01-04
Payer: MEDICARE

## 2024-01-04 NOTE — OUTREACH NOTE
COPD/PN Week 3 Survey      Flowsheet Row Responses   [facility] patient discharged from? [place]   Does the patient have one of the following disease processes/diagnoses(primary or secondary)? Pneumonia   Week 3 attempt successful? Yes   Call start time 1506   Call end time 1509   Discharge diagnosis Acute metabolic encephalopathy  E. coli acute cystitis  Community-acquired pneumonia  Acute on chronic HFpEF   Person spoke with today (if not patient) and relationship Daughter   Meds reviewed with patient/caregiver? Yes   Prescription comments another round of abx for uti from [facility] in [place]   What is the Home health agency?  [facility]   Has home health visited the patient within 72 hours of discharge? Yes   Psychosocial issues? No   Did the patient receive a copy of their discharge instructions? Yes   Nursing interventions Reviewed instructions with patient   What is the patient's perception of their health status since discharge? Improving   Is the patient/caregiver able to teach back the hierarchy of who to call/visit for symptoms/problems? PCP, Specialist, Home health nurse, Urgent Care, ED, 911 Yes   Week 3 call completed? Yes   Graduated Yes   Wrap up additional comments Daughter states patient is doing okay however noted patient has another uti and on another abx. We discussed SEPSIS related to UTI and when to have her seen. Daughter understands. no other concerns or questions noted.   Call end time 1509            Akua PETTY - Registered Nurse

## 2024-06-10 ENCOUNTER — APPOINTMENT (OUTPATIENT)
Dept: CT IMAGING | Facility: HOSPITAL | Age: 89
End: 2024-06-10
Payer: MEDICARE

## 2024-06-10 ENCOUNTER — HOSPITAL ENCOUNTER (INPATIENT)
Facility: HOSPITAL | Age: 89
LOS: 9 days | Discharge: HOSPICE | End: 2024-06-20
Attending: STUDENT IN AN ORGANIZED HEALTH CARE EDUCATION/TRAINING PROGRAM | Admitting: INTERNAL MEDICINE
Payer: MEDICARE

## 2024-06-10 ENCOUNTER — APPOINTMENT (OUTPATIENT)
Dept: GENERAL RADIOLOGY | Facility: HOSPITAL | Age: 89
End: 2024-06-10
Payer: MEDICARE

## 2024-06-10 DIAGNOSIS — N17.9 ACUTE RENAL FAILURE, UNSPECIFIED ACUTE RENAL FAILURE TYPE: Primary | ICD-10-CM

## 2024-06-10 DIAGNOSIS — I50.9 HEART FAILURE, UNSPECIFIED HF CHRONICITY, UNSPECIFIED HEART FAILURE TYPE: ICD-10-CM

## 2024-06-10 DIAGNOSIS — E83.42 HYPOMAGNESEMIA: ICD-10-CM

## 2024-06-10 DIAGNOSIS — R44.3 HALLUCINATIONS, UNSPECIFIED: ICD-10-CM

## 2024-06-10 DIAGNOSIS — I31.39 PERICARDIAL EFFUSION: ICD-10-CM

## 2024-06-10 DIAGNOSIS — J18.9 COMMUNITY ACQUIRED PNEUMONIA OF LEFT LUNG, UNSPECIFIED PART OF LUNG: ICD-10-CM

## 2024-06-10 LAB
ALBUMIN SERPL-MCNC: 3 G/DL (ref 3.5–5.2)
ALBUMIN/GLOB SERPL: 0.8 G/DL
ALP SERPL-CCNC: 73 U/L (ref 39–117)
ALT SERPL W P-5'-P-CCNC: 14 U/L (ref 1–33)
AMMONIA BLD-SCNC: 57 UMOL/L (ref 11–51)
AMPHET+METHAMPHET UR QL: NEGATIVE
AMPHETAMINES UR QL: NEGATIVE
ANION GAP SERPL CALCULATED.3IONS-SCNC: 13.4 MMOL/L (ref 5–15)
AST SERPL-CCNC: 13 U/L (ref 1–32)
B PARAPERT DNA SPEC QL NAA+PROBE: NOT DETECTED
B PERT DNA SPEC QL NAA+PROBE: NOT DETECTED
BARBITURATES UR QL SCN: NEGATIVE
BENZODIAZ UR QL SCN: NEGATIVE
BILIRUB SERPL-MCNC: 0.4 MG/DL (ref 0–1.2)
BILIRUB UR QL STRIP: NEGATIVE
BUN SERPL-MCNC: 29 MG/DL (ref 8–23)
BUN/CREAT SERPL: 18.1 (ref 7–25)
BUPRENORPHINE SERPL-MCNC: NEGATIVE NG/ML
C PNEUM DNA NPH QL NAA+NON-PROBE: NOT DETECTED
CALCIUM SPEC-SCNC: 9.8 MG/DL (ref 8.2–9.6)
CANNABINOIDS SERPL QL: NEGATIVE
CHLORIDE SERPL-SCNC: 107 MMOL/L (ref 98–107)
CLARITY UR: CLEAR
CO2 SERPL-SCNC: 20.6 MMOL/L (ref 22–29)
COCAINE UR QL: NEGATIVE
COLOR UR: YELLOW
CREAT SERPL-MCNC: 1.6 MG/DL (ref 0.57–1)
CRP SERPL-MCNC: 7.27 MG/DL (ref 0–0.5)
D-LACTATE SERPL-SCNC: 2.1 MMOL/L (ref 0.5–2)
DEPRECATED RDW RBC AUTO: 48.1 FL (ref 37–54)
EGFRCR SERPLBLD CKD-EPI 2021: 29.4 ML/MIN/1.73
ERYTHROCYTE [DISTWIDTH] IN BLOOD BY AUTOMATED COUNT: 13.9 % (ref 12.3–15.4)
ERYTHROCYTE [SEDIMENTATION RATE] IN BLOOD: 43 MM/HR (ref 0–30)
ETHANOL BLD-MCNC: <10 MG/DL (ref 0–10)
ETHANOL UR QL: <0.01 %
FENTANYL UR-MCNC: NEGATIVE NG/ML
FLUAV SUBTYP SPEC NAA+PROBE: NOT DETECTED
FLUBV RNA ISLT QL NAA+PROBE: NOT DETECTED
GLOBULIN UR ELPH-MCNC: 3.6 GM/DL
GLUCOSE BLDC GLUCOMTR-MCNC: 244 MG/DL (ref 70–130)
GLUCOSE SERPL-MCNC: 281 MG/DL (ref 65–99)
GLUCOSE UR STRIP-MCNC: NEGATIVE MG/DL
HADV DNA SPEC NAA+PROBE: NOT DETECTED
HCOV 229E RNA SPEC QL NAA+PROBE: NOT DETECTED
HCOV HKU1 RNA SPEC QL NAA+PROBE: NOT DETECTED
HCOV NL63 RNA SPEC QL NAA+PROBE: NOT DETECTED
HCOV OC43 RNA SPEC QL NAA+PROBE: NOT DETECTED
HCT VFR BLD AUTO: 38.8 % (ref 34–46.6)
HGB BLD-MCNC: 12.3 G/DL (ref 12–15.9)
HGB UR QL STRIP.AUTO: NEGATIVE
HMPV RNA NPH QL NAA+NON-PROBE: NOT DETECTED
HOLD SPECIMEN: NORMAL
HOLD SPECIMEN: NORMAL
HPIV1 RNA ISLT QL NAA+PROBE: NOT DETECTED
HPIV2 RNA SPEC QL NAA+PROBE: NOT DETECTED
HPIV3 RNA NPH QL NAA+PROBE: NOT DETECTED
HPIV4 P GENE NPH QL NAA+PROBE: NOT DETECTED
KETONES UR QL STRIP: NEGATIVE
LEUKOCYTE ESTERASE UR QL STRIP.AUTO: NEGATIVE
LYMPHOCYTES # BLD MANUAL: 0.83 10*3/MM3 (ref 0.7–3.1)
LYMPHOCYTES NFR BLD MANUAL: 4 % (ref 5–12)
M PNEUMO IGG SER IA-ACNC: NOT DETECTED
MAGNESIUM SERPL-MCNC: 1.3 MG/DL (ref 1.7–2.3)
MCH RBC QN AUTO: 30.1 PG (ref 26.6–33)
MCHC RBC AUTO-ENTMCNC: 31.7 G/DL (ref 31.5–35.7)
MCV RBC AUTO: 94.9 FL (ref 79–97)
METHADONE UR QL SCN: NEGATIVE
MONOCYTES # BLD: 0.83 10*3/MM3 (ref 0.1–0.9)
NEUTROPHILS # BLD AUTO: 18.99 10*3/MM3 (ref 1.7–7)
NEUTROPHILS NFR BLD MANUAL: 90 % (ref 42.7–76)
NEUTS BAND NFR BLD MANUAL: 2 % (ref 0–5)
NITRITE UR QL STRIP: NEGATIVE
NT-PROBNP SERPL-MCNC: 9871 PG/ML (ref 0–1800)
OPIATES UR QL: NEGATIVE
OXYCODONE UR QL SCN: NEGATIVE
PCP UR QL SCN: NEGATIVE
PH UR STRIP.AUTO: <=5 [PH] (ref 5–8)
PLAT MORPH BLD: NORMAL
PLATELET # BLD AUTO: 228 10*3/MM3 (ref 140–450)
PMV BLD AUTO: 10.5 FL (ref 6–12)
POTASSIUM SERPL-SCNC: 4.5 MMOL/L (ref 3.5–5.2)
PROT SERPL-MCNC: 6.6 G/DL (ref 6–8.5)
PROT UR QL STRIP: NEGATIVE
RBC # BLD AUTO: 4.09 10*6/MM3 (ref 3.77–5.28)
RBC MORPH BLD: NORMAL
RHINOVIRUS RNA SPEC NAA+PROBE: NOT DETECTED
RSV RNA NPH QL NAA+NON-PROBE: NOT DETECTED
SARS-COV-2 RNA NPH QL NAA+NON-PROBE: NOT DETECTED
SCAN SLIDE: NORMAL
SODIUM SERPL-SCNC: 141 MMOL/L (ref 136–145)
SP GR UR STRIP: 1.02 (ref 1–1.03)
TRICYCLICS UR QL SCN: NEGATIVE
TROPONIN T SERPL HS-MCNC: 29 NG/L
UROBILINOGEN UR QL STRIP: NORMAL
VARIANT LYMPHS NFR BLD MANUAL: 4 % (ref 19.6–45.3)
WBC NRBC COR # BLD AUTO: 20.64 10*3/MM3 (ref 3.4–10.8)
WHOLE BLOOD HOLD COAG: NORMAL
WHOLE BLOOD HOLD SPECIMEN: NORMAL

## 2024-06-10 PROCEDURE — 80053 COMPREHEN METABOLIC PANEL: CPT | Performed by: STUDENT IN AN ORGANIZED HEALTH CARE EDUCATION/TRAINING PROGRAM

## 2024-06-10 PROCEDURE — 84484 ASSAY OF TROPONIN QUANT: CPT | Performed by: STUDENT IN AN ORGANIZED HEALTH CARE EDUCATION/TRAINING PROGRAM

## 2024-06-10 PROCEDURE — 70450 CT HEAD/BRAIN W/O DYE: CPT | Performed by: RADIOLOGY

## 2024-06-10 PROCEDURE — 25010000002 MAGNESIUM SULFATE IN D5W 1G/100ML (PREMIX) 1-5 GM/100ML-% SOLUTION: Performed by: STUDENT IN AN ORGANIZED HEALTH CARE EDUCATION/TRAINING PROGRAM

## 2024-06-10 PROCEDURE — 71045 X-RAY EXAM CHEST 1 VIEW: CPT

## 2024-06-10 PROCEDURE — 85025 COMPLETE CBC W/AUTO DIFF WBC: CPT | Performed by: STUDENT IN AN ORGANIZED HEALTH CARE EDUCATION/TRAINING PROGRAM

## 2024-06-10 PROCEDURE — 93010 ELECTROCARDIOGRAM REPORT: CPT | Performed by: INTERNAL MEDICINE

## 2024-06-10 PROCEDURE — 82948 REAGENT STRIP/BLOOD GLUCOSE: CPT

## 2024-06-10 PROCEDURE — 71045 X-RAY EXAM CHEST 1 VIEW: CPT | Performed by: RADIOLOGY

## 2024-06-10 PROCEDURE — 85007 BL SMEAR W/DIFF WBC COUNT: CPT | Performed by: STUDENT IN AN ORGANIZED HEALTH CARE EDUCATION/TRAINING PROGRAM

## 2024-06-10 PROCEDURE — 81003 URINALYSIS AUTO W/O SCOPE: CPT | Performed by: STUDENT IN AN ORGANIZED HEALTH CARE EDUCATION/TRAINING PROGRAM

## 2024-06-10 PROCEDURE — 74176 CT ABD & PELVIS W/O CONTRAST: CPT | Performed by: RADIOLOGY

## 2024-06-10 PROCEDURE — 71250 CT THORAX DX C-: CPT | Performed by: RADIOLOGY

## 2024-06-10 PROCEDURE — 83880 ASSAY OF NATRIURETIC PEPTIDE: CPT | Performed by: STUDENT IN AN ORGANIZED HEALTH CARE EDUCATION/TRAINING PROGRAM

## 2024-06-10 PROCEDURE — 70450 CT HEAD/BRAIN W/O DYE: CPT

## 2024-06-10 PROCEDURE — 83036 HEMOGLOBIN GLYCOSYLATED A1C: CPT

## 2024-06-10 PROCEDURE — 71250 CT THORAX DX C-: CPT

## 2024-06-10 PROCEDURE — 80307 DRUG TEST PRSMV CHEM ANLYZR: CPT | Performed by: STUDENT IN AN ORGANIZED HEALTH CARE EDUCATION/TRAINING PROGRAM

## 2024-06-10 PROCEDURE — 82077 ASSAY SPEC XCP UR&BREATH IA: CPT | Performed by: STUDENT IN AN ORGANIZED HEALTH CARE EDUCATION/TRAINING PROGRAM

## 2024-06-10 PROCEDURE — 83605 ASSAY OF LACTIC ACID: CPT | Performed by: STUDENT IN AN ORGANIZED HEALTH CARE EDUCATION/TRAINING PROGRAM

## 2024-06-10 PROCEDURE — 36415 COLL VENOUS BLD VENIPUNCTURE: CPT | Performed by: STUDENT IN AN ORGANIZED HEALTH CARE EDUCATION/TRAINING PROGRAM

## 2024-06-10 PROCEDURE — 83735 ASSAY OF MAGNESIUM: CPT | Performed by: STUDENT IN AN ORGANIZED HEALTH CARE EDUCATION/TRAINING PROGRAM

## 2024-06-10 PROCEDURE — 82140 ASSAY OF AMMONIA: CPT | Performed by: STUDENT IN AN ORGANIZED HEALTH CARE EDUCATION/TRAINING PROGRAM

## 2024-06-10 PROCEDURE — 85652 RBC SED RATE AUTOMATED: CPT | Performed by: STUDENT IN AN ORGANIZED HEALTH CARE EDUCATION/TRAINING PROGRAM

## 2024-06-10 PROCEDURE — 93005 ELECTROCARDIOGRAM TRACING: CPT | Performed by: STUDENT IN AN ORGANIZED HEALTH CARE EDUCATION/TRAINING PROGRAM

## 2024-06-10 PROCEDURE — 74176 CT ABD & PELVIS W/O CONTRAST: CPT

## 2024-06-10 PROCEDURE — 86140 C-REACTIVE PROTEIN: CPT | Performed by: STUDENT IN AN ORGANIZED HEALTH CARE EDUCATION/TRAINING PROGRAM

## 2024-06-10 PROCEDURE — 99291 CRITICAL CARE FIRST HOUR: CPT

## 2024-06-10 PROCEDURE — 0202U NFCT DS 22 TRGT SARS-COV-2: CPT | Performed by: STUDENT IN AN ORGANIZED HEALTH CARE EDUCATION/TRAINING PROGRAM

## 2024-06-10 PROCEDURE — P9612 CATHETERIZE FOR URINE SPEC: HCPCS

## 2024-06-10 RX ORDER — SODIUM CHLORIDE 0.9 % (FLUSH) 0.9 %
10 SYRINGE (ML) INJECTION AS NEEDED
Status: DISCONTINUED | OUTPATIENT
Start: 2024-06-10 | End: 2024-06-20 | Stop reason: HOSPADM

## 2024-06-10 RX ORDER — MAGNESIUM SULFATE 1 G/100ML
1 INJECTION INTRAVENOUS ONCE
Status: COMPLETED | OUTPATIENT
Start: 2024-06-10 | End: 2024-06-10

## 2024-06-10 RX ADMIN — MAGNESIUM SULFATE HEPTAHYDRATE 1 G: 1 INJECTION, SOLUTION INTRAVENOUS at 22:17

## 2024-06-11 ENCOUNTER — APPOINTMENT (OUTPATIENT)
Dept: CARDIOLOGY | Facility: HOSPITAL | Age: 89
End: 2024-06-11
Payer: MEDICARE

## 2024-06-11 PROBLEM — A41.9 SEPSIS: Status: ACTIVE | Noted: 2024-06-11

## 2024-06-11 PROBLEM — E44.0 MODERATE MALNUTRITION: Status: ACTIVE | Noted: 2024-06-11

## 2024-06-11 LAB
A-A DO2: 34.5 MMHG (ref 0–300)
ANION GAP SERPL CALCULATED.3IONS-SCNC: 11.4 MMOL/L (ref 5–15)
ARTERIAL PATENCY WRIST A: ABNORMAL
ATMOSPHERIC PRESS: 725 MMHG
BASE EXCESS BLDA CALC-SCNC: 2.5 MMOL/L (ref 0–2)
BDY SITE: ABNORMAL
BH CV ECHO MEAS - ACS: 1.1 CM
BH CV ECHO MEAS - AI P1/2T: 415.1 MSEC
BH CV ECHO MEAS - AO MAX PG: 12.9 MMHG
BH CV ECHO MEAS - AO MEAN PG: 6.5 MMHG
BH CV ECHO MEAS - AO ROOT DIAM: 2.7 CM
BH CV ECHO MEAS - AO V2 MAX: 179 CM/SEC
BH CV ECHO MEAS - AO V2 VTI: 28.5 CM
BH CV ECHO MEAS - AVA(I,D): 1.57 CM2
BH CV ECHO MEAS - EDV(CUBED): 97.3 ML
BH CV ECHO MEAS - EDV(MOD-SP4): 23.8 ML
BH CV ECHO MEAS - EF(MOD-SP4): 53.8 %
BH CV ECHO MEAS - ESV(CUBED): 19.7 ML
BH CV ECHO MEAS - ESV(MOD-SP4): 11 ML
BH CV ECHO MEAS - FS: 41.3 %
BH CV ECHO MEAS - IVS/LVPW: 1.08 CM
BH CV ECHO MEAS - IVSD: 1.3 CM
BH CV ECHO MEAS - LAT PEAK E' VEL: 10.1 CM/SEC
BH CV ECHO MEAS - LV DIASTOLIC VOL/BSA (35-75): 14.7 CM2
BH CV ECHO MEAS - LV MASS(C)D: 217.4 GRAMS
BH CV ECHO MEAS - LV MAX PG: 2.7 MMHG
BH CV ECHO MEAS - LV MEAN PG: 2 MMHG
BH CV ECHO MEAS - LV SYSTOLIC VOL/BSA (12-30): 6.8 CM2
BH CV ECHO MEAS - LV V1 MAX: 81.8 CM/SEC
BH CV ECHO MEAS - LV V1 VTI: 14.2 CM
BH CV ECHO MEAS - LVIDD: 4.6 CM
BH CV ECHO MEAS - LVIDS: 2.7 CM
BH CV ECHO MEAS - LVOT AREA: 3.1 CM2
BH CV ECHO MEAS - LVOT DIAM: 2 CM
BH CV ECHO MEAS - LVPWD: 1.2 CM
BH CV ECHO MEAS - MED PEAK E' VEL: 3.6 CM/SEC
BH CV ECHO MEAS - MV E MAX VEL: 105 CM/SEC
BH CV ECHO MEAS - PA ACC TIME: 0.11 SEC
BH CV ECHO MEAS - SV(LVOT): 44.6 ML
BH CV ECHO MEAS - SV(MOD-SP4): 12.8 ML
BH CV ECHO MEAS - SVI(LVOT): 27.5 ML/M2
BH CV ECHO MEAS - SVI(MOD-SP4): 7.9 ML/M2
BH CV ECHO MEAS - TAPSE (>1.6): 1.3 CM
BH CV ECHO MEASUREMENTS AVERAGE E/E' RATIO: 15.33
BUN SERPL-MCNC: 28 MG/DL (ref 8–23)
BUN/CREAT SERPL: 19.7 (ref 7–25)
CALCIUM SPEC-SCNC: 9.7 MG/DL (ref 8.2–9.6)
CHLORIDE SERPL-SCNC: 107 MMOL/L (ref 98–107)
CO2 BLDA-SCNC: 26.6 MMOL/L (ref 22–33)
CO2 SERPL-SCNC: 22.6 MMOL/L (ref 22–29)
COHGB MFR BLD: 1.4 % (ref 0–5)
CREAT SERPL-MCNC: 1.42 MG/DL (ref 0.57–1)
D-LACTATE SERPL-SCNC: 1.8 MMOL/L (ref 0.5–2)
EGFRCR SERPLBLD CKD-EPI 2021: 33.9 ML/MIN/1.73
GEN 5 2HR TROPONIN T REFLEX: 27 NG/L
GLUCOSE BLDC GLUCOMTR-MCNC: 145 MG/DL (ref 70–130)
GLUCOSE BLDC GLUCOMTR-MCNC: 179 MG/DL (ref 70–130)
GLUCOSE BLDC GLUCOMTR-MCNC: 223 MG/DL (ref 70–130)
GLUCOSE BLDC GLUCOMTR-MCNC: 250 MG/DL (ref 70–130)
GLUCOSE SERPL-MCNC: 216 MG/DL (ref 65–99)
HBA1C MFR BLD: 6.6 % (ref 4.8–5.6)
HCO3 BLDA-SCNC: 25.5 MMOL/L (ref 20–26)
HCT VFR BLD CALC: 36.8 % (ref 38–51)
HGB BLDA-MCNC: 12 G/DL (ref 13.5–17.5)
INHALED O2 CONCENTRATION: 21 %
L PNEUMO1 AG UR QL IA: NEGATIVE
LEFT ATRIUM VOLUME INDEX: 42.2 ML/M2
Lab: ABNORMAL
MAGNESIUM SERPL-MCNC: 1.8 MG/DL (ref 1.7–2.3)
METHGB BLD QL: 0.1 % (ref 0–3)
MODALITY: ABNORMAL
OXYHGB MFR BLDV: 94.2 % (ref 94–99)
PCO2 BLDA: 33.7 MM HG (ref 35–45)
PCO2 TEMP ADJ BLD: ABNORMAL MM[HG]
PH BLDA: 7.49 PH UNITS (ref 7.35–7.45)
PH, TEMP CORRECTED: ABNORMAL
PHOSPHATE SERPL-MCNC: 3.1 MG/DL (ref 2.5–4.5)
PO2 BLDA: 69.8 MM HG (ref 83–108)
PO2 TEMP ADJ BLD: ABNORMAL MM[HG]
POTASSIUM SERPL-SCNC: 4.5 MMOL/L (ref 3.5–5.2)
QT INTERVAL: 322 MS
QTC INTERVAL: 404 MS
S PNEUM AG SPEC QL LA: NEGATIVE
SAO2 % BLDCOA: 95.6 % (ref 94–99)
SODIUM SERPL-SCNC: 141 MMOL/L (ref 136–145)
TROPONIN T DELTA: 1 NG/L
TROPONIN T SERPL HS-MCNC: 26 NG/L
VENTILATOR MODE: ABNORMAL

## 2024-06-11 PROCEDURE — 87040 BLOOD CULTURE FOR BACTERIA: CPT | Performed by: STUDENT IN AN ORGANIZED HEALTH CARE EDUCATION/TRAINING PROGRAM

## 2024-06-11 PROCEDURE — 94664 DEMO&/EVAL PT USE INHALER: CPT

## 2024-06-11 PROCEDURE — 92610 EVALUATE SWALLOWING FUNCTION: CPT

## 2024-06-11 PROCEDURE — 25810000003 SODIUM CHLORIDE 0.9 % SOLUTION: Performed by: INTERNAL MEDICINE

## 2024-06-11 PROCEDURE — 82948 REAGENT STRIP/BLOOD GLUCOSE: CPT

## 2024-06-11 PROCEDURE — 87899 AGENT NOS ASSAY W/OPTIC: CPT

## 2024-06-11 PROCEDURE — 94640 AIRWAY INHALATION TREATMENT: CPT

## 2024-06-11 PROCEDURE — 63710000001 INSULIN LISPRO (HUMAN) PER 5 UNITS

## 2024-06-11 PROCEDURE — 84100 ASSAY OF PHOSPHORUS: CPT | Performed by: INTERNAL MEDICINE

## 2024-06-11 PROCEDURE — 84484 ASSAY OF TROPONIN QUANT: CPT

## 2024-06-11 PROCEDURE — 93306 TTE W/DOPPLER COMPLETE: CPT

## 2024-06-11 PROCEDURE — 82375 ASSAY CARBOXYHB QUANT: CPT

## 2024-06-11 PROCEDURE — 87449 NOS EACH ORGANISM AG IA: CPT

## 2024-06-11 PROCEDURE — 97162 PT EVAL MOD COMPLEX 30 MIN: CPT

## 2024-06-11 PROCEDURE — 25010000002 CEFTRIAXONE PER 250 MG: Performed by: STUDENT IN AN ORGANIZED HEALTH CARE EDUCATION/TRAINING PROGRAM

## 2024-06-11 PROCEDURE — 82805 BLOOD GASES W/O2 SATURATION: CPT

## 2024-06-11 PROCEDURE — 36600 WITHDRAWAL OF ARTERIAL BLOOD: CPT

## 2024-06-11 PROCEDURE — 94799 UNLISTED PULMONARY SVC/PX: CPT

## 2024-06-11 PROCEDURE — 93306 TTE W/DOPPLER COMPLETE: CPT | Performed by: SPECIALIST

## 2024-06-11 PROCEDURE — 94761 N-INVAS EAR/PLS OXIMETRY MLT: CPT

## 2024-06-11 PROCEDURE — 99223 1ST HOSP IP/OBS HIGH 75: CPT

## 2024-06-11 PROCEDURE — 80048 BASIC METABOLIC PNL TOTAL CA: CPT | Performed by: INTERNAL MEDICINE

## 2024-06-11 PROCEDURE — 83605 ASSAY OF LACTIC ACID: CPT | Performed by: STUDENT IN AN ORGANIZED HEALTH CARE EDUCATION/TRAINING PROGRAM

## 2024-06-11 PROCEDURE — 83735 ASSAY OF MAGNESIUM: CPT | Performed by: INTERNAL MEDICINE

## 2024-06-11 PROCEDURE — 83050 HGB METHEMOGLOBIN QUAN: CPT

## 2024-06-11 PROCEDURE — 63710000001 PREDNISONE PER 1 MG: Performed by: STUDENT IN AN ORGANIZED HEALTH CARE EDUCATION/TRAINING PROGRAM

## 2024-06-11 RX ORDER — NICOTINE POLACRILEX 4 MG
15 LOZENGE BUCCAL
Status: DISCONTINUED | OUTPATIENT
Start: 2024-06-11 | End: 2024-06-20 | Stop reason: HOSPADM

## 2024-06-11 RX ORDER — BUDESONIDE AND FORMOTEROL FUMARATE DIHYDRATE 160; 4.5 UG/1; UG/1
2 AEROSOL RESPIRATORY (INHALATION)
Status: DISCONTINUED | OUTPATIENT
Start: 2024-06-11 | End: 2024-06-20 | Stop reason: HOSPADM

## 2024-06-11 RX ORDER — PANTOPRAZOLE SODIUM 40 MG/1
40 TABLET, DELAYED RELEASE ORAL
Status: CANCELLED | OUTPATIENT
Start: 2024-06-12

## 2024-06-11 RX ORDER — PREDNISONE 20 MG/1
40 TABLET ORAL DAILY
Status: DISPENSED | OUTPATIENT
Start: 2024-06-11 | End: 2024-06-16

## 2024-06-11 RX ORDER — BISACODYL 5 MG/1
5 TABLET, DELAYED RELEASE ORAL DAILY PRN
Status: DISCONTINUED | OUTPATIENT
Start: 2024-06-11 | End: 2024-06-20 | Stop reason: HOSPADM

## 2024-06-11 RX ORDER — NITROGLYCERIN 0.4 MG/1
0.4 TABLET SUBLINGUAL
Status: DISCONTINUED | OUTPATIENT
Start: 2024-06-11 | End: 2024-06-20 | Stop reason: HOSPADM

## 2024-06-11 RX ORDER — DIAPER,BRIEF,INFANT-TODD,DISP
1 EACH MISCELLANEOUS 2 TIMES DAILY
COMMUNITY

## 2024-06-11 RX ORDER — SODIUM CHLORIDE 0.9 % (FLUSH) 0.9 %
10 SYRINGE (ML) INJECTION AS NEEDED
Status: DISCONTINUED | OUTPATIENT
Start: 2024-06-11 | End: 2024-06-20 | Stop reason: HOSPADM

## 2024-06-11 RX ORDER — LANOLIN ALCOHOL/MO/W.PET/CERES
400 CREAM (GRAM) TOPICAL DAILY
COMMUNITY

## 2024-06-11 RX ORDER — BISACODYL 10 MG
10 SUPPOSITORY, RECTAL RECTAL DAILY PRN
Status: DISCONTINUED | OUTPATIENT
Start: 2024-06-11 | End: 2024-06-20 | Stop reason: HOSPADM

## 2024-06-11 RX ORDER — SODIUM CHLORIDE 9 MG/ML
40 INJECTION, SOLUTION INTRAVENOUS AS NEEDED
Status: DISCONTINUED | OUTPATIENT
Start: 2024-06-11 | End: 2024-06-20 | Stop reason: HOSPADM

## 2024-06-11 RX ORDER — DEXTROSE MONOHYDRATE 25 G/50ML
25 INJECTION, SOLUTION INTRAVENOUS
Status: DISCONTINUED | OUTPATIENT
Start: 2024-06-11 | End: 2024-06-20 | Stop reason: HOSPADM

## 2024-06-11 RX ORDER — INSULIN LISPRO 100 [IU]/ML
2-7 INJECTION, SOLUTION INTRAVENOUS; SUBCUTANEOUS
Status: DISCONTINUED | OUTPATIENT
Start: 2024-06-11 | End: 2024-06-20 | Stop reason: HOSPADM

## 2024-06-11 RX ORDER — SODIUM CHLORIDE 0.9 % (FLUSH) 0.9 %
10 SYRINGE (ML) INJECTION EVERY 12 HOURS SCHEDULED
Status: DISCONTINUED | OUTPATIENT
Start: 2024-06-11 | End: 2024-06-20 | Stop reason: HOSPADM

## 2024-06-11 RX ORDER — GUAIFENESIN 600 MG/1
1200 TABLET, EXTENDED RELEASE ORAL EVERY 12 HOURS SCHEDULED
Status: DISCONTINUED | OUTPATIENT
Start: 2024-06-11 | End: 2024-06-20 | Stop reason: HOSPADM

## 2024-06-11 RX ORDER — ONDANSETRON 4 MG/1
4 TABLET, ORALLY DISINTEGRATING ORAL EVERY 8 HOURS PRN
Status: CANCELLED | OUTPATIENT
Start: 2024-06-11

## 2024-06-11 RX ORDER — LANOLIN ALCOHOL/MO/W.PET/CERES
400 CREAM (GRAM) TOPICAL DAILY
Status: CANCELLED | OUTPATIENT
Start: 2024-06-11

## 2024-06-11 RX ORDER — ONDANSETRON 4 MG/1
4 TABLET, FILM COATED ORAL EVERY 8 HOURS PRN
COMMUNITY

## 2024-06-11 RX ORDER — AMOXICILLIN 250 MG
2 CAPSULE ORAL 2 TIMES DAILY PRN
Status: DISCONTINUED | OUTPATIENT
Start: 2024-06-11 | End: 2024-06-17

## 2024-06-11 RX ORDER — SODIUM CHLORIDE 9 MG/ML
75 INJECTION, SOLUTION INTRAVENOUS CONTINUOUS
Status: DISCONTINUED | OUTPATIENT
Start: 2024-06-11 | End: 2024-06-13

## 2024-06-11 RX ORDER — METOPROLOL TARTRATE 100 MG/1
100 TABLET ORAL 2 TIMES DAILY
Status: CANCELLED | OUTPATIENT
Start: 2024-06-11

## 2024-06-11 RX ORDER — GLUCAGON 1 MG/ML
1 KIT INJECTION
Status: DISCONTINUED | OUTPATIENT
Start: 2024-06-11 | End: 2024-06-20 | Stop reason: HOSPADM

## 2024-06-11 RX ORDER — LANOLIN ALCOHOL/MO/W.PET/CERES
1000 CREAM (GRAM) TOPICAL
Status: CANCELLED | OUTPATIENT
Start: 2024-06-11

## 2024-06-11 RX ORDER — FUROSEMIDE 40 MG/1
40 TABLET ORAL DAILY
Status: CANCELLED | OUTPATIENT
Start: 2024-06-11

## 2024-06-11 RX ORDER — ERYTHROMYCIN 5 MG/G
1 OINTMENT OPHTHALMIC 2 TIMES DAILY PRN
Status: CANCELLED | OUTPATIENT
Start: 2024-06-11

## 2024-06-11 RX ORDER — POTASSIUM CHLORIDE 20 MEQ/1
10 TABLET, EXTENDED RELEASE ORAL DAILY
Status: CANCELLED | OUTPATIENT
Start: 2024-06-11

## 2024-06-11 RX ORDER — LANOLIN ALCOHOL/MO/W.PET/CERES
1000 CREAM (GRAM) TOPICAL
COMMUNITY

## 2024-06-11 RX ORDER — DIAPER,BRIEF,INFANT-TODD,DISP
1 EACH MISCELLANEOUS 2 TIMES DAILY
Status: CANCELLED | OUTPATIENT
Start: 2024-06-11

## 2024-06-11 RX ORDER — CINACALCET 30 MG/1
30 TABLET, FILM COATED ORAL DAILY
Status: CANCELLED | OUTPATIENT
Start: 2024-06-11

## 2024-06-11 RX ORDER — POLYETHYLENE GLYCOL 3350 17 G/17G
17 POWDER, FOR SOLUTION ORAL DAILY PRN
Status: DISCONTINUED | OUTPATIENT
Start: 2024-06-11 | End: 2024-06-17

## 2024-06-11 RX ORDER — CETIRIZINE HYDROCHLORIDE 10 MG/1
10 TABLET ORAL DAILY PRN
Status: CANCELLED | OUTPATIENT
Start: 2024-06-11

## 2024-06-11 RX ORDER — LOSARTAN POTASSIUM 50 MG/1
50 TABLET ORAL 2 TIMES DAILY
Status: CANCELLED | OUTPATIENT
Start: 2024-06-11

## 2024-06-11 RX ADMIN — CEFTRIAXONE 1000 MG: 1 INJECTION, POWDER, FOR SOLUTION INTRAMUSCULAR; INTRAVENOUS at 02:20

## 2024-06-11 RX ADMIN — INSULIN LISPRO 2 UNITS: 100 INJECTION, SOLUTION INTRAVENOUS; SUBCUTANEOUS at 09:24

## 2024-06-11 RX ADMIN — Medication 10 ML: at 03:20

## 2024-06-11 RX ADMIN — DOXYCYCLINE 100 MG: 100 INJECTION, POWDER, LYOPHILIZED, FOR SOLUTION INTRAVENOUS at 14:03

## 2024-06-11 RX ADMIN — INSULIN LISPRO 3 UNITS: 100 INJECTION, SOLUTION INTRAVENOUS; SUBCUTANEOUS at 17:27

## 2024-06-11 RX ADMIN — SODIUM CHLORIDE 75 ML/HR: 9 INJECTION, SOLUTION INTRAVENOUS at 14:17

## 2024-06-11 RX ADMIN — BUDESONIDE AND FORMOTEROL FUMARATE DIHYDRATE 2 PUFF: 160; 4.5 AEROSOL RESPIRATORY (INHALATION) at 19:43

## 2024-06-11 RX ADMIN — SODIUM CHLORIDE 75 ML/HR: 9 INJECTION, SOLUTION INTRAVENOUS at 03:19

## 2024-06-11 RX ADMIN — PREDNISONE 40 MG: 20 TABLET ORAL at 19:41

## 2024-06-11 RX ADMIN — Medication 10 ML: at 21:26

## 2024-06-11 RX ADMIN — GUAIFENESIN 1200 MG: 600 TABLET, EXTENDED RELEASE ORAL at 21:25

## 2024-06-11 RX ADMIN — INSULIN LISPRO 4 UNITS: 100 INJECTION, SOLUTION INTRAVENOUS; SUBCUTANEOUS at 21:26

## 2024-06-11 RX ADMIN — DOXYCYCLINE 100 MG: 100 INJECTION, POWDER, LYOPHILIZED, FOR SOLUTION INTRAVENOUS at 02:27

## 2024-06-11 NOTE — H&P
[facility] Medicine Services  HISTORY & PHYSICAL    Patient Identification:  Name:  Zeenat Dong  Age:  89 y.o.  Sex:  female  :  1928  MRN:  [id number]   Visit Number:  [id number]  Admit Date: 6/10/2024   Primary Care Physician:  Fatmata Dong APRN     Subjective     Chief complaint:   Chief Complaint   Patient presents with    Abnormal Lab    Weakness - Generalized    Altered Mental Status     History of presenting illness:   Patient is a 89 y.o. female with past medical history significant for hypertension, hyperlipidemia, hypercalcemia, hyperparathyroidism, GERD, CHF, atrial fibrillation, chronic kidney disease stage IIIa, type II DM that presented to the [facility] emergency department for evaluation of altered mental status.    Patient examined at bedside in ED.  Patient present presented to the ED with daughter and granddaughter due to concerns of worsening altered mental status and hallucinations over the past week.  Said similar symptoms in the past when diagnosed with UTI, however she was negative for UTI at her PCP earlier this week..  Daughter reports the patient has had a poor appetite as well, and does not eat or drink very much.  Reports patient is typically on 40 mg of Lasix daily, however given worsening weakness there was concern she may have been dehydrated so she was decreased to 20 mg of Lasix daily saw her PCP last week.  However, patient did take full 40 mg the past 2 days.  Granddaughter Notes that patient has become significantly weaker over the past few days.  At the time of my exam, patient was lethargic.  She does arouse to verbal or tactile stimuli, and occasionally responded verbally.  She would not answer any orientation questions or ROS.  Patient does live with her daughter and has multiple other family numbers to help provide care for her.    Upon arrival to the ED, vitals were temperature 98.1, , RR 18, /69, SpO2 97% on  room air.  Significant for initial troponin 29, repeat pending.  proBNP 9871.  BUN 29, creatinine 1.60.  Magnesium 1.3.  Ammonia 57, CRP 7.27, lactate 2.1.  WBC 20.64 with left shift.  CT abdomen/pelvis with no acute intra-abdominal process.  CT chest shows focal airspace opacity left lower lobe favors atelectasis over infiltrate.  Also small left pleural effusion.  Cardiomegaly.  Small pericardial effusion.  CT head no acute intracranial process.    Patient has been admitted to the medical telemetry.  ---------------------------------------------------------------------------------------------------------------------   Review of Systems   Unable to perform ROS: Mental status change      ---------------------------------------------------------------------------------------------------------------------   Past Medical History:   Diagnosis Date    Hyperlipidemia      Past Surgical History:   Procedure Laterality Date    OTHER SURGICAL HISTORY      ear surgery    SKIN BIOPSY       Family History   Problem Relation Age of Onset    Other Father         cardiovascular disease    Kidney disease Sister      Social History     Socioeconomic History    Marital status:    Tobacco Use    Smoking status: Never   Vaping Use    Vaping status: Never Used   Substance and Sexual Activity    Alcohol use: No    Drug use: Never     ---------------------------------------------------------------------------------------------------------------------   Allergies:  Patient has no known allergies.  ---------------------------------------------------------------------------------------------------------------------   Medications below are reported home medications pulling from within the system; at this time, these medications have not been reconciled unless otherwise specified and are in the verification process for further verifcation as current home medications.    Prior to Admission Medications       Prescriptions Last Dose  Informant Patient Reported? Taking?    apixaban (ELIQUIS) 5 MG tablet tablet  Family Member, Other Yes No    Take 1 tablet by mouth 2 (Two) Times a Day.    cetirizine (zyrTEC) 10 MG tablet  Family Member, Other Yes No    Take 1 tablet by mouth Daily.    cinacalcet (SENSIPAR) 30 MG tablet  Family Member, Other No No    Take 1 tablet by mouth daily.    erythromycin (ROMYCIN) 5 MG/GM ophthalmic ointment  Family Member, Other Yes No    Administer 1 application  to both eyes 3 (Three) Times a Day As Needed (for redness/dryness).    ezetimibe (ZETIA) 10 MG tablet  Family Member, Other Yes No    Take 1 tablet by mouth Daily.    furosemide (LASIX) 40 MG tablet  Family Member, Other Yes No    Take 1 tablet by mouth Daily.    losartan (COZAAR) 50 MG tablet  Family Member, Other Yes No    Take 1 tablet by mouth 2 (Two) Times a Day.    metoprolol tartrate (LOPRESSOR) 100 MG tablet  Family Member, Other No No    Take 1 tablet by mouth 2 (two) times a day. For hypertension.    pantoprazole (PROTONIX) 40 MG EC tablet  Family Member, Other No No    Take 1 tablet by mouth daily. For gerd without esophagitis.    potassium chloride (K-DUR,KLOR-CON) 10 MEQ CR tablet  Family Member, Other No No    Take 1 tablet by mouth daily. For hypokalemia.    vitamin D (ERGOCALCIFEROL) 1.25 MG (89011 UT) capsule capsule  Family Member, Other Yes No    Take 1 capsule by mouth 1 (One) Time Per Week.          ---------------------------------------------------------------------------------------------------------------------    Objective     Hospital Scheduled Meds:  sodium chloride, 10 mL, Intravenous, Q12H      sodium chloride, 75 mL/hr, Last Rate: 75 mL/hr (06/11/24 0319)        Current listed hospital scheduled medications may not yet reflect those currently placed in orders that are signed and held, awaiting patient's arrival to floor/unit.     ---------------------------------------------------------------------------------------------------------------------   Vital Signs:  Temp:  [98.9 °F (37.2 °C)] 98.9 °F (37.2 °C)  Heart Rate:  [] 93  Resp:  [18] 18  BP: (127-165)/(68-94) 162/68  Mean Arterial Pressure (Non-Invasive) for the past 24 hrs (Last 3 readings):   Noninvasive MAP (mmHg)   06/11/24 0215 117   06/11/24 0200 91   06/11/24 0145 105     SpO2 Percentage    06/11/24 0145 06/11/24 0200 06/11/24 0215   SpO2: 98% 98% 98%     SpO2:  [96 %-100 %] 98 %  on   ;   Device (Oxygen Therapy): room air    Body mass index is 25.78 kg/m².  Wt Readings from Last 3 Encounters:   06/10/24 59.9 kg (132 lb)   12/14/23 60.7 kg (133 lb 12.8 oz)   06/21/16 67.6 kg (149 lb)     ---------------------------------------------------------------------------------------------------------------------   Physical Exam:  Physical Exam  Constitutional:       General: She is not in acute distress.     Appearance: Normal appearance. She is ill-appearing.   HENT:      Head: Normocephalic and atraumatic.      Right Ear: External ear normal.      Left Ear: External ear normal.      Nose: No nasal deformity.      Mouth/Throat:      Lips: Pink.      Mouth: Mucous membranes are moist.   Eyes:      Conjunctiva/sclera: Conjunctivae normal.      Pupils: Pupils are equal, round, and reactive to light.   Cardiovascular:      Rate and Rhythm: Normal rate. Rhythm irregularly irregular.      Pulses:           Dorsalis pedis pulses are 2+ on the right side and 2+ on the left side.      Heart sounds: Normal heart sounds.   Pulmonary:      Effort: Pulmonary effort is normal.      Breath sounds: Examination of the right-lower field reveals decreased breath sounds. Examination of the left-lower field reveals decreased breath sounds. Decreased breath sounds present. No wheezing, rhonchi or rales.   Abdominal:      General: Abdomen is flat. Bowel sounds are normal.      Palpations: Abdomen is  soft.      Tenderness: There is no guarding or rebound.   Genitourinary:     Comments: No calhoun catheter in place   Musculoskeletal:      Cervical back: Neck supple. Normal range of motion.      Right lower leg: No edema.      Left lower leg: No edema.   Skin:     General: Skin is warm and dry.   Neurological:      General: No focal deficit present.      Mental Status: She is alert and easily aroused. She is lethargic and confused.   Psychiatric:         Mood and Affect: Mood normal.         Behavior: Behavior normal.       ---------------------------------------------------------------------------------------------------------------------  EKG: Atrial fibrillation.  Heart rate 95.          I have personally reviewed the EKG/Telemetry strip  ---------------------------------------------------------------------------------------------------------------------   Results from last 7 days   Lab Units 06/10/24  2101   HSTROP T ng/L 29*     Results from last 7 days   Lab Units 06/10/24  2101   PROBNP pg/mL 9,871.0*       Results from last 7 days   Lab Units 06/11/24  0329   PH, ARTERIAL pH units 7.489*   PO2 ART mm Hg 69.8*   PCO2, ARTERIAL mm Hg 33.7*   HCO3 ART mmol/L 25.5     Results from last 7 days   Lab Units 06/11/24  0047 06/10/24  2101   CRP mg/dL  --  7.27*   LACTATE mmol/L 1.8 2.1*   WBC 10*3/mm3  --  20.64*   HEMOGLOBIN g/dL  --  12.3   HEMATOCRIT %  --  38.8   MCV fL  --  94.9   MCHC g/dL  --  31.7   PLATELETS 10*3/mm3  --  228     Results from last 7 days   Lab Units 06/10/24  2101   SODIUM mmol/L 141   POTASSIUM mmol/L 4.5   MAGNESIUM mg/dL 1.3*   CHLORIDE mmol/L 107   CO2 mmol/L 20.6*   BUN mg/dL 29*   CREATININE mg/dL 1.60*   CALCIUM mg/dL 9.8*   GLUCOSE mg/dL 281*   ALBUMIN g/dL 3.0*   BILIRUBIN mg/dL 0.4   ALK PHOS U/L 73   AST (SGOT) U/L 13   ALT (SGPT) U/L 14   Estimated Creatinine Clearance: 16.7 mL/min (A) (by C-G formula based on SCr of 1.6 mg/dL (H)).  Ammonia   Date Value Ref Range Status  "  06/10/2024 57 (H) 11 - 51 umol/L Final       Glucose   Date/Time Value Ref Range Status   06/10/2024 2159 244 (H) 70 - 130 mg/dL Final     Lab Results   Component Value Date    HGBA1C 6.9 (H) 08/18/2015     Lab Results   Component Value Date    TSH 3.752 08/17/2015    FREET4 1.09 08/17/2015       No results found for: \"BLOODCX\"  No results found for: \"URINECX\"  No results found for: \"WOUNDCX\"  No results found for: \"STOOLCX\"  No results found for: \"RESPCX\"  No results found for: \"MRSACX\"  Pain Management Panel  More data may exist         Latest Ref Rng & Units 6/10/2024 8/17/2015   Pain Management Panel   Creatinine, Urine mg/dL  mg/dL - 129.2  130.0    Amphetamine, Urine Qual Negative Negative  -   Barbiturates Screen, Urine Negative Negative  -   Benzodiazepine Screen, Urine Negative Negative  -   Buprenorphine, Screen, Urine Negative Negative  -   Cocaine Screen, Urine Negative Negative  -   Fentanyl, Urine Negative Negative  -   Methadone Screen , Urine Negative Negative  -   Methamphetamine, Ur Negative Negative  -     I have personally reviewed the above laboratory results.   ---------------------------------------------------------------------------------------------------------------------  Imaging Results (Last 7 Days)       Procedure Component Value Units Date/Time    CT Abdomen Pelvis Without Contrast [[id number]] Collected: 06/10/24 2338     Updated: 06/10/24 2340    Narrative:      Noncontrast CT abdomen and pelvis     Indications: Altered mental status     Technique: Noncontrast CT images of the abdomen and pelvis were  obtained.  Limited exposure control, adjustment of the MA and/or KV  according to patient size or use of an iterative reconstruction  technique was utilized.     Findings CT abdomen pelvis:     No prior studies available.     The liver and spleen are normal.  There is no biliary dilation.     The pancreas is normal.     The adrenal glands and kidneys are normal.     There is no " abdominal or retroperitoneal lymphadenopathy.     The bowel loops are normal in course and caliber.  There is  diverticulosis of the descending and sigmoid colon.     There is no pelvic mass or free fluid or lymphadenopathy.  Endometrial  cavity is abnormally dilated measuring 1.3 cm.     The osseous structures are normal.       Impression:      Impression:  1.  No CT evidence of an acute intra-abdominal or intrapelvic process.  2.  Abnormally dilated endometrium.  Suggest nonemergent follow-up with  pelvic ultrasound     This report was finalized on 6/10/2024 11:38 PM by Renard Roberts MD.       CT Chest Without Contrast Diagnostic [[id number]] Collected: 06/10/24 2335     Updated: 06/10/24 2337    Narrative:      Noncontrast CT chest     Indications: Altered mental status     Technique: Noncontrast CT images of the chest were obtained.  Limited  exposure control, adjustment of the MA and/or KV according to patient  size or use of an iterative reconstruction technique was utilized.     Findings CT chest:     Comparison is made with a prior study dated 12/7/2023     The heart is enlarged.  A small pericardial effusion is present.   Thoracic aorta is normal in course and caliber.  There is no hilar or  mediastinal lymphadenopathy.  Atherosclerotic calcifications involve the  thoracic aorta and coronary arteries.     There is a focal airspace opacity at the left lung base favoring  atelectasis over infiltrate.  Small left pleural effusion is present.     Degenerative changes involve the thoracic spine.          Impression:      Impression:  1.  Focal airspace opacity at the left lower lobe favors atelectasis  over infiltrate.  There also is a small left pleural effusion.  2.  Cardiomegaly  3.  Small pericardial effusion, new since the prior study     This report was finalized on 6/10/2024 11:35 PM by Renard Roberts MD.       CT Head Without Contrast [[id number]] Collected: 06/10/24 2332     Updated: 06/10/24 2335     Narrative:      Noncontrast CT brain     Indications: Altered mental status     Technique: Noncontrast CT images of the brain were obtained.  Limited  exposure control, adjustment of the MA and/or KV according to patient  size or use of an iterative reconstruction technique was utilized.     Findings:     No prior studies available.     There is no evidence of acute intracranial hemorrhage.  The ventricles  are normal in size and position.  There is no mass-effect or midline  shift.  Patchy and confluent areas of hypodensity involve the  periventricular deep white matter compatible sequelae of chronic small  vessel ischemic disease.  No abnormal extra-axial fluid collections are  demonstrated.     The bony calvarium is normal.       Impression:      Impression:     No CT evidence of an acute intracranial process.     This report was finalized on 6/10/2024 11:32 PM by Renard Roberts MD.       XR Chest 1 View [[id number]] Collected: 06/10/24 2201     Updated: 06/10/24 2204    Narrative:      PROCEDURE: Portable chest x-ray examination performed on Ruma 10, 2024.  Single view. Upright position.     HISTORY: Weakness. Altered mental status.     COMPARISON: None.     FINDINGS:     Enlarged heart size  Elevated right hemidiaphragm.  Coarsened bronchovascular pattern to the lungs.  Possible left lower lobe pneumonia.  Possible small left pleural effusion.  No interstitial edema or pneumothorax.  No free air in the upper abdomen.       Impression:         1.  Enlarged heart size.  2.  Left lower lobe atelectasis versus pneumonia with small left pleural  effusion.  3.  Mild elevated right hemidiaphragm.  4.  No pneumothorax.     This report was finalized on 6/10/2024 10:02 PM by Joseph Harmon MD.             I have personally reviewed the above radiology results.     Last Echocardiogram:  Results for orders placed during the hospital encounter of 12/07/23    Adult Transthoracic Echo Complete W/ Cont if Necessary Per  Protocol    Interpretation Summary    Left ventricular systolic function is normal. Left ventricular ejection fraction appears to be 61 - 65%.    Left ventricular diastolic function is consistent with (grade II w/high LAP) pseudonormalization.    The left atrial cavity is mild to moderately dilated.    The right atrial cavity is mildly  dilated.    Moderate aortic valve regurgitation is present.    Estimated right ventricular systolic pressure from tricuspid regurgitation is moderately elevated (45-55 mmHg).    ---------------------------------------------------------------------------------------------------------------------    Assessment & Plan      Active Hospital Problems    Diagnosis  POA    **Sepsis [A41.9]  Yes     Sepsis secondary to left lower lobe pneumonia, POA  CT chest significant for atelectasis versus pneumonia.  Will treat as pneumonia given elevated inflammatory markers and change in mental status.  Continue/add supplemental oxygen via nasal cannula to maintain O2 > or equal to 90%.  Order urinary legionella, strep pneumo.  PCR negative in ED.  PRN nebs every 6 hours.   Doxycycline and Rocephin given in ED.  Continue these as empiric antibiotic coverage at this time.  Blood cultures obtained in ED  No hypoxia on ABG.  Continue to monitor respiratory status.  Acute kidney injury on CKD stage IIIa, POA  Hypomagnesemia, POA  Baseline Cr 1.09 in January, was up to 1.60 on admission  Continue mIVFs, Trend Cr and urine output, avoid nephrotoxins, NSAIDs, dehydration and contrast as able.  Magnesium replacement ordered.  Continue to replace as indicated.  Repeat BMP in the a.m.   Generalized weakness, POA  Altered mental status, POA  Likely secondary to pneumonia and WINSTON.  Monitor improvement with treatment.  PT/OT/SLP evaluations.    CHRONIC MEDICAL PROBLEMS    Atrial fibrillation, POA  Resume home rate control medications and Eliquis pending med rec  HFpEF  Clinically compensated on exam.  proBNP  elevated, however secondary to elevated creatinine.  Monitor closely for signs of volume overload given fluid resuscitation for WINSTON.  Hold Lasix for now.  Essential hypertension  BP appears well controlled   Plan to resume home antihypertensive regimen once reconciled per pharmacy with appropriate holding parameters to prevent hypotension and/or bradycardia   Closely monitor BP per hospital protocol, titrate medications as necessary  Type II DM  Hemoglobin A1C ordered to evaluate glycemic control.   No diabetic medications on preliminary med rec.  Hold any oral antihyperglycemic's to prevent hypoglycemia.    Start sliding scale insulin.  Accu-Cheks and hypoglycemia protocol in place.    Hyperlipidemia   Hypercalcemia  Hyperparathyroidism  GERD  Restart home medications pending med rec    F/E/N: IV normal saline at 75 mill per hour.  Continue monitor electrolytes and replace as indicated.  NPO.    ---------------------------------------------------  DVT Prophylaxis: Chronically anticoagulated with Eliquis  Activity: Up with assistance    The patient is considered to be a high risk patient due to: Sepsis secondary to left lower lobe pneumonia, WINSTON on CKD    INPATIENT status due to the need for care which can only be reasonably provided in an hospital setting such as aggressive/expedited ancillary services and/or consultation services, the necessity for IV medications, close physician monitoring and/or the possible need for procedures.  In such, I feel patient’s risk for adverse outcomes and need for care warrant INPATIENT evaluation and predict the patient’s care encounter to likely last beyond 2 midnights.      Code Status: DNR/DNI    Disposition/Discharge planning: Pending clinical course, tentative plans to return home with family pending clinical improvement    I have discussed the patient's assessment and plan with Dr. Richard.      Lilli Sharpe PA-C  Hospitalist Service -- [facility]        06/11/24  04:02 EDT    Attending Physician: Dr. Richard.

## 2024-06-11 NOTE — PROGRESS NOTES
Patient admitted earlier this morning for left lower lobe pneumonia.  Workup unremarkable thus far aside from mild elevated creatinine.  Echo noted a 2 cm pericardial effusion without tamponade physiology.  Will ask cardiology for recommendations.  Continue antibiotics, mucolytic's, add prednisone/Symbicort.  Follow-up on cards recommendations, PT/OT.  Dispo Home when medically stable.

## 2024-06-11 NOTE — PLAN OF CARE
Goal Outcome Evaluation:              Outcome Evaluation: Patient rested in bed during shift. Patient has no complaints at this time. VSS. Will continue with plan of care.

## 2024-06-11 NOTE — CASE MANAGEMENT/SOCIAL WORK
Discharge Planning Assessment   [place]     Patient Name: Zeenat Dong  MRN: [id number]  Today's Date: 6/11/2024    Admit Date: 6/10/2024     Discharge Needs Assessment       Row Name 06/11/24 1312       Living Environment    People in Home child(juliet), adult    Name(s) of People in Home Dtr Jeffzeke Deras [address] [facility] [id number]    Current Living Arrangements home    Primary Care Provided by self;child(juliet)    Provides Primary Care For no one    Family Caregiver if Needed child(juliet), adult    Family Caregiver Names Dtr Jeff    Quality of Family Relationships helpful;involved;supportive    Able to Return to Prior Arrangements yes       Transition Planning    Transportation Anticipated family or friend will provide       Discharge Needs Assessment    Equipment Currently Used at Home hospital bed;walker, rolling;shower chair;wheelchair;commode;grab bar;cane, quad    Concerns to be Addressed discharge planning                   Discharge Plan       Row Name 06/11/24 1313       Plan    Plan Pt was admitted on 06/10/24. SS received Physician consult for 89y/o , discharge placement. SS attempted to speak with Pt at bedside but Pt is hard of hearing. SS spoke with Pt's dtr at bedside. Pt lives with her dtr Jeff at [address] [facility] and plans to return home with dtr at discharge. Pt does not utilize home health services. Pt utilizes home oxygen prn via unknown provider. Pt private purchased quad cane, nebulizer, bedside commode, hospital bed and grab bars. Pt's PCP is Sravani Chapa. Pt does not have a POA. Pt's living will is on file. Pt's dtr to provide transportation at discharge. Per Dtr Pt can return home with her if she can ambulate. Per Dtr, Pt was able to ambulate short distances with quad cane several days prior to hospitalization. SS to follow.                  Bee Burgos, PATW

## 2024-06-11 NOTE — THERAPY EVALUATION
Acute Care - Physical Therapy Initial Evaluation   [place]     Patient Name: Zeenat Dong  : 1928  MRN: [id number]  Today's Date: 2024   Onset of Illness/Injury or Date of Surgery: 06/10/24 (admission date)  Visit Dx:     ICD-10-CM ICD-9-CM   1. Acute renal failure, unspecified acute renal failure type  N17.9 584.9   2. Hypomagnesemia  E83.42 275.2   3. Hallucinations, unspecified  R44.3 780.1   4. Heart failure, unspecified HF chronicity, unspecified heart failure type  I50.9 428.9   5. Pericardial effusion  I31.39 423.9   6. Community acquired pneumonia of left lung, unspecified part of lung  J18.9 486     Patient Active Problem List   Diagnosis    Abdominal distension    Heart failure    Dry skin dermatitis    Gastroesophageal reflux disease without esophagitis    Hypercalcemia    Hyperparathyroidism    Hypertension    Hypokalemia    Hypomagnesemia    Seasonal allergic rhinitis    Shortness of breath    Atrial fibrillation    Weakness    Sepsis     Past Medical History:   Diagnosis Date    Hyperlipidemia      Past Surgical History:   Procedure Laterality Date    OTHER SURGICAL HISTORY      ear surgery    SKIN BIOPSY       PT Assessment (Last 12 Hours)       PT Evaluation and Treatment       Row Name 24 1455          Physical Therapy Time and Intention    Document Type evaluation  -     Mode of Treatment physical therapy  -     Patient Effort good  -     Comment Pt seen for therapy evaluation this PM, pt [place], pleasant and cooperative with therapy. Pt daughter was present at time of evaluation, able to explain pt's PLOF in which she was ambulatory with guarding assist. Pt has assist at home as well with daughter assisting in care. Pt had 3 falls last week per daughter report.  -       Row Name 24 1450          General Information    Patient Profile Reviewed yes  -NAIDA     Onset of Illness/Injury or Date of Surgery 06/10/24  admission date  -     Patient Observations  alert;cooperative;agree to therapy  -     General Observations of Patient Pt seen supine in hospital bed, pleasant and cooperative with therapy.  -     Equipment Currently Used at Home --  walking stick per daughter report  -     Existing Precautions/Restrictions fall  -     Limitations/Impairments hearing  -[facility] Name 06/11/24 1455          Cognition    Personal Safety Interventions supervised activity  x2 assist with no falls occuring at time of eval  -[facility] Name 06/11/24 1455          Range of Motion Comprehensive    Comment, General Range of Motion Grossly functional in most planes per observation of sitting EOB  -[facility] Name 06/11/24 1455          Strength Comprehensive (MMT)    Comment, General Manual Muscle Testing (MMT) Assessment Grossly (at leaste) 2 to 3/5 with ability to stand, difficulty getting MMT formally as pt was fatigued after multiple stands  -[facility] Name 06/11/24 1455          Bed Mobility    Bed Mobility supine-sit;sit-supine  -     Supine-Sit [place] (Bed Mobility) dependent (less than 25% patient effort);1 person assist;2 person assist  -     Sit-Supine [place] (Bed Mobility) standby assist;contact guard;minimum assist (75% patient effort)  SBA/CGA to allow pt to perform herself, [place] given for ease and efficiency of transfer  -[facility] Name 06/11/24 1455          Transfers    Transfers sit-stand transfer;stand-sit transfer  -[facility] Name 06/11/24 1455          Sit-Stand Transfer    Sit-Stand [place] (Transfers) contact guard;minimum assist (75% patient effort)  -     Assistive Device (Sit-Stand Transfers) other (see comments)  Ax2  -[facility] Name 06/11/24 1455          Stand-Sit Transfer    Stand-Sit [place] (Transfers) contact guard  -     Assistive Device (Stand-Sit Transfers) other (see comments)  Ax2  -[facility] Name 06/11/24 1455          Gait/Stairs (Locomotion)    Comment, (Gait/Stairs) Pt able to take  some steps at bedside and away from bed, poor endurance, grossly < 5' ambulated with CGAx2 grossly  -       Row Name             [REMOVED] Wound 12/12/23 1900 medial sacral spine Fissure    Wound - Properties Group Placement Date: 12/12/23  -SM Placement Time: 1900 -SM Orientation: medial  -SM Location: sacral spine  -SM Primary Wound Type: Fissure  -SM Removal Date: 06/11/24  -RH Removal Time: 1451 -RH Wound Outcome: Healed  -RH    Retired Wound - Properties Group Placement Date: 12/12/23  -SM Placement Time: 1900 -SM Orientation: medial  -SM Location: sacral spine  -SM Primary Wound Type: Fissure  -SM Removal Date: 06/11/24  -RH Removal Time: 1451 -RH Wound Outcome: Healed  -RH    Retired Wound - Properties Group Date first assessed: 12/12/23  -SM Time first assessed: 1900 -SM Location: sacral spine  -SM Primary Wound Type: Fissure  -SM Resolution Date: 06/11/24  -RH Resolution Time: 1451 -RH Wound Outcome: Healed  -RH      Row Name 06/11/24 1455          Plan of Care Review    Outcome Evaluation Pt seen for evaluation in which pt tolerated well with fatigue noted after grossly 4 sit to stands. Pt may benefit from therapeutic intervention to progress towards PLOF that daughter explained to PT this date. Pt may benefit from IPR however d/t endurance concerns, more appropriate for subacute care.  -       Row Name 06/11/24 1455          Positioning and Restraints    Pre-Treatment Position in bed  -     Post Treatment Position bed  -     In Bed supine;with family/caregiver  -       Row Name 06/11/24 1455          Therapy Assessment/Plan (PT)    Functional Level at Time of Evaluation (PT) Max/depA with some bed mob, CGA/[place] sit to stand  -     PT Diagnosis (PT) impaired/decreased functional mobility  -     Rehab Potential (PT) --  fair/guarded  -     Criteria for Skilled Interventions Met (PT) yes  -     Therapy Frequency (PT) 2 times/wk  2-5x/week, PRN  -       Row Name 06/11/24 1269           Physical Therapy Goals    Bed Mobility Goal Selection (PT) bed mobility, PT goal 1  -KH     Gait Training Goal Selection (PT) gait training, PT goal 1  -       Row Name 06/11/24 1455          Bed Mobility Goal 1 (PT)    Activity/Assistive Device (Bed Mobility Goal 1, PT) supine to sit;sit to supine  -     [place] Level/Cues Needed (Bed Mobility Goal 1, PT) standby assist;supervision required  -     Time Frame (Bed Mobility Goal 1, PT) long term goal (LTG)  -       Row Name 06/11/24 1455          Gait Training Goal 1 (PT)    Activity/Assistive Device (Gait Training Goal 1, PT) gait (walking locomotion);assistive device use;walker, rolling  -     [place] Level (Gait Training Goal 1, PT) contact guard required  -     Distance (Gait Training Goal 1, PT) 25'  -     Time Frame (Gait Training Goal 1, PT) long term goal (LTG)  -               User Key  (r) = Recorded By, (t) = Taken By, (c) = Cosigned By      Initials Name Provider Type    Rhiannon Medina, RN Registered Nurse    Rita Victoria, PT Physical Therapist    RH Trang Morales RN Registered Nurse                    Physical Therapy Education       Title: PT OT SLP Therapies (Not Started)       Topic: Physical Therapy (Not Started)       Point: Mobility training (Not Started)       Learner Progress:  Not documented in this visit.              Point: Home exercise program (Not Started)       Learner Progress:  Not documented in this visit.              Point: Body mechanics (Not Started)       Learner Progress:  Not documented in this visit.              Point: Precautions (Not Started)       Learner Progress:  Not documented in this visit.                                  PT Recommendation and Plan  Anticipated Discharge Disposition (PT): sub acute care setting, skilled nursing facility  Planned Therapy Interventions (PT): bed mobility training, gait training, strengthening, transfer training (add interventions PRN, as  appropriate)  Therapy Frequency (PT): 2 times/wk (2-5x/week, PRN)  Outcome Evaluation: Pt seen for evaluation in which pt tolerated well with fatigue noted after grossly 4 sit to stands. Pt may benefit from therapeutic intervention to progress towards PLOF that daughter explained to PT this date. Pt may benefit from IPR however d/t endurance concerns, more appropriate for subacute care.       Time Calculation:    PT Charges       Row Name 06/11/24 1632             Time Calculation    Start Time 1450  -KH      PT Received On 06/11/24  -      PT Goal Re-Cert Due Date 06/25/24  -                User Key  (r) = Recorded By, (t) = Taken By, (c) = Cosigned By      Initials Name Provider Type     Rita Ralph, PT Physical Therapist                  Therapy Charges for Today       Code Description Service Date Service Provider Modifiers Qty    48698254116 HC PT EVAL MOD COMPLEXITY 4 6/11/2024 Rita Ralph, PT GP 1            PT G-Codes  AM-PAC 6 Clicks Score (PT): 18    Rita Ralph PT  6/11/2024

## 2024-06-11 NOTE — THERAPY EVALUATION
Acute Care - Speech Language Pathology   Swallow Initial Evaluation [facility]  Clinical Dysphagia Assessment     Patient Name: Zeenat Dong  : 1928  MRN: [id number]  Today's Date: 2024             Admit Date: 6/10/2024    Zeenat Dong  was seen at bedside this am on 3N to assess safety/efficacy of swallowing fnx, determine safest/least restrictive diet tolerance.     Ms. Dong presented to [facility] ED 6/10/24 for evaluation of AMS and hallucinations ongoing or the past week. She is reported with decreased po intake/appetite. In ED,  vitals were temperature 98.1, , RR 18, /69, SpO2 97% on room air.  Significant for initial troponin 29, repeat pending.  proBNP 9871.  BUN 29, creatinine 1.60.  Magnesium 1.3.  Ammonia 57, CRP 7.27, lactate 2.1.  WBC 20.64 with left shift.  CT abdomen/pelvis with no acute intra-abdominal process.  CT chest shows focal airspace opacity left lower lobe favors atelectasis over infiltrate.  Also small left pleural effusion.  Cardiomegaly.  Small pericardial effusion.  CT head no acute intracranial process. She was admitted for further evaluation and management.     PMH significant for  hypertension, hyperlipidemia, hypercalcemia, hyperparathyroidism, GERD, CHF, atrial fibrillation, chronic kidney disease stage IIIa, type II DM.    She was referred to rule out dysphagia.     Social History     Socioeconomic History    Marital status:    Tobacco Use    Smoking status: Never   Vaping Use    Vaping status: Never Used   Substance and Sexual Activity    Alcohol use: No    Drug use: Never      Diet Orders (active) (From admission, onward)       Start     Ordered    24 1145  Diet: Regular/House; Fluid Consistency: Thin (IDDSI 0)  Diet Effective Now         24 1145    24 0959  DIET MESSAGE Please send apple juice  Once        Comments: Please send apple juice    24 0959                  She was observed ra w/o complications.     Ms. Dong was  positioned upright and centered in bed to accept multiple po presentations of ice chips, solid cracker, puree, and thin liquids via spoon, cup, and straw.  She did not attempt to self provide po trials today, however, her daughter whom was present, reported that she does typically actively particpiate in meals/po intake.     Facial/oral structures were symmetrical upon observation. Lingual protrusion revealed no deviation. Oral mucosa were moist, pink, and clean. Secretions were clear, thin, and controlled. OROM/MARY was moderately weak in general to imitate oral postures. Gag is not assessed. Volitional cough was intact w/ adequate  intensity, clear in quality, non-productive. Voice was adequate in intensity, clear in quality w/ intelligible speech. She was able to answer simple y/n questions.     Upon po presentations, adequate bolus anticipation and acceptance w/ good labial seal for bolus clearance via spoon bowl, cup rim stability and suction via straw. Bolus formation, manipulation and control were wfl w/ rotary mastication pattern. A-p transit was timely w/o significant oral residue appreciated. No overt s/s aspiration before the swallow.      Pharyngeal swallow was timely w/ adequate hyolaryngeal elevation per palpation. No overt s/s aspiration evidenced across this evaluation. No silent aspiration suspected. She denied odynophagia.    Visit Dx:     ICD-10-CM ICD-9-CM   1. Acute renal failure, unspecified acute renal failure type  N17.9 584.9   2. Hypomagnesemia  E83.42 275.2   3. Hallucinations, unspecified  R44.3 780.1   4. Heart failure, unspecified HF chronicity, unspecified heart failure type  I50.9 428.9   5. Pericardial effusion  I31.39 423.9   6. Community acquired pneumonia of left lung, unspecified part of lung  J18.9 486     Patient Active Problem List   Diagnosis    Abdominal distension    Heart failure    Dry skin dermatitis    Gastroesophageal reflux disease without esophagitis    Hypercalcemia     Hyperparathyroidism    Hypertension    Hypokalemia    Hypomagnesemia    Seasonal allergic rhinitis    Shortness of breath    Atrial fibrillation    Weakness    Sepsis     Past Medical History:   Diagnosis Date    Hyperlipidemia      Past Surgical History:   Procedure Laterality Date    OTHER SURGICAL HISTORY      ear surgery    SKIN BIOPSY       EDUCATION  The patient has been educated in the following areas:   Dysphagia (Swallowing Impairment) Oral Care/Hydration.     Impression: Ms. Dong presented w/generalized weakness/fatigue.  wfl oropharyngeal swallow w/o s/s aspiration.No s/s indicative of silent aspiration.  No odynophagia reported.      SLP Recommendation and Plan   1. Regular consistency po diet, thin liquids.    2. Medications whole in puree/thins.   3. Upright and centered for all po intake  4. CLYDE precautions.  5. Oral care protocol.  6. Assistance with po intake 2/2 fatigue, weakness.     D/w patient and her daughter results and recommendations w/ verbal agreement.    D/w RN results and recommendations w/ verbal agreement.    Thank you for allowing me to participate in the care of your patient-  LESLEE Hernandez.S., CCC/SLP                                                                                                      Time Calculation:       Therapy Charges for Today       Code Description Service Date Service Provider Modifiers Qty    37973802487 HC ST EVAL ORAL PHARYNG SWALLOW 2 6/11/2024 Chantell Max, MS CCC-SLP GN 1                 Chantell Max MS CCC-SLP  6/11/2024

## 2024-06-11 NOTE — ED PROVIDER NOTES
Subjective   History of Present Illness  Patient is a 89-year-old female that presents with daughter as well as granddaughter with concerns for progressive altered mental status and hallucinations that started over the past week.  Family states that her claiming to see people that are not present in the room is a new presentation and initially the family thought that she had a urinary tract infection however urinalysis at PCP was reportedly negative.  Patient has had progressive decrease in appetite and has complaining of her abdomen hurting as well as her bottom.. Family has been given her stool softeners.  Patient has not been started on any recent medications, changes in daily behavior or sleep patterns and has had no recent falls or injuries.     Patient daughter states that she has been on Lasix 40 mg once daily however given her progressive weakness there was concern that patient may be dehydrated late last week and she was transition to 20 mg of Lasix once daily.      Review of Systems   Constitutional: Negative.  Positive for activity change, appetite change and fatigue. Negative for fever.   HENT: Negative.     Respiratory: Negative.     Cardiovascular: Negative.  Negative for chest pain.   Gastrointestinal: Negative.  Positive for abdominal pain.   Endocrine: Negative.    Genitourinary: Negative.  Negative for dysuria.   Skin: Negative.    Neurological: Negative.    Psychiatric/Behavioral: Negative.  Positive for hallucinations.    All other systems reviewed and are negative.      Past Medical History:   Diagnosis Date    Hyperlipidemia        No Known Allergies    Past Surgical History:   Procedure Laterality Date    OTHER SURGICAL HISTORY      ear surgery    SKIN BIOPSY         Family History   Problem Relation Age of Onset    Other Father         cardiovascular disease    Kidney disease Sister        Social History     Socioeconomic History    Marital status:    Tobacco Use    Smoking status:  Never   Vaping Use    Vaping status: Never Used   Substance and Sexual Activity    Alcohol use: No    Drug use: Never           Objective   Physical Exam  Vitals and nursing note reviewed.   Constitutional:       Appearance: She is well-developed. She is ill-appearing.      Comments: Elderly 89-year-old female comfortably resting in bed no acute distress.  Appears pale and generally weak and fatigued.    Patient requires hearing aid in the right ear and still remains extremely hard of hearing.  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         HENT:      Head: Normocephalic and atraumatic.      Right Ear: External ear normal.      Left Ear: External ear normal.      Nose: Nose normal.   Eyes:      Extraocular Movements: Extraocular movements intact.      Conjunctiva/sclera: Conjunctivae normal.      Pupils: Pupils are equal, round, and reactive to light.   Neck:      Vascular: No JVD.      Trachea: No tracheal deviation.   Cardiovascular:      Rate and Rhythm: Normal rate and regular rhythm.      Heart sounds: Normal heart sounds. No murmur heard.  Pulmonary:      Effort: No respiratory distress.      Breath sounds: No wheezing.      Comments: Grossly diminished breath sounds in the left lower lung field  Abdominal:      General: Bowel sounds are normal.      Palpations: Abdomen is soft.      Tenderness: There is no abdominal tenderness.   Musculoskeletal:         General: No deformity. Normal range of motion.      Cervical back: Normal range of motion and neck supple.   Skin:     General: Skin is warm and dry.      Coloration: Skin is not pale.      Findings: No erythema or rash.   Neurological:      GCS: GCS eye subscore is 4. GCS verbal subscore is 4. GCS motor subscore is 6.      Cranial Nerves: No cranial nerve deficit.      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  She will attempt to answer simple questions but answers are not always reliable at this moment time per family at bedside.       Psychiatric:          Thought Content: Thought content normal.         Critical Care    Performed by: Haylee Rawls DO  Authorized by: Haylee Rawls DO    Critical care provider statement:     Critical care time (minutes):  45    Critical care time was exclusive of:  Separately billable procedures and treating other patients and teaching time    Critical care was necessary to treat or prevent imminent or life-threatening deterioration of the following conditions:  Cardiac failure, renal failure, circulatory failure, respiratory failure, metabolic crisis, CNS failure or compromise and sepsis    Critical care was time spent personally by me on the following activities:  Blood draw for specimens, development of treatment plan with patient or surrogate, evaluation of patient's response to treatment, discussions with primary provider, examination of patient, obtaining history from patient or surrogate, ordering and performing treatments and interventions, ordering and review of laboratory studies, ordering and review of radiographic studies, pulse oximetry, re-evaluation of patient's condition, review of old charts and vascular access procedures    I assumed direction of critical care for this patient from another provider in my specialty: no      Care discussed with: admitting provider           Results for orders placed or performed during the hospital encounter of 06/10/24   Respiratory Panel PCR w/COVID-19(SARS-CoV-2) PARMJIT/PEBBLES/KATELYN/PAD/COR/RUDDY In-House, NP Swab in UTM/VTM, 2 HR TAT - Swab, Nasopharynx    Specimen: Nasopharynx; Swab   Result Value Ref Range    ADENOVIRUS, PCR Not Detected Not Detected    Coronavirus 229E Not Detected Not Detected    Coronavirus HKU1 Not Detected Not Detected    Coronavirus NL63 Not Detected Not Detected    Coronavirus OC43 Not Detected Not Detected    COVID19 Not Detected Not Detected - Ref. Range    Human Metapneumovirus Not Detected Not Detected    Human Rhinovirus/Enterovirus Not Detected Not  Detected    Influenza A PCR Not Detected Not Detected    Influenza B PCR Not Detected Not Detected    Parainfluenza Virus 1 Not Detected Not Detected    Parainfluenza Virus 2 Not Detected Not Detected    Parainfluenza Virus 3 Not Detected Not Detected    Parainfluenza Virus 4 Not Detected Not Detected    RSV, PCR Not Detected Not Detected    Bordetella pertussis pcr Not Detected Not Detected    Bordetella parapertussis PCR Not Detected Not Detected    Chlamydophila pneumoniae PCR Not Detected Not Detected    Mycoplasma pneumo by PCR Not Detected Not Detected   Comprehensive Metabolic Panel    Specimen: Arm, Right; Blood   Result Value Ref Range    Glucose 281 (H) 65 - 99 mg/dL    BUN 29 (H) 8 - 23 mg/dL    Creatinine 1.60 (H) 0.57 - 1.00 mg/dL    Sodium 141 136 - 145 mmol/L    Potassium 4.5 3.5 - 5.2 mmol/L    Chloride 107 98 - 107 mmol/L    CO2 20.6 (L) 22.0 - 29.0 mmol/L    Calcium 9.8 (H) 8.2 - 9.6 mg/dL    Total Protein 6.6 6.0 - 8.5 g/dL    Albumin 3.0 (L) 3.5 - 5.2 g/dL    ALT (SGPT) 14 1 - 33 U/L    AST (SGOT) 13 1 - 32 U/L    Alkaline Phosphatase 73 39 - 117 U/L    Total Bilirubin 0.4 0.0 - 1.2 mg/dL    Globulin 3.6 gm/dL    A/G Ratio 0.8 g/dL    BUN/Creatinine Ratio 18.1 7.0 - 25.0    Anion Gap 13.4 5.0 - 15.0 mmol/L    eGFR 29.4 (L) >60.0 mL/min/1.73   Single High Sensitivity Troponin T    Specimen: Arm, Right; Blood   Result Value Ref Range    HS Troponin T 29 (H) <14 ng/L   Magnesium    Specimen: Arm, Right; Blood   Result Value Ref Range    Magnesium 1.3 (L) 1.7 - 2.3 mg/dL   Urinalysis With Microscopic If Indicated (No Culture) - Straight Cath    Specimen: Straight Cath; Urine   Result Value Ref Range    Color, UA Yellow Yellow, Straw    Appearance, UA Clear Clear    pH, UA <=5.0 5.0 - 8.0    Specific Gravity, UA 1.017 1.005 - 1.030    Glucose, UA Negative Negative    Ketones, UA Negative Negative    Bilirubin, UA Negative Negative    Blood, UA Negative Negative    Protein, UA Negative Negative    Leuk  Esterase, UA Negative Negative    Nitrite, UA Negative Negative    Urobilinogen, UA 0.2 E.U./dL 0.2 - 1.0 E.U./dL   CBC Auto Differential    Specimen: Arm, Right; Blood   Result Value Ref Range    WBC 20.64 (H) 3.40 - 10.80 10*3/mm3    RBC 4.09 3.77 - 5.28 10*6/mm3    Hemoglobin 12.3 12.0 - 15.9 g/dL    Hematocrit 38.8 34.0 - 46.6 %    MCV 94.9 79.0 - 97.0 fL    MCH 30.1 26.6 - 33.0 pg    MCHC 31.7 31.5 - 35.7 g/dL    RDW 13.9 12.3 - 15.4 %    RDW-SD 48.1 37.0 - 54.0 fl    MPV 10.5 6.0 - 12.0 fL    Platelets 228 140 - 450 10*3/mm3   BNP    Specimen: Arm, Right; Blood   Result Value Ref Range    proBNP 9,871.0 (H) 0.0 - 1,800.0 pg/mL   Sedimentation Rate    Specimen: Arm, Right; Blood   Result Value Ref Range    Sed Rate 43 (H) 0 - 30 mm/hr   Lactic Acid, Plasma    Specimen: Arm, Right; Blood   Result Value Ref Range    Lactate 2.1 (C) 0.5 - 2.0 mmol/L   C-reactive Protein    Specimen: Arm, Right; Blood   Result Value Ref Range    C-Reactive Protein 7.27 (H) 0.00 - 0.50 mg/dL   Urine Drug Screen - Straight Cath    Specimen: Straight Cath; Urine   Result Value Ref Range    THC, Screen, Urine Negative Negative    Phencyclidine (PCP), Urine Negative Negative    Cocaine Screen, Urine Negative Negative    Methamphetamine, Ur Negative Negative    Opiate Screen Negative Negative    Amphetamine Screen, Urine Negative Negative    Benzodiazepine Screen, Urine Negative Negative    Tricyclic Antidepressants Screen Negative Negative    Methadone Screen, Urine Negative Negative    Barbiturates Screen, Urine Negative Negative    Oxycodone Screen, Urine Negative Negative    Buprenorphine, Screen, Urine Negative Negative   Ammonia    Specimen: Arm, Right; Blood   Result Value Ref Range    Ammonia 57 (H) 11 - 51 umol/L   Ethanol    Specimen: Arm, Right; Blood   Result Value Ref Range    Ethanol <10 0 - 10 mg/dL    Ethanol % <0.010 %   Scan Slide    Specimen: Arm, Right; Blood   Result Value Ref Range    Scan Slide     Manual  Differential    Specimen: Arm, Right; Blood   Result Value Ref Range    Neutrophil % 90.0 (H) 42.7 - 76.0 %    Lymphocyte % 4.0 (L) 19.6 - 45.3 %    Monocyte % 4.0 (L) 5.0 - 12.0 %    Bands %  2.0 0.0 - 5.0 %    Neutrophils Absolute 18.99 (H) 1.70 - 7.00 10*3/mm3    Lymphocytes Absolute 0.83 0.70 - 3.10 10*3/mm3    Monocytes Absolute 0.83 0.10 - 0.90 10*3/mm3    RBC Morphology Normal Normal    Platelet Morphology Normal Normal   STAT Lactic Acid, Reflex    Specimen: Blood   Result Value Ref Range    Lactate 1.8 0.5 - 2.0 mmol/L   Fentanyl, Urine - Straight Cath    Specimen: Straight Cath; Urine   Result Value Ref Range    Fentanyl, Urine Negative Negative   Blood Gas, Arterial With Co-Ox    Specimen: Arterial Blood   Result Value Ref Range    Site Left Brachial     Braeden's Test N/A     pH, Arterial 7.489 (H) 7.350 - 7.450 pH units    pCO2, Arterial 33.7 (L) 35.0 - 45.0 mm Hg    pO2, Arterial 69.8 (L) 83.0 - 108.0 mm Hg    HCO3, Arterial 25.5 20.0 - 26.0 mmol/L    Base Excess, Arterial 2.5 (H) 0.0 - 2.0 mmol/L    O2 Saturation, Arterial 95.6 94.0 - 99.0 %    Hemoglobin, Blood Gas 12.0 (L) 13.5 - 17.5 g/dL    Hematocrit, Blood Gas 36.8 (L) 38.0 - 51.0 %    Oxyhemoglobin 94.2 94 - 99 %    Methemoglobin 0.10 0.00 - 3.00 %    Carboxyhemoglobin 1.4 0 - 5 %    A-a DO2 34.5 0.0 - 300.0 mmHg    CO2 Content 26.6 22 - 33 mmol/L    Barometric Pressure for Blood Gas 725 mmHg    Modality Room Air     FIO2 21 %    Ventilator Mode NA     Collected by [id number]     pH, Temp Corrected      pCO2, Temperature Corrected      pO2, Temperature Corrected     POC Glucose Once    Specimen: Blood   Result Value Ref Range    Glucose 244 (H) 70 - 130 mg/dL   ECG 12 Lead ED Triage Standing Order; Weak / Dizzy / AMS   Result Value Ref Range    QT Interval 322 ms    QTC Interval 404 ms   Green Top (Gel)   Result Value Ref Range    Extra Tube Hold for add-ons.    Lavender Top   Result Value Ref Range    Extra Tube hold for add-on    Gold Top - SST    Result Value Ref Range    Extra Tube Hold for add-ons.    Light Blue Top   Result Value Ref Range    Extra Tube Hold for add-ons.          ED Course  ED Course as of 06/11/24 0408   Mon Wan 10, 2024   2152 5 months ago patient had relatively stable renal function with a creatinine of 0.93-GFR 57 currently creatinine is 1.6 with a GFR of 29    Patient has significant pulmonary edema with a proBNP of almost 10,000 today as well    Modified sepsis bolus fluids were rerouted to the patient given elevated white count    Patient will also require magnesium replacement [LK]   2156 Patient shows evidence of decompensated heart failure that is new in the past 5 months.  Patient meets CKD 4 at this moment in time and is on daily diuretic therapy of Lasix currently at 20 mg a day previously and 40 mg today.  Patient will be given a 500 mL bolus initially for sepsis modified fluids and then be placed on maintenance therapy.     [LK]   2158 She has a large left pleural effusion that shows dense consolidation with questionable possible underlying pneumonia.  -Will receive empiric IV antibiotics including Rocephin and doxycycline at this point in time. [LK]   Tue Jun 11, 2024   0029 Sent is starting to become more alert after IV fluids.  Spoke with family and given presentation of elevated inflammatory markers and opacity in the lung patient will be and treated like community-acquired pneumonia.    Recommended that the patient be admitted to the hospital for a recent updated echocardiogram as patient has significantly decompensated in 5 months.  Ultimately Lasix will have to be titrated based on renal function during her stay as well.  Electronically signed by Haylee Rawls DO, 06/11/24, 12:39 AM EDT.   [LK]   0153 Patient remains hemodynamically stable.  Dr. Richard has accepted patient for admission.   [LK]      ED Course User Index  [LK] Haylee Rawls DO                                             Medical Decision  Making  Problems Addressed:  Acute renal failure, unspecified acute renal failure type: complicated acute illness or injury  Community acquired pneumonia of left lung, unspecified part of lung: complicated acute illness or injury  Hallucinations, unspecified: complicated acute illness or injury  Heart failure, unspecified HF chronicity, unspecified heart failure type: complicated acute illness or injury  Hypomagnesemia: complicated acute illness or injury  Pericardial effusion: complicated acute illness or injury    Amount and/or Complexity of Data Reviewed  Labs: ordered.  Radiology: ordered.  ECG/medicine tests: ordered.    Risk  Prescription drug management.  Parenteral controlled substances.  Decision regarding hospitalization.    Critical Care  Total time providing critical care: 45 minutes        Final diagnoses:   Acute renal failure, unspecified acute renal failure type   Hypomagnesemia   Hallucinations, unspecified   Heart failure, unspecified HF chronicity, unspecified heart failure type   Pericardial effusion   Community acquired pneumonia of left lung, unspecified part of lung       ED Disposition  ED Disposition       ED Disposition   Decision to Admit    Condition   --    Comment   Level of Care: Medical Telemetry [23]   Diagnosis: Sepsis [[id number]]   Certification: I Certify That Inpatient Hospital Services Are Medically Necessary For Greater Than 2 Midnights                 No follow-up provider specified.       Medication List      No changes were made to your prescriptions during this visit.            Haylee Rawls DO  06/11/24 0404

## 2024-06-11 NOTE — PLAN OF CARE
Goal Outcome Evaluation:              Outcome Evaluation: Pt seen for evaluation in which pt tolerated well with fatigue noted after grossly 4 sit to stands. Pt may benefit from therapeutic intervention to progress towards PLOF that daughter explained to PT this date. Pt may benefit from IPR however d/t endurance concerns, more appropriate for subacute care.      Anticipated Discharge Disposition (PT): sub acute care setting, skilled nursing facility

## 2024-06-11 NOTE — CONSULTS
Adult Nutrition  Assessment/PES    Patient Name:  Zeenat Dong  YOB: 1928  MRN: [id number]  Admit Date:  6/10/2024    Assessment Date:  6/11/2024    Comments:  Patient meets criteria for moderate malnutrition related to poor intakes, muscle wasting.      Will start ONS BID and encourage intakes.  Patient at times has trouble with chopping foods, and patient's daughter said she would chop foods, did not want kitchen doing at this time.     Reason for Assessment       Row Name 06/11/24 1642          Reason for Assessment    Reason For Assessment per organizational policy;identified at risk by screening criteria     Identified At Risk by Screening Criteria reduced oral intake over the last month                      Labs/Tests/Procedures/Meds       Row Name 06/11/24 1643          Labs/Procedures/Meds    Lab Results Reviewed reviewed        Medications    Pertinent Medications Reviewed reviewed                      Estimated/Assessed Needs - Anthropometrics       Row Name 06/11/24 1646          Anthropometrics    Age for Calculations 89     Weight for Calculation 59.9 kg (132 lb 0.9 oz)        Estimated/Assessed Needs    Additional Documentation Fluid Requirements (Group);[place]-St[place] Jeor Equation (Group);Protein Requirements (Group)        [place]-St[place] Jeor Equation    RMR ([place]-St. Jeor Equation) 958.125     [place]-St[place] Jeor Activity Factors 1.2     Activity Factors ([place]-St. Jeor) 1149.75        Protein Requirements    Weight Used For Protein Calculations 59.9 kg (132 lb 0.9 oz)     Est Protein Requirement Amount (gms/kg) 1.0 gm protein     Estimated Protein Requirements (gms/day) 59.9        Fluid Requirements    Fluid Requirements (mL/day) 1200     Estimated Fluid Requirement Method RDA Method     RDA Method (mL) 1200                    Nutrition Prescription Ordered       Row Name 06/11/24 164          Nutrition Prescription PO    Current PO Diet Regular     Fluid Consistency Thin                     Evaluation of Received Nutrient/Fluid Intake       Row Name 06/11/24 1648          Calories Evaluation    Total Calorie Target (kcal) 1200        PO Evaluation    Number of Days PO Intake Evaluated Insufficient Data                    Malnutrition Severity Assessment       Row Name 06/11/24 1649          Malnutrition Severity Assessment    Malnutrition Type Chronic Disease - Related Malnutrition        Insufficient Energy Intake     Insufficient Energy Intake Findings Severe     Insufficient Energy Intake  <75% of est. energy requirement for >7d)        Unintentional Weight Loss     Unintentional Weight Loss Findings None        Muscle Loss    Loss of Muscle Mass Findings Moderate     [place] Moderate - slight depression     Clavicle Bone Region Moderate - some protrusion in females, visible in males     Acromion Bone Region Severe - squared shoulders, bones, and acromion process protrusion prominent     Scapular Bone Region Moderate - mild depression, bones may show slightly     Dorsal Hand Region Moderate - slight depression     Patellar Region Moderate - patella more prominent, less muscle definition around patella     Anterior Thigh Region Moderate - mild depression on inner thigh     Posterior Calf Region Moderate - some roundness, slight firmness        Fat Loss    Subcutaneous Fat Loss Findings Mild        Declining Functional Status    Declining Functional Status Findings Measurably Reduced        Criteria Met (Must meet criteria for severity in at least 2 of these categories: M Wasting, Fat Loss, Fluid, Secondary Signs, Wt. Status, Intake)    Patient meets criteria for  Moderate (non-severe) Malnutrition                     Problem/Interventions:   Problem 1       Row Name 06/11/24 1658          Nutrition Diagnoses Problem 1    Problem 1 Malnutrition     Etiology (related to) Medical Diagnosis     Cardiac HTN     Gastrointestinal GERD/Reflux     Infectious Disease Sepsis     Renal WINSTON;CKD      Signs/Symptoms (evidenced by) Report/Observation;Report of Mnimal PO Intake;SLP/Swallow eval     Reported/Observed By Patient;Family;RN;MD;SLP     Swallow eval status Done     Type of SLP Evaluation Bedside                          Intervention Goal       Row Name 06/11/24 1652          Intervention Goal    General Meet nutritional needs for age/condition     PO Establish PO;Meet estimated needs                    Nutrition Intervention       Row Name 06/11/24 1653          Nutrition Intervention    RD/Tech Action Recommend/ordered;Follow Tx progress     Recommended/Ordered Supplement                    Nutrition Prescription       Row Name 06/11/24 1653          Nutrition Prescription PO    PO Prescription Begin/change supplement     Fluid Consistency Thin     Supplement Boost Plus     Supplement Frequency 2 times a day                    Education/Evaluation       Row Name 06/11/24 1653          Monitor/Evaluation    Monitor Per protocol;PO intake;Supplement intake;Pertinent labs     Education Follow-up Other (comment)  will continue to follow                     Electronically signed by:  Beatrice Gallegos RD  06/11/24 16:54 EDT

## 2024-06-12 LAB
ALBUMIN SERPL-MCNC: 3.1 G/DL (ref 3.5–5.2)
ALBUMIN/GLOB SERPL: 1.1 G/DL
ALP SERPL-CCNC: 77 U/L (ref 39–117)
ALT SERPL W P-5'-P-CCNC: 12 U/L (ref 1–33)
ANION GAP SERPL CALCULATED.3IONS-SCNC: 11.4 MMOL/L (ref 5–15)
AST SERPL-CCNC: 12 U/L (ref 1–32)
BASOPHILS # BLD AUTO: 0.02 10*3/MM3 (ref 0–0.2)
BASOPHILS NFR BLD AUTO: 0.1 % (ref 0–1.5)
BILIRUB SERPL-MCNC: 0.3 MG/DL (ref 0–1.2)
BUN SERPL-MCNC: 22 MG/DL (ref 8–23)
BUN/CREAT SERPL: 18.5 (ref 7–25)
CALCIUM SPEC-SCNC: 9.1 MG/DL (ref 8.2–9.6)
CHLORIDE SERPL-SCNC: 108 MMOL/L (ref 98–107)
CO2 SERPL-SCNC: 20.6 MMOL/L (ref 22–29)
CREAT SERPL-MCNC: 1.19 MG/DL (ref 0.57–1)
DEPRECATED RDW RBC AUTO: 45.3 FL (ref 37–54)
EGFRCR SERPLBLD CKD-EPI 2021: 41.9 ML/MIN/1.73
EOSINOPHIL # BLD AUTO: 0 10*3/MM3 (ref 0–0.4)
EOSINOPHIL NFR BLD AUTO: 0 % (ref 0.3–6.2)
ERYTHROCYTE [DISTWIDTH] IN BLOOD BY AUTOMATED COUNT: 13.8 % (ref 12.3–15.4)
FOLATE SERPL-MCNC: 12.6 NG/ML (ref 4.78–24.2)
GLOBULIN UR ELPH-MCNC: 2.9 GM/DL
GLUCOSE BLDC GLUCOMTR-MCNC: 212 MG/DL (ref 70–130)
GLUCOSE BLDC GLUCOMTR-MCNC: 212 MG/DL (ref 70–130)
GLUCOSE BLDC GLUCOMTR-MCNC: 231 MG/DL (ref 70–130)
GLUCOSE BLDC GLUCOMTR-MCNC: 336 MG/DL (ref 70–130)
GLUCOSE SERPL-MCNC: 239 MG/DL (ref 65–99)
HCT VFR BLD AUTO: 34.2 % (ref 34–46.6)
HGB BLD-MCNC: 11.3 G/DL (ref 12–15.9)
IMM GRANULOCYTES # BLD AUTO: 0.42 10*3/MM3 (ref 0–0.05)
IMM GRANULOCYTES NFR BLD AUTO: 2.5 % (ref 0–0.5)
LYMPHOCYTES # BLD AUTO: 0.83 10*3/MM3 (ref 0.7–3.1)
LYMPHOCYTES NFR BLD AUTO: 4.9 % (ref 19.6–45.3)
MCH RBC QN AUTO: 29.7 PG (ref 26.6–33)
MCHC RBC AUTO-ENTMCNC: 33 G/DL (ref 31.5–35.7)
MCV RBC AUTO: 90 FL (ref 79–97)
MONOCYTES # BLD AUTO: 0.57 10*3/MM3 (ref 0.1–0.9)
MONOCYTES NFR BLD AUTO: 3.4 % (ref 5–12)
NEUTROPHILS NFR BLD AUTO: 15.13 10*3/MM3 (ref 1.7–7)
NEUTROPHILS NFR BLD AUTO: 89.1 % (ref 42.7–76)
NRBC BLD AUTO-RTO: 0 /100 WBC (ref 0–0.2)
PLATELET # BLD AUTO: 205 10*3/MM3 (ref 140–450)
PMV BLD AUTO: 11.3 FL (ref 6–12)
POTASSIUM SERPL-SCNC: 4.2 MMOL/L (ref 3.5–5.2)
PROT SERPL-MCNC: 6 G/DL (ref 6–8.5)
RBC # BLD AUTO: 3.8 10*6/MM3 (ref 3.77–5.28)
SODIUM SERPL-SCNC: 140 MMOL/L (ref 136–145)
VIT B12 BLD-MCNC: 534 PG/ML (ref 211–946)
WBC NRBC COR # BLD AUTO: 16.97 10*3/MM3 (ref 3.4–10.8)

## 2024-06-12 PROCEDURE — 25010000002 CEFTRIAXONE PER 250 MG

## 2024-06-12 PROCEDURE — 85025 COMPLETE CBC W/AUTO DIFF WBC: CPT | Performed by: STUDENT IN AN ORGANIZED HEALTH CARE EDUCATION/TRAINING PROGRAM

## 2024-06-12 PROCEDURE — 94664 DEMO&/EVAL PT USE INHALER: CPT

## 2024-06-12 PROCEDURE — 94799 UNLISTED PULMONARY SVC/PX: CPT

## 2024-06-12 PROCEDURE — 94761 N-INVAS EAR/PLS OXIMETRY MLT: CPT

## 2024-06-12 PROCEDURE — 36415 COLL VENOUS BLD VENIPUNCTURE: CPT | Performed by: INTERNAL MEDICINE

## 2024-06-12 PROCEDURE — 82948 REAGENT STRIP/BLOOD GLUCOSE: CPT

## 2024-06-12 PROCEDURE — 82746 ASSAY OF FOLIC ACID SERUM: CPT | Performed by: INTERNAL MEDICINE

## 2024-06-12 PROCEDURE — 99233 SBSQ HOSP IP/OBS HIGH 50: CPT | Performed by: STUDENT IN AN ORGANIZED HEALTH CARE EDUCATION/TRAINING PROGRAM

## 2024-06-12 PROCEDURE — 82607 VITAMIN B-12: CPT | Performed by: INTERNAL MEDICINE

## 2024-06-12 PROCEDURE — 63710000001 PREDNISONE PER 1 MG: Performed by: STUDENT IN AN ORGANIZED HEALTH CARE EDUCATION/TRAINING PROGRAM

## 2024-06-12 PROCEDURE — 99221 1ST HOSP IP/OBS SF/LOW 40: CPT | Performed by: INTERNAL MEDICINE

## 2024-06-12 PROCEDURE — 80053 COMPREHEN METABOLIC PANEL: CPT | Performed by: STUDENT IN AN ORGANIZED HEALTH CARE EDUCATION/TRAINING PROGRAM

## 2024-06-12 PROCEDURE — 63710000001 INSULIN LISPRO (HUMAN) PER 5 UNITS

## 2024-06-12 RX ORDER — DIAPER,BRIEF,INFANT-TODD,DISP
1 EACH MISCELLANEOUS 2 TIMES DAILY
Status: DISCONTINUED | OUTPATIENT
Start: 2024-06-12 | End: 2024-06-20 | Stop reason: HOSPADM

## 2024-06-12 RX ORDER — CINACALCET 30 MG/1
30 TABLET, FILM COATED ORAL DAILY
Status: DISCONTINUED | OUTPATIENT
Start: 2024-06-12 | End: 2024-06-20 | Stop reason: HOSPADM

## 2024-06-12 RX ORDER — FOLIC ACID 1 MG/1
500 TABLET ORAL DAILY
Status: DISCONTINUED | OUTPATIENT
Start: 2024-06-12 | End: 2024-06-20 | Stop reason: HOSPADM

## 2024-06-12 RX ORDER — METOPROLOL TARTRATE 100 MG/1
100 TABLET ORAL EVERY 12 HOURS SCHEDULED
Status: DISCONTINUED | OUTPATIENT
Start: 2024-06-12 | End: 2024-06-20 | Stop reason: HOSPADM

## 2024-06-12 RX ORDER — ERYTHROMYCIN 5 MG/G
1 OINTMENT OPHTHALMIC 2 TIMES DAILY PRN
Status: DISCONTINUED | OUTPATIENT
Start: 2024-06-12 | End: 2024-06-20 | Stop reason: HOSPADM

## 2024-06-12 RX ORDER — LANOLIN ALCOHOL/MO/W.PET/CERES
1000 CREAM (GRAM) TOPICAL
Status: DISCONTINUED | OUTPATIENT
Start: 2024-06-12 | End: 2024-06-20 | Stop reason: HOSPADM

## 2024-06-12 RX ORDER — ACETAMINOPHEN 325 MG/1
650 TABLET ORAL EVERY 6 HOURS PRN
Status: DISCONTINUED | OUTPATIENT
Start: 2024-06-12 | End: 2024-06-20 | Stop reason: HOSPADM

## 2024-06-12 RX ORDER — CETIRIZINE HYDROCHLORIDE 10 MG/1
10 TABLET ORAL DAILY PRN
Status: DISCONTINUED | OUTPATIENT
Start: 2024-06-12 | End: 2024-06-20 | Stop reason: HOSPADM

## 2024-06-12 RX ORDER — UREA 10 %
5 LOTION (ML) TOPICAL NIGHTLY PRN
Status: DISCONTINUED | OUTPATIENT
Start: 2024-06-12 | End: 2024-06-17

## 2024-06-12 RX ORDER — PANTOPRAZOLE SODIUM 40 MG/1
40 TABLET, DELAYED RELEASE ORAL DAILY
Status: DISCONTINUED | OUTPATIENT
Start: 2024-06-12 | End: 2024-06-20 | Stop reason: HOSPADM

## 2024-06-12 RX ORDER — NIFEDIPINE 30 MG
30 TABLET, EXTENDED RELEASE ORAL
Status: DISCONTINUED | OUTPATIENT
Start: 2024-06-12 | End: 2024-06-20 | Stop reason: HOSPADM

## 2024-06-12 RX ORDER — ONDANSETRON 2 MG/ML
4 INJECTION INTRAMUSCULAR; INTRAVENOUS EVERY 6 HOURS PRN
Status: DISCONTINUED | OUTPATIENT
Start: 2024-06-12 | End: 2024-06-20 | Stop reason: HOSPADM

## 2024-06-12 RX ADMIN — Medication 1000 MCG: at 12:31

## 2024-06-12 RX ADMIN — INSULIN LISPRO 3 UNITS: 100 INJECTION, SOLUTION INTRAVENOUS; SUBCUTANEOUS at 17:30

## 2024-06-12 RX ADMIN — DOXYCYCLINE 100 MG: 100 INJECTION, POWDER, LYOPHILIZED, FOR SOLUTION INTRAVENOUS at 13:38

## 2024-06-12 RX ADMIN — DOXYCYCLINE 100 MG: 100 INJECTION, POWDER, LYOPHILIZED, FOR SOLUTION INTRAVENOUS at 01:29

## 2024-06-12 RX ADMIN — Medication 10 ML: at 09:11

## 2024-06-12 RX ADMIN — HYDROCORTISONE 1 APPLICATION: 10 OINTMENT TOPICAL at 12:31

## 2024-06-12 RX ADMIN — GUAIFENESIN 1200 MG: 600 TABLET, EXTENDED RELEASE ORAL at 09:10

## 2024-06-12 RX ADMIN — BUDESONIDE AND FORMOTEROL FUMARATE DIHYDRATE 2 PUFF: 160; 4.5 AEROSOL RESPIRATORY (INHALATION) at 07:57

## 2024-06-12 RX ADMIN — CINACALCET 30 MG: 30 TABLET, FILM COATED ORAL at 12:30

## 2024-06-12 RX ADMIN — PANTOPRAZOLE SODIUM 40 MG: 40 TABLET, DELAYED RELEASE ORAL at 11:21

## 2024-06-12 RX ADMIN — METOPROLOL TARTRATE 100 MG: 100 TABLET, FILM COATED ORAL at 12:31

## 2024-06-12 RX ADMIN — PREDNISONE 40 MG: 20 TABLET ORAL at 09:10

## 2024-06-12 RX ADMIN — Medication 5 MG: at 23:58

## 2024-06-12 RX ADMIN — CEFTRIAXONE 1000 MG: 1 INJECTION, POWDER, FOR SOLUTION INTRAMUSCULAR; INTRAVENOUS at 01:29

## 2024-06-12 RX ADMIN — INSULIN LISPRO 5 UNITS: 100 INJECTION, SOLUTION INTRAVENOUS; SUBCUTANEOUS at 11:21

## 2024-06-12 RX ADMIN — NIFEDIPINE 30 MG: 30 TABLET, EXTENDED RELEASE ORAL at 12:30

## 2024-06-12 RX ADMIN — INSULIN LISPRO 3 UNITS: 100 INJECTION, SOLUTION INTRAVENOUS; SUBCUTANEOUS at 09:10

## 2024-06-12 RX ADMIN — Medication 500 MCG: at 12:30

## 2024-06-12 NOTE — PLAN OF CARE
Goal Outcome Evaluation:      Pt has progressively gotten more aggressive toward family and staff throughout the shift, rearing arm back as if to strike staff during care. Pt has not rested well, family member at bedside states this is most likely the reason the patient has become aggravated. Pt family member at bedside, participating in care, attentive to pt. Will continue with plan of care.

## 2024-06-12 NOTE — CASE MANAGEMENT/SOCIAL WORK
Discharge Planning Assessment   [place]     Patient Name: Zeenat Dong  MRN: [id number]  Today's Date: 6/12/2024    Admit Date: 6/10/2024         Discharge Plan       Row Name 06/12/24 1544       Plan    Plan SS spoke with Pt and Pt's spouse at bedside on this date. PT has recommended SNF placement. Pt and dtr are agreeable, prefer [facility]. SS contacted [facility] per Debra and faxed referral for review to fax [phone]. SS to follow.                   PAT NicoleW

## 2024-06-12 NOTE — PROGRESS NOTES
[facility] HOSPITALIST PROGRESS NOTE     Patient Identification:  Name:  Zeenat Dong  Age:  89 y.o.  Sex:  female  :  1928  MRN:  [id number]  Visit Number:  [id number]  ROOM: [address] [place] [address]     Primary Care Provider:  Sravani Chapa APRN    Length of stay in inpatient status:  1    Subjective     Chief Compliant:    Chief Complaint   Patient presents with    Abnormal Lab    Weakness - Generalized    Altered Mental Status       History of Presenting Illness:    Patient seen in follow-up for weakness, suspected pneumonia and pericardial effusion.  Patient is hard of hearing, family present at bedside.  No chest pain/pressure or tightness.  Has been afebrile.  Previously was walking about a week or 2 ago but has been on a continued decline since.  No adverse events noted overnight.    Objective     Current Hospital Meds:budesonide-formoterol, 2 puff, Inhalation, BID - RT  cefTRIAXone, 1,000 mg, Intravenous, Q24H  cinacalcet, 30 mg, Oral, Daily  doxycycline, 100 mg, Intravenous, Q12H  folic acid, 500 mcg, Oral, Daily  guaiFENesin, 1,200 mg, Oral, Q12H  hydrocortisone, 1 Application, Topical, BID  insulin lispro, 2-7 Units, Subcutaneous, 4x Daily AC & at Bedtime  metoprolol tartrate, 100 mg, Oral, Q12H  NIFEdipine CC, 30 mg, Oral, Q24H  pantoprazole, 40 mg, Oral, Daily  predniSONE, 40 mg, Oral, Daily  sodium chloride, 10 mL, Intravenous, Q12H  vitamin B-12, 1,000 mcg, Oral, Once per day on     sodium chloride, 75 mL/hr, Last Rate: 75 mL/hr (24 1417)        Current Antimicrobial Therapy:  Anti-Infectives (From admission, onward)      Ordered     Dose/Rate Route Frequency Start Stop    24 0421  cefTRIAXone (ROCEPHIN) 1,000 mg in sodium chloride 0.9 % 100 mL IVPB-VTB        Ordering Provider: Lilli Sharpe PA-C    1,000 mg  200 mL/hr over 30 Minutes Intravenous Every 24 Hours 24 0200 24 0159    24 0421  doxycycline (VIBRAMYCIN) 100  mg in sodium chloride 0.9 % 100 mL IVPB-VTB        Ordering Provider: Lilli Sharpe PA-C    100 mg  over 60 Minutes Intravenous Every 12 Hours 06/11/24 1400 06/16/24 1359    06/11/24 0153  doxycycline (VIBRAMYCIN) 100 mg in sodium chloride 0.9 % 100 mL IVPB-VTB        Ordering Provider: Haylee Rawls DO    100 mg  over 60 Minutes Intravenous Once 06/11/24 0209 06/11/24 0327    06/11/24 0152  cefTRIAXone (ROCEPHIN) 1,000 mg in sodium chloride 0.9 % 100 mL IVPB-VTB        Ordering Provider: Haylee Rawls DO    1,000 mg  200 mL/hr over 30 Minutes Intravenous Once 06/11/24 0208 06/11/24 0250          Current Diuretic Therapy:  Diuretics (From admission, onward)      None          ----------------------------------------------------------------------------------------------------------------------  Vital Signs:  Temp:  [97.8 °F (36.6 °C)-98.4 °F (36.9 °C)] 98.3 °F (36.8 °C)  Heart Rate:  [] 119  Resp:  [16-18] 18  BP: (139-180)/(67-89) 139/74  SpO2:  [96 %-98 %] 96 %  on   ;   Device (Oxygen Therapy): room air  Body mass index is 23.38 kg/m².    Wt Readings from Last 3 Encounters:   06/11/24 59.9 kg (132 lb)   12/14/23 60.7 kg (133 lb 12.8 oz)   06/21/16 67.6 kg (149 lb)     Intake & Output (last 3 days)         06/09 0701  06/10 0700 06/10 0701 06/11 0700 06/11 0701 06/12 0700 06/12 0701  06/13 0700    P.O.   120     I.V. (mL/kg)   300 (5)     Total Intake(mL/kg)   420 (7)     Net   +420             Urine Unmeasured Occurrence   2 x           Diet: Regular/House; Fluid Consistency: Thin (IDDSI 0)  ----------------------------------------------------------------------------------------------------------------------  Physical exam:  Constitutional: Elderly female-looks good for her age, Well-developed and well-nourished, resting comfortably in bed, no acute distress.      HENT:  Head:  Normocephalic and atraumatic.  Mouth:  Moist mucous membranes.  Eyes:  Conjunctivae and EOM are normal. No scleral icterus.    Cardiovascular: Tachycardia, regular rhythm and normal heart sounds with no murmur. No JVD.   Pulmonary/Chest:  No respiratory distress, no wheezes, no crackles, with normal breath sounds and good air movement. Unlabored. No accessory muscle use.  Abdominal:  Soft. No distension and no tenderness.  Bowel sounds present. No rebound or guarding.   Musculoskeletal:  No tenderness, and no deformity.  No red or swollen joints anywhere.    Neurological:  Alert and oriented to person, place, and time.  No cranial nerve deficit.   Nonfocal.   Skin:  Skin is warm and dry. No rash noted. No pallor.   Peripheral vascular:  No clubbing, no cyanosis, no edema. Pedal and tibial pulses 2 out of 4 bilaterally.     ----------------------------------------------------------------------------------------------------------------------  Results from last 7 days   Lab Units 06/12/24  0755 06/11/24  0047 06/10/24  2101   CRP mg/dL  --   --  7.27*   LACTATE mmol/L  --  1.8 2.1*   WBC 10*3/mm3 16.97*  --  20.64*   HEMOGLOBIN g/dL 11.3*  --  12.3   HEMATOCRIT % 34.2  --  38.8   MCV fL 90.0  --  94.9   MCHC g/dL 33.0  --  31.7   PLATELETS 10*3/mm3 205  --  228     Results from last 7 days   Lab Units 06/11/24  0329   PH, ARTERIAL pH units 7.489*   PO2 ART mm Hg 69.8*   PCO2, ARTERIAL mm Hg 33.7*   HCO3 ART mmol/L 25.5     Results from last 7 days   Lab Units 06/12/24  0755 06/11/24  0342 06/10/24  2101   SODIUM mmol/L 140 141 141   POTASSIUM mmol/L 4.2 4.5 4.5   MAGNESIUM mg/dL  --  1.8 1.3*   CHLORIDE mmol/L 108* 107 107   CO2 mmol/L 20.6* 22.6 20.6*   BUN mg/dL 22 28* 29*   CREATININE mg/dL 1.19* 1.42* 1.60*   CALCIUM mg/dL 9.1 9.7* 9.8*   PHOSPHORUS mg/dL  --  3.1  --    GLUCOSE mg/dL 239* 216* 281*   ALBUMIN g/dL 3.1*  --  3.0*   BILIRUBIN mg/dL 0.3  --  0.4   ALK PHOS U/L 77  --  73   AST (SGOT) U/L 12  --  13   ALT (SGPT) U/L 12  --  14   Estimated Creatinine Clearance: 26.1 mL/min (A) (by C-G formula based on SCr of 1.19 mg/dL  "(H)).  Ammonia   Date Value Ref Range Status   06/10/2024 57 (H) 11 - 51 umol/L Final     Results from last 7 days   Lab Units 06/11/24  0534 06/11/24  0342 06/10/24  2101   HSTROP T ng/L 27* 26* 29*     Results from last 7 days   Lab Units 06/10/24  2101   PROBNP pg/mL 9,871.0*         Hemoglobin A1C   Date/Time Value Ref Range Status   06/10/2024 2101 6.60 (H) 4.80 - 5.60 % Final     Glucose   Date/Time Value Ref Range Status   06/12/2024 1044 336 (H) 70 - 130 mg/dL Final   06/12/2024 0739 212 (H) 70 - 130 mg/dL Final   06/11/2024 2028 250 (H) 70 - 130 mg/dL Final   06/11/2024 1636 223 (H) 70 - 130 mg/dL Final   06/11/2024 1030 145 (H) 70 - 130 mg/dL Final   06/11/2024 0718 179 (H) 70 - 130 mg/dL Final   06/10/2024 2159 244 (H) 70 - 130 mg/dL Final     Lab Results   Component Value Date    TSH 3.752 08/17/2015    FREET4 1.09 08/17/2015     No results found for: \"PREGTESTUR\", \"PREGSERUM\", \"HCG\", \"HCGQUANT\"  Pain Management Panel  More data may exist         Latest Ref Rng & Units 6/10/2024 8/17/2015   Pain Management Panel   Creatinine, Urine mg/dL  mg/dL - 129.2  130.0    Amphetamine, Urine Qual Negative Negative  -   Barbiturates Screen, Urine Negative Negative  -   Benzodiazepine Screen, Urine Negative Negative  -   Buprenorphine, Screen, Urine Negative Negative  -   Cocaine Screen, Urine Negative Negative  -   Fentanyl, Urine Negative Negative  -   Methadone Screen , Urine Negative Negative  -   Methamphetamine, Ur Negative Negative  -     Brief Urine Lab Results  (Last result in the past 365 days)        Color   Clarity   Blood   Leuk Est   Nitrite   Protein   CREAT   Urine HCG        06/10/24 2313 Yellow   Clear   Negative   Negative   Negative   Negative                 Blood Culture   Date Value Ref Range Status   06/11/2024 No growth at 24 hours  Preliminary   06/11/2024 No growth at 24 hours  Preliminary     No results found for: \"URINECX\"  No results found for: \"WOUNDCX\"  No results found for: " "\"STOOLCX\"  No results found for: \"RESPCX\"  No results found for: \"AFBCX\"  Results from last 7 days   Lab Units 06/11/24  0047 06/10/24  2101   LACTATE mmol/L 1.8 2.1*   SED RATE mm/hr  --  43*   CRP mg/dL  --  7.27*       I have personally looked at the labs and they are summarized above.  ----------------------------------------------------------------------------------------------------------------------  Detailed radiology reports for the last 24 hours:  Imaging Results (Last 24 Hours)       ** No results found for the last 24 hours. **          Assessment & Plan      Patient is a 89-year-old female with history significant for CKD, hyperlipidemia, hyperparathyroidism and atrial fibrillation who presented to the ER with complaints of weakness and leukocytosis noted on outpatient labs.    #Sepsis due to community-acquired pneumonia, bacterial, unspecified  #Metabolic encephalopathy  --Patient presented with overall weakness, leukocytosis.  Afebrile and hemodynamically stable with otherwise benign workup aside from CT imaging findings.  --Admit labs WBC 17 K, elevated inflammatory markers  --Initial ABG unremarkable  --Blood cultures NGTD, Legionella/strep pneumo negative  --Viral PCR negative  --CT chest noted questionable infiltrates in the left lower lobe  --Continue p.o. prednisone  --Continue as needed DuoNebs, Symbicort, Symbicort  --Continue Rocephin/Doxy to complete treatment course    #Atrial fibrillation  #HFpEF, not in acute exacerbation  #Essential hypertension  #Large pericardial effusion without tamponade physiology  --No chest pain/pressure or tightness  --EKG with atrial fibrillation, no acute ischemic changes  --Troponin 26, repeat 27 with a delta of 1  --Echo EF 66 to 70%, large greater than 2 cm pericardial effusion without evidence of tamponade physiology  --Hold diuretics  --Continue beta-blocker, Eliquis (reduced dose) for stroke prophylaxis  --Cardiology consulted, suspected chronic " pericardial effusion and recommend infectious disease consult and outpatient follow-up next month    #Acute kidney injury on CKD stage IIIa due to prerenal azotemia  --Baseline creatinine around 1, creatinine 1.6 on admission  --CT negative for obstruction  --CK normal  --Continue IV fluids, replace labs in a.m.    #Type 2 diabetes mellitus, SSI, adjust as needed   #Hypomagnesemia, replace  #Acute on chronic debility, PT/OT, agreeable to SNF  #Hyperlipidemia, statin  #Hyperparathyroidism, Sensipar  #GERD, PPI    CHECKLIST:  Abx: Rocephin, Doxy  VTE: Eliquis  GI ppx: PPI  Diet: Modified  Code: No CPR, limited  Dispo: Patient is medically stable, ID consulted per cards recs.  Agreeable to SNF placement, social work following    Reji Calvillo DO  [facility] Hospitalist  06/12/24  12:09 EDT

## 2024-06-12 NOTE — PLAN OF CARE
Goal Outcome Evaluation:  Plan of Care Reviewed With: patient           Outcome Evaluation: Patient rested in bed during shift. Patient has no complaints or concerns during shift. Will continue with plan of care.

## 2024-06-12 NOTE — DISCHARGE PLACEMENT REQUEST
"Zeenat Henriquez (89 y.o. Female)       Date of Birth   06/09/1928    Social Security Number       Address   [facility] [address] [address]    Home Phone   [phone]    MRN   [id number]       [facility]    Marital Status                               Admission Date   6/10/24    Admission Type   Emergency    Admitting Provider   Sravanthi Richard MD    Attending Provider   Reji Calvillo DO    Department, Room/Bed   [address], 3348/1S       Discharge Date       Discharge Disposition       Discharge Destination                                 Attending Provider: Reji Calvillo DO    Allergies: No Known Allergies    Isolation: None   Infection: None   Code Status: No CPR    Ht: 160 cm (63\")   Wt: 59.9 kg (132 lb)    Admission Cmt: None   Principal Problem: Sepsis [A41.9]                   Active Insurance as of 6/10/2024       Primary Coverage       Payor Plan Insurance Group Employer/Plan Group    MEDICARE MEDICARE A & B        Payor Plan Address Payor Plan Phone Number Payor Plan Fax Number Effective Dates    [address] [phone]  6/1/1993 - None Entered    [place] [id number]         Subscriber Name Subscriber Birth Date Member ID       ZEENAT HENRIQUEZ 6/9/1928 [id number]                     Emergency Contacts        (Rel.) Home Phone Work Phone Mobile Phone    Jeff Deras (Daughter) [phone] --                 History & Physical        Lilli Sharpe PA-C at 06/11/24 0242       Attestation signed by Sravanthi Richard MD at 06/11/24 0640    I have reviewed this documentation and agree.  I have discussed and formulated the assessment and plan with COLIN Reeder.  The patient meets criteria for severe sepsis per CMS, as lactic acid is 2.1, white blood cell count 20,640, heart rates .  Imaging shows a left lower lobe infiltrate and she appears to be in acute kidney injury as the baseline creatinine is " 0.95 and the admission creatinine is 1.6.  Patient does have some baseline dementia.  We will start the patient on ceftriaxone and doxycycline 9; I have ordered a sputum culture.  I have consulted palliative care for goals of care discussion.  I have also started her on normal saline at 75 mL/h for the acute kidney injury.  We are using SCUDs for DVT prophylaxis.  The patient is admitted to the medical surgical floor but due to lack of beds she is currently a border in ER room 117.                       [facility] Medicine Services  HISTORY & PHYSICAL    Patient Identification:  Name:  Zeenat Dong  Age:  89 y.o.  Sex:  female  :  1928  MRN:  [id number]   Visit Number:  [id number]  Admit Date: 6/10/2024   Primary Care Physician:  Fatmata Dong APRN     Subjective     Chief complaint:   Chief Complaint   Patient presents with    Abnormal Lab    Weakness - Generalized    Altered Mental Status     History of presenting illness:   Patient is a 89 y.o. female with past medical history significant for hypertension, hyperlipidemia, hypercalcemia, hyperparathyroidism, GERD, CHF, atrial fibrillation, chronic kidney disease stage IIIa, type II DM that presented to the [facility] emergency department for evaluation of altered mental status.    Patient examined at bedside in ED.  Patient present presented to the ED with daughter and granddaughter due to concerns of worsening altered mental status and hallucinations over the past week.  Said similar symptoms in the past when diagnosed with UTI, however she was negative for UTI at her PCP earlier this week..  Daughter reports the patient has had a poor appetite as well, and does not eat or drink very much.  Reports patient is typically on 40 mg of Lasix daily, however given worsening weakness there was concern she may have been dehydrated so she was decreased to 20 mg of Lasix daily saw her PCP last week.  However, patient did take full  40 mg the past 2 days.  Granddaughter Notes that patient has become significantly weaker over the past few days.  At the time of my exam, patient was lethargic.  She does arouse to verbal or tactile stimuli, and occasionally responded verbally.  She would not answer any orientation questions or ROS.  Patient does live with her daughter and has multiple other family numbers to help provide care for her.    Upon arrival to the ED, vitals were temperature 98.1, , RR 18, /69, SpO2 97% on room air.  Significant for initial troponin 29, repeat pending.  proBNP 9871.  BUN 29, creatinine 1.60.  Magnesium 1.3.  Ammonia 57, CRP 7.27, lactate 2.1.  WBC 20.64 with left shift.  CT abdomen/pelvis with no acute intra-abdominal process.  CT chest shows focal airspace opacity left lower lobe favors atelectasis over infiltrate.  Also small left pleural effusion.  Cardiomegaly.  Small pericardial effusion.  CT head no acute intracranial process.    Patient has been admitted to the medical telemetry.  ---------------------------------------------------------------------------------------------------------------------   Review of Systems   Unable to perform ROS: Mental status change      ---------------------------------------------------------------------------------------------------------------------   Past Medical History:   Diagnosis Date    Hyperlipidemia      Past Surgical History:   Procedure Laterality Date    OTHER SURGICAL HISTORY      ear surgery    SKIN BIOPSY       Family History   Problem Relation Age of Onset    Other Father         cardiovascular disease    Kidney disease Sister      Social History     Socioeconomic History    Marital status:    Tobacco Use    Smoking status: Never   Vaping Use    Vaping status: Never Used   Substance and Sexual Activity    Alcohol use: No    Drug use: Never      ---------------------------------------------------------------------------------------------------------------------   Allergies:  Patient has no known allergies.  ---------------------------------------------------------------------------------------------------------------------   Medications below are reported home medications pulling from within the system; at this time, these medications have not been reconciled unless otherwise specified and are in the verification process for further verifcation as current home medications.    Prior to Admission Medications       Prescriptions Last Dose Informant Patient Reported? Taking?    apixaban (ELIQUIS) 5 MG tablet tablet  Family Member, Other Yes No    Take 1 tablet by mouth 2 (Two) Times a Day.    cetirizine (zyrTEC) 10 MG tablet  Family Member, Other Yes No    Take 1 tablet by mouth Daily.    cinacalcet (SENSIPAR) 30 MG tablet  Family Member, Other No No    Take 1 tablet by mouth daily.    erythromycin (ROMYCIN) 5 MG/GM ophthalmic ointment  Family Member, Other Yes No    Administer 1 application  to both eyes 3 (Three) Times a Day As Needed (for redness/dryness).    ezetimibe (ZETIA) 10 MG tablet  Family Member, Other Yes No    Take 1 tablet by mouth Daily.    furosemide (LASIX) 40 MG tablet  Family Member, Other Yes No    Take 1 tablet by mouth Daily.    losartan (COZAAR) 50 MG tablet  Family Member, Other Yes No    Take 1 tablet by mouth 2 (Two) Times a Day.    metoprolol tartrate (LOPRESSOR) 100 MG tablet  Family Member, Other No No    Take 1 tablet by mouth 2 (two) times a day. For hypertension.    pantoprazole (PROTONIX) 40 MG EC tablet  Family Member, Other No No    Take 1 tablet by mouth daily. For gerd without esophagitis.    potassium chloride (K-DUR,KLOR-CON) 10 MEQ CR tablet  Family Member, Other No No    Take 1 tablet by mouth daily. For hypokalemia.    vitamin D (ERGOCALCIFEROL) 1.25 MG (97064 UT) capsule capsule  Family Member, Other Yes No     Take 1 capsule by mouth 1 (One) Time Per Week.          ---------------------------------------------------------------------------------------------------------------------    Objective     Hospital Scheduled Meds:  sodium chloride, 10 mL, Intravenous, Q12H      sodium chloride, 75 mL/hr, Last Rate: 75 mL/hr (06/11/24 0319)        Current listed hospital scheduled medications may not yet reflect those currently placed in orders that are signed and held, awaiting patient's arrival to floor/unit.    ---------------------------------------------------------------------------------------------------------------------   Vital Signs:  Temp:  [98.9 °F (37.2 °C)] 98.9 °F (37.2 °C)  Heart Rate:  [] 93  Resp:  [18] 18  BP: (127-165)/(68-94) 162/68  Mean Arterial Pressure (Non-Invasive) for the past 24 hrs (Last 3 readings):   Noninvasive MAP (mmHg)   06/11/24 0215 117   06/11/24 0200 91   06/11/24 0145 105     SpO2 Percentage    06/11/24 0145 06/11/24 0200 06/11/24 0215   SpO2: 98% 98% 98%     SpO2:  [96 %-100 %] 98 %  on   ;   Device (Oxygen Therapy): room air    Body mass index is 25.78 kg/m².  Wt Readings from Last 3 Encounters:   06/10/24 59.9 kg (132 lb)   12/14/23 60.7 kg (133 lb 12.8 oz)   06/21/16 67.6 kg (149 lb)     ---------------------------------------------------------------------------------------------------------------------   Physical Exam:  Physical Exam  Constitutional:       General: She is not in acute distress.     Appearance: Normal appearance. She is ill-appearing.   HENT:      Head: Normocephalic and atraumatic.      Right Ear: External ear normal.      Left Ear: External ear normal.      Nose: No nasal deformity.      Mouth/Throat:      Lips: Pink.      Mouth: Mucous membranes are moist.   Eyes:      Conjunctiva/sclera: Conjunctivae normal.      Pupils: Pupils are equal, round, and reactive to light.   Cardiovascular:      Rate and Rhythm: Normal rate. Rhythm irregularly irregular.       Pulses:           Dorsalis pedis pulses are 2+ on the right side and 2+ on the left side.      Heart sounds: Normal heart sounds.   Pulmonary:      Effort: Pulmonary effort is normal.      Breath sounds: Examination of the right-lower field reveals decreased breath sounds. Examination of the left-lower field reveals decreased breath sounds. Decreased breath sounds present. No wheezing, rhonchi or rales.   Abdominal:      General: Abdomen is flat. Bowel sounds are normal.      Palpations: Abdomen is soft.      Tenderness: There is no guarding or rebound.   Genitourinary:     Comments: No calhoun catheter in place   Musculoskeletal:      Cervical back: Neck supple. Normal range of motion.      Right lower leg: No edema.      Left lower leg: No edema.   Skin:     General: Skin is warm and dry.   Neurological:      General: No focal deficit present.      Mental Status: She is alert and easily aroused. She is lethargic and confused.   Psychiatric:         Mood and Affect: Mood normal.         Behavior: Behavior normal.       ---------------------------------------------------------------------------------------------------------------------  EKG: Atrial fibrillation.  Heart rate 95.          I have personally reviewed the EKG/Telemetry strip  ---------------------------------------------------------------------------------------------------------------------   Results from last 7 days   Lab Units 06/10/24  2101   HSTROP T ng/L 29*     Results from last 7 days   Lab Units 06/10/24  2101   PROBNP pg/mL 9,871.0*       Results from last 7 days   Lab Units 06/11/24  0329   PH, ARTERIAL pH units 7.489*   PO2 ART mm Hg 69.8*   PCO2, ARTERIAL mm Hg 33.7*   HCO3 ART mmol/L 25.5     Results from last 7 days   Lab Units 06/11/24  0047 06/10/24  2101   CRP mg/dL  --  7.27*   LACTATE mmol/L 1.8 2.1*   WBC 10*3/mm3  --  20.64*   HEMOGLOBIN g/dL  --  12.3   HEMATOCRIT %  --  38.8   MCV fL  --  94.9   MCHC g/dL  --  31.7   PLATELETS  "10*3/mm3  --  228     Results from last 7 days   Lab Units 06/10/24  2101   SODIUM mmol/L 141   POTASSIUM mmol/L 4.5   MAGNESIUM mg/dL 1.3*   CHLORIDE mmol/L 107   CO2 mmol/L 20.6*   BUN mg/dL 29*   CREATININE mg/dL 1.60*   CALCIUM mg/dL 9.8*   GLUCOSE mg/dL 281*   ALBUMIN g/dL 3.0*   BILIRUBIN mg/dL 0.4   ALK PHOS U/L 73   AST (SGOT) U/L 13   ALT (SGPT) U/L 14   Estimated Creatinine Clearance: 16.7 mL/min (A) (by C-G formula based on SCr of 1.6 mg/dL (H)).  Ammonia   Date Value Ref Range Status   06/10/2024 57 (H) 11 - 51 umol/L Final       Glucose   Date/Time Value Ref Range Status   06/10/2024 2159 244 (H) 70 - 130 mg/dL Final     Lab Results   Component Value Date    HGBA1C 6.9 (H) 08/18/2015     Lab Results   Component Value Date    TSH 3.752 08/17/2015    FREET4 1.09 08/17/2015       No results found for: \"BLOODCX\"  No results found for: \"URINECX\"  No results found for: \"WOUNDCX\"  No results found for: \"STOOLCX\"  No results found for: \"RESPCX\"  No results found for: \"MRSACX\"  Pain Management Panel  More data may exist         Latest Ref Rng & Units 6/10/2024 8/17/2015   Pain Management Panel   Creatinine, Urine mg/dL  mg/dL - 129.2  130.0    Amphetamine, Urine Qual Negative Negative  -   Barbiturates Screen, Urine Negative Negative  -   Benzodiazepine Screen, Urine Negative Negative  -   Buprenorphine, Screen, Urine Negative Negative  -   Cocaine Screen, Urine Negative Negative  -   Fentanyl, Urine Negative Negative  -   Methadone Screen , Urine Negative Negative  -   Methamphetamine, Ur Negative Negative  -     I have personally reviewed the above laboratory results.   ---------------------------------------------------------------------------------------------------------------------  Imaging Results (Last 7 Days)       Procedure Component Value Units Date/Time    CT Abdomen Pelvis Without Contrast [[id number]] Collected: 06/10/24 2337     Updated: 06/10/24 2348    Narrative:      Noncontrast CT abdomen and " pelvis     Indications: Altered mental status     Technique: Noncontrast CT images of the abdomen and pelvis were  obtained.  Limited exposure control, adjustment of the MA and/or KV  according to patient size or use of an iterative reconstruction  technique was utilized.     Findings CT abdomen pelvis:     No prior studies available.     The liver and spleen are normal.  There is no biliary dilation.     The pancreas is normal.     The adrenal glands and kidneys are normal.     There is no abdominal or retroperitoneal lymphadenopathy.     The bowel loops are normal in course and caliber.  There is  diverticulosis of the descending and sigmoid colon.     There is no pelvic mass or free fluid or lymphadenopathy.  Endometrial  cavity is abnormally dilated measuring 1.3 cm.     The osseous structures are normal.       Impression:      Impression:  1.  No CT evidence of an acute intra-abdominal or intrapelvic process.  2.  Abnormally dilated endometrium.  Suggest nonemergent follow-up with  pelvic ultrasound     This report was finalized on 6/10/2024 11:38 PM by Renard Roberts MD.       CT Chest Without Contrast Diagnostic [[id number]] Collected: 06/10/24 2335     Updated: 06/10/24 2337    Narrative:      Noncontrast CT chest     Indications: Altered mental status     Technique: Noncontrast CT images of the chest were obtained.  Limited  exposure control, adjustment of the MA and/or KV according to patient  size or use of an iterative reconstruction technique was utilized.     Findings CT chest:     Comparison is made with a prior study dated 12/7/2023     The heart is enlarged.  A small pericardial effusion is present.   Thoracic aorta is normal in course and caliber.  There is no hilar or  mediastinal lymphadenopathy.  Atherosclerotic calcifications involve the  thoracic aorta and coronary arteries.     There is a focal airspace opacity at the left lung base favoring  atelectasis over infiltrate.  Small left pleural  effusion is present.     Degenerative changes involve the thoracic spine.          Impression:      Impression:  1.  Focal airspace opacity at the left lower lobe favors atelectasis  over infiltrate.  There also is a small left pleural effusion.  2.  Cardiomegaly  3.  Small pericardial effusion, new since the prior study     This report was finalized on 6/10/2024 11:35 PM by Renard Roberts MD.       CT Head Without Contrast [[id number]] Collected: 06/10/24 2332     Updated: 06/10/24 2335    Narrative:      Noncontrast CT brain     Indications: Altered mental status     Technique: Noncontrast CT images of the brain were obtained.  Limited  exposure control, adjustment of the MA and/or KV according to patient  size or use of an iterative reconstruction technique was utilized.     Findings:     No prior studies available.     There is no evidence of acute intracranial hemorrhage.  The ventricles  are normal in size and position.  There is no mass-effect or midline  shift.  Patchy and confluent areas of hypodensity involve the  periventricular deep white matter compatible sequelae of chronic small  vessel ischemic disease.  No abnormal extra-axial fluid collections are  demonstrated.     The bony calvarium is normal.       Impression:      Impression:     No CT evidence of an acute intracranial process.     This report was finalized on 6/10/2024 11:32 PM by Renard Roberts MD.       XR Chest 1 View [[id number]] Collected: 06/10/24 2201     Updated: 06/10/24 2204    Narrative:      PROCEDURE: Portable chest x-ray examination performed on Ruma 10, 2024.  Single view. Upright position.     HISTORY: Weakness. Altered mental status.     COMPARISON: None.     FINDINGS:     Enlarged heart size  Elevated right hemidiaphragm.  Coarsened bronchovascular pattern to the lungs.  Possible left lower lobe pneumonia.  Possible small left pleural effusion.  No interstitial edema or pneumothorax.  No free air in the upper abdomen.        Impression:         1.  Enlarged heart size.  2.  Left lower lobe atelectasis versus pneumonia with small left pleural  effusion.  3.  Mild elevated right hemidiaphragm.  4.  No pneumothorax.     This report was finalized on 6/10/2024 10:02 PM by Joseph Harmon MD.             I have personally reviewed the above radiology results.     Last Echocardiogram:  Results for orders placed during the hospital encounter of 12/07/23    Adult Transthoracic Echo Complete W/ Cont if Necessary Per Protocol    Interpretation Summary    Left ventricular systolic function is normal. Left ventricular ejection fraction appears to be 61 - 65%.    Left ventricular diastolic function is consistent with (grade II w/high LAP) pseudonormalization.    The left atrial cavity is mild to moderately dilated.    The right atrial cavity is mildly  dilated.    Moderate aortic valve regurgitation is present.    Estimated right ventricular systolic pressure from tricuspid regurgitation is moderately elevated (45-55 mmHg).    ---------------------------------------------------------------------------------------------------------------------    Assessment & Plan      Active Hospital Problems    Diagnosis  POA    **Sepsis [A41.9]  Yes     Sepsis secondary to left lower lobe pneumonia, POA  CT chest significant for atelectasis versus pneumonia.  Will treat as pneumonia given elevated inflammatory markers and change in mental status.  Continue/add supplemental oxygen via nasal cannula to maintain O2 > or equal to 90%.  Order urinary legionella, strep pneumo.  PCR negative in ED.  PRN nebs every 6 hours.   Doxycycline and Rocephin given in ED.  Continue these as empiric antibiotic coverage at this time.  Blood cultures obtained in ED  No hypoxia on ABG.  Continue to monitor respiratory status.  Acute kidney injury on CKD stage IIIa, POA  Hypomagnesemia, POA  Baseline Cr 1.09 in January, was up to 1.60 on admission  Continue mIVFs, Trend Cr and urine  output, avoid nephrotoxins, NSAIDs, dehydration and contrast as able.  Magnesium replacement ordered.  Continue to replace as indicated.  Repeat BMP in the a.m.   Generalized weakness, POA  Altered mental status, POA  Likely secondary to pneumonia and WINSTON.  Monitor improvement with treatment.  PT/OT/SLP evaluations.    CHRONIC MEDICAL PROBLEMS    Atrial fibrillation, POA  Resume home rate control medications and Eliquis pending med rec  HFpEF  Clinically compensated on exam.  proBNP elevated, however secondary to elevated creatinine.  Monitor closely for signs of volume overload given fluid resuscitation for WINSTON.  Hold Lasix for now.  Essential hypertension  BP appears well controlled   Plan to resume home antihypertensive regimen once reconciled per pharmacy with appropriate holding parameters to prevent hypotension and/or bradycardia   Closely monitor BP per hospital protocol, titrate medications as necessary  Type II DM  Hemoglobin A1C ordered to evaluate glycemic control.   No diabetic medications on preliminary med rec.  Hold any oral antihyperglycemic's to prevent hypoglycemia.    Start sliding scale insulin.  Accu-Cheks and hypoglycemia protocol in place.    Hyperlipidemia   Hypercalcemia  Hyperparathyroidism  GERD  Restart home medications pending med rec    F/E/N: IV normal saline at 75 mill per hour.  Continue monitor electrolytes and replace as indicated.  NPO.    ---------------------------------------------------  DVT Prophylaxis: Chronically anticoagulated with Eliquis  Activity: Up with assistance    The patient is considered to be a high risk patient due to: Sepsis secondary to left lower lobe pneumonia, WINSTON on CKD    INPATIENT status due to the need for care which can only be reasonably provided in an hospital setting such as aggressive/expedited ancillary services and/or consultation services, the necessity for IV medications, close physician monitoring and/or the possible need for procedures.  In  such, I feel patient’s risk for adverse outcomes and need for care warrant INPATIENT evaluation and predict the patient’s care encounter to likely last beyond 2 midnights.      Code Status: DNR/DNI    Disposition/Discharge planning: Pending clinical course, tentative plans to return home with family pending clinical improvement    I have discussed the patient's assessment and plan with Dr. Richard.      Lilli Sharpe PA-C  Hospitalist Service -- [facility]       06/11/24  04:02 EDT    Attending Physician: Dr. Richard.      Electronically signed by Sravanthi Richard MD at 06/11/24 0640       Vital Signs (last day)       Date/Time Temp Temp src Pulse Resp BP Patient Position SpO2    06/12/24 1400 97.8 (36.6) Oral 112 18 130/79 Lying 96    06/12/24 1000 98.3 (36.8) Oral 119 18 139/74 Lying 96    06/12/24 0751 -- -- -- -- -- -- 96    06/12/24 0600 97.9 (36.6) Oral 119 18 174/89 Lying --    06/12/24 0300 98.2 (36.8) Axillary 119 16 180/79 Lying 98    06/11/24 1943 -- -- 107 18 -- -- 98    06/11/24 1900 98.4 (36.9) Oral 117 18 151/67 Lying 96    06/11/24 1854 -- -- -- -- -- -- 97    06/11/24 1400 97.8 (36.6) Oral 97 16 140/88 Lying 98    06/11/24 0837 98.4 (36.9) Oral 101 16 182/90 Lying --    06/11/24 0700 -- -- 102 -- 148/90 -- 97    06/11/24 0630 -- -- 98 -- 143/81 -- 96    06/11/24 0600 -- -- 101 -- 166/80 -- 98    06/11/24 0530 -- -- 101 -- 157/93 -- 97    06/11/24 0430 -- -- 90 -- 128/85 -- 98    06/11/24 0330 -- -- 96 -- 167/75 -- 98    06/11/24 0230 -- -- 100 -- 134/95 -- 97    06/11/24 0215 -- -- 93 -- 162/68 -- 98    06/11/24 0200 -- -- 91 -- 159/75 -- 98    06/11/24 0145 -- -- 98 -- 160/78 -- 98    06/11/24 0130 -- -- 94 -- 146/72 -- 97    06/11/24 0115 -- -- 99 -- 161/81 -- 98    06/11/24 0100 -- -- 98 -- 159/74 -- 100    06/11/24 0045 -- -- 109 -- 154/80 -- 98    06/11/24 0030 -- -- 96 -- 148/75 -- 96    06/11/24 0015 -- -- 96 -- 148/82 -- 98    06/11/24 0000 -- -- 96 -- 147/85 -- 97           Lines, Drains & Airways       Active LDAs       Name Placement date Placement time Site Days    Peripheral IV 06/11/24 1451 Anterior;Distal;Left;Upper Arm 06/11/24  1451  Arm  1                  Current Facility-Administered Medications   Medication Dose Route Frequency Provider Last Rate Last Admin    acetaminophen (TYLENOL) tablet 650 mg  650 mg Oral Q6H PRN Reji Calvillo DO        sennosides-docusate (PERICOLACE) 8.6-50 MG per tablet 2 tablet  2 tablet Oral BID PRN Sravanthi Richard MD        And    polyethylene glycol (MIRALAX) packet 17 g  17 g Oral Daily PRN Sravanthi Richard MD        And    bisacodyl (DULCOLAX) EC tablet 5 mg  5 mg Oral Daily PRN Sravanthi Richard MD        And    bisacodyl (DULCOLAX) suppository 10 mg  10 mg Rectal Daily PRN Sravanthi Richard MD        budesonide-formoterol (SYMBICORT) 160-4.5 MCG/ACT inhaler 2 puff  2 puff Inhalation BID - RT Reji Calvillo DO   2 puff at 06/11/24 1943    cefTRIAXone (ROCEPHIN) 1,000 mg in sodium chloride 0.9 % 100 mL IVPB-VTB  1,000 mg Intravenous Q24H Lilli Sharpe PA-C 200 mL/hr at 06/12/24 0129 1,000 mg at 06/12/24 0129    cetirizine (zyrTEC) tablet 10 mg  10 mg Oral Daily PRN Reji Calvillo DO        cinacalcet (SENSIPAR) tablet 30 mg  30 mg Oral Daily Reji Calvillo DO   30 mg at 06/12/24 1230    dextrose (D50W) (25 g/50 mL) IV injection 25 g  25 g Intravenous Q15 Min PRN Lilli Sharpe PA-C        dextrose (GLUTOSE) oral gel 15 g  15 g Oral Q15 Min PRN Lilli Sharpe PA-C        doxycycline (VIBRAMYCIN) 100 mg in sodium chloride 0.9 % 100 mL IVPB-VTB  100 mg Intravenous Q12H Lilli Sharpe PA-C   100 mg at 06/12/24 1338    erythromycin (ROMYCIN) ophthalmic ointment 1 Application  1 Application Both Eyes BID PRN Reji Calvillo DO        folic acid (FOLVITE) tablet 500 mcg  500 mcg Oral Daily Reji Calvillo DO   500 mcg at 06/12/24 1230    glucagon HCl (Diagnostic)  injection 1 mg  1 mg Intramuscular Q15 Min PRN Lilli Sharpe PA-C        guaiFENesin (MUCINEX) 12 hr tablet 1,200 mg  1,200 mg Oral Q12H Reji Calvillo, DO   1,200 mg at 06/12/24 0910    hydrocortisone 1 % ointment 1 Application  1 Application Topical BID Reji Calvillo DO   1 Application at 06/12/24 1231    Insulin Lispro (humaLOG) injection 2-7 Units  2-7 Units Subcutaneous 4x Daily AC & at Bedtime Lilli Sharpe PA-C   5 Units at 06/12/24 1121    ipratropium (ATROVENT) nebulizer solution 0.5 mg  0.5 mg Nebulization Q6H PRN Lilli Sharpe PA-C        Magnesium Low Dose Replacement - Follow Nurse / BPA Driven Protocol   Does not apply PRN Sravanthi Richard MD        metoprolol tartrate (LOPRESSOR) tablet 100 mg  100 mg Oral Q12H Reji Calvillo, DO   100 mg at 06/12/24 1231    NIFEdipine CC (ADALAT CC) 24 hr tablet 30 mg  30 mg Oral Q24H Reji Calvillo DO   30 mg at 06/12/24 1230    nitroglycerin (NITROSTAT) SL tablet 0.4 mg  0.4 mg Sublingual Q5 Min PRN Sravanthi Richard MD        ondansetron (ZOFRAN) injection 4 mg  4 mg Intravenous Q6H PRN Reji Calvillo DO        pantoprazole (PROTONIX) EC tablet 40 mg  40 mg Oral Daily Reji Calvillo, DO   40 mg at 06/12/24 1121    Potassium Replacement - Follow Nurse / BPA Driven Protocol   Does not apply PRN Sravanthi Richard MD        predniSONE (DELTASONE) tablet 40 mg  40 mg Oral Daily Reji Calvillo, DO   40 mg at 06/12/24 0910    sodium chloride 0.9 % flush 10 mL  10 mL Intravenous PRN Haylee Rawls,         sodium chloride 0.9 % flush 10 mL  10 mL Intravenous Q12H Sravanthi Richard MD   10 mL at 06/12/24 0911    sodium chloride 0.9 % flush 10 mL  10 mL Intravenous PRN Sravanthi Richard MD        sodium chloride 0.9 % infusion 40 mL  40 mL Intravenous PRN Sravanthi Richard MD        sodium chloride 0.9 % infusion  75 mL/hr Intravenous Continuous Sravanthi Richard MD 75 mL/hr at  24 1417 75 mL/hr at 24 1417    vitamin B-12 (CYANOCOBALAMIN) tablet 1,000 mcg  1,000 mcg Oral Once per day on  Reji Calvillo DO   1,000 mcg at 24 1231     Operative/Procedure Notes (most recent note)    No notes of this type exist for this encounter.          Physician Progress Notes (most recent note)        Reji Calvillo DO at 24 1827          Patient admitted earlier this morning for left lower lobe pneumonia.  Workup unremarkable thus far aside from mild elevated creatinine.  Echo noted a 2 cm pericardial effusion without tamponade physiology.  Will ask cardiology for recommendations.  Continue antibiotics, mucolytic's, add prednisone/Symbicort.  Follow-up on cards recommendations, PT/OT.  Dispo Home when medically stable.    Electronically signed by Reji Calvillo DO at 24 1829          Consult Notes (most recent note)        Linda Fuentes MD at 24 1444          Consults    Patient Identification:    Name:  Zeenat Dong  Age:  89 y.o.  Sex:  female  :  1928  MRN:  [id number]  Visit Number:  [id number]  Primary care provider:  Sravani Chapa APRN    Chief complaint:   Complaining of generalized weakness fatigue, poor food intake and hallucinations.  Cardiology consult was called because patient has pericardial effusion on the echo without any tamponade physiology      History of presenting illness:   89-year-old white female who was presents scented to emergency room because of fatigue, hallucination in the emergency room during the workup patient found to have a possible pneumonia left lower lobe patient has similar symptoms when she had UTI in the past as per family  Denies any complaint of chest pain shortness of breath nausea vomiting.    ---------------------------------------------------------------------------------------------------------------------  ROS: All systems reviewed and  negative except noted above.  ---------------------------------------------------------------------------------------------------------------------   Past History:   Family History   Problem Relation Age of Onset    Other Father         cardiovascular disease    Kidney disease Sister      Past Medical History:   Diagnosis Date    Hyperlipidemia      Past Surgical History:   Procedure Laterality Date    OTHER SURGICAL HISTORY      ear surgery    SKIN BIOPSY       Social History     Socioeconomic History    Marital status:    Tobacco Use    Smoking status: Never   Vaping Use    Vaping status: Never Used   Substance and Sexual Activity    Alcohol use: No    Drug use: Never     ---------------------------------------------------------------------------------------------------------------------   Allergies:  Patient has no known allergies.  ---------------------------------------------------------------------------------------------------------------------   Prior to Admission Medications       Prescriptions Last Dose Informant Patient Reported? Taking?    apixaban (ELIQUIS) 5 MG tablet tablet 6/10/2024 Family Member, Other Yes Yes    Take 1 tablet by mouth 2 (Two) Times a Day.    cetirizine (zyrTEC) 10 MG tablet Past Week Family Member, Other Yes Yes    Take 1 tablet by mouth Daily As Needed for Allergies.    cinacalcet (SENSIPAR) 30 MG tablet 6/10/2024 Family Member, Other No Yes    Take 1 tablet by mouth daily.    erythromycin (ROMYCIN) 5 MG/GM ophthalmic ointment Past Week Family Member, Other Yes Yes    Administer 1 Application to both eyes 2 (Two) Times a Day As Needed (for redness/dryness).    ezetimibe (ZETIA) 10 MG tablet 6/10/2024 Family Member, Other Yes Yes    Take 1 tablet by mouth Daily.    folic acid (FOLVITE) 400 MCG tablet 6/10/2024 Other, Family Member Yes Yes    Take 1 tablet by mouth Daily.    furosemide (LASIX) 40 MG tablet 6/10/2024 Family Member, Other Yes Yes    Take 1 tablet by mouth  Daily.    hydrocortisone 1 % ointment 6/10/2024 Other, Family Member Yes Yes    Apply 1 Application topically to the appropriate area as directed 2 (Two) Times a Day.    losartan (COZAAR) 50 MG tablet 6/10/2024 Family Member, Other Yes Yes    Take 1 tablet by mouth 2 (Two) Times a Day.    metoprolol tartrate (LOPRESSOR) 100 MG tablet 6/10/2024 Family Member, Other No Yes    Take 1 tablet by mouth 2 (two) times a day. For hypertension.    ondansetron (ZOFRAN) 4 MG tablet Past Week Other, Family Member Yes Yes    Take 1 tablet by mouth Every 8 (Eight) Hours As Needed for Nausea or Vomiting.    pantoprazole (PROTONIX) 40 MG EC tablet 6/10/2024 Family Member, Other No Yes    Take 1 tablet by mouth daily. For gerd without esophagitis.    potassium chloride (K-DUR,KLOR-CON) 10 MEQ CR tablet 6/10/2024 Family Member, Other No Yes    Take 1 tablet by mouth daily. For hypokalemia.    vitamin B-12 (CYANOCOBALAMIN) 1000 MCG tablet 6/10/2024 Other, Family Member Yes Yes    Take 1 tablet by mouth 5 (Five) Times a Week.    vitamin D (ERGOCALCIFEROL) 1.25 MG (57574 UT) capsule capsule 6/9/2024 Family Member, Other Yes No    Take 1 capsule by mouth 1 (One) Time Per Week.          Hospital Meds:  budesonide-formoterol, 2 puff, Inhalation, BID - RT  cefTRIAXone, 1,000 mg, Intravenous, Q24H  cinacalcet, 30 mg, Oral, Daily  doxycycline, 100 mg, Intravenous, Q12H  folic acid, 500 mcg, Oral, Daily  guaiFENesin, 1,200 mg, Oral, Q12H  hydrocortisone, 1 Application, Topical, BID  insulin lispro, 2-7 Units, Subcutaneous, 4x Daily AC & at Bedtime  metoprolol tartrate, 100 mg, Oral, Q12H  NIFEdipine CC, 30 mg, Oral, Q24H  pantoprazole, 40 mg, Oral, Daily  predniSONE, 40 mg, Oral, Daily  sodium chloride, 10 mL, Intravenous, Q12H  vitamin B-12, 1,000 mcg, Oral, Once per day on Monday Tuesday Wednesday Thursday Friday      sodium chloride, 75 mL/hr, Last Rate: 75 mL/hr (06/11/24  1417)      ---------------------------------------------------------------------------------------------------------------------   Vital Signs:  Temp:  [97.9 °F (36.6 °C)-98.4 °F (36.9 °C)] 98.3 °F (36.8 °C)  Heart Rate:  [107-119] 119  Resp:  [16-18] 18  BP: (139-180)/(67-89) 139/74      06/10/24  1929 06/11/24  0837   Weight: 59.9 kg (132 lb) 59.9 kg (132 lb)     Body mass index is 23.38 kg/m².  ---------------------------------------------------------------------------------------------------------------------   Physical exam:   Constitutional:  Well-developed and well-nourished.  No respiratory distress.      HENT:  Head: Normocephalic and atraumatic.  Mouth:  Moist mucous membranes.    Eyes:  Conjunctivae and EOM are normal.  Pupils are equal, round, and reactive to light.  No scleral icterus.  Neck:  Neck supple.  No JVD present.    Cardiovascular:  Normal rate, regular rhythm and normal heart sounds with no murmur.  Pulmonary/Chest:  No respiratory distress, no wheezes, no crackles, with normal breath sounds and good air movement.  Abdominal:  Soft.  Bowel sounds are normal.  No distension and no tenderness.   Musculoskeletal:  No edema, no tenderness, and no deformity.  No red or swollen joints anywhere.    Neurological:  Alert and oriented to person, place, and time.  No cranial nerve deficit.  No tongue deviation.  No facial droop.  No slurred speech.   Skin:  Skin is warm and dry.  No rash noted.  No pallor.   Psychiatric:  Normal mood and affect.  Behavior is normal.  Judgment and thought content normal.   Peripheral vascular:  No edema and strong pulses on all 4 extremities.    ---------------------------------------------------------------------------------------------------------------------   EKG: Normal sinus rhythm  Telemetry: Normal sinus rhythm  I have personally looked at both the EKG and the telemetry strips.  Echo:  Results for orders placed during the hospital encounter of 06/10/24    Adult  Transthoracic Echo Complete W/ Cont if Necessary Per Protocol    Interpretation Summary    Left ventricular systolic function is normal. Left ventricular ejection fraction appears to be 66 - 70%.    Left ventricular wall thickness is consistent with mild concentric hypertrophy.    Left ventricular diastolic function is consistent with (grade III w/high LAP) fixed restrictive pattern.    The left atrial cavity is moderately dilated.    The right atrial cavity is borderline dilated.    There is mild calcification of the aortic valve mainly affecting the non-coronary, left coronary and right coronary cusp(s).    There is a large (>2cm) pericardial effusion. There is no evidence of cardiac tamponade.    ---------------------------------------------------------------------------------------------------------------------   Results from last 7 days   Lab Units 06/12/24  0755 06/11/24  0047 06/10/24  2101   CRP mg/dL  --   --  7.27*   LACTATE mmol/L  --  1.8 2.1*   WBC 10*3/mm3 16.97*  --  20.64*   HEMOGLOBIN g/dL 11.3*  --  12.3   HEMATOCRIT % 34.2  --  38.8   MCV fL 90.0  --  94.9   MCHC g/dL 33.0  --  31.7   PLATELETS 10*3/mm3 205  --  228     Results from last 7 days   Lab Units 06/11/24  0329   PH, ARTERIAL pH units 7.489*   PO2 ART mm Hg 69.8*   PCO2, ARTERIAL mm Hg 33.7*   HCO3 ART mmol/L 25.5     Results from last 7 days   Lab Units 06/12/24  0755 06/11/24  0342 06/10/24  2101   SODIUM mmol/L 140 141 141   POTASSIUM mmol/L 4.2 4.5 4.5   MAGNESIUM mg/dL  --  1.8 1.3*   CHLORIDE mmol/L 108* 107 107   CO2 mmol/L 20.6* 22.6 20.6*   BUN mg/dL 22 28* 29*   CREATININE mg/dL 1.19* 1.42* 1.60*   CALCIUM mg/dL 9.1 9.7* 9.8*   PHOSPHORUS mg/dL  --  3.1  --    GLUCOSE mg/dL 239* 216* 281*   ALBUMIN g/dL 3.1*  --  3.0*   BILIRUBIN mg/dL 0.3  --  0.4   ALK PHOS U/L 77  --  73   AST (SGOT) U/L 12  --  13   ALT (SGPT) U/L 12  --  14   Estimated Creatinine Clearance: 26.1 mL/min (A) (by C-G formula based on SCr of 1.19 mg/dL  "(H)).  Ammonia   Date Value Ref Range Status   06/10/2024 57 (H) 11 - 51 umol/L Final     Results from last 7 days   Lab Units 06/11/24  0534 06/11/24  0342 06/10/24  2101   HSTROP T ng/L 27* 26* 29*     Results from last 7 days   Lab Units 06/10/24  2101   PROBNP pg/mL 9,871.0*     Lab Results   Component Value Date    HGBA1C 6.60 (H) 06/10/2024     Lab Results   Component Value Date    TSH 3.752 08/17/2015    FREET4 1.09 08/17/2015     No results found for: \"PREGTESTUR\", \"PREGSERUM\", \"HCG\", \"HCGQUANT\"  Pain Management Panel  More data may exist         Latest Ref Rng & Units 6/10/2024 8/17/2015   Pain Management Panel   Creatinine, Urine mg/dL  mg/dL - 129.2  130.0    Amphetamine, Urine Qual Negative Negative  -   Barbiturates Screen, Urine Negative Negative  -   Benzodiazepine Screen, Urine Negative Negative  -   Buprenorphine, Screen, Urine Negative Negative  -   Cocaine Screen, Urine Negative Negative  -   Fentanyl, Urine Negative Negative  -   Methadone Screen , Urine Negative Negative  -   Methamphetamine, Ur Negative Negative  -     Blood Culture   Date Value Ref Range Status   06/11/2024 No growth at 24 hours  Preliminary   06/11/2024 No growth at 24 hours  Preliminary     No results found for: \"URINECX\"  No results found for: \"WOUNDCX\"  No results found for: \"STOOLCX\"        ---------------------------------------------------------------------------------------------------------------------   Imaging Results (Last 7 Days)       Procedure Component Value Units Date/Time    CT Abdomen Pelvis Without Contrast [[id number]] Collected: 06/10/24 2338     Updated: 06/10/24 1185    Narrative:      Noncontrast CT abdomen and pelvis     Indications: Altered mental status     Technique: Noncontrast CT images of the abdomen and pelvis were  obtained.  Limited exposure control, adjustment of the MA and/or KV  according to patient size or use of an iterative reconstruction  technique was utilized.     Findings CT abdomen " pelvis:     No prior studies available.     The liver and spleen are normal.  There is no biliary dilation.     The pancreas is normal.     The adrenal glands and kidneys are normal.     There is no abdominal or retroperitoneal lymphadenopathy.     The bowel loops are normal in course and caliber.  There is  diverticulosis of the descending and sigmoid colon.     There is no pelvic mass or free fluid or lymphadenopathy.  Endometrial  cavity is abnormally dilated measuring 1.3 cm.     The osseous structures are normal.       Impression:      Impression:  1.  No CT evidence of an acute intra-abdominal or intrapelvic process.  2.  Abnormally dilated endometrium.  Suggest nonemergent follow-up with  pelvic ultrasound     This report was finalized on 6/10/2024 11:38 PM by Renard Roberts MD.       CT Chest Without Contrast Diagnostic [[id number]] Collected: 06/10/24 2335     Updated: 06/10/24 2337    Narrative:      Noncontrast CT chest     Indications: Altered mental status     Technique: Noncontrast CT images of the chest were obtained.  Limited  exposure control, adjustment of the MA and/or KV according to patient  size or use of an iterative reconstruction technique was utilized.     Findings CT chest:     Comparison is made with a prior study dated 12/7/2023     The heart is enlarged.  A small pericardial effusion is present.   Thoracic aorta is normal in course and caliber.  There is no hilar or  mediastinal lymphadenopathy.  Atherosclerotic calcifications involve the  thoracic aorta and coronary arteries.     There is a focal airspace opacity at the left lung base favoring  atelectasis over infiltrate.  Small left pleural effusion is present.     Degenerative changes involve the thoracic spine.          Impression:      Impression:  1.  Focal airspace opacity at the left lower lobe favors atelectasis  over infiltrate.  There also is a small left pleural effusion.  2.  Cardiomegaly  3.  Small pericardial effusion,  new since the prior study     This report was finalized on 6/10/2024 11:35 PM by Renard Roberts MD.       CT Head Without Contrast [[id number]] Collected: 06/10/24 2332     Updated: 06/10/24 2335    Narrative:      Noncontrast CT brain     Indications: Altered mental status     Technique: Noncontrast CT images of the brain were obtained.  Limited  exposure control, adjustment of the MA and/or KV according to patient  size or use of an iterative reconstruction technique was utilized.     Findings:     No prior studies available.     There is no evidence of acute intracranial hemorrhage.  The ventricles  are normal in size and position.  There is no mass-effect or midline  shift.  Patchy and confluent areas of hypodensity involve the  periventricular deep white matter compatible sequelae of chronic small  vessel ischemic disease.  No abnormal extra-axial fluid collections are  demonstrated.     The bony calvarium is normal.       Impression:      Impression:     No CT evidence of an acute intracranial process.     This report was finalized on 6/10/2024 11:32 PM by Renard Roberts MD.       XR Chest 1 View [[id number]] Collected: 06/10/24 2201     Updated: 06/10/24 2204    Narrative:      PROCEDURE: Portable chest x-ray examination performed on Ruma 10, 2024.  Single view. Upright position.     HISTORY: Weakness. Altered mental status.     COMPARISON: None.     FINDINGS:     Enlarged heart size  Elevated right hemidiaphragm.  Coarsened bronchovascular pattern to the lungs.  Possible left lower lobe pneumonia.  Possible small left pleural effusion.  No interstitial edema or pneumothorax.  No free air in the upper abdomen.       Impression:         1.  Enlarged heart size.  2.  Left lower lobe atelectasis versus pneumonia with small left pleural  effusion.  3.  Mild elevated right hemidiaphragm.  4.  No pneumothorax.     This report was finalized on 6/10/2024 10:02 PM by Joseph Harmon MD.              ----------------------------------------------------------------------------------------------------------------------  Assessment:   Moderate pericardial effusion  Patient has small pericardial effusion on the previous echo that was almost 6 months ago  Pneumonia  Elevated WBC  Elevated creatinine  Dehydration  Malnutrition  Advanced age        Plan:   At this time patient is hemodynamically stable  No signs of clinical tamponade or tamponade criteria by the echo  Patient seems to have a chronic pericardial effusion with small layered echogenic material in the pericardial space  Discussed with family regarding the long-term deficient differential diagnosis  At this time they decided to treat infection will follow-up pericardial effusion by echo repeated next month as an outpatient  No further cardiac workup is needed at the present time  Will recommend rule out any infectious etiology of pericardial effusion by ID team  Thank you for the consult.    Linda Fuentes MD  06/12/24  14:44 EDT      Electronically signed by Linda Fuentes MD at 06/12/24 1450          Physical Therapy Notes (most recent note)        Rita Ralph PT at 06/11/24 1633  Version 1 of 1         Goal Outcome Evaluation:              Outcome Evaluation: Pt seen for evaluation in which pt tolerated well with fatigue noted after grossly 4 sit to stands. Pt may benefit from therapeutic intervention to progress towards PLOF that daughter explained to PT this date. Pt may benefit from IPR however d/t endurance concerns, more appropriate for subacute care.      Anticipated Discharge Disposition (PT): sub acute care setting, skilled nursing facility                          Electronically signed by Rita Ralph PT at 06/11/24 1633       Occupational Therapy Notes (most recent note)    No notes exist for this encounter.

## 2024-06-12 NOTE — CONSULTS
Consults    Patient Identification:    Name:  Zeenat Dong  Age:  89 y.o.  Sex:  female  :  1928  MRN:  [id number]  Visit Number:  [id number]  Primary care provider:  Sravani Chapa APRN    Chief complaint:   Complaining of generalized weakness fatigue, poor food intake and hallucinations.  Cardiology consult was called because patient has pericardial effusion on the echo without any tamponade physiology      History of presenting illness:   89-year-old white female who was presents scented to emergency room because of fatigue, hallucination in the emergency room during the workup patient found to have a possible pneumonia left lower lobe patient has similar symptoms when she had UTI in the past as per family  Denies any complaint of chest pain shortness of breath nausea vomiting.    ---------------------------------------------------------------------------------------------------------------------  ROS: All systems reviewed and negative except noted above.  ---------------------------------------------------------------------------------------------------------------------   Past History:   Family History   Problem Relation Age of Onset    Other Father         cardiovascular disease    Kidney disease Sister      Past Medical History:   Diagnosis Date    Hyperlipidemia      Past Surgical History:   Procedure Laterality Date    OTHER SURGICAL HISTORY      ear surgery    SKIN BIOPSY       Social History     Socioeconomic History    Marital status:    Tobacco Use    Smoking status: Never   Vaping Use    Vaping status: Never Used   Substance and Sexual Activity    Alcohol use: No    Drug use: Never     ---------------------------------------------------------------------------------------------------------------------   Allergies:  Patient has no known allergies.  ---------------------------------------------------------------------------------------------------------------------   Prior to Admission  Medications       Prescriptions Last Dose Informant Patient Reported? Taking?    apixaban (ELIQUIS) 5 MG tablet tablet 6/10/2024 Family Member, Other Yes Yes    Take 1 tablet by mouth 2 (Two) Times a Day.    cetirizine (zyrTEC) 10 MG tablet Past Week Family Member, Other Yes Yes    Take 1 tablet by mouth Daily As Needed for Allergies.    cinacalcet (SENSIPAR) 30 MG tablet 6/10/2024 Family Member, Other No Yes    Take 1 tablet by mouth daily.    erythromycin (ROMYCIN) 5 MG/GM ophthalmic ointment Past Week Family Member, Other Yes Yes    Administer 1 Application to both eyes 2 (Two) Times a Day As Needed (for redness/dryness).    ezetimibe (ZETIA) 10 MG tablet 6/10/2024 Family Member, Other Yes Yes    Take 1 tablet by mouth Daily.    folic acid (FOLVITE) 400 MCG tablet 6/10/2024 Other, Family Member Yes Yes    Take 1 tablet by mouth Daily.    furosemide (LASIX) 40 MG tablet 6/10/2024 Family Member, Other Yes Yes    Take 1 tablet by mouth Daily.    hydrocortisone 1 % ointment 6/10/2024 Other, Family Member Yes Yes    Apply 1 Application topically to the appropriate area as directed 2 (Two) Times a Day.    losartan (COZAAR) 50 MG tablet 6/10/2024 Family Member, Other Yes Yes    Take 1 tablet by mouth 2 (Two) Times a Day.    metoprolol tartrate (LOPRESSOR) 100 MG tablet 6/10/2024 Family Member, Other No Yes    Take 1 tablet by mouth 2 (two) times a day. For hypertension.    ondansetron (ZOFRAN) 4 MG tablet Past Week Other, Family Member Yes Yes    Take 1 tablet by mouth Every 8 (Eight) Hours As Needed for Nausea or Vomiting.    pantoprazole (PROTONIX) 40 MG EC tablet 6/10/2024 Family Member, Other No Yes    Take 1 tablet by mouth daily. For gerd without esophagitis.    potassium chloride (K-DUR,KLOR-CON) 10 MEQ CR tablet 6/10/2024 Family Member, Other No Yes    Take 1 tablet by mouth daily. For hypokalemia.    vitamin B-12 (CYANOCOBALAMIN) 1000 MCG tablet 6/10/2024 Other, Family Member Yes Yes    Take 1 tablet by mouth 5  (Five) Times a Week.    vitamin D (ERGOCALCIFEROL) 1.25 MG (17455 UT) capsule capsule 6/9/2024 Family Member, Other Yes No    Take 1 capsule by mouth 1 (One) Time Per Week.          [facility] Meds:  budesonide-formoterol, 2 puff, Inhalation, BID - RT  cefTRIAXone, 1,000 mg, Intravenous, Q24H  cinacalcet, 30 mg, Oral, Daily  doxycycline, 100 mg, Intravenous, Q12H  folic acid, 500 mcg, Oral, Daily  guaiFENesin, 1,200 mg, Oral, Q12H  hydrocortisone, 1 Application, Topical, BID  insulin lispro, 2-7 Units, Subcutaneous, 4x Daily AC & at Bedtime  metoprolol tartrate, 100 mg, Oral, Q12H  NIFEdipine CC, 30 mg, Oral, Q24H  pantoprazole, 40 mg, Oral, Daily  predniSONE, 40 mg, Oral, Daily  sodium chloride, 10 mL, Intravenous, Q12H  vitamin B-12, 1,000 mcg, Oral, Once per day on Monday Tuesday Wednesday Thursday Friday      sodium chloride, 75 mL/hr, Last Rate: 75 mL/hr (06/11/24 1417)      ---------------------------------------------------------------------------------------------------------------------   Vital Signs:  Temp:  [97.9 °F (36.6 °C)-98.4 °F (36.9 °C)] 98.3 °F (36.8 °C)  Heart Rate:  [107-119] 119  Resp:  [16-18] 18  BP: (139-180)/(67-89) 139/74      06/10/24  19[birth year] 06/11/24  0837   Weight: 59.9 kg (132 lb) 59.9 kg (132 lb)     Body mass index is 23.38 kg/m².  ---------------------------------------------------------------------------------------------------------------------   Physical exam:   Constitutional:  Well-developed and well-nourished.  No respiratory distress.      HENT:  Head: Normocephalic and atraumatic.  Mouth:  Moist mucous membranes.    Eyes:  Conjunctivae and EOM are normal.  Pupils are equal, round, and reactive to light.  No scleral icterus.  Neck:  Neck supple.  No JVD present.    Cardiovascular:  Normal rate, regular rhythm and normal heart sounds with no murmur.  Pulmonary/Chest:  No respiratory distress, no wheezes, no crackles, with normal breath sounds and good air movement.  Abdominal:   Soft.  Bowel sounds are normal.  No distension and no tenderness.   Musculoskeletal:  No edema, no tenderness, and no deformity.  No red or swollen joints anywhere.    Neurological:  Alert and oriented to person, place, and time.  No cranial nerve deficit.  No tongue deviation.  No facial droop.  No slurred speech.   Skin:  Skin is warm and dry.  No rash noted.  No pallor.   Psychiatric:  Normal mood and affect.  Behavior is normal.  Judgment and thought content normal.   Peripheral vascular:  No edema and strong pulses on all 4 extremities.    ---------------------------------------------------------------------------------------------------------------------   EKG: Normal sinus rhythm  Telemetry: Normal sinus rhythm  I have personally looked at both the EKG and the telemetry strips.  Echo:  Results for orders placed during the hospital encounter of 06/10/24    Adult Transthoracic Echo Complete W/ Cont if Necessary Per Protocol    Interpretation Summary    Left ventricular systolic function is normal. Left ventricular ejection fraction appears to be 66 - 70%.    Left ventricular wall thickness is consistent with mild concentric hypertrophy.    Left ventricular diastolic function is consistent with (grade III w/high LAP) fixed restrictive pattern.    The left atrial cavity is moderately dilated.    The right atrial cavity is borderline dilated.    There is mild calcification of the aortic valve mainly affecting the non-coronary, left coronary and right coronary cusp(s).    There is a large (>2cm) pericardial effusion. There is no evidence of cardiac tamponade.    ---------------------------------------------------------------------------------------------------------------------   Results from last 7 days   Lab Units 06/12/24  0755 06/11/24  0047 06/10/24  2101   CRP mg/dL  --   --  7.27*   LACTATE mmol/L  --  1.8 2.1*   WBC 10*3/mm3 16.97*  --  20.64*   HEMOGLOBIN g/dL 11.3*  --  12.3   HEMATOCRIT % 34.2  --  38.8  "  MCV fL 90.0  --  94.9   MCHC g/dL 33.0  --  31.7   PLATELETS 10*3/mm3 205  --  228     Results from last 7 days   Lab Units 06/11/24  0329   PH, ARTERIAL pH units 7.489*   PO2 ART mm Hg 69.8*   PCO2, ARTERIAL mm Hg 33.7*   HCO3 ART mmol/L 25.5     Results from last 7 days   Lab Units 06/12/24  0755 06/11/24  0342 06/10/24  2101   SODIUM mmol/L 140 141 141   POTASSIUM mmol/L 4.2 4.5 4.5   MAGNESIUM mg/dL  --  1.8 1.3*   CHLORIDE mmol/L 108* 107 107   CO2 mmol/L 20.6* 22.6 20.6*   BUN mg/dL 22 28* 29*   CREATININE mg/dL 1.19* 1.42* 1.60*   CALCIUM mg/dL 9.1 9.7* 9.8*   PHOSPHORUS mg/dL  --  3.1  --    GLUCOSE mg/dL 239* 216* 281*   ALBUMIN g/dL 3.1*  --  3.0*   BILIRUBIN mg/dL 0.3  --  0.4   ALK PHOS U/L 77  --  73   AST (SGOT) U/L 12  --  13   ALT (SGPT) U/L 12  --  14   Estimated Creatinine Clearance: 26.1 mL/min (A) (by C-G formula based on SCr of 1.19 mg/dL (H)).  Ammonia   Date Value Ref Range Status   06/10/2024 57 (H) 11 - 51 umol/L Final     Results from last 7 days   Lab Units 06/11/24  0534 06/11/24  0342 06/10/24  2101   HSTROP T ng/L 27* 26* 29*     Results from last 7 days   Lab Units 06/10/24  2101   PROBNP pg/mL 9,871.0*     Lab Results   Component Value Date    HGBA1C 6.60 (H) 06/10/2024     Lab Results   Component Value Date    TSH 3.752 08/17/2015    FREET4 1.09 08/17/2015     No results found for: \"PREGTESTUR\", \"PREGSERUM\", \"HCG\", \"HCGQUANT\"  Pain Management Panel  More data may exist         Latest Ref Rng & Units 6/10/2024 8/17/2015   Pain Management Panel   Creatinine, Urine mg/dL  mg/dL - 129.2  130.0    Amphetamine, Urine Qual Negative Negative  -   Barbiturates Screen, Urine Negative Negative  -   Benzodiazepine Screen, Urine Negative Negative  -   Buprenorphine, Screen, Urine Negative Negative  -   Cocaine Screen, Urine Negative Negative  -   Fentanyl, Urine Negative Negative  -   Methadone Screen , Urine Negative Negative  -   Methamphetamine, Ur Negative Negative  -     Blood Culture " "  Date Value Ref Range Status   06/11/2024 No growth at 24 hours  Preliminary   06/11/2024 No growth at 24 hours  Preliminary     No results found for: \"URINECX\"  No results found for: \"WOUNDCX\"  No results found for: \"STOOLCX\"        ---------------------------------------------------------------------------------------------------------------------   Imaging Results (Last 7 Days)       Procedure Component Value Units Date/Time    CT Abdomen Pelvis Without Contrast [[id number]] Collected: 06/10/24 2338     Updated: 06/10/24 2340    Narrative:      Noncontrast CT abdomen and pelvis     Indications: Altered mental status     Technique: Noncontrast CT images of the abdomen and pelvis were  obtained.  Limited exposure control, adjustment of the MA and/or KV  according to patient size or use of an iterative reconstruction  technique was utilized.     Findings CT abdomen pelvis:     No prior studies available.     The liver and spleen are normal.  There is no biliary dilation.     The pancreas is normal.     The adrenal glands and kidneys are normal.     There is no abdominal or retroperitoneal lymphadenopathy.     The bowel loops are normal in course and caliber.  There is  diverticulosis of the descending and sigmoid colon.     There is no pelvic mass or free fluid or lymphadenopathy.  Endometrial  cavity is abnormally dilated measuring 1.3 cm.     The osseous structures are normal.       Impression:      Impression:  1.  No CT evidence of an acute intra-abdominal or intrapelvic process.  2.  Abnormally dilated endometrium.  Suggest nonemergent follow-up with  pelvic ultrasound     This report was finalized on 6/10/2024 11:38 PM by Renard Roberts MD.       CT Chest Without Contrast Diagnostic [[id number]] Collected: 06/10/24 2335     Updated: 06/10/24 2337    Narrative:      Noncontrast CT chest     Indications: Altered mental status     Technique: Noncontrast CT images of the chest were obtained.  Limited  exposure " control, adjustment of the MA and/or KV according to patient  size or use of an iterative reconstruction technique was utilized.     Findings CT chest:     Comparison is made with a prior study dated 12/7/2023     The heart is enlarged.  A small pericardial effusion is present.   Thoracic aorta is normal in course and caliber.  There is no hilar or  mediastinal lymphadenopathy.  Atherosclerotic calcifications involve the  thoracic aorta and coronary arteries.     There is a focal airspace opacity at the left lung base favoring  atelectasis over infiltrate.  Small left pleural effusion is present.     Degenerative changes involve the thoracic spine.          Impression:      Impression:  1.  Focal airspace opacity at the left lower lobe favors atelectasis  over infiltrate.  There also is a small left pleural effusion.  2.  Cardiomegaly  3.  Small pericardial effusion, new since the prior study     This report was finalized on 6/10/2024 11:35 PM by Renard Roberts MD.       CT Head Without Contrast [[id number]] Collected: 06/10/24 2332     Updated: 06/10/24 2335    Narrative:      Noncontrast CT brain     Indications: Altered mental status     Technique: Noncontrast CT images of the brain were obtained.  Limited  exposure control, adjustment of the MA and/or KV according to patient  size or use of an iterative reconstruction technique was utilized.     Findings:     No prior studies available.     There is no evidence of acute intracranial hemorrhage.  The ventricles  are normal in size and position.  There is no mass-effect or midline  shift.  Patchy and confluent areas of hypodensity involve the  periventricular deep white matter compatible sequelae of chronic small  vessel ischemic disease.  No abnormal extra-axial fluid collections are  demonstrated.     The bony calvarium is normal.       Impression:      Impression:     No CT evidence of an acute intracranial process.     This report was finalized on 6/10/2024  11:32 PM by Renard Roberts MD.       XR Chest 1 View [[id number]] Collected: 06/10/24 2201     Updated: 06/10/24 2204    Narrative:      PROCEDURE: Portable chest x-ray examination performed on Ruma 10, 2024.  Single view. Upright position.     HISTORY: Weakness. Altered mental status.     COMPARISON: None.     FINDINGS:     Enlarged heart size  Elevated right hemidiaphragm.  Coarsened bronchovascular pattern to the lungs.  Possible left lower lobe pneumonia.  Possible small left pleural effusion.  No interstitial edema or pneumothorax.  No free air in the upper abdomen.       Impression:         1.  Enlarged heart size.  2.  Left lower lobe atelectasis versus pneumonia with small left pleural  effusion.  3.  Mild elevated right hemidiaphragm.  4.  No pneumothorax.     This report was finalized on 6/10/2024 10:02 PM by Joseph Harmon MD.             ----------------------------------------------------------------------------------------------------------------------  Assessment:   Moderate pericardial effusion  Patient has small pericardial effusion on the previous echo that was almost 6 months ago  Pneumonia  Elevated WBC  Elevated creatinine  Dehydration  Malnutrition  Advanced age        Plan:   At this time patient is hemodynamically stable  No signs of clinical tamponade or tamponade criteria by the echo  Patient seems to have a chronic pericardial effusion with small layered echogenic material in the pericardial space  Discussed with family regarding the long-term deficient differential diagnosis  At this time they decided to treat infection will follow-up pericardial effusion by echo repeated next month as an outpatient  No further cardiac workup is needed at the present time  Will recommend rule out any infectious etiology of pericardial effusion by ID team  Thank you for the consult.    Linda Fuentes MD  06/12/24  14:44 EDT

## 2024-06-13 LAB
ANION GAP SERPL CALCULATED.3IONS-SCNC: 12.1 MMOL/L (ref 5–15)
BUN SERPL-MCNC: 24 MG/DL (ref 8–23)
BUN/CREAT SERPL: 19.7 (ref 7–25)
BURR CELLS BLD QL SMEAR: ABNORMAL
CALCIUM SPEC-SCNC: 9.3 MG/DL (ref 8.2–9.6)
CHLORIDE SERPL-SCNC: 110 MMOL/L (ref 98–107)
CO2 SERPL-SCNC: 18.9 MMOL/L (ref 22–29)
CREAT SERPL-MCNC: 1.22 MG/DL (ref 0.57–1)
CRP SERPL-MCNC: 15.97 MG/DL (ref 0–0.5)
DEPRECATED RDW RBC AUTO: 47.8 FL (ref 37–54)
EGFRCR SERPLBLD CKD-EPI 2021: 40.7 ML/MIN/1.73
ERYTHROCYTE [DISTWIDTH] IN BLOOD BY AUTOMATED COUNT: 13.9 % (ref 12.3–15.4)
ERYTHROCYTE [SEDIMENTATION RATE] IN BLOOD: 59 MM/HR (ref 0–30)
GLUCOSE BLDC GLUCOMTR-MCNC: 157 MG/DL (ref 70–130)
GLUCOSE BLDC GLUCOMTR-MCNC: 170 MG/DL (ref 70–130)
GLUCOSE BLDC GLUCOMTR-MCNC: 198 MG/DL (ref 70–130)
GLUCOSE BLDC GLUCOMTR-MCNC: 226 MG/DL (ref 70–130)
GLUCOSE SERPL-MCNC: 223 MG/DL (ref 65–99)
HCT VFR BLD AUTO: 33.6 % (ref 34–46.6)
HGB BLD-MCNC: 10.8 G/DL (ref 12–15.9)
HYPOCHROMIA BLD QL: ABNORMAL
LYMPHOCYTES # BLD MANUAL: 1.03 10*3/MM3 (ref 0.7–3.1)
LYMPHOCYTES NFR BLD MANUAL: 2 % (ref 5–12)
MCH RBC QN AUTO: 30.4 PG (ref 26.6–33)
MCHC RBC AUTO-ENTMCNC: 32.1 G/DL (ref 31.5–35.7)
MCV RBC AUTO: 94.6 FL (ref 79–97)
METAMYELOCYTES NFR BLD MANUAL: 1 % (ref 0–0)
MONOCYTES # BLD: 0.41 10*3/MM3 (ref 0.1–0.9)
MRSA DNA SPEC QL NAA+PROBE: NORMAL
NEUTROPHILS # BLD AUTO: 18.91 10*3/MM3 (ref 1.7–7)
NEUTROPHILS NFR BLD MANUAL: 92 % (ref 42.7–76)
PLAT MORPH BLD: NORMAL
PLATELET # BLD AUTO: 236 10*3/MM3 (ref 140–450)
PMV BLD AUTO: 11 FL (ref 6–12)
POTASSIUM SERPL-SCNC: 3.9 MMOL/L (ref 3.5–5.2)
RBC # BLD AUTO: 3.55 10*6/MM3 (ref 3.77–5.28)
SODIUM SERPL-SCNC: 141 MMOL/L (ref 136–145)
VARIANT LYMPHS NFR BLD MANUAL: 5 % (ref 19.6–45.3)
WBC NRBC COR # BLD AUTO: 20.55 10*3/MM3 (ref 3.4–10.8)

## 2024-06-13 PROCEDURE — 85025 COMPLETE CBC W/AUTO DIFF WBC: CPT | Performed by: STUDENT IN AN ORGANIZED HEALTH CARE EDUCATION/TRAINING PROGRAM

## 2024-06-13 PROCEDURE — 80048 BASIC METABOLIC PNL TOTAL CA: CPT | Performed by: STUDENT IN AN ORGANIZED HEALTH CARE EDUCATION/TRAINING PROGRAM

## 2024-06-13 PROCEDURE — 25010000002 DIAZEPAM PER 5 MG

## 2024-06-13 PROCEDURE — 94761 N-INVAS EAR/PLS OXIMETRY MLT: CPT

## 2024-06-13 PROCEDURE — 85007 BL SMEAR W/DIFF WBC COUNT: CPT | Performed by: STUDENT IN AN ORGANIZED HEALTH CARE EDUCATION/TRAINING PROGRAM

## 2024-06-13 PROCEDURE — 63710000001 INSULIN LISPRO (HUMAN) PER 5 UNITS

## 2024-06-13 PROCEDURE — 99232 SBSQ HOSP IP/OBS MODERATE 35: CPT | Performed by: STUDENT IN AN ORGANIZED HEALTH CARE EDUCATION/TRAINING PROGRAM

## 2024-06-13 PROCEDURE — C1751 CATH, INF, PER/CENT/MIDLINE: HCPCS

## 2024-06-13 PROCEDURE — 87641 MR-STAPH DNA AMP PROBE: CPT | Performed by: NURSE PRACTITIONER

## 2024-06-13 PROCEDURE — 82948 REAGENT STRIP/BLOOD GLUCOSE: CPT

## 2024-06-13 PROCEDURE — 25010000002 METRONIDAZOLE 500 MG/100ML SOLUTION: Performed by: NURSE PRACTITIONER

## 2024-06-13 PROCEDURE — 99222 1ST HOSP IP/OBS MODERATE 55: CPT | Performed by: NURSE PRACTITIONER

## 2024-06-13 PROCEDURE — 25010000002 CEFTRIAXONE PER 250 MG

## 2024-06-13 PROCEDURE — 97110 THERAPEUTIC EXERCISES: CPT

## 2024-06-13 PROCEDURE — 97530 THERAPEUTIC ACTIVITIES: CPT

## 2024-06-13 PROCEDURE — 85652 RBC SED RATE AUTOMATED: CPT | Performed by: STUDENT IN AN ORGANIZED HEALTH CARE EDUCATION/TRAINING PROGRAM

## 2024-06-13 PROCEDURE — 63710000001 PREDNISONE PER 1 MG: Performed by: STUDENT IN AN ORGANIZED HEALTH CARE EDUCATION/TRAINING PROGRAM

## 2024-06-13 PROCEDURE — 94799 UNLISTED PULMONARY SVC/PX: CPT

## 2024-06-13 PROCEDURE — 36410 VNPNXR 3YR/> PHY/QHP DX/THER: CPT

## 2024-06-13 PROCEDURE — 25010000002 CEFEPIME PER 500 MG

## 2024-06-13 PROCEDURE — 25010000002 ZIPRASIDONE MESYLATE PER 10 MG: Performed by: STUDENT IN AN ORGANIZED HEALTH CARE EDUCATION/TRAINING PROGRAM

## 2024-06-13 PROCEDURE — 94664 DEMO&/EVAL PT USE INHALER: CPT

## 2024-06-13 PROCEDURE — 86140 C-REACTIVE PROTEIN: CPT | Performed by: STUDENT IN AN ORGANIZED HEALTH CARE EDUCATION/TRAINING PROGRAM

## 2024-06-13 RX ORDER — METRONIDAZOLE 500 MG/100ML
500 INJECTION, SOLUTION INTRAVENOUS EVERY 8 HOURS
Status: DISPENSED | OUTPATIENT
Start: 2024-06-13 | End: 2024-06-18

## 2024-06-13 RX ORDER — SODIUM CHLORIDE 0.9 % (FLUSH) 0.9 %
10 SYRINGE (ML) INJECTION EVERY 12 HOURS SCHEDULED
Status: DISCONTINUED | OUTPATIENT
Start: 2024-06-13 | End: 2024-06-20 | Stop reason: HOSPADM

## 2024-06-13 RX ORDER — SODIUM CHLORIDE 0.9 % (FLUSH) 0.9 %
10 SYRINGE (ML) INJECTION AS NEEDED
Status: DISCONTINUED | OUTPATIENT
Start: 2024-06-13 | End: 2024-06-20 | Stop reason: HOSPADM

## 2024-06-13 RX ORDER — OLANZAPINE 5 MG/1
5 TABLET, ORALLY DISINTEGRATING ORAL NIGHTLY
Status: DISCONTINUED | OUTPATIENT
Start: 2024-06-13 | End: 2024-06-20 | Stop reason: HOSPADM

## 2024-06-13 RX ORDER — DIAZEPAM 5 MG/ML
5 INJECTION, SOLUTION INTRAMUSCULAR; INTRAVENOUS ONCE
Status: COMPLETED | OUTPATIENT
Start: 2024-06-13 | End: 2024-06-13

## 2024-06-13 RX ORDER — SODIUM CHLORIDE 9 MG/ML
40 INJECTION, SOLUTION INTRAVENOUS AS NEEDED
Status: DISCONTINUED | OUTPATIENT
Start: 2024-06-13 | End: 2024-06-20 | Stop reason: HOSPADM

## 2024-06-13 RX ADMIN — PANTOPRAZOLE SODIUM 40 MG: 40 TABLET, DELAYED RELEASE ORAL at 08:46

## 2024-06-13 RX ADMIN — Medication 500 MCG: at 08:46

## 2024-06-13 RX ADMIN — DOXYCYCLINE 100 MG: 100 INJECTION, POWDER, LYOPHILIZED, FOR SOLUTION INTRAVENOUS at 01:19

## 2024-06-13 RX ADMIN — METRONIDAZOLE 500 MG: 500 INJECTION, SOLUTION INTRAVENOUS at 13:35

## 2024-06-13 RX ADMIN — PREDNISONE 40 MG: 20 TABLET ORAL at 08:46

## 2024-06-13 RX ADMIN — CEFEPIME 2000 MG: 2 INJECTION, POWDER, FOR SOLUTION INTRAVENOUS at 14:14

## 2024-06-13 RX ADMIN — OLANZAPINE 5 MG: 5 TABLET, ORALLY DISINTEGRATING ORAL at 21:53

## 2024-06-13 RX ADMIN — ZIPRASIDONE MESYLATE 20 MG: 20 INJECTION, POWDER, LYOPHILIZED, FOR SOLUTION INTRAMUSCULAR at 10:56

## 2024-06-13 RX ADMIN — METOPROLOL TARTRATE 100 MG: 100 TABLET, FILM COATED ORAL at 08:46

## 2024-06-13 RX ADMIN — GUAIFENESIN 1200 MG: 600 TABLET, EXTENDED RELEASE ORAL at 08:46

## 2024-06-13 RX ADMIN — ZIPRASIDONE MESYLATE 20 MG: 20 INJECTION, POWDER, LYOPHILIZED, FOR SOLUTION INTRAMUSCULAR at 21:50

## 2024-06-13 RX ADMIN — Medication 1000 MCG: at 08:46

## 2024-06-13 RX ADMIN — CEFTRIAXONE 1000 MG: 1 INJECTION, POWDER, FOR SOLUTION INTRAMUSCULAR; INTRAVENOUS at 02:21

## 2024-06-13 RX ADMIN — APIXABAN 2.5 MG: 2.5 TABLET, FILM COATED ORAL at 08:45

## 2024-06-13 RX ADMIN — MUPIROCIN 1 APPLICATION: 20 OINTMENT TOPICAL at 22:13

## 2024-06-13 RX ADMIN — INSULIN LISPRO 2 UNITS: 100 INJECTION, SOLUTION INTRAVENOUS; SUBCUTANEOUS at 08:47

## 2024-06-13 RX ADMIN — HYDROCORTISONE 1 APPLICATION: 10 OINTMENT TOPICAL at 08:47

## 2024-06-13 RX ADMIN — Medication 10 ML: at 08:47

## 2024-06-13 RX ADMIN — CINACALCET 30 MG: 30 TABLET, FILM COATED ORAL at 08:45

## 2024-06-13 RX ADMIN — Medication 10 ML: at 12:13

## 2024-06-13 RX ADMIN — HYDROCORTISONE 1 APPLICATION: 10 OINTMENT TOPICAL at 21:54

## 2024-06-13 RX ADMIN — NIFEDIPINE 30 MG: 30 TABLET, EXTENDED RELEASE ORAL at 08:46

## 2024-06-13 RX ADMIN — METOPROLOL TARTRATE 100 MG: 100 TABLET, FILM COATED ORAL at 21:53

## 2024-06-13 RX ADMIN — GUAIFENESIN 1200 MG: 600 TABLET, EXTENDED RELEASE ORAL at 21:53

## 2024-06-13 RX ADMIN — DIAZEPAM 5 MG: 5 INJECTION, SOLUTION INTRAMUSCULAR; INTRAVENOUS at 01:13

## 2024-06-13 RX ADMIN — APIXABAN 2.5 MG: 2.5 TABLET, FILM COATED ORAL at 21:53

## 2024-06-13 RX ADMIN — INSULIN LISPRO 3 UNITS: 100 INJECTION, SOLUTION INTRAVENOUS; SUBCUTANEOUS at 17:40

## 2024-06-13 RX ADMIN — Medication 10 ML: at 21:54

## 2024-06-13 RX ADMIN — METRONIDAZOLE 500 MG: 500 INJECTION, SOLUTION INTRAVENOUS at 21:52

## 2024-06-13 RX ADMIN — INSULIN LISPRO 2 UNITS: 100 INJECTION, SOLUTION INTRAVENOUS; SUBCUTANEOUS at 21:54

## 2024-06-13 NOTE — PLAN OF CARE
Goal Outcome Evaluation:  Plan of Care Reviewed With: patient           Outcome Evaluation: Patient rested in bed during shift. Patient has been agitated during shift. Geodon given during shift. Patient has no complaints or concerns during shift. Will continue with plan of care.

## 2024-06-13 NOTE — PLAN OF CARE
Goal Outcome Evaluation:  Plan of Care Reviewed With: patient        Progress: no change  Outcome Evaluation: Patient rested in bed during shift. Pt became agitated, PRN medication given per MAR. Pt voices no complaints or concerns at this time. VSS. Bedrest. Family at bedside. Will continue with plan of care.

## 2024-06-13 NOTE — THERAPY TREATMENT NOTE
Acute Care - Physical Therapy Treatment Note   [place]     Patient Name: Zeenat Dong  : 1928  MRN: [id number]  Today's Date: 2024   Onset of Illness/Injury or Date of Surgery: 06/10/24 (admission date)  Visit Dx:     ICD-10-CM ICD-9-CM   1. Acute renal failure, unspecified acute renal failure type  N17.9 584.9   2. Hypomagnesemia  E83.42 275.2   3. Hallucinations, unspecified  R44.3 780.1   4. Heart failure, unspecified HF chronicity, unspecified heart failure type  I50.9 428.9   5. Pericardial effusion  I31.39 423.9   6. Community acquired pneumonia of left lung, unspecified part of lung  J18.9 486     Patient Active Problem List   Diagnosis    Abdominal distension    Heart failure    Dry skin dermatitis    Gastroesophageal reflux disease without esophagitis    Hypercalcemia    Hyperparathyroidism    Hypertension    Hypokalemia    Hypomagnesemia    Seasonal allergic rhinitis    Shortness of breath    Atrial fibrillation    Weakness    Sepsis    Moderate malnutrition     Past Medical History:   Diagnosis Date    Hyperlipidemia      Past Surgical History:   Procedure Laterality Date    OTHER SURGICAL HISTORY      ear surgery    SKIN BIOPSY       PT Assessment (Last 12 Hours)       PT Evaluation and Treatment       Row Name 24 1454          Physical Therapy Time and Intention    Document Type therapy note (daily note)  -     Mode of Treatment physical therapy  -     Patient Effort fair  -     Comment Pt seen for therapy this PM, daughter inquiring about therapy services. Time taken to allow daughter to convince pt to work with PT. Pt eventually was agreeable to sitting EOB, intermittently in and out of being alert. Pt pleasantly confused at times it appears. Pt was more assist d/t lethargy/grogginess (Pt had Geodon near noon today if PT understood correctly). Pt did try t work with PT.  -       Row Name 24 1451          Bed Mobility    Bed Mobility  supine-sit;sit-supine;scooting/bridging  -     Scooting/Bridging Travis (Bed Mobility) dependent (less than 25% patient effort);other (see comments)  pt was able to bridge partially, however  -     Supine-Sit Travis (Bed Mobility) maximum assist (25% patient effort);dependent (less than 25% patient effort);2 person assist  Once agreeable, pt was able to assist somewhat but grossly max/depA to sit upright and with scooting towards EOB. Time taken to scoot pt up in bed in which she could demonstrate a bridge but had difficulty getting herself scooted without depA too  -     Sit-Supine Travis (Bed Mobility) minimum assist (75% patient effort)  -       Row Name 06/13/24 1454          Motor Skills    Therapeutic Exercise hip  attempted hip add while sitting EOB, some hip flex sitting EOB as well as supine, verbal cues/reorienting pt to task  -       Row Name 06/13/24 1454          Positioning and Restraints    Pre-Treatment Position in bed  -     Post Treatment Position bed  -     In Bed supine;exit alarm on;with family/caregiver  -       Row Name 06/13/24 1458          Progress Summary (PT)    Daily Progress Summary (PT) Pt groggy this PM limiting participation with therapy however attempted to work with pt as able. Pt demonstrates impaired mobility still and no change in POC recommended at this time.  -               User Key  (r) = Recorded By, (t) = Taken By, (c) = Cosigned By      Initials Name Provider Type    Rita Victoria, PT Physical Therapist                    Physical Therapy Education       Title: PT OT SLP Therapies (Done)       Topic: Physical Therapy (Done)       Point: Mobility training (Done)       Learning Progress Summary             Patient Acceptance, TB,E, VU by TRELL at 6/13/2024 0407    Acceptance, E, VU by LESLIE at 6/12/2024 0112                         Point: Home exercise program (Done)       Learning Progress Summary             Patient Acceptance, TB,E, VU  by TRELL at 6/13/2024 0407    Acceptance, E, VU by LESLIE at 6/12/2024 0112                         Point: Body mechanics (Done)       Learning Progress Summary             Patient Acceptance, TB,E, VU by  at 6/13/2024 0407    Acceptance, E, VU by LESLIE at 6/12/2024 0112                         Point: Precautions (Done)       Learning Progress Summary             Patient Acceptance, TB,E, VU by  at 6/13/2024 0407    Acceptance, E, VU by LESLIE at 6/12/2024 0112                                         User Key       Initials Effective Dates Name Provider Type Discipline     03/26/24 -  Kimber Hinojosa, RN Registered Nurse Nurse    LESLIE 02/12/24 -  Joahnna Francisco, RN Registered Nurse Nurse                  PT Recommendation and Plan  Anticipated Discharge Disposition (PT): sub acute care setting, skilled nursing facility  Planned Therapy Interventions (PT): bed mobility training, gait training, strengthening, transfer training (add interventions PRN, as appropriate)  Therapy Frequency (PT): 2 times/wk (2-5x/week, PRN)  Progress Summary (PT)  Daily Progress Summary (PT): Pt groggy this PM limiting participation with therapy however attempted to work with pt as able. Pt demonstrates impaired mobility still and no change in POC recommended at this time.  Outcome Evaluation: Pt seen for evaluation in which pt tolerated well with fatigue noted after grossly 4 sit to stands. Pt may benefit from therapeutic intervention to progress towards PLOF that daughter explained to PT this date. Pt may benefit from IPR however d/t endurance concerns, more appropriate for subacute care.       Time Calculation:    PT Charges       Row Name 06/13/24 1544             Time Calculation    Start Time 1454  -      PT Received On 06/13/24  -         Time Calculation- PT    Total Timed Code Minutes- PT 23 minute(s)  -                User Key  (r) = Recorded By, (t) = Taken By, (c) = Cosigned By      Initials Name Provider Type    NAIDA Ralph  Rita, PT Physical Therapist                  Therapy Charges for Today       Code Description Service Date Service Provider Modifiers Qty    78808013907 HC PT THER PROC EA 15 MIN 6/13/2024 Rita Ralph, PT GP 1    30319921786 HC PT THERAPEUTIC ACT EA 15 MIN 6/13/2024 Rita Ralph, PT GP 1            PT G-Codes  AM-PAC 6 Clicks Score (PT): 18    Rita Ralph, PT  6/13/2024

## 2024-06-13 NOTE — CONSULTS
INFECTIOUS DISEASE CONSULTATION REPORT        Patient Identification:  Name:  Zeenat Dong  Age:  89 y.o.  Sex:  female  :  1928  MRN:  [id number]   Visit Number:  [id number]  Primary Care Physician:  Sravani Chapa APRN        Referring Provider: Dr. Calvillo    Reason for consult: Pericardial effusion, rule out infectious process       LOS: 2 days        Subjective       Subjective     History of present illness:      Thank you Dr. Calvillo for allowing us to participate in the care of your patient.  As you well know, Ms. Zeenat Dong is a 89 y.o. female with past medical history significant for hypertension, hyperlipidemia, hypercalcemia, hyperparathyroidism, GERD, CHF, atrial fibrillation, chronic kidney disease stage IIIa, type II DM, who presented to [facility] Emergency Department on 6/10/2024 for altered mental status.  The patient presented to the ED with family due to concerns of worsening altered mental status and hallucinations over the prior week.  The family reported similar symptoms in the past when diagnosed with UTI, however the patient was negative for UTI at her PCP prior in the week.  The daughter reported the patient had a poor appetite and had not been eating or drinking very much.  The patient's granddaughter noted she had become significantly weaker over the past several days.  The patient was lethargic on initial exam but did arouse to verbal and tactile stimuli.     On arrival to ED temperature was 98.1 °F.  Heart rate 103.  SpO2 97% on room air.  CRP 7.27.  Lactate 2.1.  WBC 20.64 with left shift.  CT abdomen pelvis with no acute intra-abdominal process.  CT chest with focal airspace opacity left lower lobe favoring atelectasis or infiltrate.  Small left pleural effusion.  Cardiomegaly.  Small pericardial effusion.  CT head with no acute intracranial process.  Respiratory PCR panel was negative.  Blood cultures from  preliminarily report no growth at 2  days.  Strep pneumo urine antigen was negative.  Legionella antigen is negative.  Urinalysis was negative.  Echocardiogram from 6/11 reported large (greater than 2 cm) pericardial effusion.  No evidence of cardiac tamponade.  No reported vegetation.    The patient is resting comfortably in bed, awake and alert to person.  Daughter at the bedside.  The patient is hard of hearing with a hearing aid to the right side.  On room air with no apparent distress.  Afebrile.  Reports constipation with abdominal bloating.  Lung exam is diminished to auscultation bilaterally.  Abdomen is slightly distended but soft and nontender with normoactive bowel sounds.  CRP is elevated at 15.97.  Leukocytosis at 20.55.      Infectious Disease consultation was requested for antimicrobial management.      ---------------------------------------------------------------------------------------------------------------------     Review Of Systems:    JOO due to baseline mental status  ---------------------------------------------------------------------------------------------------------------------     Past Medical History    Past Medical History:   Diagnosis Date    Hyperlipidemia        Past Surgical History    Past Surgical History:   Procedure Laterality Date    OTHER SURGICAL HISTORY      ear surgery    SKIN BIOPSY         Family History    Family History   Problem Relation Age of Onset    Other Father         cardiovascular disease    Kidney disease Sister        Social History    Social History     Tobacco Use    Smoking status: Never   Vaping Use    Vaping status: Never Used   Substance Use Topics    Alcohol use: No    Drug use: Never       Allergies    Patient has no known allergies.  ---------------------------------------------------------------------------------------------------------------------     Home Medications:    Prior to Admission Medications       Prescriptions Last Dose Informant Patient Reported? Taking?    apixaban  (ELIQUIS) 5 MG tablet tablet 6/10/2024 Family Member, Other Yes Yes    Take 1 tablet by mouth 2 (Two) Times a Day.    cetirizine (zyrTEC) 10 MG tablet Past Week Family Member, Other Yes Yes    Take 1 tablet by mouth Daily As Needed for Allergies.    cinacalcet (SENSIPAR) 30 MG tablet 6/10/2024 Family Member, Other No Yes    Take 1 tablet by mouth daily.    erythromycin (ROMYCIN) 5 MG/GM ophthalmic ointment Past Week Family Member, Other Yes Yes    Administer 1 Application to both eyes 2 (Two) Times a Day As Needed (for redness/dryness).    ezetimibe (ZETIA) 10 MG tablet 6/10/2024 Family Member, Other Yes Yes    Take 1 tablet by mouth Daily.    folic acid (FOLVITE) 400 MCG tablet 6/10/2024 Other, Family Member Yes Yes    Take 1 tablet by mouth Daily.    furosemide (LASIX) 40 MG tablet 6/10/2024 Family Member, Other Yes Yes    Take 1 tablet by mouth Daily.    hydrocortisone 1 % ointment 6/10/2024 Other, Family Member Yes Yes    Apply 1 Application topically to the appropriate area as directed 2 (Two) Times a Day.    losartan (COZAAR) 50 MG tablet 6/10/2024 Family Member, Other Yes Yes    Take 1 tablet by mouth 2 (Two) Times a Day.    metoprolol tartrate (LOPRESSOR) 100 MG tablet 6/10/2024 Family Member, Other No Yes    Take 1 tablet by mouth 2 (two) times a day. For hypertension.    ondansetron (ZOFRAN) 4 MG tablet Past Week Other, Family Member Yes Yes    Take 1 tablet by mouth Every 8 (Eight) Hours As Needed for Nausea or Vomiting.    pantoprazole (PROTONIX) 40 MG EC tablet 6/10/2024 Family Member, Other No Yes    Take 1 tablet by mouth daily. For gerd without esophagitis.    potassium chloride (K-DUR,KLOR-CON) 10 MEQ CR tablet 6/10/2024 Family Member, Other No Yes    Take 1 tablet by mouth daily. For hypokalemia.    vitamin B-12 (CYANOCOBALAMIN) 1000 MCG tablet 6/10/2024 Other, Family Member Yes Yes    Take 1 tablet by mouth 5 (Five) Times a Week.    vitamin D (ERGOCALCIFEROL) 1.25 MG (81237 UT) capsule capsule  6/9/2024 Family Member, Other Yes No    Take 1 capsule by mouth 1 (One) Time Per Week.          ---------------------------------------------------------------------------------------------------------------------    Objective       Objective     Hospital Scheduled Meds:  apixaban, 2.5 mg, Oral, BID  budesonide-formoterol, 2 puff, Inhalation, BID - RT  cefTRIAXone, 1,000 mg, Intravenous, Q24H  cinacalcet, 30 mg, Oral, Daily  doxycycline, 100 mg, Intravenous, Q12H  folic acid, 500 mcg, Oral, Daily  guaiFENesin, 1,200 mg, Oral, Q12H  hydrocortisone, 1 Application, Topical, BID  insulin lispro, 2-7 Units, Subcutaneous, 4x Daily AC & at Bedtime  metoprolol tartrate, 100 mg, Oral, Q12H  mupirocin, 1 application , Each Nare, BID  NIFEdipine CC, 30 mg, Oral, Q24H  OLANZapine zydis, 5 mg, Oral, Nightly  pantoprazole, 40 mg, Oral, Daily  predniSONE, 40 mg, Oral, Daily  sodium chloride, 10 mL, Intravenous, Q12H  sodium chloride, 10 mL, Intravenous, Q12H  vitamin B-12, 1,000 mcg, Oral, Once per day on Monday Tuesday Wednesday Thursday Friday         ---------------------------------------------------------------------------------------------------------------------   Vital Signs:  Temp:  [97.6 °F (36.4 °C)-98 °F (36.7 °C)] 97.6 °F (36.4 °C)  Heart Rate:  [] 97  Resp:  [18] 18  BP: (130-142)/(70-79) 142/70  No data found.  SpO2 Percentage    06/12/24 1900 06/12/24 1943 06/13/24 0845   SpO2: 97% 98% 96%     SpO2:  [96 %-98 %] 96 %  on   ;   Device (Oxygen Therapy): room air    Body mass index is 23.38 kg/m².  Wt Readings from Last 3 Encounters:   06/11/24 59.9 kg (132 lb)   12/14/23 60.7 kg (133 lb 12.8 oz)   06/21/16 67.6 kg (149 lb)     ---------------------------------------------------------------------------------------------------------------------     Physical Exam:    Constitutional: Elderly, frail appearing female.  Alert to person.  Daughter at the bedside.  On room air with no apparent distress.  Denied  complaints other than constipation.  HENT:  Head: Normocephalic and atraumatic.  Mouth:  Moist mucous membranes.  Hard of hearing with hearing aid to the right  Eyes:  Conjunctivae and EOM are normal.  No scleral icterus.  Neck:  Neck supple.  No JVD present.    Cardiovascular:  Normal rate, regular rhythm and normal heart sounds with no murmur. No edema.  Pulmonary/Chest: Diminished to auscultation bilaterally  Abdominal:  Soft.  Bowel sounds are normal.  Abdomen is mildly distended but nontender  Musculoskeletal:  No edema, no tenderness, and no deformity.  No swelling or redness of joints.  Neurological:  Alert and oriented to person  Skin:  Skin is warm and dry.  No rash noted.  No pallor.   ---------------------------------------------------------------------------------------------------------------------    Results from last 7 days   Lab Units 06/11/24  0534 06/11/24  0342 06/10/24  2101   HSTROP T ng/L 27* 26* 29*     Results from last 7 days   Lab Units 06/10/24  2101   PROBNP pg/mL 9,871.0*       Results from last 7 days   Lab Units 06/11/24  0329   PH, ARTERIAL pH units 7.489*   PO2 ART mm Hg 69.8*   PCO2, ARTERIAL mm Hg 33.7*   HCO3 ART mmol/L 25.5     Results from last 7 days   Lab Units 06/13/24  0134 06/12/24  0755 06/11/24  0047 06/10/24  2101   CRP mg/dL 15.97*  --   --  7.27*   LACTATE mmol/L  --   --  1.8 2.1*   WBC 10*3/mm3 20.55* 16.97*  --  20.64*   HEMOGLOBIN g/dL 10.8* 11.3*  --  12.3   HEMATOCRIT % 33.6* 34.2  --  38.8   MCV fL 94.6 90.0  --  94.9   MCHC g/dL 32.1 33.0  --  31.7   PLATELETS 10*3/mm3 236 205  --  228     Results from last 7 days   Lab Units 06/13/24  0134 06/12/24  0755 06/11/24  0342 06/10/24  2101   SODIUM mmol/L 141 140 141 141   POTASSIUM mmol/L 3.9 4.2 4.5 4.5   MAGNESIUM mg/dL  --   --  1.8 1.3*   CHLORIDE mmol/L 110* 108* 107 107   CO2 mmol/L 18.9* 20.6* 22.6 20.6*   BUN mg/dL 24* 22 28* 29*   CREATININE mg/dL 1.22* 1.19* 1.42* 1.60*   CALCIUM mg/dL 9.3 9.1 9.7* 9.8*  "  GLUCOSE mg/dL 223* 239* 216* 281*   ALBUMIN g/dL  --  3.1*  --  3.0*   BILIRUBIN mg/dL  --  0.3  --  0.4   ALK PHOS U/L  --  77  --  73   AST (SGOT) U/L  --  12  --  13   ALT (SGPT) U/L  --  12  --  14   Estimated Creatinine Clearance: 25.5 mL/min (A) (by C-G formula based on SCr of 1.22 mg/dL (H)).  Ammonia   Date Value Ref Range Status   06/10/2024 57 (H) 11 - 51 umol/L Final       Hemoglobin A1C   Date/Time Value Ref Range Status   06/10/2024 2101 6.60 (H) 4.80 - 5.60 % Final     Glucose   Date/Time Value Ref Range Status   06/13/2024 1040 157 (H) 70 - 130 mg/dL Final   06/13/2024 0631 170 (H) 70 - 130 mg/dL Final   06/12/2024 2006 212 (H) 70 - 130 mg/dL Final   06/12/2024 1651 231 (H) 70 - 130 mg/dL Final   06/12/2024 1044 336 (H) 70 - 130 mg/dL Final   06/12/2024 0739 212 (H) 70 - 130 mg/dL Final   06/11/2024 2028 250 (H) 70 - 130 mg/dL Final   06/11/2024 1636 223 (H) 70 - 130 mg/dL Final     Lab Results   Component Value Date    HGBA1C 6.60 (H) 06/10/2024     Lab Results   Component Value Date    TSH 3.752 08/17/2015    FREET4 1.09 08/17/2015       Blood Culture   Date Value Ref Range Status   06/11/2024 No growth at 2 days  Preliminary   06/11/2024 No growth at 2 days  Preliminary     No results found for: \"URINECX\"  No results found for: \"WOUNDCX\"  No results found for: \"STOOLCX\"  No results found for: \"RESPCX\"  Pain Management Panel  More data may exist         Latest Ref Rng & Units 6/10/2024 8/17/2015   Pain Management Panel   Creatinine, Urine mg/dL  mg/dL - 129.2  130.0    Amphetamine, Urine Qual Negative Negative  -   Barbiturates Screen, Urine Negative Negative  -   Benzodiazepine Screen, Urine Negative Negative  -   Buprenorphine, Screen, Urine Negative Negative  -   Cocaine Screen, Urine Negative Negative  -   Fentanyl, Urine Negative Negative  -   Methadone Screen , Urine Negative Negative  -   Methamphetamine, Ur Negative Negative  -     I have personally reviewed the above laboratory results. "   ---------------------------------------------------------------------------------------------------------------------  Imaging Results (Last 7 Days)       Procedure Component Value Units Date/Time    CT Abdomen Pelvis Without Contrast [[id number]] Collected: 06/10/24 2338     Updated: 06/10/24 2340    Narrative:      Noncontrast CT abdomen and pelvis     Indications: Altered mental status     Technique: Noncontrast CT images of the abdomen and pelvis were  obtained.  Limited exposure control, adjustment of the MA and/or KV  according to patient size or use of an iterative reconstruction  technique was utilized.     Findings CT abdomen pelvis:     No prior studies available.     The liver and spleen are normal.  There is no biliary dilation.     The pancreas is normal.     The adrenal glands and kidneys are normal.     There is no abdominal or retroperitoneal lymphadenopathy.     The bowel loops are normal in course and caliber.  There is  diverticulosis of the descending and sigmoid colon.     There is no pelvic mass or free fluid or lymphadenopathy.  Endometrial  cavity is abnormally dilated measuring 1.3 cm.     The osseous structures are normal.       Impression:      Impression:  1.  No CT evidence of an acute intra-abdominal or intrapelvic process.  2.  Abnormally dilated endometrium.  Suggest nonemergent follow-up with  pelvic ultrasound     This report was finalized on 6/10/2024 11:38 PM by Renard Roberts MD.       CT Chest Without Contrast Diagnostic [[id number]] Collected: 06/10/24 2335     Updated: 06/10/24 2337    Narrative:      Noncontrast CT chest     Indications: Altered mental status     Technique: Noncontrast CT images of the chest were obtained.  Limited  exposure control, adjustment of the MA and/or KV according to patient  size or use of an iterative reconstruction technique was utilized.     Findings CT chest:     Comparison is made with a prior study dated 12/7/2023     The heart is  enlarged.  A small pericardial effusion is present.   Thoracic aorta is normal in course and caliber.  There is no hilar or  mediastinal lymphadenopathy.  Atherosclerotic calcifications involve the  thoracic aorta and coronary arteries.     There is a focal airspace opacity at the left lung base favoring  atelectasis over infiltrate.  Small left pleural effusion is present.     Degenerative changes involve the thoracic spine.          Impression:      Impression:  1.  Focal airspace opacity at the left lower lobe favors atelectasis  over infiltrate.  There also is a small left pleural effusion.  2.  Cardiomegaly  3.  Small pericardial effusion, new since the prior study     This report was finalized on 6/10/2024 11:35 PM by Renard Roberts MD.       CT Head Without Contrast [[id number]] Collected: 06/10/24 2332     Updated: 06/10/24 2335    Narrative:      Noncontrast CT brain     Indications: Altered mental status     Technique: Noncontrast CT images of the brain were obtained.  Limited  exposure control, adjustment of the MA and/or KV according to patient  size or use of an iterative reconstruction technique was utilized.     Findings:     No prior studies available.     There is no evidence of acute intracranial hemorrhage.  The ventricles  are normal in size and position.  There is no mass-effect or midline  shift.  Patchy and confluent areas of hypodensity involve the  periventricular deep white matter compatible sequelae of chronic small  vessel ischemic disease.  No abnormal extra-axial fluid collections are  demonstrated.     The bony calvarium is normal.       Impression:      Impression:     No CT evidence of an acute intracranial process.     This report was finalized on 6/10/2024 11:32 PM by Renard Roberts MD.       XR Chest 1 View [[id number]] Collected: 06/10/24 2201     Updated: 06/10/24 2204    Narrative:      PROCEDURE: Portable chest x-ray examination performed on Ruma 10, 2024.  Single view.  Upright position.     HISTORY: Weakness. Altered mental status.     COMPARISON: None.     FINDINGS:     Enlarged heart size  Elevated right hemidiaphragm.  Coarsened bronchovascular pattern to the lungs.  Possible left lower lobe pneumonia.  Possible small left pleural effusion.  No interstitial edema or pneumothorax.  No free air in the upper abdomen.       Impression:         1.  Enlarged heart size.  2.  Left lower lobe atelectasis versus pneumonia with small left pleural  effusion.  3.  Mild elevated right hemidiaphragm.  4.  No pneumothorax.     This report was finalized on 6/10/2024 10:02 PM by Joseph Harmon MD.             I have personally reviewed the above radiology results.   ---------------------------------------------------------------------------------------------------------------------      Pertinent Infectious Disease Results          Assessment & Plan      Assessment      Pericardial effusion, rule out infectious process        Plan      Ms. Zeenat Dong is a 89 y.o. female with past medical history significant for hypertension, hyperlipidemia, hypercalcemia, hyperparathyroidism, GERD, CHF, atrial fibrillation, chronic kidney disease stage IIIa, type II DM, who presented to [facility] Emergency Department on 6/10/2024 for altered mental status.  The patient presented to the ED with family due to concerns of worsening altered mental status and hallucinations over the prior week.  The family reported similar symptoms in the past when diagnosed with UTI, however the patient was negative for UTI at her PCP prior in the week.  The daughter reported the patient had a poor appetite and had not been eating or drinking very much.  The patient's granddaughter noted she had become significantly weaker over the past several days.  The patient was lethargic on initial exam but did arouse to verbal and tactile stimuli.     On arrival to ED temperature was 98.1 °F.  Heart rate 103.  SpO2 97% on room  air.  CRP 7.27.  Lactate 2.1.  WBC 20.64 with left shift.  CT abdomen pelvis with no acute intra-abdominal process.  CT chest with focal airspace opacity left lower lobe favoring atelectasis or infiltrate.  Small left pleural effusion.  Cardiomegaly.  Small pericardial effusion.  CT head with no acute intracranial process.  Respiratory PCR panel was negative.  Blood cultures from 6/11 preliminarily report no growth at 2 days.  Strep pneumo urine antigen was negative.  Legionella antigen is negative.  Urinalysis was negative.  Echocardiogram from 6/11 reported large (greater than 2 cm) pericardial effusion.  No evidence of cardiac tamponade.  No reported vegetation.    The patient is resting comfortably in bed, awake and alert to person.  Daughter at the bedside.  The patient is hard of hearing with a hearing aid to the right side.  On room air with no apparent distress.  Afebrile.  Reports constipation with abdominal bloating.  Lung exam is diminished to auscultation bilaterally.  Abdomen is slightly distended but soft and nontender with normoactive bowel sounds.  CRP is elevated at 15.97.  Leukocytosis at 20.55.    The infectious disease team was consulted on this case to rule out infectious process in the setting of pericardial effusion.  Unfortunately without a culture of the pericardial fluid we are unable to rule out infection at this time.  Would recommend pericardiocentesis for culture.  In the setting of worsening CRP and leukocytosis, will discontinue ceftriaxone and doxycycline courses and initiate cefepime and metronidazole courses empirically for pneumonia and pericarditis.  MRSA PCR screen ordered, will plan to initiate vancomycin if positive.  We will continue to monitor closely.        ANTIMICROBIAL THERAPY    cefTRIAXone (ROCEPHIN) 1000 mg IVPB in 100 mL NS (VTB)  doxycycline (VIBRAMYCIN) 100 mg IVPB in 100 mL NS (VTB)       Again, thank you Dr. Calvillo for allowing us to participate in the care of  your patient and please feel free to call for any questions you may have.        Code Status:     Code Status and Medical Interventions:   Ordered at: 06/11/24 0401     Medical Intervention Limits:    No intubation (DNI)     Level Of Support Discussed With:    Next of Kin (If No Surrogate)     Code Status (Patient has no pulse and is not breathing):    No CPR (Do Not Attempt to Resuscitate)     Medical Interventions (Patient has pulse or is breathing):    Limited Support         FIORELLA Frazier  06/13/24  11:52 EDT

## 2024-06-13 NOTE — PROGRESS NOTES
Pharmacy was consulted for cefepime dosing. The patients cefepime has been as dosed as cefepime 2 grams every 24 hours over a 4 hour extended infusion for the condition of pneumonia based on an estimated creatinine clearance of 25.5 and the  Extended Infusion of Beta-Lactam and Monobactam Antibiotics Policy.    GIO Pereira, PharmMALINDA  12:40 EDT  06/13/24

## 2024-06-13 NOTE — SIGNIFICANT NOTE
As her pericardial effusion has features consistent with chronic effusion and she is already on antibiotics per ID for pneumonia.  At her age, risks for pericardiocentesis outweigh the benefits.  We will sign off.  Please let us know if you have any further questions.  Thank you for allowing us to participate in her care.    Electronically signed by FIORELLA Dias, 06/13/24, 4:29 PM EDT.

## 2024-06-13 NOTE — CASE MANAGEMENT/SOCIAL WORK
Discharge Planning Assessment   [place]     Patient Name: Zeenat Dong  MRN: [id number]  Today's Date: 6/13/2024    Admit Date: 6/10/2024            Discharge Plan       Row Name 06/13/24 1329       Plan    Plan SS spoke with Pt's dtr at bedside on this date. Pt was sleeping. [facility] per Annalise [facility]. Per dtr she plans to tour facilityon 06/14/24 and then will make decision about short term rehab bed offer. SS updated Dr. Calvillo. SS to follow.                    PAT NicoleW

## 2024-06-13 NOTE — PROGRESS NOTES
[facility] HOSPITALIST PROGRESS NOTE     Patient Identification:  Name:  Zeenat Dong  Age:  89 y.o.  Sex:  female  :  1928  MRN:  [id number]  Visit Number:  [id number]  ROOM: [address] [place] [address]     Primary Care Provider:  Sravani Chapa APRN    Length of stay in inpatient status:  2    Subjective     Chief Compliant:    Chief Complaint   Patient presents with    Abnormal Lab    Weakness - Generalized    Altered Mental Status       History of Presenting Illness:    Patient seen in follow-up for weakness, suspected pneumonia and pericardial effusion.  Patient is hard of hearing, family present at bedside.  No chest pain/pressure or tightness.  Has been afebrile.  No adverse events noted overnight.    Objective     Current Hospital Meds:apixaban, 2.5 mg, Oral, BID  budesonide-formoterol, 2 puff, Inhalation, BID - RT  [START ON 2024] cefepime, 2,000 mg, Intravenous, Q24H  cinacalcet, 30 mg, Oral, Daily  folic acid, 500 mcg, Oral, Daily  guaiFENesin, 1,200 mg, Oral, Q12H  hydrocortisone, 1 Application, Topical, BID  insulin lispro, 2-7 Units, Subcutaneous, 4x Daily AC & at Bedtime  metoprolol tartrate, 100 mg, Oral, Q12H  metroNIDAZOLE, 500 mg, Intravenous, Q8H  mupirocin, 1 application , Each Nare, BID  NIFEdipine CC, 30 mg, Oral, Q24H  OLANZapine zydis, 5 mg, Oral, Nightly  pantoprazole, 40 mg, Oral, Daily  predniSONE, 40 mg, Oral, Daily  sodium chloride, 10 mL, Intravenous, Q12H  sodium chloride, 10 mL, Intravenous, Q12H  vitamin B-12, 1,000 mcg, Oral, Once per day on     Pharmacy Consult - Pharmacy to dose,         Current Antimicrobial Therapy:  Anti-Infectives (From admission, onward)      Ordered     Dose/Rate Route Frequency Start Stop    24 1239  cefepime 2000 mg IVPB in 100 mL NS (VTB)        Ordering Provider: Blanca Pereira RPH    2,000 mg  over 4 Hours Intravenous Every 24 Hours 24 1400 24 1359    24 1239  cefepime 2000  mg IVPB in 100 mL NS (VTB)        Ordering Provider: Blanca Pereira RPH    2,000 mg  over 30 Minutes Intravenous Once 06/13/24 1330 06/13/24 1444    06/13/24 1205  metroNIDAZOLE (FLAGYL) IVPB 500 mg        Ordering Provider: Johanna Chapa APRN    500 mg  200 mL/hr over 30 Minutes Intravenous Every 8 Hours 06/13/24 1300 06/18/24 1259    06/11/24 0153  doxycycline (VIBRAMYCIN) 100 mg in sodium chloride 0.9 % 100 mL IVPB-VTB        Ordering Provider: Haylee Rawls DO    100 mg  over 60 Minutes Intravenous Once 06/11/24 0209 06/11/24 0327    06/11/24 0152  cefTRIAXone (ROCEPHIN) 1,000 mg in sodium chloride 0.9 % 100 mL IVPB-VTB        Ordering Provider: Haylee Rawls DO    1,000 mg  200 mL/hr over 30 Minutes Intravenous Once 06/11/24 0208 06/11/24 0250          Current Diuretic Therapy:  Diuretics (From admission, onward)      None          ----------------------------------------------------------------------------------------------------------------------  Vital Signs:  Temp:  [97.6 °F (36.4 °C)-98.7 °F (37.1 °C)] 98.7 °F (37.1 °C)  Heart Rate:  [] 90  Resp:  [18] 18  BP: (138-142)/(68-79) 138/68  SpO2:  [96 %-98 %] 96 %  on   ;   Device (Oxygen Therapy): room air  Body mass index is 23.38 kg/m².    Wt Readings from Last 3 Encounters:   06/11/24 59.9 kg (132 lb)   12/14/23 60.7 kg (133 lb 12.8 oz)   06/21/16 67.6 kg (149 lb)     Intake & Output (last 3 days)         06/09 0701  06/10 0700 06/10 0701  06/11 0700 06/11 0701  06/12 0700 06/12 0701  06/13 0700    P.O.   120     I.V. (mL/kg)   300 (5)     Total Intake(mL/kg)   420 (7)     Net   +420             Urine Unmeasured Occurrence   2 x           Diet: Regular/House; Fluid Consistency: Thin (IDDSI 0)  ----------------------------------------------------------------------------------------------------------------------  Physical exam:  Constitutional: Elderly female-looks good for her age, Well-developed and well-nourished, resting comfortably in  bed, no acute distress.      HENT:  Head:  Normocephalic and atraumatic.  Mouth:  Moist mucous membranes.  Eyes:  Conjunctivae and EOM are normal. No scleral icterus.   Cardiovascular: Tachycardia, regular rhythm and normal heart sounds with no murmur. No JVD.   Pulmonary/Chest:  No respiratory distress, no wheezes, no crackles, with normal breath sounds and good air movement. Unlabored. No accessory muscle use.  Abdominal:  Soft. No distension and no tenderness.  Bowel sounds present. No rebound or guarding.   Musculoskeletal:  No tenderness, and no deformity.  No red or swollen joints anywhere.    Neurological:  Alert and oriented to person, place but no time.  No cranial nerve deficit.   Nonfocal.   Skin:  Skin is warm and dry. No rash noted. No pallor.   Peripheral vascular:  No clubbing, no cyanosis, no edema. Pedal and tibial pulses 2 out of 4 bilaterally.     ----------------------------------------------------------------------------------------------------------------------  Results from last 7 days   Lab Units 06/13/24  0134 06/12/24  0755 06/11/24  0047 06/10/24  2101   CRP mg/dL 15.97*  --   --  7.27*   LACTATE mmol/L  --   --  1.8 2.1*   WBC 10*3/mm3 20.55* 16.97*  --  20.64*   HEMOGLOBIN g/dL 10.8* 11.3*  --  12.3   HEMATOCRIT % 33.6* 34.2  --  38.8   MCV fL 94.6 90.0  --  94.9   MCHC g/dL 32.1 33.0  --  31.7   PLATELETS 10*3/mm3 236 205  --  228     Results from last 7 days   Lab Units 06/11/24  0329   PH, ARTERIAL pH units 7.489*   PO2 ART mm Hg 69.8*   PCO2, ARTERIAL mm Hg 33.7*   HCO3 ART mmol/L 25.5     Results from last 7 days   Lab Units 06/13/24  0134 06/12/24  0755 06/11/24  0342 06/10/24  2101   SODIUM mmol/L 141 140 141 141   POTASSIUM mmol/L 3.9 4.2 4.5 4.5   MAGNESIUM mg/dL  --   --  1.8 1.3*   CHLORIDE mmol/L 110* 108* 107 107   CO2 mmol/L 18.9* 20.6* 22.6 20.6*   BUN mg/dL 24* 22 28* 29*   CREATININE mg/dL 1.22* 1.19* 1.42* 1.60*   CALCIUM mg/dL 9.3 9.1 9.7* 9.8*   PHOSPHORUS mg/dL  --    "--  3.1  --    GLUCOSE mg/dL 223* 239* 216* 281*   ALBUMIN g/dL  --  3.1*  --  3.0*   BILIRUBIN mg/dL  --  0.3  --  0.4   ALK PHOS U/L  --  77  --  73   AST (SGOT) U/L  --  12  --  13   ALT (SGPT) U/L  --  12  --  14   Estimated Creatinine Clearance: 25.5 mL/min (A) (by C-G formula based on SCr of 1.22 mg/dL (H)).  Ammonia   Date Value Ref Range Status   06/10/2024 57 (H) 11 - 51 umol/L Final     Results from last 7 days   Lab Units 06/11/24  0534 06/11/24  0342 06/10/24  2101   HSTROP T ng/L 27* 26* 29*     Results from last 7 days   Lab Units 06/10/24  2101   PROBNP pg/mL 9,871.0*         Hemoglobin A1C   Date/Time Value Ref Range Status   06/10/2024 2101 6.60 (H) 4.80 - 5.60 % Final     Glucose   Date/Time Value Ref Range Status   06/13/2024 1040 157 (H) 70 - 130 mg/dL Final   06/13/2024 0631 170 (H) 70 - 130 mg/dL Final   06/12/2024 2006 212 (H) 70 - 130 mg/dL Final   06/12/2024 1651 231 (H) 70 - 130 mg/dL Final   06/12/2024 1044 336 (H) 70 - 130 mg/dL Final   06/12/2024 0739 212 (H) 70 - 130 mg/dL Final   06/11/2024 2028 250 (H) 70 - 130 mg/dL Final   06/11/2024 1636 223 (H) 70 - 130 mg/dL Final     Lab Results   Component Value Date    TSH 3.752 08/17/2015    FREET4 1.09 08/17/2015     No results found for: \"PREGTESTUR\", \"PREGSERUM\", \"HCG\", \"HCGQUANT\"  Pain Management Panel  More data may exist         Latest Ref Rng & Units 6/10/2024 8/17/2015   Pain Management Panel   Creatinine, Urine mg/dL  mg/dL - 129.2  130.0    Amphetamine, Urine Qual Negative Negative  -   Barbiturates Screen, Urine Negative Negative  -   Benzodiazepine Screen, Urine Negative Negative  -   Buprenorphine, Screen, Urine Negative Negative  -   Cocaine Screen, Urine Negative Negative  -   Fentanyl, Urine Negative Negative  -   Methadone Screen , Urine Negative Negative  -   Methamphetamine, Ur Negative Negative  -     Brief Urine Lab Results  (Last result in the past 365 days)        Color   Clarity   Blood   Leuk Est   Nitrite   Protein   " "CREAT   Urine HCG        06/10/24 2313 Yellow   Clear   Negative   Negative   Negative   Negative                 Blood Culture   Date Value Ref Range Status   06/11/2024 No growth at 24 hours  Preliminary   06/11/2024 No growth at 24 hours  Preliminary     No results found for: \"URINECX\"  No results found for: \"WOUNDCX\"  No results found for: \"STOOLCX\"  No results found for: \"RESPCX\"  No results found for: \"AFBCX\"  Results from last 7 days   Lab Units 06/13/24  0134 06/11/24  0047 06/10/24  2101   LACTATE mmol/L  --  1.8 2.1*   SED RATE mm/hr 59*  --  43*   CRP mg/dL 15.97*  --  7.27*       I have personally looked at the labs and they are summarized above.  ----------------------------------------------------------------------------------------------------------------------  Detailed radiology reports for the last 24 hours:  Imaging Results (Last 24 Hours)       ** No results found for the last 24 hours. **          Assessment & Plan      Patient is a 89-year-old female with history significant for CKD, hyperlipidemia, hyperparathyroidism and atrial fibrillation who presented to the ER with complaints of weakness and leukocytosis noted on outpatient labs.    #Sepsis due to community-acquired pneumonia, bacterial, unspecified  #Metabolic encephalopathy  --Patient presented with overall weakness, leukocytosis.  Afebrile and hemodynamically stable with otherwise benign workup aside from CT imaging findings.  --Admit labs WBC 17 K, elevated inflammatory markers  --Initial ABG unremarkable  --Blood cultures NGTD, Legionella/strep pneumo negative  --Viral PCR negative  --CT chest noted questionable infiltrates in the left lower lobe  --Continue p.o. prednisone  --Continue as needed DuoNebs, Symbicort, Symbicort  --abx switched to cefepime/flagyl  --ID following, appreciate recommendations    #Atrial fibrillation  #HFpEF, not in acute exacerbation  #Essential hypertension  #Large pericardial effusion without tamponade " physiology  --No chest pain/pressure or tightness  --EKG with atrial fibrillation, no acute ischemic changes  --Troponin 26, repeat 27 with a delta of 1  --Echo EF 66 to 70%, large greater than 2 cm pericardial effusion without evidence of tamponade physiology  --Hold diuretics  --Continue beta-blocker, Eliquis (reduced dose) for stroke prophylaxis  --Cardiology consulted, suspected chronic pericardial effusion and recommend infectious disease consult and outpatient follow-up next month with echo    #Acute kidney injury on CKD stage IIIa due to prerenal azotemia  --Baseline creatinine around 1, creatinine 1.6 on admission  --CT negative for obstruction  --CK normal  --Continue IV fluids, replace labs in a.m.    #Delirium  --delirium precautions  --po zyprexa hs    #Type 2 diabetes mellitus, SSI, adjust as needed   #Hypomagnesemia, replace  #Acute on chronic debility, PT/OT, agreeable to SNF  #Hyperlipidemia, statin  #Hyperparathyroidism, Sensipar  #GERD, PPI    CHECKLIST:  Abx: cefepime, flagyl  VTE: Eliquis  GI ppx: PPI  Diet: Modified  Code: No CPR, limited  Dispo: Patient is medically stable, ID consulted per cards recs.  Agreeable to SNF placement, social work following    Reji Calvillo DO  [facility]  06/13/24  15:24 EDT

## 2024-06-13 NOTE — CONSULTS
Assessment:  Diagnosis: sepsis    No Known Allergies    Order Date/Time: 6/13/2024 1001  Indications: no access; difficult access; iv abx  LABS:  Lab Results   Component Value Date    INR 2.20 (H) 12/07/2023    PROTIME 25.1 (H) 12/07/2023     Lab Results   Component Value Date    PTT 41.2 (H) 12/07/2023     Lab Results   Component Value Date    WBC 20.55 (H) 06/13/2024    HGB 10.8 (L) 06/13/2024    HCT 33.6 (L) 06/13/2024    MCV 94.6 06/13/2024     06/13/2024     Lab Results   Component Value Date    BUN 24 (H) 06/13/2024     Lab Results   Component Value Date    CREATININE 1.22 (H) 06/13/2024     Lab Results   Component Value Date    EGFRIFNONA 55 (L) 06/21/2016     Labs Reviewed: all labs reviewed    Contraindications for PICC/Midline:  No contraindications noted    Recommendations:  PowerGlide Pro Midline Catheter; 18 g; 10 cm  Upper Right Basilic    Procedure Time Out:  Time out Time: 1045  Correct Patient Identity: Yes  Correct Surgical Side and Site Are Marked: Yes  Agreement on Procedure to be done: Yes  Antibiotic Given: N/A  RN: LASHELL Carvalho RN    PowerGlide Pro Midline Catheter placed with ultrasound guidance and verified by blood return. Minimal blood loss noted. Catheter flushes easily. Blood return noted. Patient nurse, Trang MARTIN, made aware that midline is in upper R arm and ready for use.      Divine Carvalho RN

## 2024-06-14 ENCOUNTER — APPOINTMENT (OUTPATIENT)
Dept: CARDIOLOGY | Facility: HOSPITAL | Age: 89
End: 2024-06-14
Payer: MEDICARE

## 2024-06-14 ENCOUNTER — APPOINTMENT (OUTPATIENT)
Dept: ULTRASOUND IMAGING | Facility: HOSPITAL | Age: 89
End: 2024-06-14
Payer: MEDICARE

## 2024-06-14 LAB
ANION GAP SERPL CALCULATED.3IONS-SCNC: 13.4 MMOL/L (ref 5–15)
BASOPHILS # BLD AUTO: 0.04 10*3/MM3 (ref 0–0.2)
BASOPHILS NFR BLD AUTO: 0.2 % (ref 0–1.5)
BUN SERPL-MCNC: 29 MG/DL (ref 8–23)
BUN/CREAT SERPL: 23.8 (ref 7–25)
CALCIUM SPEC-SCNC: 9.6 MG/DL (ref 8.2–9.6)
CHLORIDE SERPL-SCNC: 113 MMOL/L (ref 98–107)
CO2 SERPL-SCNC: 18.6 MMOL/L (ref 22–29)
CREAT SERPL-MCNC: 1.22 MG/DL (ref 0.57–1)
DEPRECATED RDW RBC AUTO: 49.6 FL (ref 37–54)
EGFRCR SERPLBLD CKD-EPI 2021: 40.7 ML/MIN/1.73
EOSINOPHIL # BLD AUTO: 0 10*3/MM3 (ref 0–0.4)
EOSINOPHIL NFR BLD AUTO: 0 % (ref 0.3–6.2)
ERYTHROCYTE [DISTWIDTH] IN BLOOD BY AUTOMATED COUNT: 14.2 % (ref 12.3–15.4)
GLUCOSE BLDC GLUCOMTR-MCNC: 150 MG/DL (ref 70–130)
GLUCOSE BLDC GLUCOMTR-MCNC: 161 MG/DL (ref 70–130)
GLUCOSE BLDC GLUCOMTR-MCNC: 192 MG/DL (ref 70–130)
GLUCOSE BLDC GLUCOMTR-MCNC: 201 MG/DL (ref 70–130)
GLUCOSE SERPL-MCNC: 194 MG/DL (ref 65–99)
HCT VFR BLD AUTO: 36.7 % (ref 34–46.6)
HGB BLD-MCNC: 11.4 G/DL (ref 12–15.9)
IMM GRANULOCYTES # BLD AUTO: 0.34 10*3/MM3 (ref 0–0.05)
IMM GRANULOCYTES NFR BLD AUTO: 2 % (ref 0–0.5)
LYMPHOCYTES # BLD AUTO: 0.96 10*3/MM3 (ref 0.7–3.1)
LYMPHOCYTES NFR BLD AUTO: 5.7 % (ref 19.6–45.3)
MCH RBC QN AUTO: 29.5 PG (ref 26.6–33)
MCHC RBC AUTO-ENTMCNC: 31.1 G/DL (ref 31.5–35.7)
MCV RBC AUTO: 94.8 FL (ref 79–97)
MONOCYTES # BLD AUTO: 1.16 10*3/MM3 (ref 0.1–0.9)
MONOCYTES NFR BLD AUTO: 6.8 % (ref 5–12)
NEUTROPHILS NFR BLD AUTO: 14.46 10*3/MM3 (ref 1.7–7)
NEUTROPHILS NFR BLD AUTO: 85.3 % (ref 42.7–76)
NRBC BLD AUTO-RTO: 0 /100 WBC (ref 0–0.2)
PLATELET # BLD AUTO: 260 10*3/MM3 (ref 140–450)
PMV BLD AUTO: 10.7 FL (ref 6–12)
POTASSIUM SERPL-SCNC: 4.4 MMOL/L (ref 3.5–5.2)
RBC # BLD AUTO: 3.87 10*6/MM3 (ref 3.77–5.28)
SODIUM SERPL-SCNC: 145 MMOL/L (ref 136–145)
WBC NRBC COR # BLD AUTO: 16.96 10*3/MM3 (ref 3.4–10.8)

## 2024-06-14 PROCEDURE — 63710000001 PREDNISONE PER 1 MG: Performed by: STUDENT IN AN ORGANIZED HEALTH CARE EDUCATION/TRAINING PROGRAM

## 2024-06-14 PROCEDURE — 94799 UNLISTED PULMONARY SVC/PX: CPT

## 2024-06-14 PROCEDURE — 99232 SBSQ HOSP IP/OBS MODERATE 35: CPT | Performed by: STUDENT IN AN ORGANIZED HEALTH CARE EDUCATION/TRAINING PROGRAM

## 2024-06-14 PROCEDURE — 82948 REAGENT STRIP/BLOOD GLUCOSE: CPT

## 2024-06-14 PROCEDURE — 93308 TTE F-UP OR LMTD: CPT

## 2024-06-14 PROCEDURE — 63710000001 INSULIN LISPRO (HUMAN) PER 5 UNITS

## 2024-06-14 PROCEDURE — 76830 TRANSVAGINAL US NON-OB: CPT

## 2024-06-14 PROCEDURE — 80048 BASIC METABOLIC PNL TOTAL CA: CPT | Performed by: STUDENT IN AN ORGANIZED HEALTH CARE EDUCATION/TRAINING PROGRAM

## 2024-06-14 PROCEDURE — 25010000002 METRONIDAZOLE 500 MG/100ML SOLUTION: Performed by: NURSE PRACTITIONER

## 2024-06-14 PROCEDURE — 94664 DEMO&/EVAL PT USE INHALER: CPT

## 2024-06-14 PROCEDURE — 97166 OT EVAL MOD COMPLEX 45 MIN: CPT

## 2024-06-14 PROCEDURE — 25010000002 CEFEPIME PER 500 MG

## 2024-06-14 PROCEDURE — 94760 N-INVAS EAR/PLS OXIMETRY 1: CPT

## 2024-06-14 PROCEDURE — 76830 TRANSVAGINAL US NON-OB: CPT | Performed by: RADIOLOGY

## 2024-06-14 PROCEDURE — 93321 DOPPLER ECHO F-UP/LMTD STD: CPT

## 2024-06-14 PROCEDURE — 85025 COMPLETE CBC W/AUTO DIFF WBC: CPT | Performed by: STUDENT IN AN ORGANIZED HEALTH CARE EDUCATION/TRAINING PROGRAM

## 2024-06-14 PROCEDURE — 93308 TTE F-UP OR LMTD: CPT | Performed by: INTERNAL MEDICINE

## 2024-06-14 PROCEDURE — 99232 SBSQ HOSP IP/OBS MODERATE 35: CPT | Performed by: NURSE PRACTITIONER

## 2024-06-14 PROCEDURE — 93321 DOPPLER ECHO F-UP/LMTD STD: CPT | Performed by: INTERNAL MEDICINE

## 2024-06-14 RX ORDER — METOPROLOL TARTRATE 1 MG/ML
5 INJECTION, SOLUTION INTRAVENOUS EVERY 6 HOURS PRN
Status: DISCONTINUED | OUTPATIENT
Start: 2024-06-14 | End: 2024-06-20 | Stop reason: HOSPADM

## 2024-06-14 RX ADMIN — GUAIFENESIN 1200 MG: 600 TABLET, EXTENDED RELEASE ORAL at 21:45

## 2024-06-14 RX ADMIN — MUPIROCIN 1 APPLICATION: 20 OINTMENT TOPICAL at 12:08

## 2024-06-14 RX ADMIN — INSULIN LISPRO 3 UNITS: 100 INJECTION, SOLUTION INTRAVENOUS; SUBCUTANEOUS at 21:45

## 2024-06-14 RX ADMIN — HYDROCORTISONE 1 APPLICATION: 10 OINTMENT TOPICAL at 21:59

## 2024-06-14 RX ADMIN — BUDESONIDE AND FORMOTEROL FUMARATE DIHYDRATE 2 PUFF: 160; 4.5 AEROSOL RESPIRATORY (INHALATION) at 07:38

## 2024-06-14 RX ADMIN — BUDESONIDE AND FORMOTEROL FUMARATE DIHYDRATE 2 PUFF: 160; 4.5 AEROSOL RESPIRATORY (INHALATION) at 18:26

## 2024-06-14 RX ADMIN — MUPIROCIN 1 APPLICATION: 20 OINTMENT TOPICAL at 21:59

## 2024-06-14 RX ADMIN — APIXABAN 2.5 MG: 2.5 TABLET, FILM COATED ORAL at 21:45

## 2024-06-14 RX ADMIN — INSULIN LISPRO 2 UNITS: 100 INJECTION, SOLUTION INTRAVENOUS; SUBCUTANEOUS at 12:08

## 2024-06-14 RX ADMIN — METOPROLOL TARTRATE 100 MG: 100 TABLET, FILM COATED ORAL at 21:52

## 2024-06-14 RX ADMIN — Medication 10 ML: at 21:59

## 2024-06-14 RX ADMIN — METOPROLOL TARTRATE 5 MG: 1 INJECTION, SOLUTION INTRAVENOUS at 16:22

## 2024-06-14 RX ADMIN — CEFEPIME 2000 MG: 2 INJECTION, POWDER, FOR SOLUTION INTRAVENOUS at 14:17

## 2024-06-14 RX ADMIN — INSULIN LISPRO 2 UNITS: 100 INJECTION, SOLUTION INTRAVENOUS; SUBCUTANEOUS at 08:59

## 2024-06-14 RX ADMIN — METRONIDAZOLE 500 MG: 500 INJECTION, SOLUTION INTRAVENOUS at 12:08

## 2024-06-14 RX ADMIN — HYDROCORTISONE 1 APPLICATION: 10 OINTMENT TOPICAL at 08:58

## 2024-06-14 RX ADMIN — POLYETHYLENE GLYCOL (3350) 17 G: 17 POWDER, FOR SOLUTION ORAL at 15:08

## 2024-06-14 RX ADMIN — Medication 10 ML: at 08:59

## 2024-06-14 RX ADMIN — INSULIN LISPRO 2 UNITS: 100 INJECTION, SOLUTION INTRAVENOUS; SUBCUTANEOUS at 17:31

## 2024-06-14 RX ADMIN — METRONIDAZOLE 500 MG: 500 INJECTION, SOLUTION INTRAVENOUS at 21:59

## 2024-06-14 RX ADMIN — OLANZAPINE 5 MG: 5 TABLET, ORALLY DISINTEGRATING ORAL at 21:45

## 2024-06-14 NOTE — THERAPY EVALUATION
Patient Name: Zeenat Dong  : 1928    MRN: [id number]                              Today's Date: 2024       Admit Date: 6/10/2024    Visit Dx:     ICD-10-CM ICD-9-CM   1. Acute renal failure, unspecified acute renal failure type  N17.9 584.9   2. Hypomagnesemia  E83.42 275.2   3. Hallucinations, unspecified  R44.3 780.1   4. Heart failure, unspecified HF chronicity, unspecified heart failure type  I50.9 428.9   5. Pericardial effusion  I31.39 423.9   6. Community acquired pneumonia of left lung, unspecified part of lung  J18.9 486     Patient Active Problem List   Diagnosis    Abdominal distension    Heart failure    Dry skin dermatitis    Gastroesophageal reflux disease without esophagitis    Hypercalcemia    Hyperparathyroidism    Hypertension    Hypokalemia    Hypomagnesemia    Seasonal allergic rhinitis    Shortness of breath    Atrial fibrillation    Weakness    Sepsis    Moderate malnutrition     Past Medical History:   Diagnosis Date    Hyperlipidemia      Past Surgical History:   Procedure Laterality Date    OTHER SURGICAL HISTORY      ear surgery    SKIN BIOPSY        General Information       Row Name 24 1420          OT Time and Intention    Document Type evaluation  -LA     Mode of Treatment occupational therapy  -LA       Row Name 24 1420          General Information    Patient Profile Reviewed yes  -LA     Prior Level of Function min assist:;ADL's  Patient ambulated with rolling walker.  -LA     Existing Precautions/Restrictions fall  -LA     Barriers to Rehab medically complex;previous functional deficit  -LA       Row Name 24 1420          Occupational Profile    Reason for Services/Referral (Occupational Profile) OT to assess for changes in level of independence/safety with ADLs.  -LA     Patient Goals (Occupational Profile) Return home with daughter/ VAL  -LA       Row Name 24 1420          Living Environment    People in Home child(juliet), adult  -LA       Alena  Name 06/14/24 1420          Cognition    Orientation Status (Cognition) oriented to;person;place  -[facility] Name 06/14/24 1420          Safety Issues, Functional Mobility    Impairments Affecting Function (Mobility) endurance/activity tolerance  Patient refused sitting EOB/transfers this date due to not feeling well. Patient +.  -LA               User Key  (r) = Recorded By, (t) = Taken By, (c) = Cosigned By      Initials Name Provider Type    Aspen Sierra OT Occupational Therapist                     Mobility/ADL's       Row Name 06/14/24 1426          Bed Mobility    Bed Mobility bed mobility (all) activities  -LA     All Activities, [place] (Bed Mobility) dependent (less than 25% patient effort)  -LA     Supine-Sit [place] (Bed Mobility) other (see comments)  patient refused  -LA       Row Name 06/14/24 1426          Transfers    Transfers other (see comments)  patient refused  -LA       Row Name 06/14/24 1426          Functional Mobility    Functional Mobility- Ind. Level not tested  -LA       Row Name 06/14/24 1426          Activities of Daily Living    BADL Assessment/Intervention bathing;upper body dressing;lower body dressing;grooming;feeding;toileting  -LA       Row Name 06/14/24 1426          Bathing Assessment/Intervention    [place] Level (Bathing) dependent (less than 25% patient effort)  -LA       Row Name 06/14/24 1426          Upper Body Dressing Assessment/Training    [place] Level (Upper Body Dressing) maximum assist (25% patient effort);dependent (less than 25% patient effort)  -LA       Row Name 06/14/24 1426          Lower Body Dressing Assessment/Training    [place] Level (Lower Body Dressing) dependent (less than 25% patient effort)  -LA       Row Name 06/14/24 1426          Grooming Assessment/Training    [place] Level (Grooming) moderate assist (50% patient effort);maximum assist (25% patient effort)  -LA       Row Name 06/14/24 1426           Self-Feeding Assessment/Training    [place] Level (Feeding) minimum assist (75% patient effort)  -[facility] Name 06/14/24 1426          Toileting Assessment/Training    [place] Level (Toileting) maximum assist (25% patient effort);dependent (less than 25% patient effort)  -LA               User Key  (r) = Recorded By, (t) = Taken By, (c) = Cosigned By      Initials Name Provider Type    Aspen Sierra OT Occupational Therapist                   Obj/Interventions       [facility] Name 06/14/24 1428          Sensory Assessment (Somatosensory)    Sensory Assessment (Somatosensory) sensation intact  -LA       Row Name 06/14/24 1428          Vision Assessment/Intervention    Visual Impairment/Limitations WFL  -LA       Row Name 06/14/24 1428          Range of Motion Comprehensive    General Range of Motion bilateral upper extremity ROM WFL  -LA     Comment, General Range of Motion BUE PROM/AAROM WFL  -LA       Row Name 06/14/24 1428          Strength Comprehensive (MMT)    General Manual Muscle Testing (MMT) Assessment upper extremity strength deficits identified  -LA     Comment, General Manual Muscle Testing (MMT) Assessment BUE strength grossly 3/5 in all planes  -LA       Row Name 06/14/24 1428          Motor Skills    Motor Skills coordination;functional endurance  -LA     Coordination fine motor deficit;gross motor deficit;bilateral;upper extremity;minimal impairment  limited due to weakness  -LA     Functional Endurance poor  -LA       Row Name 06/14/24 1428          Balance    Comment, Balance --  Patient refused sitting EOB/transfers during evaluation due to not feeling well. Patient HR elevated  -LA               User Key  (r) = Recorded By, (t) = Taken By, (c) = Cosigned By      Initials Name Provider Type    Aspen Sierra OT Occupational Therapist                   Goals/Plan       Row Name 06/14/24 1434          Transfer Goal 1 (OT)    Activity/Assistive Device (Transfer Goal 1, OT) transfers,  all  -LA     [place] Level/Cues Needed (Transfer Goal 1, OT) minimum assist (75% or more patient effort)  -LA     Time Frame (Transfer Goal 1, OT) by discharge  -LA       Row Name 06/14/24 1434          Bathing Goal 1 (OT)    Activity/Device (Bathing Goal 1, OT) bathing skills, all  -LA     [place] Level/Cues Needed (Bathing Goal 1, OT) minimum assist (75% or more patient effort)  -LA     Time Frame (Bathing Goal 1, OT) by discharge  -LA       Row Name 06/14/24 1434          Dressing Goal 1 (OT)    Activity/Device (Dressing Goal 1, OT) dressing skills, all  -LA     [place]/Cues Needed (Dressing Goal 1, OT) minimum assist (75% or more patient effort)  -LA     Time Frame (Dressing Goal 1, OT) by discharge  -LA       Row Name 06/14/24 1434          Toileting Goal 1 (OT)    Activity/Device (Toileting Goal 1, OT) toileting skills, all  -LA     [place] Level/Cues Needed (Toileting Goal 1, OT) minimum assist (75% or more patient effort)  -LA     Time Frame (Toileting Goal 1, OT) by discharge  -LA       Row Name 06/14/24 1434          Therapy Assessment/Plan (OT)    Planned Therapy Interventions (OT) activity tolerance training;patient/caregiver education/training;adaptive equipment training;ROM/therapeutic exercise;BADL retraining;occupation/activity based interventions;strengthening exercise;transfer/mobility retraining;functional balance retraining  -LA               User Key  (r) = Recorded By, (t) = Taken By, (c) = Cosigned By      Initials Name Provider Type    Aspen Sierra OT Occupational Therapist                   Clinical Impression       Row Name 06/14/24 1430          Plan of Care Review    Plan of Care Reviewed With patient  -LA     Outcome Evaluation OT evaluation completed this date. Patient refused sitting EOB/transfer to chair during evaluation. Patient reported no feeling well and HR elevated during evaluation. Patient does present with significant decline in level of  independence/safety with ADLs. Patient would benefit from OT services to maximize independence/safety prior to discharge.  -LA       Row Name 06/14/24 1430          Therapy Assessment/Plan (OT)    Patient/Family Therapy Goal Statement (OT) Return home  -LA     Rehab Potential (OT) fair, will monitor progress closely  -LA     Criteria for Skilled Therapeutic Interventions Met (OT) skilled treatment is necessary;meets criteria  -LA     Therapy Frequency (OT) --  PRN to follow for changes/progress toward goals.  -LA       Row Name 06/14/24 1430          Therapy Plan Review/Discharge Plan (OT)    Equipment Needs Upon Discharge (OT) --  TBD  -LA     Anticipated Discharge Disposition (OT) --  TBD  -LA       Row Name 06/14/24 1430          Positioning and Restraints    Pre-Treatment Position in bed  -LA     Post Treatment Position bed  -LA     In Bed supine;call light within reach;encouraged to call for assist;exit alarm on;with family/caregiver  -LA               User Key  (r) = Recorded By, (t) = Taken By, (c) = Cosigned By      Initials Name Provider Type    Aspen Sierra, ROBERT Occupational Therapist                   Outcome Measures       Row Name 06/14/24 0855          How much help from another person do you currently need...    Turning from your back to your side while in flat bed without using bedrails? 3  -DA     Moving from lying on back to sitting on the side of a flat bed without bedrails? 3  -DA     Moving to and from a bed to a chair (including a wheelchair)? 3  -DA     Standing up from a chair using your arms (e.g., wheelchair, bedside chair)? 3  -DA     Climbing 3-5 steps with a railing? 3  -DA     To walk in hospital room? 3  -DA     AM-PAC 6 Clicks Score (PT) 18  -DA     Highest Level of Mobility Goal 6 --> Walk 10 steps or more  -DA               User Key  (r) = Recorded By, (t) = Taken By, (c) = Cosigned By      Initials Name Provider Type    Adelia Gutierrez, RN Registered Nurse                       OT Recommendation and Plan  Planned Therapy Interventions (OT): activity tolerance training, patient/caregiver education/training, adaptive equipment training, ROM/therapeutic exercise, BADL retraining, occupation/activity based interventions, strengthening exercise, transfer/mobility retraining, functional balance retraining  Therapy Frequency (OT):  (PRN to follow for changes/progress toward goals.)  Plan of Care Review  Plan of Care Reviewed With: patient  Outcome Evaluation: OT evaluation completed this date. Patient refused sitting EOB/transfer to chair during evaluation. Patient reported no feeling well and HR elevated during evaluation. Patient does present with significant decline in level of independence/safety with ADLs. Patient would benefit from OT services to maximize independence/safety prior to discharge.     Time Calculation:          Therapy Charges for Today       Code Description Service Date Service Provider Modifiers Qty    [id number]  OT EVAL MOD COMPLEXITY 4 6/14/2024 Aspen Deras OT GO 1                 Aspen Deras OT  6/14/2024

## 2024-06-14 NOTE — CASE MANAGEMENT/SOCIAL WORK
Discharge Planning Assessment   [place]     Patient Name: Zeenat Dong  MRN: [id number]  Today's Date: 6/14/2024    Admit Date: 6/10/2024       Discharge Plan       Row Name 06/14/24 1717       Plan    Plan SS spoke with Pt and Pt's dtr at bedside on this date. Pt's dtr remains agreeable to SNF placement for short term rehab. Dtr to contact [facility] on 06/17/24 for admission paperwork. [facility] per Annalise can accept Pt on Monday 06/17/24 if Pt is medically stable. SS to follow.                    PAT NicoleW

## 2024-06-14 NOTE — PLAN OF CARE
Goal Outcome Evaluation:  Plan of Care Reviewed With: patient, family        Progress: no change  Outcome Evaluation: Pt resting in bed. Family at bedside. HR elevated, PRN medication given. No s/s of distress. Pt voices no complaints or concerns. Will continue with poc.

## 2024-06-14 NOTE — CONSULTS
Referring Provider: Reji Calvillo DO  Reason for Consultation: Thickened Endometrium    Patient Care Team:  No Known Provider as PCP - General  Meggan Harrington DO-OBGYELY    Chief complaint 89 yo Female who underwent CT with incidental finding of thickened endometrium.      .     History of present illness:  89 yo Female who underwent CT with incidental finding of thickened endometrium.  U/S today notes EMS of 13mm.  Pt herself is unable to provide a history but her daughter is present.  Per nursing and pt's daughter there has been no vaginal bleeding or vaginal discharge.  She is admitted for Pneumonia and sepsis.        Review of Systems  Unable to obtain from patient directly but per family and nursing pt having poor appetitie, constipation and abdominal discomfort.     History  Past Medical History:   Diagnosis Date    Hyperlipidemia    ,   Past Surgical History:   Procedure Laterality Date    OTHER SURGICAL HISTORY      ear surgery    SKIN BIOPSY     ,   Family History   Problem Relation Age of Onset    Other Father         cardiovascular disease    Kidney disease Sister    ,   Social History     Socioeconomic History    Marital status:    Tobacco Use    Smoking status: Never   Vaping Use    Vaping status: Never Used   Substance and Sexual Activity    Alcohol use: No    Drug use: Never     E-cigarette/Vaping    E-cigarette/Vaping Use Never User      E-cigarette/Vaping Substances     E-cigarette/Vaping Devices         , Scheduled Meds:  apixaban, 2.5 mg, Oral, BID  budesonide-formoterol, 2 puff, Inhalation, BID - RT  cefepime, 2,000 mg, Intravenous, Q24H  cinacalcet, 30 mg, Oral, Daily  folic acid, 500 mcg, Oral, Daily  guaiFENesin, 1,200 mg, Oral, Q12H  hydrocortisone, 1 Application, Topical, BID  insulin lispro, 2-7 Units, Subcutaneous, 4x Daily AC & at Bedtime  metoprolol tartrate, 100 mg, Oral, Q12H  metroNIDAZOLE, 500 mg, Intravenous, Q8H  mupirocin, 1 application , Each Nare, BID  NIFEdipine CC, 30  mg, Oral, Q24H  OLANZapine zydis, 5 mg, Oral, Nightly  pantoprazole, 40 mg, Oral, Daily  predniSONE, 40 mg, Oral, Daily  sodium chloride, 10 mL, Intravenous, Q12H  sodium chloride, 10 mL, Intravenous, Q12H  vitamin B-12, 1,000 mcg, Oral, Once per day on Monday Tuesday Wednesday Thursday Friday   , Continuous Infusions:   , PRN Meds:    acetaminophen    senna-docusate sodium **AND** polyethylene glycol **AND** bisacodyl **AND** bisacodyl    cetirizine    dextrose    dextrose    erythromycin    glucagon (human recombinant)    ipratropium    Magnesium Low Dose Replacement - Follow Nurse / BPA Driven Protocol    melatonin    metoprolol tartrate    nitroglycerin    ondansetron    Potassium Replacement - Follow Nurse / BPA Driven Protocol    sodium chloride    sodium chloride    sodium chloride    sodium chloride    sodium chloride    ziprasidone (GEODON) 20 mg in sterile water (preservative free) 1 mL injection, and Allergies:  Patient has no known allergies.          Vital Signs   Temp:    Pulse:    Resp:    BP:     Physical Exam:   Gen: NAD    Results Review:   CT and U/S reviewed            Active Problems:          Thickened endometrium- images are reviewed and simple fluid within the endometrium noted as was measured.  Actual endometrium appears thin.  Pt is having no bleeding or discharge.  With cervical stenosis it is often noted for fluid to fill endometrium.  Her daughter does not want to have TVUS or EMB.  I have offered outpatient imaging in my office if desired.  I do not feel this is pyometrium as WBC decreasing and fluid is simple in appearance.  Will sign off but thank you for the consult and available if needed.     I discussed the patients findings and my recommendations with patient, family, and nursing staff      Meggan Harrington DO  Date: 6/14/2024  Time: 17:51 EDT      Time: More than 50% of time spent in counseling and coordination of care:  Total face-to-face/floor time 30 min.  Time spent in  counseling 20 min. Counseling included the following topics: endometrial thickening in menopause, endometrial fluid, endometrial hyperplasia and how diagnosed and why.  F/u in office discussed as well.

## 2024-06-14 NOTE — PLAN OF CARE
Goal Outcome Evaluation:  Plan of Care Reviewed With: patient        Progress: no change  Outcome Evaluation: Patient rested in bed during shift. Pt agitated during shift, geodon given per MAR. No concerns or complaints at this time. Will continue with plan of care.

## 2024-06-14 NOTE — PROGRESS NOTES
[facility] HOSPITALIST PROGRESS NOTE     Patient Identification:  Name:  Zeenat Dong  Age:  89 y.o.  Sex:  female  :  1928  MRN:  [id number]  Visit Number:  [id number]  ROOM: [address] [place] [address]     Primary Care Provider:  Sravani Chapa APRN    Length of stay in inpatient status:  3    Subjective     Chief Compliant:    Chief Complaint   Patient presents with    Abnormal Lab    Weakness - Generalized    Altered Mental Status       History of Presenting Illness:    Patient seen in follow-up for weakness, suspected pneumonia and pericardial effusion.  Patient is hard of hearing, family present at bedside.  She got choked on her dinner yesterday and has been unable to swallow medications.  No chest pain/pressure or tightness.  Has been afebrile.     Objective     Current Hospital Meds:apixaban, 2.5 mg, Oral, BID  budesonide-formoterol, 2 puff, Inhalation, BID - RT  cefepime, 2,000 mg, Intravenous, Q24H  cinacalcet, 30 mg, Oral, Daily  folic acid, 500 mcg, Oral, Daily  guaiFENesin, 1,200 mg, Oral, Q12H  hydrocortisone, 1 Application, Topical, BID  insulin lispro, 2-7 Units, Subcutaneous, 4x Daily AC & at Bedtime  metoprolol tartrate, 100 mg, Oral, Q12H  metroNIDAZOLE, 500 mg, Intravenous, Q8H  mupirocin, 1 application , Each Nare, BID  NIFEdipine CC, 30 mg, Oral, Q24H  OLANZapine zydis, 5 mg, Oral, Nightly  pantoprazole, 40 mg, Oral, Daily  predniSONE, 40 mg, Oral, Daily  sodium chloride, 10 mL, Intravenous, Q12H  sodium chloride, 10 mL, Intravenous, Q12H  vitamin B-12, 1,000 mcg, Oral, Once per day on            Current Antimicrobial Therapy:  Anti-Infectives (From admission, onward)      Ordered     Dose/Rate Route Frequency Start Stop    24 1239  cefepime 2000 mg IVPB in 100 mL NS (VTB)        Ordering Provider: Blanca Pereira RPH    2,000 mg  over 4 Hours Intravenous Every 24 Hours 24 1400 24 1359    24 1239  cefepime 2000 mg IVPB  in 100 mL NS (VTB)        Ordering Provider: Blanca Pereira RPH    2,000 mg  over 30 Minutes Intravenous Once 06/13/24 1330 06/13/24 1444    06/13/24 1205  metroNIDAZOLE (FLAGYL) IVPB 500 mg        Ordering Provider: Johanna Chapa APRN    500 mg  200 mL/hr over 30 Minutes Intravenous Every 8 Hours 06/13/24 1300 06/18/24 1259    06/11/24 0153  doxycycline (VIBRAMYCIN) 100 mg in sodium chloride 0.9 % 100 mL IVPB-VTB        Ordering Provider: Haylee Rawls DO    100 mg  over 60 Minutes Intravenous Once 06/11/24 0209 06/11/24 0327    06/11/24 0152  cefTRIAXone (ROCEPHIN) 1,000 mg in sodium chloride 0.9 % 100 mL IVPB-VTB        Ordering Provider: Haylee Rawls DO    1,000 mg  200 mL/hr over 30 Minutes Intravenous Once 06/11/24 0208 06/11/24 0250          Current Diuretic Therapy:  Diuretics (From admission, onward)      None          ----------------------------------------------------------------------------------------------------------------------  Vital Signs:  Temp:  [98.1 °F (36.7 °C)-98.6 °F (37 °C)] 98.1 °F (36.7 °C)  Heart Rate:  [] 122  Resp:  [18] 18  BP: (145-152)/(80-85) 145/85  SpO2:  [95 %-98 %] 95 %  on   ;   Device (Oxygen Therapy): room air  Body mass index is 23.38 kg/m².    Wt Readings from Last 3 Encounters:   06/11/24 59.9 kg (132 lb)   12/14/23 60.7 kg (133 lb 12.8 oz)   06/21/16 67.6 kg (149 lb)     Intake & Output (last 3 days)         06/09 0701  06/10 0700 06/10 0701  06/11 0700 06/11 0701  06/12 0700 06/12 0701 06/13 0700    P.O.   120     I.V. (mL/kg)   300 (5)     Total Intake(mL/kg)   420 (7)     Net   +420             Urine Unmeasured Occurrence   2 x           Diet: Regular/House; Fluid Consistency: Thin (IDDSI 0)  ----------------------------------------------------------------------------------------------------------------------  Physical exam:  Constitutional: Elderly female-looks unwell today, well-developed and well-nourished, resting comfortably in bed, no acute  distress.      HENT:  Head:  Normocephalic and atraumatic.  Mouth:  Moist mucous membranes.  Eyes:  Conjunctivae and EOM are normal. No scleral icterus.   Cardiovascular: Irregularly irregular and normal heart sounds with no murmur. No JVD.   Pulmonary/Chest:  No respiratory distress, no wheezes, no crackles, with normal breath sounds and good air movement. Unlabored. No accessory muscle use.  Abdominal:  Soft. No distension and no tenderness.  Bowel sounds present. No rebound or guarding.   Musculoskeletal:  No tenderness, and no deformity.  No red or swollen joints anywhere.    Neurological:  Alert and oriented to person, place but no time.  No cranial nerve deficit.   Nonfocal.   Skin:  Skin is warm and dry. No rash noted. No pallor.   Peripheral vascular:  No clubbing, no cyanosis, no edema. Pedal and tibial pulses 2 out of 4 bilaterally.     ----------------------------------------------------------------------------------------------------------------------  Results from last 7 days   Lab Units 06/14/24  0112 06/13/24  0134 06/12/24  0755 06/11/24  0047 06/10/24  2101   CRP mg/dL  --  15.97*  --   --  7.27*   LACTATE mmol/L  --   --   --  1.8 2.1*   WBC 10*3/mm3 16.96* 20.55* 16.97*  --  20.64*   HEMOGLOBIN g/dL 11.4* 10.8* 11.3*  --  12.3   HEMATOCRIT % 36.7 33.6* 34.2  --  38.8   MCV fL 94.8 94.6 90.0  --  94.9   MCHC g/dL 31.1* 32.1 33.0  --  31.7   PLATELETS 10*3/mm3 260 236 205  --  228     Results from last 7 days   Lab Units 06/11/24  0329   PH, ARTERIAL pH units 7.489*   PO2 ART mm Hg 69.8*   PCO2, ARTERIAL mm Hg 33.7*   HCO3 ART mmol/L 25.5     Results from last 7 days   Lab Units 06/14/24  0112 06/13/24  0134 06/12/24  0755 06/11/24  0342 06/10/24  2101   SODIUM mmol/L 145 141 140 141 141   POTASSIUM mmol/L 4.4 3.9 4.2 4.5 4.5   MAGNESIUM mg/dL  --   --   --  1.8 1.3*   CHLORIDE mmol/L 113* 110* 108* 107 107   CO2 mmol/L 18.6* 18.9* 20.6* 22.6 20.6*   BUN mg/dL 29* 24* 22 28* 29*   CREATININE mg/dL  "1.22* 1.22* 1.19* 1.42* 1.60*   CALCIUM mg/dL 9.6 9.3 9.1 9.7* 9.8*   PHOSPHORUS mg/dL  --   --   --  3.1  --    GLUCOSE mg/dL 194* 223* 239* 216* 281*   ALBUMIN g/dL  --   --  3.1*  --  3.0*   BILIRUBIN mg/dL  --   --  0.3  --  0.4   ALK PHOS U/L  --   --  77  --  73   AST (SGOT) U/L  --   --  12  --  13   ALT (SGPT) U/L  --   --  12  --  14   Estimated Creatinine Clearance: 25.5 mL/min (A) (by C-G formula based on SCr of 1.22 mg/dL (H)).  No results found for: \"AMMONIA\"    Results from last 7 days   Lab Units 06/11/24  0534 06/11/24  0342 06/10/24  2101   HSTROP T ng/L 27* 26* 29*     Results from last 7 days   Lab Units 06/10/24  2101   PROBNP pg/mL 9,871.0*         Glucose   Date/Time Value Ref Range Status   06/14/2024 1621 192 (H) 70 - 130 mg/dL Final   06/14/2024 1114 161 (H) 70 - 130 mg/dL Final   06/14/2024 0717 150 (H) 70 - 130 mg/dL Final   06/13/2024 2007 198 (H) 70 - 130 mg/dL Final   06/13/2024 1626 226 (H) 70 - 130 mg/dL Final   06/13/2024 1040 157 (H) 70 - 130 mg/dL Final   06/13/2024 0631 170 (H) 70 - 130 mg/dL Final   06/12/2024 2006 212 (H) 70 - 130 mg/dL Final     Lab Results   Component Value Date    TSH 3.752 08/17/2015    FREET4 1.09 08/17/2015     No results found for: \"PREGTESTUR\", \"PREGSERUM\", \"HCG\", \"HCGQUANT\"  Pain Management Panel  More data may exist         Latest Ref Rng & Units 6/10/2024 8/17/2015   Pain Management Panel   Creatinine, Urine mg/dL  mg/dL - 129.2  130.0    Amphetamine, Urine Qual Negative Negative  -   Barbiturates Screen, Urine Negative Negative  -   Benzodiazepine Screen, Urine Negative Negative  -   Buprenorphine, Screen, Urine Negative Negative  -   Cocaine Screen, Urine Negative Negative  -   Fentanyl, Urine Negative Negative  -   Methadone Screen , Urine Negative Negative  -   Methamphetamine, Ur Negative Negative  -     Brief Urine Lab Results  (Last result in the past 365 days)        Color   Clarity   Blood   Leuk Est   Nitrite   Protein   CREAT   Urine HCG    " "    06/10/24 2313 Yellow   Clear   Negative   Negative   Negative   Negative                 Blood Culture   Date Value Ref Range Status   06/11/2024 No growth at 24 hours  Preliminary   06/11/2024 No growth at 24 hours  Preliminary     No results found for: \"URINECX\"  No results found for: \"WOUNDCX\"  No results found for: \"STOOLCX\"  No results found for: \"RESPCX\"  No results found for: \"AFBCX\"  Results from last 7 days   Lab Units 06/13/24  0134 06/11/24  0047 06/10/24  2101   LACTATE mmol/L  --  1.8 2.1*   SED RATE mm/hr 59*  --  43*   CRP mg/dL 15.97*  --  7.27*       I have personally looked at the labs and they are summarized above.  ----------------------------------------------------------------------------------------------------------------------  Detailed radiology reports for the last 24 hours:  Imaging Results (Last 24 Hours)       Procedure Component Value Units Date/Time    US Non-ob Transvaginal [[id number]] Collected: 06/14/24 1529     Updated: 06/14/24 1532    Narrative:      EXAM:    US Pelvis Transabdominal and Transvaginal, Complete     EXAM DATE:    6/14/2024 3:08 PM     CLINICAL HISTORY:    dilated endometrium, sepsis of unkown source; N17.9-Acute kidney  failure, unspecified; E83.42-Hypomagnesemia; R44.3-Hallucinations,  unspecified; I50.9-Heart failure, unspecified; I31.39-Other pericardial  effusion (noninflammatory); J18.9-Pneumonia, unspecified organism     TECHNIQUE:    Real-time complete transabdominal and transvaginal pelvic ultrasound  with image documentation.  Transvaginal imaging was used for better  evaluation of the endometrium and adnexa.     COMPARISON:    No relevant prior studies available.     FINDINGS:    UTERUS/CERVIX:  The uterus measures 4.3 x 2.1 x 3.1 cm.  The  endometrial stripe measures 1.3 cm in thickness which is abnormal for  patient's age.  Small amount of fluid within the endometrial canal.  No  myometrial mass.    RIGHT OVARY:  Bilateral ovaries are not well " visualized.    LEFT OVARY:  See above.    FREE FLUID:  No free fluid.    BLADDER:  Unremarkable as visualized.  Wall is normal thickness for  degree of distention.       Impression:      1.  The uterus measures 4.3 x 2.1 x 3.1 cm.  The endometrial stripe  measures 1.3 cm in thickness which is abnormal for patient's age..   Consider gynecology consultation.  2.  Bilateral ovaries are not well visualized.  3.  Small amount of fluid within the endometrial canal.        This report was finalized on 6/14/2024 3:30 PM by Dr. Bassam Jung MD.             Assessment & Plan      Patient is a 89-year-old female with history significant for CKD, hyperlipidemia, hyperparathyroidism and atrial fibrillation who presented to the ER with complaints of weakness and leukocytosis noted on outpatient labs.    #Sepsis due to community-acquired pneumonia, bacterial, unspecified  #Metabolic encephalopathy  --Patient presented with overall weakness, leukocytosis.  Afebrile and hemodynamically stable with otherwise benign workup aside from CT imaging findings.  --Admit labs WBC 17 K, elevated inflammatory markers  --Initial ABG unremarkable  --Blood cultures NGTD, Legionella/strep pneumo negative  --Viral PCR negative  --CT chest noted questionable infiltrates in the left lower lobe  --Continue p.o. prednisone  --Continue as needed DuoNebs, Symbicort, Symbicort  --Continue cefepime/flagyl  --ID following, appreciate recommendations    #Atrial fibrillation  #HFpEF, not in acute exacerbation  #Essential hypertension  #Large pericardial effusion without tamponade physiology  --No chest pain/pressure or tightness  --EKG with atrial fibrillation, no acute ischemic changes  --Troponin 26, repeat 27 with a delta of 1  --Echo EF 66 to 70%, large greater than 2 cm pericardial effusion without evidence of tamponade physiology  --Repeat limited echo noted pericardial effusion 1 to 2 cm, no evidence of tamponade physiology  --Hold diuretics  --Issues  with A-fib with RVR today as she has not taken p.o. Lopressor, as needed IV Lopressor added in the meantime  --Continue beta-blocker, Eliquis (reduced dose) for stroke prophylaxis  --Cardiology consulted, suspected chronic pericardial effusion and recommend infectious disease consult and outpatient follow-up next month with echo    #Thickened endometrium  --Noted on non-OB ultrasound, evaluated by OB/GYN, recommended outpatient follow-up, nothing further to add at this time    #Acute kidney injury on CKD stage IIIa due to prerenal azotemia  --Baseline creatinine around 1, creatinine 1.6 on admission  --CT negative for obstruction  --CK normal  --Continue IV fluids, replace labs in a.m.    #Delirium  --delirium precautions  --po zyprexa hs    #Type 2 diabetes mellitus, SSI, adjust as needed   #Hypomagnesemia, replace  #Acute on chronic debility, PT/OT, agreeable to SNF  #Hyperlipidemia, statin  #Hyperparathyroidism, Sensipar  #GERD, PPI    CHECKLIST:  Abx: cefepime, flagyl  VTE: Eliquis  GI ppx: PPI  Diet: NPO until seen by speech  Code: No CPR, limited  Dispo: Patient is stable but having difficulty swallowing and taking p.o. medications.  Tentative plan to discharge to SNF on Monday if medically stable.    Reji Calvillo,   [facility] ERist  06/14/24  18:10 EDT

## 2024-06-14 NOTE — PROGRESS NOTES
"           PROGRESS NOTE         Patient Identification:  Name:  Zeenat Dong  Age:  89 y.o.  Sex:  female  :  1928  MRN:  [id number]  Visit Number:  [id number]  Primary Care Provider:  Sravani Chapa APRN         LOS: 3 days       ----------------------------------------------------------------------------------------------------------------------  Subjective       Chief Complaints:    Abnormal Lab, Weakness - Generalized, and Altered Mental Status        Interval History:      The patient is drowsy this morning, resting in bed on room air with no apparent distress.  Afebrile.  complain of constipation.  Daughter at the bedside reports the patient has had a difficult night and removed her IV.  The patient's daughter also reported the patient was unable to swallow her dinner last night and \"coughed up her food\".  Lung exam is diminished to auscultation bilaterally.  Abdomen soft and rounded, nontender with normoactive bowel sounds.  Leukocytosis improving at 16.96.  MRSA screen is negative.    Review of Systems:    JOO due to drowsiness  ----------------------------------------------------------------------------------------------------------------------      Objective       Current Hospital Meds:  apixaban, 2.5 mg, Oral, BID  budesonide-formoterol, 2 puff, Inhalation, BID - RT  cefepime, 2,000 mg, Intravenous, Q24H  cinacalcet, 30 mg, Oral, Daily  folic acid, 500 mcg, Oral, Daily  guaiFENesin, 1,200 mg, Oral, Q12H  hydrocortisone, 1 Application, Topical, BID  insulin lispro, 2-7 Units, Subcutaneous, 4x Daily AC & at Bedtime  metoprolol tartrate, 100 mg, Oral, Q12H  metroNIDAZOLE, 500 mg, Intravenous, Q8H  mupirocin, 1 application , Each Nare, BID  NIFEdipine CC, 30 mg, Oral, Q24H  OLANZapine zydis, 5 mg, Oral, Nightly  pantoprazole, 40 mg, Oral, Daily  predniSONE, 40 mg, Oral, Daily  sodium chloride, 10 mL, Intravenous, Q12H  sodium chloride, 10 mL, Intravenous, Q12H  vitamin B-12, 1,000 mcg, Oral, Once " per day on Monday Tuesday Wednesday Thursday Friday         ----------------------------------------------------------------------------------------------------------------------    Vital Signs:  Temp:  [98.6 °F (37 °C)-98.7 °F (37.1 °C)] 98.6 °F (37 °C)  Heart Rate:  [] 102  Resp:  [18] 18  BP: (138-152)/(68-80) 152/80  No data found.  SpO2 Percentage    06/13/24 0845 06/13/24 1922 06/14/24 0738   SpO2: 96% 98% 95%     SpO2:  [95 %-98 %] 95 %  on   ;   Device (Oxygen Therapy): room air    Body mass index is 23.38 kg/m².  Wt Readings from Last 3 Encounters:   06/11/24 59.9 kg (132 lb)   12/14/23 60.7 kg (133 lb 12.8 oz)   06/21/16 67.6 kg (149 lb)        Intake/Output Summary (Last 24 hours) at 6/14/2024 1043  Last data filed at 6/14/2024 0300  Gross per 24 hour   Intake 440 ml   Output --   Net 440 ml     Diet: Regular/House; Fluid Consistency: Thin (IDDSI 0)  ----------------------------------------------------------------------------------------------------------------------      Physical Exam:    Constitutional: Elderly, frail, chronically ill-appearing female.  On room air with no apparent distress.  Daughter at the bedside.  HENT:  Head: Normocephalic and atraumatic.  Mouth:  Moist mucous membranes.    Eyes:  Conjunctivae and EOM are normal.  No scleral icterus.  Neck:  Neck supple.  No JVD present.    Cardiovascular:  Normal rate, regular rhythm and normal heart sounds with no murmur. No edema.  Pulmonary/Chest: Diminished to auscultation bilaterally  Abdominal:  Soft.  Bowel sounds are normal.  No distension and no tenderness.   Musculoskeletal:  No edema, no tenderness, and no deformity.  No swelling or redness of joints.  Neurological: JOO  Skin:  Skin is warm and dry.  No rash noted.  No pallor.           ----------------------------------------------------------------------------------------------------------------------  Results from last 7 days   Lab Units 06/11/24  0534 06/11/24  0342  "06/10/24  2101   HSTROP T ng/L 27* 26* 29*     Results from last 7 days   Lab Units 06/10/24  2101   PROBNP pg/mL 9,871.0*       Results from last 7 days   Lab Units 06/11/24  0329   PH, ARTERIAL pH units 7.489*   PO2 ART mm Hg 69.8*   PCO2, ARTERIAL mm Hg 33.7*   HCO3 ART mmol/L 25.5     Results from last 7 days   Lab Units 06/14/24  0112 06/13/24  0134 06/12/24  0755 06/11/24  0047 06/10/24  2101   CRP mg/dL  --  15.97*  --   --  7.27*   LACTATE mmol/L  --   --   --  1.8 2.1*   WBC 10*3/mm3 16.96* 20.55* 16.97*  --  20.64*   HEMOGLOBIN g/dL 11.4* 10.8* 11.3*  --  12.3   HEMATOCRIT % 36.7 33.6* 34.2  --  38.8   MCV fL 94.8 94.6 90.0  --  94.9   MCHC g/dL 31.1* 32.1 33.0  --  31.7   PLATELETS 10*3/mm3 260 236 205  --  228     Results from last 7 days   Lab Units 06/14/24  0112 06/13/24  0134 06/12/24  0755 06/11/24  0342 06/10/24  2101   SODIUM mmol/L 145 141 140 141 141   POTASSIUM mmol/L 4.4 3.9 4.2 4.5 4.5   MAGNESIUM mg/dL  --   --   --  1.8 1.3*   CHLORIDE mmol/L 113* 110* 108* 107 107   CO2 mmol/L 18.6* 18.9* 20.6* 22.6 20.6*   BUN mg/dL 29* 24* 22 28* 29*   CREATININE mg/dL 1.22* 1.22* 1.19* 1.42* 1.60*   CALCIUM mg/dL 9.6 9.3 9.1 9.7* 9.8*   GLUCOSE mg/dL 194* 223* 239* 216* 281*   ALBUMIN g/dL  --   --  3.1*  --  3.0*   BILIRUBIN mg/dL  --   --  0.3  --  0.4   ALK PHOS U/L  --   --  77  --  73   AST (SGOT) U/L  --   --  12  --  13   ALT (SGPT) U/L  --   --  12  --  14   Estimated Creatinine Clearance: 25.5 mL/min (A) (by C-G formula based on SCr of 1.22 mg/dL (H)).  No results found for: \"AMMONIA\"    Glucose   Date/Time Value Ref Range Status   06/14/2024 0717 150 (H) 70 - 130 mg/dL Final   06/13/2024 2007 198 (H) 70 - 130 mg/dL Final   06/13/2024 1626 226 (H) 70 - 130 mg/dL Final   06/13/2024 1040 157 (H) 70 - 130 mg/dL Final   06/13/2024 0631 170 (H) 70 - 130 mg/dL Final   06/12/2024 2006 212 (H) 70 - 130 mg/dL Final   06/12/2024 1651 231 (H) 70 - 130 mg/dL Final   06/12/2024 1044 336 (H) 70 - 130 mg/dL " "Final     Lab Results   Component Value Date    HGBA1C 6.60 (H) 06/10/2024     Lab Results   Component Value Date    TSH 3.752 08/17/2015    FREET4 1.09 08/17/2015       Blood Culture   Date Value Ref Range Status   06/11/2024 No growth at 3 days  Preliminary   06/11/2024 No growth at 3 days  Preliminary     No results found for: \"URINECX\"  No results found for: \"WOUNDCX\"  No results found for: \"STOOLCX\"  No results found for: \"RESPCX\"  Pain Management Panel  More data may exist         Latest Ref Rng & Units 6/10/2024 8/17/2015   Pain Management Panel   Creatinine, Urine mg/dL  mg/dL - 129.2  130.0    Amphetamine, Urine Qual Negative Negative  -   Barbiturates Screen, Urine Negative Negative  -   Benzodiazepine Screen, Urine Negative Negative  -   Buprenorphine, Screen, Urine Negative Negative  -   Cocaine Screen, Urine Negative Negative  -   Fentanyl, Urine Negative Negative  -   Methadone Screen , Urine Negative Negative  -   Methamphetamine, Ur Negative Negative  -         ----------------------------------------------------------------------------------------------------------------------  Imaging Results (Last 24 Hours)       ** No results found for the last 24 hours. **            ----------------------------------------------------------------------------------------------------------------------    Pertinent Infectious Disease Results                Assessment/Plan       Assessment     Pericardial effusion, rule out infectious process  Community-acquired pneumonia        Plan      The patient is drowsy this morning, resting in bed on room air with no apparent distress.  Afebrile.  complain of constipation.  Daughter at the bedside reports the patient has had a difficult night and removed her IV.  The patient's daughter also reported the patient was unable to swallow her dinner last night and \"coughed up her food\".  Lung exam is diminished to auscultation bilaterally.  Abdomen soft and rounded, nontender " with normoactive bowel sounds.  Leukocytosis improving at 16.96.  MRSA screen is negative.    Cardiology note reviewed, due to the patient's age and comorbidities, risk was felt to be greater than benefit for pericardiocentesis at this time.  We are unable to determine if pericardial effusion is infectious at this time.  Would recommend to treat with NSAIDs, per literature review.    In the setting of community-acquired pneumonia and likely aspiration at dinner last night, would recommend to continue with cefepime and Flagyl courses for now.  We will continue to monitor closely and adjust antibiotic coverage as appropriate.      ANTIMICROBIAL THERAPY    cefepime 2000 mg IVPB in 100 mL NS (VTB)  metroNIDAZOLE - 500-0.79 MG/100ML-%     Code Status:   Code Status and Medical Interventions:   Ordered at: 06/11/24 0401     Medical Intervention Limits:    No intubation (DNI)     Level Of Support Discussed With:    Next of Kin (If No Surrogate)     Code Status (Patient has no pulse and is not breathing):    No CPR (Do Not Attempt to Resuscitate)     Medical Interventions (Patient has pulse or is breathing):    Limited Support       Johanna Chapa, FIORELLA  06/14/24  10:43 EDT

## 2024-06-15 LAB
GLUCOSE BLDC GLUCOMTR-MCNC: 111 MG/DL (ref 70–130)
GLUCOSE BLDC GLUCOMTR-MCNC: 149 MG/DL (ref 70–130)
GLUCOSE BLDC GLUCOMTR-MCNC: 178 MG/DL (ref 70–130)
GLUCOSE BLDC GLUCOMTR-MCNC: 220 MG/DL (ref 70–130)

## 2024-06-15 PROCEDURE — 25010000002 CEFEPIME PER 500 MG

## 2024-06-15 PROCEDURE — 92610 EVALUATE SWALLOWING FUNCTION: CPT | Performed by: SPEECH-LANGUAGE PATHOLOGIST

## 2024-06-15 PROCEDURE — 99232 SBSQ HOSP IP/OBS MODERATE 35: CPT | Performed by: NURSE PRACTITIONER

## 2024-06-15 PROCEDURE — 94799 UNLISTED PULMONARY SVC/PX: CPT

## 2024-06-15 PROCEDURE — 99232 SBSQ HOSP IP/OBS MODERATE 35: CPT | Performed by: STUDENT IN AN ORGANIZED HEALTH CARE EDUCATION/TRAINING PROGRAM

## 2024-06-15 PROCEDURE — 82948 REAGENT STRIP/BLOOD GLUCOSE: CPT

## 2024-06-15 PROCEDURE — 94761 N-INVAS EAR/PLS OXIMETRY MLT: CPT

## 2024-06-15 PROCEDURE — 25010000002 METRONIDAZOLE 500 MG/100ML SOLUTION: Performed by: NURSE PRACTITIONER

## 2024-06-15 PROCEDURE — 63710000001 PREDNISONE PER 1 MG: Performed by: STUDENT IN AN ORGANIZED HEALTH CARE EDUCATION/TRAINING PROGRAM

## 2024-06-15 PROCEDURE — 63710000001 INSULIN LISPRO (HUMAN) PER 5 UNITS

## 2024-06-15 PROCEDURE — 94664 DEMO&/EVAL PT USE INHALER: CPT

## 2024-06-15 RX ORDER — MEGESTROL ACETATE 40 MG/1
80 TABLET ORAL
Status: DISCONTINUED | OUTPATIENT
Start: 2024-06-15 | End: 2024-06-15

## 2024-06-15 RX ORDER — MEGESTROL ACETATE 40 MG/ML
80 SUSPENSION ORAL DAILY
Status: DISCONTINUED | OUTPATIENT
Start: 2024-06-16 | End: 2024-06-20 | Stop reason: HOSPADM

## 2024-06-15 RX ADMIN — PANTOPRAZOLE SODIUM 40 MG: 40 TABLET, DELAYED RELEASE ORAL at 09:35

## 2024-06-15 RX ADMIN — Medication 10 ML: at 21:59

## 2024-06-15 RX ADMIN — INSULIN LISPRO 2 UNITS: 100 INJECTION, SOLUTION INTRAVENOUS; SUBCUTANEOUS at 21:53

## 2024-06-15 RX ADMIN — NIFEDIPINE 30 MG: 30 TABLET, EXTENDED RELEASE ORAL at 09:35

## 2024-06-15 RX ADMIN — METOPROLOL TARTRATE 100 MG: 100 TABLET, FILM COATED ORAL at 21:51

## 2024-06-15 RX ADMIN — CINACALCET 30 MG: 30 TABLET, FILM COATED ORAL at 09:36

## 2024-06-15 RX ADMIN — HYDROCORTISONE 1 APPLICATION: 10 OINTMENT TOPICAL at 21:59

## 2024-06-15 RX ADMIN — BUDESONIDE AND FORMOTEROL FUMARATE DIHYDRATE 2 PUFF: 160; 4.5 AEROSOL RESPIRATORY (INHALATION) at 07:29

## 2024-06-15 RX ADMIN — Medication 10 ML: at 09:17

## 2024-06-15 RX ADMIN — PREDNISONE 40 MG: 20 TABLET ORAL at 09:36

## 2024-06-15 RX ADMIN — METOPROLOL TARTRATE 5 MG: 1 INJECTION, SOLUTION INTRAVENOUS at 09:10

## 2024-06-15 RX ADMIN — MEGESTROL ACETATE 80 MG: 40 TABLET ORAL at 14:56

## 2024-06-15 RX ADMIN — APIXABAN 2.5 MG: 2.5 TABLET, FILM COATED ORAL at 09:36

## 2024-06-15 RX ADMIN — CEFEPIME 2000 MG: 2 INJECTION, POWDER, FOR SOLUTION INTRAVENOUS at 14:56

## 2024-06-15 RX ADMIN — MUPIROCIN 1 APPLICATION: 20 OINTMENT TOPICAL at 21:59

## 2024-06-15 RX ADMIN — INSULIN LISPRO 3 UNITS: 100 INJECTION, SOLUTION INTRAVENOUS; SUBCUTANEOUS at 17:31

## 2024-06-15 RX ADMIN — METRONIDAZOLE 500 MG: 500 INJECTION, SOLUTION INTRAVENOUS at 13:29

## 2024-06-15 RX ADMIN — GUAIFENESIN 1200 MG: 600 TABLET, EXTENDED RELEASE ORAL at 21:53

## 2024-06-15 RX ADMIN — METRONIDAZOLE 500 MG: 500 INJECTION, SOLUTION INTRAVENOUS at 21:59

## 2024-06-15 RX ADMIN — OLANZAPINE 5 MG: 5 TABLET, ORALLY DISINTEGRATING ORAL at 21:53

## 2024-06-15 RX ADMIN — METRONIDAZOLE 500 MG: 500 INJECTION, SOLUTION INTRAVENOUS at 05:34

## 2024-06-15 RX ADMIN — METOPROLOL TARTRATE 100 MG: 100 TABLET, FILM COATED ORAL at 09:36

## 2024-06-15 RX ADMIN — MUPIROCIN 1 APPLICATION: 20 OINTMENT TOPICAL at 10:51

## 2024-06-15 RX ADMIN — APIXABAN 2.5 MG: 2.5 TABLET, FILM COATED ORAL at 21:53

## 2024-06-15 RX ADMIN — Medication 500 MCG: at 09:35

## 2024-06-15 RX ADMIN — GUAIFENESIN 1200 MG: 600 TABLET, EXTENDED RELEASE ORAL at 09:35

## 2024-06-15 NOTE — THERAPY EVALUATION
Acute Care - Speech Language Pathology   Swallow Initial Evaluation [facility]  CLINICAL DYSPHAGIA EVALUATION       Patient Name: Zeenat Dong  : 1928  MRN: [id number]  Today's Date: 6/15/2024  Onset of Illness/Injury or Date of Surgery: 06/10/24 (admission date)            Admit Date: 6/10/2024    Visit Dx:     ICD-10-CM ICD-9-CM   1. Acute renal failure, unspecified acute renal failure type  N17.9 584.9   2. Hypomagnesemia  E83.42 275.2   3. Hallucinations, unspecified  R44.3 780.1   4. Heart failure, unspecified HF chronicity, unspecified heart failure type  I50.9 428.9   5. Pericardial effusion  I31.39 423.9   6. Community acquired pneumonia of left lung, unspecified part of lung  J18.9 486     Patient Active Problem List   Diagnosis    Abdominal distension    Heart failure    Dry skin dermatitis    Gastroesophageal reflux disease without esophagitis    Hypercalcemia    Hyperparathyroidism    Hypertension    Hypokalemia    Hypomagnesemia    Seasonal allergic rhinitis    Shortness of breath    Atrial fibrillation    Weakness    Sepsis    Moderate malnutrition     Past Medical History:   Diagnosis Date    Hyperlipidemia      Past Surgical History:   Procedure Laterality Date    OTHER SURGICAL HISTORY      ear surgery    SKIN BIOPSY         Zeenat Dong  was seen at bedside this am on [id number] to assess safety/efficacy of swallowing fnx, determine safest/least restrictive diet tolerance.     Daughter present at beside and assists as historian. She reports intermittently difficulties with coughing at mealtimes w/ dry textures and meats. Reports most notable difficulties over the last week at home w/ pt accepting limited po intake over the past week. She reports her favorite foods are cherry coke and cheetos.    Social History     Socioeconomic History    Marital status:    Tobacco Use    Smoking status: Never   Vaping Use    Vaping status: Never Used   Substance and Sexual Activity    Alcohol use: No     Drug use: Never        Imaging:    PROCEDURE: Portable chest x-ray examination performed on Ruma 10, 2024.  Single view. Upright position.     HISTORY: Weakness. Altered mental status.     COMPARISON: None.     FINDINGS:     Enlarged heart size  Elevated right hemidiaphragm.  Coarsened bronchovascular pattern to the lungs.  Possible left lower lobe pneumonia.  Possible small left pleural effusion.  No interstitial edema or pneumothorax.  No free air in the upper abdomen.     IMPRESSION:     1.  Enlarged heart size.  2.  Left lower lobe atelectasis versus pneumonia with small left pleural  effusion.  3.  Mild elevated right hemidiaphragm.  4.  No pneumothorax.     This report was finalized on 6/10/2024 10:02 PM by Joseph Harmon MD.         Labs:  Sodium  145  06/14 0112  Potassium  4.4  06/14 0112  Chloride  113  06/14 0112  CO2  18.6  06/14 0112  BUN  29  06/14 0112  Creatinine  1.22  06/14 0112  Glucose  149  06/15 1105  Hemoglobin  11.4  06/14 0112  Hematocrit  36.7  06/14 0112  WBC  16.96  06/14 0112  Platelets  260  06/14 0112  PTT  --     Protime  --     INR  --     ABO Type  --     RH type  --        Diet Orders (active) (From admission, onward)       Start     Ordered    06/11/24 1145  Diet: Regular/House; Fluid Consistency: Thin (IDDSI 0)  Diet Effective Now         06/11/24 1145    06/11/24 0959  DIET MESSAGE Please send apple juice  Once        Comments: Please send apple juice    06/11/24 0959                    Pt tolerates room air w/o difficultly.    Patient was positioned upright and centered in bed to accept multiple po presentations of ice chips, regular consistencies lunch tray, puree, and thin liquids via spoon, cup, and straw.  Patient required direct assistance for self provide po trials, however family reports pt typically self feeds at baseline.     Facial/oral structures were symmetrical upon observation. Lingual protrusion revealed no deviation. Oral mucosa were moist, pink, and clean.  Secretions were clear, thin, and well controlled. OROM/MARY was generally to imitate oral postures. Gag is not assessed. Volitional cough was intact w/ adequate  intensity, clear in quality, non-productive. Voice was adequate in intensity, clear in quality w/ intelligible speech. Pt is pleasantly confused. She is wearing upper and lower dentures.    Upon po presentations, adequate bolus anticipation and acceptance w/ mildly weak labial seal for bolus clearance via spoon bowl, cup rim stability and suction via straw. Most success w/ pt preferred drinks via straw presentation. Bolus formation, manipulation and control reveal increased oral prep w/ mixed phasic-rotary mastication pattern. A-p transit was timely w/ mild oral residue appreciated., cleared w/ liquid wash. No overt s/s aspiration before the swallow.    Pt enjoyed munching on cheetos, drinking cherry coke. She tolerates a few bites of soft broccoli, x1 bite of chicken, x3 bites of chopped cooked potatoes, x2 bites of cheesecake. She sips on water however reports dislike and produces facial grimace.      Pharyngeal swallow was timely w/ adequate hyolaryngeal elevation per palpation. No overt s/s aspiration evidenced across this evaluation. No silent aspiration suspected. Patient denied odynophagia.     Isolated event of cough response on first drink of thin liquids (cherry coke) however this was not able to be reduplicated. She had one dry cough s/p po intake however no other concerns this assessment. Pt did require max verbal cues and prompts for po acceptance.    Impression: Per this evaluation, Ms Dong presented w/ premorbid baseline of moderate oral , wfl pharyngeal swallow w/o s/s aspiration. No s/s indicative of silent aspiration.  No odynophagia reported.          SLP Recommendation and Plan  1. Mechanical soft, ground consistencies w/ thin liquids   2. Medications whole in puree/thins. Single presentation. Crushed or via non oral prn.  3. Upright  and centered for all po intake  4. CLYDE precautions.  5. Oral care protocol.  6. Aspiration precautions  7. Must be fully a/a  8. Direct 1:1 assistance for all po intake    Further formal SLP f/u warranted/recommended at this time for diet tolerance/safety.    D/w patient and family at bedside results and recommendations w/ verbal agreement.    D/w MARIO Acosta results and recommendations w/ verbal agreement.    Epic Secure Chat sent to Dr Calvillo    Thank you for allowing me to participate in the care of your patient-  Anastasia Canada MA CCC-SLP                    EDUCATION  The patient has been educated in the following areas:   Dysphagia (Swallowing Impairment) Oral Care/Hydration Modified Diet Instruction.                Time Calculation:    Time Calculation- SLP       Row Name 06/15/24 1254             Time Calculation- SLP    SLP - Next Appointment 06/17/24  -DANDRE                User Key  (r) = Recorded By, (t) = Taken By, (c) = Cosigned By      Initials Name Provider Type    Adam Dockery MA,CCC-SLP Speech and Language Pathologist                    Therapy Charges for Today       Code Description Service Date Service Provider Modifiers Qty    [id number]  ST EVAL ORAL PHARYNG SWALLOW 5 6/15/2024 Adam Canada MA,CCC-SLP GN 1                 Adam Canada MA,CCC-SLP  6/15/2024

## 2024-06-15 NOTE — PLAN OF CARE
Goal Outcome Evaluation:              Outcome Evaluation: Pt is resting in bed this shift, with family at bedside. Heart rate elevated, PRN medication given. Pt voices no complaints at this time, will continue with POC.

## 2024-06-15 NOTE — PLAN OF CARE
Goal Outcome Evaluation:      Patient resting in bed during shift with family at bedside. No s/s of distress noted at this time. Will continue with plan of care.

## 2024-06-15 NOTE — PROGRESS NOTES
[facility] HOSPITALIST PROGRESS NOTE     Patient Identification:  Name:  Zeenat Dong  Age:  89 y.o.  Sex:  female  :  1928  MRN:  [id number]  Visit Number:  [id number]  ROOM: [address] [place] [address]     Primary Care Provider:  Sravani Chapa APRN    Length of stay in inpatient status:  4    Subjective     Chief Compliant:    Chief Complaint   Patient presents with    Abnormal Lab    Weakness - Generalized    Altered Mental Status       History of Presenting Illness:    Patient seen in follow-up for weakness, suspected pneumonia and pericardial effusion.  Patient is hard of hearing, family present at bedside.  Patient looks much better this morning, intermittently gets agitated but overall slept well last night and seems to be doing better.  No chest pain/pressure or tightness.  Has been afebrile.  No adverse events noted overnight aside from patient refusing blood draw.    Objective     Current Hospital Meds:apixaban, 2.5 mg, Oral, BID  budesonide-formoterol, 2 puff, Inhalation, BID - RT  cefepime, 2,000 mg, Intravenous, Q24H  cinacalcet, 30 mg, Oral, Daily  folic acid, 500 mcg, Oral, Daily  guaiFENesin, 1,200 mg, Oral, Q12H  hydrocortisone, 1 Application, Topical, BID  insulin lispro, 2-7 Units, Subcutaneous, 4x Daily AC & at Bedtime  metoprolol tartrate, 100 mg, Oral, Q12H  metroNIDAZOLE, 500 mg, Intravenous, Q8H  mupirocin, 1 application , Each Nare, BID  NIFEdipine CC, 30 mg, Oral, Q24H  OLANZapine zydis, 5 mg, Oral, Nightly  pantoprazole, 40 mg, Oral, Daily  predniSONE, 40 mg, Oral, Daily  sodium chloride, 10 mL, Intravenous, Q12H  sodium chloride, 10 mL, Intravenous, Q12H  vitamin B-12, 1,000 mcg, Oral, Once per day on            Current Antimicrobial Therapy:  Anti-Infectives (From admission, onward)      Ordered     Dose/Rate Route Frequency Start Stop    24 1239  cefepime 2000 mg IVPB in 100 mL NS (VTB)        Ordering Provider: Blanca Pereira  RPH    2,000 mg  over 4 Hours Intravenous Every 24 Hours 06/14/24 1400 06/19/24 1359    06/13/24 1239  cefepime 2000 mg IVPB in 100 mL NS (VTB)        Ordering Provider: Blanca Pereira RPH    2,000 mg  over 30 Minutes Intravenous Once 06/13/24 1330 06/13/24 1444    06/13/24 1205  metroNIDAZOLE (FLAGYL) IVPB 500 mg        Ordering Provider: Johanna Chapa APRN    500 mg  200 mL/hr over 30 Minutes Intravenous Every 8 Hours 06/13/24 1300 06/18/24 1259    06/11/24 0153  doxycycline (VIBRAMYCIN) 100 mg in sodium chloride 0.9 % 100 mL IVPB-VTB        Ordering Provider: Haylee Rawls DO    100 mg  over 60 Minutes Intravenous Once 06/11/24 0209 06/11/24 0327    06/11/24 0152  cefTRIAXone (ROCEPHIN) 1,000 mg in sodium chloride 0.9 % 100 mL IVPB-VTB        Ordering Provider: Haylee Rawls DO    1,000 mg  200 mL/hr over 30 Minutes Intravenous Once 06/11/24 0208 06/11/24 0250          Current Diuretic Therapy:  Diuretics (From admission, onward)      None          ----------------------------------------------------------------------------------------------------------------------  Vital Signs:  Temp:  [98.1 °F (36.7 °C)-98.8 °F (37.1 °C)] 98.2 °F (36.8 °C)  Heart Rate:  [] 87  Resp:  [17-18] 18  BP: (130-165)/(70-85) 130/72  SpO2:  [91 %-95 %] 91 %  on   ;   Device (Oxygen Therapy): room air  Body mass index is 23.38 kg/m².    Wt Readings from Last 3 Encounters:   06/11/24 59.9 kg (132 lb)   12/14/23 60.7 kg (133 lb 12.8 oz)   06/21/16 67.6 kg (149 lb)     Intake & Output (last 3 days)         06/09 0701  06/10 0700 06/10 0701  06/11 0700 06/11 0701  06/12 0700 06/12 0701 06/13 0700    P.O.   120     I.V. (mL/kg)   300 (5)     Total Intake(mL/kg)   420 (7)     Net   +420             Urine Unmeasured Occurrence   2 x           Diet: Regular/House; Fluid Consistency: Thin (IDDSI 0)  ----------------------------------------------------------------------------------------------------------------------  Physical  exam:  Constitutional: Elderly female-looks better today today, well-developed and well-nourished, resting comfortably in bed, no acute distress.      HENT:  Head:  Normocephalic and atraumatic.  Mouth:  Moist mucous membranes.  Eyes:  Conjunctivae and EOM are normal. No scleral icterus.   Cardiovascular: Irregularly irregular and normal heart sounds with no murmur. No JVD.   Pulmonary/Chest:  No respiratory distress, no wheezes, no crackles, with normal breath sounds and good air movement. Unlabored. No accessory muscle use.  Abdominal:  Soft. No distension and no tenderness.  Bowel sounds present. No rebound or guarding.   Musculoskeletal:  No tenderness, and no deformity.  No red or swollen joints anywhere.    Neurological:  Alert and oriented to person, place but no time.  No cranial nerve deficit.   Nonfocal.   Skin:  Skin is warm and dry. No rash noted. No pallor.   Peripheral vascular:  No clubbing, no cyanosis, no edema. Pedal and tibial pulses 2 out of 4 bilaterally.     Unchanged from 6/14/2024  Electronically signed by Reji Calvillo DO, 06/15/24, 10:08 AM EDT.      ----------------------------------------------------------------------------------------------------------------------  Results from last 7 days   Lab Units 06/14/24  0112 06/13/24  0134 06/12/24  0755 06/11/24  0047 06/10/24  2101   CRP mg/dL  --  15.97*  --   --  7.27*   LACTATE mmol/L  --   --   --  1.8 2.1*   WBC 10*3/mm3 16.96* 20.55* 16.97*  --  20.64*   HEMOGLOBIN g/dL 11.4* 10.8* 11.3*  --  12.3   HEMATOCRIT % 36.7 33.6* 34.2  --  38.8   MCV fL 94.8 94.6 90.0  --  94.9   MCHC g/dL 31.1* 32.1 33.0  --  31.7   PLATELETS 10*3/mm3 260 236 205  --  228     Results from last 7 days   Lab Units 06/11/24  0329   PH, ARTERIAL pH units 7.489*   PO2 ART mm Hg 69.8*   PCO2, ARTERIAL mm Hg 33.7*   HCO3 ART mmol/L 25.5     Results from last 7 days   Lab Units 06/14/24  0112 06/13/24  0134 06/12/24  0755 06/11/24  0342 06/10/24  2101   SODIUM  "mmol/L 145 141 140 141 141   POTASSIUM mmol/L 4.4 3.9 4.2 4.5 4.5   MAGNESIUM mg/dL  --   --   --  1.8 1.3*   CHLORIDE mmol/L 113* 110* 108* 107 107   CO2 mmol/L 18.6* 18.9* 20.6* 22.6 20.6*   BUN mg/dL 29* 24* 22 28* 29*   CREATININE mg/dL 1.22* 1.22* 1.19* 1.42* 1.60*   CALCIUM mg/dL 9.6 9.3 9.1 9.7* 9.8*   PHOSPHORUS mg/dL  --   --   --  3.1  --    GLUCOSE mg/dL 194* 223* 239* 216* 281*   ALBUMIN g/dL  --   --  3.1*  --  3.0*   BILIRUBIN mg/dL  --   --  0.3  --  0.4   ALK PHOS U/L  --   --  77  --  73   AST (SGOT) U/L  --   --  12  --  13   ALT (SGPT) U/L  --   --  12  --  14   Estimated Creatinine Clearance: 25.5 mL/min (A) (by C-G formula based on SCr of 1.22 mg/dL (H)).  No results found for: \"AMMONIA\"    Results from last 7 days   Lab Units 06/11/24  0534 06/11/24  0342 06/10/24  2101   HSTROP T ng/L 27* 26* 29*     Results from last 7 days   Lab Units 06/10/24  2101   PROBNP pg/mL 9,871.0*         Glucose   Date/Time Value Ref Range Status   06/15/2024 0745 111 70 - 130 mg/dL Final   06/14/2024 2033 201 (H) 70 - 130 mg/dL Final   06/14/2024 1621 192 (H) 70 - 130 mg/dL Final   06/14/2024 1114 161 (H) 70 - 130 mg/dL Final   06/14/2024 0717 150 (H) 70 - 130 mg/dL Final   06/13/2024 2007 198 (H) 70 - 130 mg/dL Final   06/13/2024 1626 226 (H) 70 - 130 mg/dL Final   06/13/2024 1040 157 (H) 70 - 130 mg/dL Final     Lab Results   Component Value Date    TSH 3.752 08/17/2015    FREET4 1.09 08/17/2015     No results found for: \"PREGTESTUR\", \"PREGSERUM\", \"HCG\", \"HCGQUANT\"  Pain Management Panel  More data may exist         Latest Ref Rng & Units 6/10/2024 8/17/2015   Pain Management Panel   Creatinine, Urine mg/dL  mg/dL - 129.2  130.0    Amphetamine, Urine Qual Negative Negative  -   Barbiturates Screen, Urine Negative Negative  -   Benzodiazepine Screen, Urine Negative Negative  -   Buprenorphine, Screen, Urine Negative Negative  -   Cocaine Screen, Urine Negative Negative  -   Fentanyl, Urine Negative Negative  - " "  Methadone Screen , Urine Negative Negative  -   Methamphetamine, Ur Negative Negative  -     Brief Urine Lab Results  (Last result in the past 365 days)        Color   Clarity   Blood   Leuk Est   Nitrite   Protein   CREAT   Urine HCG        06/10/24 2313 Yellow   Clear   Negative   Negative   Negative   Negative                 Blood Culture   Date Value Ref Range Status   06/11/2024 No growth at 24 hours  Preliminary   06/11/2024 No growth at 24 hours  Preliminary     No results found for: \"URINECX\"  No results found for: \"WOUNDCX\"  No results found for: \"STOOLCX\"  No results found for: \"RESPCX\"  No results found for: \"AFBCX\"  Results from last 7 days   Lab Units 06/13/24  0134 06/11/24  0047 06/10/24  2101   LACTATE mmol/L  --  1.8 2.1*   SED RATE mm/hr 59*  --  43*   CRP mg/dL 15.97*  --  7.27*       I have personally looked at the labs and they are summarized above.  ----------------------------------------------------------------------------------------------------------------------  Detailed radiology reports for the last 24 hours:  Imaging Results (Last 24 Hours)       Procedure Component Value Units Date/Time    US Non-ob Transvaginal [[id number]] Collected: 06/14/24 1529     Updated: 06/14/24 1532    Narrative:      EXAM:    US Pelvis Transabdominal and Transvaginal, Complete     EXAM DATE:    6/14/2024 3:08 PM     CLINICAL HISTORY:    dilated endometrium, sepsis of unkown source; N17.9-Acute kidney  failure, unspecified; E83.42-Hypomagnesemia; R44.3-Hallucinations,  unspecified; I50.9-Heart failure, unspecified; I31.39-Other pericardial  effusion (noninflammatory); J18.9-Pneumonia, unspecified organism     TECHNIQUE:    Real-time complete transabdominal and transvaginal pelvic ultrasound  with image documentation.  Transvaginal imaging was used for better  evaluation of the endometrium and adnexa.     COMPARISON:    No relevant prior studies available.     FINDINGS:    UTERUS/CERVIX:  The uterus measures " 4.3 x 2.1 x 3.1 cm.  The  endometrial stripe measures 1.3 cm in thickness which is abnormal for  patient's age.  Small amount of fluid within the endometrial canal.  No  myometrial mass.    RIGHT OVARY:  Bilateral ovaries are not well visualized.    LEFT OVARY:  See above.    FREE FLUID:  No free fluid.    BLADDER:  Unremarkable as visualized.  Wall is normal thickness for  degree of distention.       Impression:      1.  The uterus measures 4.3 x 2.1 x 3.1 cm.  The endometrial stripe  measures 1.3 cm in thickness which is abnormal for patient's age..   Consider gynecology consultation.  2.  Bilateral ovaries are not well visualized.  3.  Small amount of fluid within the endometrial canal.        This report was finalized on 6/14/2024 3:30 PM by Dr. Bassam Jung MD.             Assessment & Plan      Patient is a 89-year-old female with history significant for CKD, hyperlipidemia, hyperparathyroidism and atrial fibrillation who presented to the ER with complaints of weakness and leukocytosis noted on outpatient labs.    #Sepsis due to community-acquired pneumonia, bacterial, unspecified  #Metabolic encephalopathy  #Suspected aspiration pneumonia  --Patient presented with overall weakness, leukocytosis.  Afebrile and hemodynamically stable with otherwise benign workup aside from CT imaging findings.  --Admit labs WBC 17 K, elevated inflammatory markers  --Initial ABG unremarkable  --Blood cultures NGTD, Legionella/strep pneumo negative  --Viral PCR negative  --CT chest noted questionable infiltrates in the left lower lobe  --Continue p.o. prednisone  --Continue as needed DuoNebs, Symbicort, Symbicort  --Continue cefepime/flagyl  --ID following, appreciate recommendations    #Atrial fibrillation  #HFpEF, not in acute exacerbation  #Essential hypertension  #Large pericardial effusion without tamponade physiology  --No chest pain/pressure or tightness  --EKG with atrial fibrillation, no acute ischemic  changes  --Troponin 26, repeat 27 with a delta of 1  --Echo EF 66 to 70%, large greater than 2 cm pericardial effusion without evidence of tamponade physiology  --Repeat limited echo noted pericardial effusion 1 to 2 cm, no evidence of tamponade physiology  --Hold diuretics  --Issues with A-fib with RVR today as she has not taken p.o. Lopressor, as needed IV Lopressor added in the meantime-rate improved after being able to take Lopressor this morning  --Continue beta-blocker, Eliquis (reduced dose) for stroke prophylaxis  --Cardiology consulted, suspected chronic pericardial effusion and recommend infectious disease consult and outpatient follow-up next month with echo    #Thickened endometrium  --Noted on non-OB ultrasound, evaluated by OB/GYN, recommended outpatient follow-up, nothing further to add at this time    #Acute kidney injury on CKD stage IIIa due to prerenal azotemia  --Baseline creatinine around 1, creatinine 1.6 on admission  --CT negative for obstruction  --CK normal  --Continue IV fluids, replace labs in a.m.    #Delirium  --delirium precautions  --po zyprexa hs    #Dysphagia  -- SLP to see, discussed with family, they are not interested in a PEG tube and would do the safest oral option for her if needed    #Type 2 diabetes mellitus, SSI, adjust as needed   #Hypomagnesemia, replace  #Acute on chronic debility, PT/OT, agreeable to SNF  #Hyperlipidemia, statin  #Hyperparathyroidism, Sensipar  #GERD, PPI    CHECKLIST:  Abx: cefepime, flagyl  VTE: Eliquis  GI ppx: PPI  Diet: NPO until seen by speech  Code: No CPR, limited  Dispo: Patient is stable, improved today, follow-up repeat labs. Tentative plan to discharge to SNF on Monday if medically stable.    Reji Calvillo,   Psychiatric Hospitalist  06/15/24  10:07 EDT

## 2024-06-15 NOTE — PROGRESS NOTES
PROGRESS NOTE         Patient Identification:  Name:  Zeenat Dong  Age:  89 y.o.  Sex:  female  :  1928  MRN:  [id number]  Visit Number:  [id number]  Primary Care Provider:  Sravani Chapa APRN         LOS: 4 days       ----------------------------------------------------------------------------------------------------------------------  Subjective       Chief Complaints:    Abnormal Lab, Weakness - Generalized, and Altered Mental Status        Interval History:      The patient is awake and alert, sitting up comfortably in bed eating breakfast.  On room air with no apparent distress.  Afebrile.  Continues complain of constipation.  Family member at the bedside reported she refused blood draw overnight.  Lung exam is clear to auscultation bilaterally.  Abdomen soft and nontender with normoactive bowel sounds.      Review of Systems:    Denies any complaints or issues  ----------------------------------------------------------------------------------------------------------------------      Objective       Current Hospital Meds:  apixaban, 2.5 mg, Oral, BID  budesonide-formoterol, 2 puff, Inhalation, BID - RT  cefepime, 2,000 mg, Intravenous, Q24H  cinacalcet, 30 mg, Oral, Daily  folic acid, 500 mcg, Oral, Daily  guaiFENesin, 1,200 mg, Oral, Q12H  hydrocortisone, 1 Application, Topical, BID  insulin lispro, 2-7 Units, Subcutaneous, 4x Daily AC & at Bedtime  metoprolol tartrate, 100 mg, Oral, Q12H  metroNIDAZOLE, 500 mg, Intravenous, Q8H  mupirocin, 1 application , Each Nare, BID  NIFEdipine CC, 30 mg, Oral, Q24H  OLANZapine zydis, 5 mg, Oral, Nightly  pantoprazole, 40 mg, Oral, Daily  predniSONE, 40 mg, Oral, Daily  sodium chloride, 10 mL, Intravenous, Q12H  sodium chloride, 10 mL, Intravenous, Q12H  vitamin B-12, 1,000 mcg, Oral, Once per day on           ----------------------------------------------------------------------------------------------------------------------    Vital Signs:  Temp:  [98.1 °F (36.7 °C)-98.8 °F (37.1 °C)] 98.2 °F (36.8 °C)  Heart Rate:  [] 87  Resp:  [17-21] 18  BP: (130-165)/(70-85) 130/72  Mean Arterial Pressure (Non-Invasive) for the past 24 hrs (Last 3 readings):   Noninvasive MAP (mmHg)   06/14/24 1609 97     SpO2 Percentage    06/14/24 1826 06/14/24 1900 06/15/24 0729   SpO2: 95% 95% 91%     SpO2:  [91 %-95 %] 91 %  on   ;   Device (Oxygen Therapy): room air    Body mass index is 23.38 kg/m².  Wt Readings from Last 3 Encounters:   06/11/24 59.9 kg (132 lb)   12/14/23 60.7 kg (133 lb 12.8 oz)   06/21/16 67.6 kg (149 lb)        Intake/Output Summary (Last 24 hours) at 6/15/2024 1124  Last data filed at 6/14/2024 1200  Gross per 24 hour   Intake 360 ml   Output --   Net 360 ml     Diet: Regular/House; Fluid Consistency: Thin (IDDSI 0)  ----------------------------------------------------------------------------------------------------------------------      Physical Exam:    Constitutional: Elderly, frail, chronically ill-appearing female.  On room air with no apparent distress.  Family member at the bedside.  Sitting up comfortably eating breakfast.  Hard of hearing with hearing aid to the right side  HENT:  Head: Normocephalic and atraumatic.  Mouth:  Moist mucous membranes.    Eyes:  Conjunctivae and EOM are normal.  No scleral icterus.  Neck:  Neck supple.  No JVD present.    Cardiovascular:  Normal rate, regular rhythm and normal heart sounds with no murmur. No edema.  Pulmonary/Chest: Clear to auscultation bilaterally  Abdominal:  Soft.  Bowel sounds are normal.  No distension and no tenderness.   Musculoskeletal:  No edema, no tenderness, and no deformity.  No swelling or redness of joints.  Neurological: Alert and oriented to person and place  Skin:  Skin is warm and dry.  No rash noted.  No pallor.  "          ----------------------------------------------------------------------------------------------------------------------  Results from last 7 days   Lab Units 06/11/24  0534 06/11/24  0342 06/10/24  2101   HSTROP T ng/L 27* 26* 29*     Results from last 7 days   Lab Units 06/10/24  2101   PROBNP pg/mL 9,871.0*       Results from last 7 days   Lab Units 06/11/24  0329   PH, ARTERIAL pH units 7.489*   PO2 ART mm Hg 69.8*   PCO2, ARTERIAL mm Hg 33.7*   HCO3 ART mmol/L 25.5     Results from last 7 days   Lab Units 06/14/24  0112 06/13/24  0134 06/12/24  0755 06/11/24  0047 06/10/24  2101   CRP mg/dL  --  15.97*  --   --  7.27*   LACTATE mmol/L  --   --   --  1.8 2.1*   WBC 10*3/mm3 16.96* 20.55* 16.97*  --  20.64*   HEMOGLOBIN g/dL 11.4* 10.8* 11.3*  --  12.3   HEMATOCRIT % 36.7 33.6* 34.2  --  38.8   MCV fL 94.8 94.6 90.0  --  94.9   MCHC g/dL 31.1* 32.1 33.0  --  31.7   PLATELETS 10*3/mm3 260 236 205  --  228     Results from last 7 days   Lab Units 06/14/24  0112 06/13/24  0134 06/12/24  0755 06/11/24  0342 06/10/24  2101   SODIUM mmol/L 145 141 140 141 141   POTASSIUM mmol/L 4.4 3.9 4.2 4.5 4.5   MAGNESIUM mg/dL  --   --   --  1.8 1.3*   CHLORIDE mmol/L 113* 110* 108* 107 107   CO2 mmol/L 18.6* 18.9* 20.6* 22.6 20.6*   BUN mg/dL 29* 24* 22 28* 29*   CREATININE mg/dL 1.22* 1.22* 1.19* 1.42* 1.60*   CALCIUM mg/dL 9.6 9.3 9.1 9.7* 9.8*   GLUCOSE mg/dL 194* 223* 239* 216* 281*   ALBUMIN g/dL  --   --  3.1*  --  3.0*   BILIRUBIN mg/dL  --   --  0.3  --  0.4   ALK PHOS U/L  --   --  77  --  73   AST (SGOT) U/L  --   --  12  --  13   ALT (SGPT) U/L  --   --  12  --  14   Estimated Creatinine Clearance: 25.5 mL/min (A) (by C-G formula based on SCr of 1.22 mg/dL (H)).  No results found for: \"AMMONIA\"    Glucose   Date/Time Value Ref Range Status   06/15/2024 1105 149 (H) 70 - 130 mg/dL Final   06/15/2024 0745 111 70 - 130 mg/dL Final   06/14/2024 2033 201 (H) 70 - 130 mg/dL Final   06/14/2024 1621 192 (H) 70 - 130 " "mg/dL Final   06/14/2024 1114 161 (H) 70 - 130 mg/dL Final   06/14/2024 0717 150 (H) 70 - 130 mg/dL Final   06/13/2024 2007 198 (H) 70 - 130 mg/dL Final   06/13/2024 1626 226 (H) 70 - 130 mg/dL Final     Lab Results   Component Value Date    HGBA1C 6.60 (H) 06/10/2024     Lab Results   Component Value Date    TSH 3.752 08/17/2015    FREET4 1.09 08/17/2015       Blood Culture   Date Value Ref Range Status   06/11/2024 No growth at 3 days  Preliminary   06/11/2024 No growth at 3 days  Preliminary     No results found for: \"URINECX\"  No results found for: \"WOUNDCX\"  No results found for: \"STOOLCX\"  No results found for: \"RESPCX\"  Pain Management Panel  More data may exist         Latest Ref Rng & Units 6/10/2024 8/17/2015   Pain Management Panel   Creatinine, Urine mg/dL  mg/dL - 129.2  130.0    Amphetamine, Urine Qual Negative Negative  -   Barbiturates Screen, Urine Negative Negative  -   Benzodiazepine Screen, Urine Negative Negative  -   Buprenorphine, Screen, Urine Negative Negative  -   Cocaine Screen, Urine Negative Negative  -   Fentanyl, Urine Negative Negative  -   Methadone Screen , Urine Negative Negative  -   Methamphetamine, Ur Negative Negative  -         ----------------------------------------------------------------------------------------------------------------------  Imaging Results (Last 24 Hours)       Procedure Component Value Units Date/Time    US Non-ob Transvaginal [[id number]] Collected: 06/14/24 1529     Updated: 06/14/24 1532    Narrative:      EXAM:    US Pelvis Transabdominal and Transvaginal, Complete     EXAM DATE:    6/14/2024 3:08 PM     CLINICAL HISTORY:    dilated endometrium, sepsis of unkown source; N17.9-Acute kidney  failure, unspecified; E83.42-Hypomagnesemia; R44.3-Hallucinations,  unspecified; I50.9-Heart failure, unspecified; I31.39-Other pericardial  effusion (noninflammatory); J18.9-Pneumonia, unspecified organism     TECHNIQUE:    Real-time complete transabdominal and " transvaginal pelvic ultrasound  with image documentation.  Transvaginal imaging was used for better  evaluation of the endometrium and adnexa.     COMPARISON:    No relevant prior studies available.     FINDINGS:    UTERUS/CERVIX:  The uterus measures 4.3 x 2.1 x 3.1 cm.  The  endometrial stripe measures 1.3 cm in thickness which is abnormal for  patient's age.  Small amount of fluid within the endometrial canal.  No  myometrial mass.    RIGHT OVARY:  Bilateral ovaries are not well visualized.    LEFT OVARY:  See above.    FREE FLUID:  No free fluid.    BLADDER:  Unremarkable as visualized.  Wall is normal thickness for  degree of distention.       Impression:      1.  The uterus measures 4.3 x 2.1 x 3.1 cm.  The endometrial stripe  measures 1.3 cm in thickness which is abnormal for patient's age..   Consider gynecology consultation.  2.  Bilateral ovaries are not well visualized.  3.  Small amount of fluid within the endometrial canal.        This report was finalized on 6/14/2024 3:30 PM by Dr. Bassam Jung MD.               ----------------------------------------------------------------------------------------------------------------------    Pertinent Infectious Disease Results    Cardiology note reviewed, due to the patient's age and comorbidities, risk was felt to be greater than benefit for pericardiocentesis at this time.  We are unable to determine if pericardial effusion is infectious at this time.  Would recommend to treat with NSAIDs, per literature review.            Assessment/Plan       Assessment     Pericardial effusion, rule out infectious process  Community-acquired pneumonia        Plan      The patient is awake and alert, sitting up comfortably in bed eating breakfast.  On room air with no apparent distress.  Afebrile.  Continues complain of constipation.  Family member at the bedside reported she refused blood draw overnight.  Lung exam is clear to auscultation bilaterally.  Abdomen soft and  nontender with normoactive bowel sounds.    Transthoracic echo from 6/14 reported moderate 1 to 2 cm pericardial effusion.    Recommend to continue with cefepime and metronidazole courses for now in the setting of community-acquired pneumonia and likely aspiration.  Awaiting SLP evaluation.  We will continue to monitor closely and adjust coverage as appropriate.    ANTIMICROBIAL THERAPY    cefepime 2000 mg IVPB in 100 mL NS (VTB)  metroNIDAZOLE - 500-0.79 MG/100ML-%     Code Status:   Code Status and Medical Interventions:   Ordered at: 06/11/24 0401     Medical Intervention Limits:    No intubation (DNI)     Level Of Support Discussed With:    Next of Kin (If No Surrogate)     Code Status (Patient has no pulse and is not breathing):    No CPR (Do Not Attempt to Resuscitate)     Medical Interventions (Patient has pulse or is breathing):    Limited Support       Johanna Chapa, FIORELLA  06/15/24  11:24 EDT

## 2024-06-16 LAB
ANION GAP SERPL CALCULATED.3IONS-SCNC: 13.1 MMOL/L (ref 5–15)
ANION GAP SERPL CALCULATED.3IONS-SCNC: 13.9 MMOL/L (ref 5–15)
BACTERIA SPEC AEROBE CULT: NORMAL
BACTERIA SPEC AEROBE CULT: NORMAL
BASOPHILS # BLD AUTO: 0.03 10*3/MM3 (ref 0–0.2)
BASOPHILS NFR BLD AUTO: 0.2 % (ref 0–1.5)
BUN SERPL-MCNC: 37 MG/DL (ref 8–23)
BUN SERPL-MCNC: 37 MG/DL (ref 8–23)
BUN/CREAT SERPL: 26.4 (ref 7–25)
BUN/CREAT SERPL: 26.4 (ref 7–25)
CALCIUM SPEC-SCNC: 8.9 MG/DL (ref 8.2–9.6)
CALCIUM SPEC-SCNC: 9.4 MG/DL (ref 8.2–9.6)
CHLORIDE SERPL-SCNC: 115 MMOL/L (ref 98–107)
CHLORIDE SERPL-SCNC: 119 MMOL/L (ref 98–107)
CO2 SERPL-SCNC: 16.9 MMOL/L (ref 22–29)
CO2 SERPL-SCNC: 18.1 MMOL/L (ref 22–29)
CREAT SERPL-MCNC: 1.4 MG/DL (ref 0.57–1)
CREAT SERPL-MCNC: 1.4 MG/DL (ref 0.57–1)
DEPRECATED RDW RBC AUTO: 51.8 FL (ref 37–54)
EGFRCR SERPLBLD CKD-EPI 2021: 34.5 ML/MIN/1.73
EGFRCR SERPLBLD CKD-EPI 2021: 34.5 ML/MIN/1.73
EOSINOPHIL # BLD AUTO: 0 10*3/MM3 (ref 0–0.4)
EOSINOPHIL NFR BLD AUTO: 0 % (ref 0.3–6.2)
ERYTHROCYTE [DISTWIDTH] IN BLOOD BY AUTOMATED COUNT: 14.6 % (ref 12.3–15.4)
GLUCOSE BLDC GLUCOMTR-MCNC: 103 MG/DL (ref 70–130)
GLUCOSE BLDC GLUCOMTR-MCNC: 130 MG/DL (ref 70–130)
GLUCOSE BLDC GLUCOMTR-MCNC: 215 MG/DL (ref 70–130)
GLUCOSE BLDC GLUCOMTR-MCNC: 290 MG/DL (ref 70–130)
GLUCOSE SERPL-MCNC: 114 MG/DL (ref 65–99)
GLUCOSE SERPL-MCNC: 278 MG/DL (ref 65–99)
HCT VFR BLD AUTO: 36 % (ref 34–46.6)
HGB BLD-MCNC: 10.8 G/DL (ref 12–15.9)
IMM GRANULOCYTES # BLD AUTO: 0.44 10*3/MM3 (ref 0–0.05)
IMM GRANULOCYTES NFR BLD AUTO: 2.5 % (ref 0–0.5)
LYMPHOCYTES # BLD AUTO: 1.44 10*3/MM3 (ref 0.7–3.1)
LYMPHOCYTES NFR BLD AUTO: 8.3 % (ref 19.6–45.3)
MCH RBC QN AUTO: 29.2 PG (ref 26.6–33)
MCHC RBC AUTO-ENTMCNC: 30 G/DL (ref 31.5–35.7)
MCV RBC AUTO: 97.3 FL (ref 79–97)
MONOCYTES # BLD AUTO: 1.24 10*3/MM3 (ref 0.1–0.9)
MONOCYTES NFR BLD AUTO: 7.1 % (ref 5–12)
NEUTROPHILS NFR BLD AUTO: 14.28 10*3/MM3 (ref 1.7–7)
NEUTROPHILS NFR BLD AUTO: 81.9 % (ref 42.7–76)
NRBC BLD AUTO-RTO: 0 /100 WBC (ref 0–0.2)
PLATELET # BLD AUTO: 227 10*3/MM3 (ref 140–450)
PMV BLD AUTO: 11 FL (ref 6–12)
POTASSIUM SERPL-SCNC: 4 MMOL/L (ref 3.5–5.2)
POTASSIUM SERPL-SCNC: 4.3 MMOL/L (ref 3.5–5.2)
RBC # BLD AUTO: 3.7 10*6/MM3 (ref 3.77–5.28)
SODIUM SERPL-SCNC: 147 MMOL/L (ref 136–145)
SODIUM SERPL-SCNC: 149 MMOL/L (ref 136–145)
WBC NRBC COR # BLD AUTO: 17.43 10*3/MM3 (ref 3.4–10.8)

## 2024-06-16 PROCEDURE — 99232 SBSQ HOSP IP/OBS MODERATE 35: CPT | Performed by: STUDENT IN AN ORGANIZED HEALTH CARE EDUCATION/TRAINING PROGRAM

## 2024-06-16 PROCEDURE — 85025 COMPLETE CBC W/AUTO DIFF WBC: CPT | Performed by: STUDENT IN AN ORGANIZED HEALTH CARE EDUCATION/TRAINING PROGRAM

## 2024-06-16 PROCEDURE — 63710000001 INSULIN LISPRO (HUMAN) PER 5 UNITS

## 2024-06-16 PROCEDURE — 94761 N-INVAS EAR/PLS OXIMETRY MLT: CPT

## 2024-06-16 PROCEDURE — 94664 DEMO&/EVAL PT USE INHALER: CPT

## 2024-06-16 PROCEDURE — 80048 BASIC METABOLIC PNL TOTAL CA: CPT | Performed by: STUDENT IN AN ORGANIZED HEALTH CARE EDUCATION/TRAINING PROGRAM

## 2024-06-16 PROCEDURE — 25010000002 CEFEPIME PER 500 MG

## 2024-06-16 PROCEDURE — 25010000002 METRONIDAZOLE 500 MG/100ML SOLUTION: Performed by: NURSE PRACTITIONER

## 2024-06-16 PROCEDURE — 82948 REAGENT STRIP/BLOOD GLUCOSE: CPT

## 2024-06-16 PROCEDURE — 94799 UNLISTED PULMONARY SVC/PX: CPT

## 2024-06-16 RX ORDER — SODIUM CHLORIDE 450 MG/100ML
100 INJECTION, SOLUTION INTRAVENOUS CONTINUOUS
Status: DISCONTINUED | OUTPATIENT
Start: 2024-06-16 | End: 2024-06-17

## 2024-06-16 RX ORDER — CLONAZEPAM 0.5 MG/1
0.25 TABLET ORAL 3 TIMES DAILY PRN
Status: DISCONTINUED | OUTPATIENT
Start: 2024-06-16 | End: 2024-06-17

## 2024-06-16 RX ADMIN — Medication 10 ML: at 08:47

## 2024-06-16 RX ADMIN — SODIUM CHLORIDE 100 ML/HR: 4.5 INJECTION, SOLUTION INTRAVENOUS at 20:09

## 2024-06-16 RX ADMIN — OLANZAPINE 5 MG: 5 TABLET, ORALLY DISINTEGRATING ORAL at 20:48

## 2024-06-16 RX ADMIN — HYDROCORTISONE 1 APPLICATION: 10 OINTMENT TOPICAL at 08:48

## 2024-06-16 RX ADMIN — MEGESTROL ACETATE 80 MG: 40 SUSPENSION ORAL at 08:48

## 2024-06-16 RX ADMIN — METOPROLOL TARTRATE 100 MG: 100 TABLET, FILM COATED ORAL at 20:48

## 2024-06-16 RX ADMIN — MUPIROCIN 1 APPLICATION: 20 OINTMENT TOPICAL at 20:54

## 2024-06-16 RX ADMIN — Medication 10 ML: at 20:55

## 2024-06-16 RX ADMIN — Medication 500 MCG: at 08:46

## 2024-06-16 RX ADMIN — GUAIFENESIN 1200 MG: 600 TABLET, EXTENDED RELEASE ORAL at 08:46

## 2024-06-16 RX ADMIN — Medication 10 ML: at 08:48

## 2024-06-16 RX ADMIN — GUAIFENESIN 1200 MG: 600 TABLET, EXTENDED RELEASE ORAL at 20:48

## 2024-06-16 RX ADMIN — HYDROCORTISONE 1 APPLICATION: 10 OINTMENT TOPICAL at 20:54

## 2024-06-16 RX ADMIN — METRONIDAZOLE 500 MG: 500 INJECTION, SOLUTION INTRAVENOUS at 20:09

## 2024-06-16 RX ADMIN — SODIUM BICARBONATE 75 MEQ: 84 INJECTION, SOLUTION INTRAVENOUS at 08:50

## 2024-06-16 RX ADMIN — INSULIN LISPRO 3 UNITS: 100 INJECTION, SOLUTION INTRAVENOUS; SUBCUTANEOUS at 20:47

## 2024-06-16 RX ADMIN — MUPIROCIN 1 APPLICATION: 20 OINTMENT TOPICAL at 08:48

## 2024-06-16 RX ADMIN — METRONIDAZOLE 500 MG: 500 INJECTION, SOLUTION INTRAVENOUS at 13:22

## 2024-06-16 RX ADMIN — METRONIDAZOLE 500 MG: 500 INJECTION, SOLUTION INTRAVENOUS at 05:36

## 2024-06-16 RX ADMIN — APIXABAN 2.5 MG: 2.5 TABLET, FILM COATED ORAL at 08:45

## 2024-06-16 RX ADMIN — BUDESONIDE AND FORMOTEROL FUMARATE DIHYDRATE 2 PUFF: 160; 4.5 AEROSOL RESPIRATORY (INHALATION) at 07:33

## 2024-06-16 RX ADMIN — CEFEPIME 2000 MG: 2 INJECTION, POWDER, FOR SOLUTION INTRAVENOUS at 13:21

## 2024-06-16 RX ADMIN — METOPROLOL TARTRATE 100 MG: 100 TABLET, FILM COATED ORAL at 08:47

## 2024-06-16 RX ADMIN — NIFEDIPINE 30 MG: 30 TABLET, EXTENDED RELEASE ORAL at 08:46

## 2024-06-16 RX ADMIN — INSULIN LISPRO 4 UNITS: 100 INJECTION, SOLUTION INTRAVENOUS; SUBCUTANEOUS at 17:32

## 2024-06-16 RX ADMIN — APIXABAN 2.5 MG: 2.5 TABLET, FILM COATED ORAL at 20:48

## 2024-06-16 NOTE — PROGRESS NOTES
[facility] HOSPITALIST PROGRESS NOTE     Patient Identification:  Name:  Zeenat Dong  Age:  89 y.o.  Sex:  female  :  1928  MRN:  [id number]  Visit Number:  [id number]  ROOM: [address] [place] [address]     Primary Care Provider:  Sravani Chapa APRN    Length of stay in inpatient status:  5    Subjective     Chief Compliant:    Chief Complaint   Patient presents with    Abnormal Lab    Weakness - Generalized    Altered Mental Status       History of Presenting Illness:    Patient seen in follow-up for weakness, suspected pneumonia and pericardial effusion.  Patient is hard of hearing, family present at bedside.  Patient resting comfortably this morning.  Family at bedside states she did not rest well overnight.  Has been afebrile.  No adverse events noted overnight.    Objective     Current Hospital Meds:apixaban, 2.5 mg, Oral, BID  budesonide-formoterol, 2 puff, Inhalation, BID - RT  cefepime, 2,000 mg, Intravenous, Q24H  cinacalcet, 30 mg, Oral, Daily  folic acid, 500 mcg, Oral, Daily  guaiFENesin, 1,200 mg, Oral, Q12H  hydrocortisone, 1 Application, Topical, BID  insulin lispro, 2-7 Units, Subcutaneous, 4x Daily AC & at Bedtime  megestrol, 80 mg, Oral, Daily  metoprolol tartrate, 100 mg, Oral, Q12H  metroNIDAZOLE, 500 mg, Intravenous, Q8H  mupirocin, 1 application , Each Nare, BID  NIFEdipine CC, 30 mg, Oral, Q24H  OLANZapine zydis, 5 mg, Oral, Nightly  pantoprazole, 40 mg, Oral, Daily  sodium chloride, 10 mL, Intravenous, Q12H  sodium chloride, 10 mL, Intravenous, Q12H  vitamin B-12, 1,000 mcg, Oral, Once per day on     sodium bicarbonate 8.4 % 75 mEq in sodium chloride 0.45 % 1,000 mL infusion (greater than 100 mEq), 75 mEq, Last Rate: 75 mEq (24 0850)          Current Antimicrobial Therapy:  Anti-Infectives (From admission, onward)      Ordered     Dose/Rate Route Frequency Start Stop    24 1239  cefepime 2000 mg IVPB in 100 mL NS (VTB)         Ordering Provider: Blanca Pereira RPH    2,000 mg  over 4 Hours Intravenous Every 24 Hours 06/14/24 1400 06/19/24 1359    06/13/24 1239  cefepime 2000 mg IVPB in 100 mL NS (VTB)        Ordering Provider: Blanca Pereira RPH    2,000 mg  over 30 Minutes Intravenous Once 06/13/24 1330 06/13/24 1444    06/13/24 1205  metroNIDAZOLE (FLAGYL) IVPB 500 mg        Ordering Provider: Johanna Chapa APRN    500 mg  200 mL/hr over 30 Minutes Intravenous Every 8 Hours 06/13/24 1300 06/18/24 1259    06/11/24 0153  doxycycline (VIBRAMYCIN) 100 mg in sodium chloride 0.9 % 100 mL IVPB-VTB        Ordering Provider: Haylee Rawls DO    100 mg  over 60 Minutes Intravenous Once 06/11/24 0209 06/11/24 0327    06/11/24 0152  cefTRIAXone (ROCEPHIN) 1,000 mg in sodium chloride 0.9 % 100 mL IVPB-VTB        Ordering Provider: Haylee Rawls DO    1,000 mg  200 mL/hr over 30 Minutes Intravenous Once 06/11/24 0208 06/11/24 0250          Current Diuretic Therapy:  Diuretics (From admission, onward)      None          ----------------------------------------------------------------------------------------------------------------------  Vital Signs:  Temp:  [96.8 °F (36 °C)-98 °F (36.7 °C)] 98 °F (36.7 °C)  Heart Rate:  [] 115  Resp:  [18-19] 18  BP: (138-174)/(72-98) 174/98  SpO2:  [90 %-93 %] 93 %  on   ;   Device (Oxygen Therapy): room air  Body mass index is 23.38 kg/m².    Wt Readings from Last 3 Encounters:   06/11/24 59.9 kg (132 lb)   12/14/23 60.7 kg (133 lb 12.8 oz)   06/21/16 67.6 kg (149 lb)     Intake & Output (last 3 days)         06/09 0701  06/10 0700 06/10 0701  06/11 0700 06/11 0701 06/12 0700 06/12 0701 06/13 0700    P.O.   120     I.V. (mL/kg)   300 (5)     Total Intake(mL/kg)   420 (7)     Net   +420             Urine Unmeasured Occurrence   2 x           Diet: Regular/House; Texture: Mechanical Ground (NDD 2); Fluid Consistency: Thin (IDDSI  0)  ----------------------------------------------------------------------------------------------------------------------  Physical exam:  Constitutional: Elderly female-looks better today today, well-developed and well-nourished, resting comfortably in bed, no acute distress.      HENT:  Head:  Normocephalic and atraumatic.  Mouth:  Moist mucous membranes.  Eyes:  Conjunctivae and EOM are normal. No scleral icterus.   Cardiovascular: Irregularly irregular and normal heart sounds with no murmur. No JVD.   Pulmonary/Chest:  No respiratory distress, no wheezes, no crackles, with normal breath sounds and good air movement. Unlabored. No accessory muscle use.  Abdominal:  Soft. No distension and no tenderness.  Bowel sounds present. No rebound or guarding.   Musculoskeletal:  No tenderness, and no deformity.  No red or swollen joints anywhere.    Neurological:  Alert and oriented to person, place but not time.  No cranial nerve deficit.   Nonfocal.   Skin:  Skin is warm and dry. No rash noted. No pallor.   Peripheral vascular:  No clubbing, no cyanosis, no edema. Pedal and tibial pulses 2 out of 4 bilaterally.     Unchanged from 6/15/2024  Electronically signed by Reji Calvillo DO, 06/16/24, 1:20 PM EDT.      ----------------------------------------------------------------------------------------------------------------------  Results from last 7 days   Lab Units 06/16/24  0702 06/14/24  0112 06/13/24  0134 06/12/24  0755 06/11/24  0047 06/10/24  2101   CRP mg/dL  --   --  15.97*  --   --  7.27*   LACTATE mmol/L  --   --   --   --  1.8 2.1*   WBC 10*3/mm3 17.43* 16.96* 20.55*   < >  --  20.64*   HEMOGLOBIN g/dL 10.8* 11.4* 10.8*   < >  --  12.3   HEMATOCRIT % 36.0 36.7 33.6*   < >  --  38.8   MCV fL 97.3* 94.8 94.6   < >  --  94.9   MCHC g/dL 30.0* 31.1* 32.1   < >  --  31.7   PLATELETS 10*3/mm3 227 260 236   < >  --  228    < > = values in this interval not displayed.     Results from last 7 days   Lab Units  "06/11/24  0329   PH, ARTERIAL pH units 7.489*   PO2 ART mm Hg 69.8*   PCO2, ARTERIAL mm Hg 33.7*   HCO3 ART mmol/L 25.5     Results from last 7 days   Lab Units 06/16/24  0702 06/14/24  0112 06/13/24  0134 06/12/24  0755 06/11/24  0342 06/10/24  2101   SODIUM mmol/L 149* 145 141 140 141 141   POTASSIUM mmol/L 4.3 4.4 3.9 4.2 4.5 4.5   MAGNESIUM mg/dL  --   --   --   --  1.8 1.3*   CHLORIDE mmol/L 119* 113* 110* 108* 107 107   CO2 mmol/L 16.9* 18.6* 18.9* 20.6* 22.6 20.6*   BUN mg/dL 37* 29* 24* 22 28* 29*   CREATININE mg/dL 1.40* 1.22* 1.22* 1.19* 1.42* 1.60*   CALCIUM mg/dL 9.4 9.6 9.3 9.1 9.7* 9.8*   PHOSPHORUS mg/dL  --   --   --   --  3.1  --    GLUCOSE mg/dL 114* 194* 223* 239* 216* 281*   ALBUMIN g/dL  --   --   --  3.1*  --  3.0*   BILIRUBIN mg/dL  --   --   --  0.3  --  0.4   ALK PHOS U/L  --   --   --  77  --  73   AST (SGOT) U/L  --   --   --  12  --  13   ALT (SGPT) U/L  --   --   --  12  --  14   Estimated Creatinine Clearance: 22.2 mL/min (A) (by C-G formula based on SCr of 1.4 mg/dL (H)).  No results found for: \"AMMONIA\"    Results from last 7 days   Lab Units 06/11/24  0534 06/11/24  0342 06/10/24  2101   HSTROP T ng/L 27* 26* 29*     Results from last 7 days   Lab Units 06/10/24  2101   PROBNP pg/mL 9,871.0*         Glucose   Date/Time Value Ref Range Status   06/16/2024 1122 130 70 - 130 mg/dL Final   06/16/2024 0651 103 70 - 130 mg/dL Final   06/15/2024 2017 178 (H) 70 - 130 mg/dL Final   06/15/2024 1650 220 (H) 70 - 130 mg/dL Final   06/15/2024 1105 149 (H) 70 - 130 mg/dL Final   06/15/2024 0745 111 70 - 130 mg/dL Final   06/14/2024 2033 201 (H) 70 - 130 mg/dL Final   06/14/2024 1621 192 (H) 70 - 130 mg/dL Final     Lab Results   Component Value Date    TSH 3.752 08/17/2015    FREET4 1.09 08/17/2015     No results found for: \"PREGTESTUR\", \"PREGSERUM\", \"HCG\", \"HCGQUANT\"  Pain Management Panel  More data may exist         Latest Ref Rng & Units 6/10/2024 8/17/2015   Pain Management Panel " "  Creatinine, Urine mg/dL  mg/dL - 129.2  130.0    Amphetamine, Urine Qual Negative Negative  -   Barbiturates Screen, Urine Negative Negative  -   Benzodiazepine Screen, Urine Negative Negative  -   Buprenorphine, Screen, Urine Negative Negative  -   Cocaine Screen, Urine Negative Negative  -   Fentanyl, Urine Negative Negative  -   Methadone Screen , Urine Negative Negative  -   Methamphetamine, Ur Negative Negative  -     Brief Urine Lab Results  (Last result in the past 365 days)        Color   Clarity   Blood   Leuk Est   Nitrite   Protein   CREAT   Urine HCG        06/10/24 2313 Yellow   Clear   Negative   Negative   Negative   Negative                 Blood Culture   Date Value Ref Range Status   06/11/2024 No growth at 24 hours  Preliminary   06/11/2024 No growth at 24 hours  Preliminary     No results found for: \"URINECX\"  No results found for: \"WOUNDCX\"  No results found for: \"STOOLCX\"  No results found for: \"RESPCX\"  No results found for: \"AFBCX\"  Results from last 7 days   Lab Units 06/13/24  0134 06/11/24  0047 06/10/24  2101   LACTATE mmol/L  --  1.8 2.1*   SED RATE mm/hr 59*  --  43*   CRP mg/dL 15.97*  --  7.27*       I have personally looked at the labs and they are summarized above.  ----------------------------------------------------------------------------------------------------------------------  Detailed radiology reports for the last 24 hours:  Imaging Results (Last 24 Hours)       ** No results found for the last 24 hours. **          Assessment & Plan      Patient is a 89-year-old female with history significant for CKD, hyperlipidemia, hyperparathyroidism and atrial fibrillation who presented to the ER with complaints of weakness and leukocytosis noted on outpatient labs.    #Sepsis due to community-acquired pneumonia, bacterial, unspecified  #Metabolic encephalopathy  #Suspected aspiration pneumonia  --Patient presented with overall weakness, leukocytosis.  Afebrile and hemodynamically " stable with otherwise benign workup aside from CT imaging findings.  --Admit labs WBC 17 K, elevated inflammatory markers  --Initial ABG unremarkable  --Blood cultures NGTD, Legionella/strep pneumo negative  --Viral PCR negative  --CT chest noted questionable infiltrates in the left lower lobe  --Continue p.o. prednisone  --Continue as needed DuoNebs, Symbicort, Symbicort  --Continue cefepime/flagyl  --ID following, appreciate recommendations    #Atrial fibrillation  #HFpEF, not in acute exacerbation  #Essential hypertension  #Large pericardial effusion without tamponade physiology  --No chest pain/pressure or tightness  --EKG with atrial fibrillation, no acute ischemic changes  --Troponin 26, repeat 27 with a delta of 1  --Echo EF 66 to 70%, large greater than 2 cm pericardial effusion without evidence of tamponade physiology  --Repeat limited echo noted pericardial effusion 1 to 2 cm, no evidence of tamponade physiology  --Continue beta-blocker (rate controlled as long as she takes it), Eliquis (reduced dose) for stroke prophylaxis  --Cardiology consulted, suspected chronic pericardial effusion and recommend infectious disease consult and outpatient follow-up next month with echo    #Thickened endometrium  --Noted on non-OB ultrasound, evaluated by OB/GYN, recommended outpatient follow-up, nothing further to add at this time    #Acute kidney injury on CKD stage IIIa due to prerenal azotemia  #Hypernatremia due to free water deficit  --Baseline creatinine around 1, creatinine 1.6 on admission  --CT negative for obstruction  --CK normal  --Continue sodium bicarb half-normal saline, repeat labs at 1400, adjust IV fluids thereafter and repeat labs in a.m.    #Delirium  --delirium precautions  --After long discussion with family on Sunday, they ask for potential anxiety medications just as needed, I started Klonopin 0.25 3 times daily as needed-granddaughter works with hospice and knows if patient does not improve with  the attempt to rehab, this is likely the next step  --po zyprexa hs    #Dysphagia  --SLP to see, discussed with family, they are not interested in a PEG tube and would do the safest oral option for her if needed    #Type 2 diabetes mellitus, SSI, adjust as needed   #Hypomagnesemia, replace  #Acute on chronic debility, PT/OT, agreeable to SNF  #Hyperlipidemia, statin  #Hyperparathyroidism, Sensipar  #GERD, PPI    CHECKLIST:  Abx: cefepime, flagyl  VTE: Eliquis  GI ppx: PPI  Diet: Modified diet  Code: No CPR, limited  Dispo: Patient is stable, improved today, follow-up repeat labs. Tentative plan to discharge to SNF on Monday if medically stable.    Reji Calvillo DO  [facility]  06/16/24  13:18 EDT

## 2024-06-16 NOTE — PLAN OF CARE
Goal Outcome Evaluation:      Patient resting in bed during shift with family at bedside. VSS on room air. Patient voices no complaints or concerns at this time.  Will continue with plan of care.

## 2024-06-17 ENCOUNTER — PREP FOR SURGERY (OUTPATIENT)
Dept: OTHER | Facility: HOSPITAL | Age: 89
End: 2024-06-17
Payer: MEDICARE

## 2024-06-17 LAB
ANION GAP SERPL CALCULATED.3IONS-SCNC: 12.1 MMOL/L (ref 5–15)
BUN SERPL-MCNC: 34 MG/DL (ref 8–23)
BUN/CREAT SERPL: 24.8 (ref 7–25)
BURR CELLS BLD QL SMEAR: ABNORMAL
CALCIUM SPEC-SCNC: 8.6 MG/DL (ref 8.2–9.6)
CHLORIDE SERPL-SCNC: 115 MMOL/L (ref 98–107)
CO2 SERPL-SCNC: 19.9 MMOL/L (ref 22–29)
CREAT SERPL-MCNC: 1.37 MG/DL (ref 0.57–1)
DEPRECATED RDW RBC AUTO: 50 FL (ref 37–54)
EGFRCR SERPLBLD CKD-EPI 2021: 35.4 ML/MIN/1.73
ERYTHROCYTE [DISTWIDTH] IN BLOOD BY AUTOMATED COUNT: 14.4 % (ref 12.3–15.4)
GLUCOSE BLDC GLUCOMTR-MCNC: 126 MG/DL (ref 70–130)
GLUCOSE BLDC GLUCOMTR-MCNC: 147 MG/DL (ref 70–130)
GLUCOSE BLDC GLUCOMTR-MCNC: 179 MG/DL (ref 70–130)
GLUCOSE BLDC GLUCOMTR-MCNC: 238 MG/DL (ref 70–130)
GLUCOSE SERPL-MCNC: 123 MG/DL (ref 65–99)
HCT VFR BLD AUTO: 34.2 % (ref 34–46.6)
HGB BLD-MCNC: 10.6 G/DL (ref 12–15.9)
LYMPHOCYTES # BLD MANUAL: 1.11 10*3/MM3 (ref 0.7–3.1)
LYMPHOCYTES NFR BLD MANUAL: 7 % (ref 5–12)
MCH RBC QN AUTO: 29.8 PG (ref 26.6–33)
MCHC RBC AUTO-ENTMCNC: 31 G/DL (ref 31.5–35.7)
MCV RBC AUTO: 96.1 FL (ref 79–97)
MONOCYTES # BLD: 1.3 10*3/MM3 (ref 0.1–0.9)
NEUTROPHILS # BLD AUTO: 16.11 10*3/MM3 (ref 1.7–7)
NEUTROPHILS NFR BLD MANUAL: 85 % (ref 42.7–76)
NEUTS BAND NFR BLD MANUAL: 2 % (ref 0–5)
PLAT MORPH BLD: NORMAL
PLATELET # BLD AUTO: 236 10*3/MM3 (ref 140–450)
PMV BLD AUTO: 10.4 FL (ref 6–12)
POIKILOCYTOSIS BLD QL SMEAR: ABNORMAL
POTASSIUM SERPL-SCNC: 3.7 MMOL/L (ref 3.5–5.2)
RBC # BLD AUTO: 3.56 10*6/MM3 (ref 3.77–5.28)
SCAN SLIDE: NORMAL
SODIUM SERPL-SCNC: 147 MMOL/L (ref 136–145)
VARIANT LYMPHS NFR BLD MANUAL: 6 % (ref 19.6–45.3)
WBC NRBC COR # BLD AUTO: 18.52 10*3/MM3 (ref 3.4–10.8)

## 2024-06-17 PROCEDURE — 92610 EVALUATE SWALLOWING FUNCTION: CPT | Performed by: SPEECH-LANGUAGE PATHOLOGIST

## 2024-06-17 PROCEDURE — 63710000001 INSULIN LISPRO (HUMAN) PER 5 UNITS

## 2024-06-17 PROCEDURE — 25010000002 CEFEPIME PER 500 MG

## 2024-06-17 PROCEDURE — 94799 UNLISTED PULMONARY SVC/PX: CPT

## 2024-06-17 PROCEDURE — 86747 PARVOVIRUS ANTIBODY: CPT | Performed by: NURSE PRACTITIONER

## 2024-06-17 PROCEDURE — 25010000002 METRONIDAZOLE 500 MG/100ML SOLUTION: Performed by: NURSE PRACTITIONER

## 2024-06-17 PROCEDURE — 99232 SBSQ HOSP IP/OBS MODERATE 35: CPT | Performed by: NURSE PRACTITIONER

## 2024-06-17 PROCEDURE — 99232 SBSQ HOSP IP/OBS MODERATE 35: CPT | Performed by: INTERNAL MEDICINE

## 2024-06-17 PROCEDURE — 80048 BASIC METABOLIC PNL TOTAL CA: CPT | Performed by: STUDENT IN AN ORGANIZED HEALTH CARE EDUCATION/TRAINING PROGRAM

## 2024-06-17 PROCEDURE — 82948 REAGENT STRIP/BLOOD GLUCOSE: CPT

## 2024-06-17 PROCEDURE — 85007 BL SMEAR W/DIFF WBC COUNT: CPT | Performed by: STUDENT IN AN ORGANIZED HEALTH CARE EDUCATION/TRAINING PROGRAM

## 2024-06-17 PROCEDURE — 94664 DEMO&/EVAL PT USE INHALER: CPT

## 2024-06-17 PROCEDURE — 87799 DETECT AGENT NOS DNA QUANT: CPT | Performed by: NURSE PRACTITIONER

## 2024-06-17 PROCEDURE — 0 DEXTROSE 5 % SOLUTION: Performed by: INTERNAL MEDICINE

## 2024-06-17 PROCEDURE — 97530 THERAPEUTIC ACTIVITIES: CPT

## 2024-06-17 PROCEDURE — 94761 N-INVAS EAR/PLS OXIMETRY MLT: CPT

## 2024-06-17 PROCEDURE — 85025 COMPLETE CBC W/AUTO DIFF WBC: CPT | Performed by: STUDENT IN AN ORGANIZED HEALTH CARE EDUCATION/TRAINING PROGRAM

## 2024-06-17 RX ORDER — UREA 10 %
5 LOTION (ML) TOPICAL NIGHTLY
Status: DISCONTINUED | OUTPATIENT
Start: 2024-06-17 | End: 2024-06-20 | Stop reason: HOSPADM

## 2024-06-17 RX ORDER — DEXTROSE MONOHYDRATE 50 MG/ML
100 INJECTION, SOLUTION INTRAVENOUS CONTINUOUS
Status: DISCONTINUED | OUTPATIENT
Start: 2024-06-17 | End: 2024-06-18

## 2024-06-17 RX ORDER — POLYETHYLENE GLYCOL 3350 17 G/17G
17 POWDER, FOR SOLUTION ORAL 2 TIMES DAILY
Status: DISCONTINUED | OUTPATIENT
Start: 2024-06-17 | End: 2024-06-20 | Stop reason: HOSPADM

## 2024-06-17 RX ORDER — AMOXICILLIN 250 MG
2 CAPSULE ORAL 2 TIMES DAILY
Status: DISCONTINUED | OUTPATIENT
Start: 2024-06-17 | End: 2024-06-20 | Stop reason: HOSPADM

## 2024-06-17 RX ORDER — OLANZAPINE 5 MG/1
5 TABLET, ORALLY DISINTEGRATING ORAL ONCE
Status: COMPLETED | OUTPATIENT
Start: 2024-06-17 | End: 2024-06-17

## 2024-06-17 RX ADMIN — Medication 10 ML: at 08:30

## 2024-06-17 RX ADMIN — BISACODYL 10 MG: 10 SUPPOSITORY RECTAL at 05:15

## 2024-06-17 RX ADMIN — POLYETHYLENE GLYCOL (3350) 17 G: 17 POWDER, FOR SOLUTION ORAL at 21:35

## 2024-06-17 RX ADMIN — INSULIN LISPRO 3 UNITS: 100 INJECTION, SOLUTION INTRAVENOUS; SUBCUTANEOUS at 21:08

## 2024-06-17 RX ADMIN — NIFEDIPINE 30 MG: 30 TABLET, EXTENDED RELEASE ORAL at 08:29

## 2024-06-17 RX ADMIN — METRONIDAZOLE 500 MG: 500 INJECTION, SOLUTION INTRAVENOUS at 04:01

## 2024-06-17 RX ADMIN — METRONIDAZOLE 500 MG: 500 INJECTION, SOLUTION INTRAVENOUS at 12:09

## 2024-06-17 RX ADMIN — Medication 5 MG: at 21:35

## 2024-06-17 RX ADMIN — Medication 1000 MCG: at 08:29

## 2024-06-17 RX ADMIN — APIXABAN 2.5 MG: 2.5 TABLET, FILM COATED ORAL at 08:29

## 2024-06-17 RX ADMIN — Medication 10 ML: at 21:22

## 2024-06-17 RX ADMIN — MUPIROCIN 1 APPLICATION: 20 OINTMENT TOPICAL at 21:22

## 2024-06-17 RX ADMIN — Medication 500 MCG: at 08:29

## 2024-06-17 RX ADMIN — BUDESONIDE AND FORMOTEROL FUMARATE DIHYDRATE 2 PUFF: 160; 4.5 AEROSOL RESPIRATORY (INHALATION) at 07:06

## 2024-06-17 RX ADMIN — METOPROLOL TARTRATE 100 MG: 100 TABLET, FILM COATED ORAL at 21:12

## 2024-06-17 RX ADMIN — METOPROLOL TARTRATE 100 MG: 100 TABLET, FILM COATED ORAL at 08:31

## 2024-06-17 RX ADMIN — MUPIROCIN 1 APPLICATION: 20 OINTMENT TOPICAL at 08:30

## 2024-06-17 RX ADMIN — INSULIN LISPRO 2 UNITS: 100 INJECTION, SOLUTION INTRAVENOUS; SUBCUTANEOUS at 17:10

## 2024-06-17 RX ADMIN — APIXABAN 2.5 MG: 2.5 TABLET, FILM COATED ORAL at 21:14

## 2024-06-17 RX ADMIN — METRONIDAZOLE 500 MG: 500 INJECTION, SOLUTION INTRAVENOUS at 21:19

## 2024-06-17 RX ADMIN — DEXTROSE MONOHYDRATE 100 ML/HR: 50 INJECTION, SOLUTION INTRAVENOUS at 23:18

## 2024-06-17 RX ADMIN — GUAIFENESIN 1200 MG: 600 TABLET, EXTENDED RELEASE ORAL at 21:14

## 2024-06-17 RX ADMIN — Medication 10 ML: at 21:21

## 2024-06-17 RX ADMIN — OLANZAPINE 5 MG: 5 TABLET, ORALLY DISINTEGRATING ORAL at 11:46

## 2024-06-17 RX ADMIN — CINACALCET 30 MG: 30 TABLET, FILM COATED ORAL at 08:29

## 2024-06-17 RX ADMIN — PANTOPRAZOLE SODIUM 40 MG: 40 TABLET, DELAYED RELEASE ORAL at 08:29

## 2024-06-17 RX ADMIN — CEFEPIME 2000 MG: 2 INJECTION, POWDER, FOR SOLUTION INTRAVENOUS at 14:15

## 2024-06-17 RX ADMIN — HYDROCORTISONE 1 APPLICATION: 10 OINTMENT TOPICAL at 08:30

## 2024-06-17 RX ADMIN — DEXTROSE MONOHYDRATE 100 ML/HR: 50 INJECTION, SOLUTION INTRAVENOUS at 12:10

## 2024-06-17 RX ADMIN — CLONAZEPAM 0.25 MG: 0.5 TABLET ORAL at 03:53

## 2024-06-17 RX ADMIN — HYDROCORTISONE 1 APPLICATION: 10 OINTMENT TOPICAL at 21:22

## 2024-06-17 RX ADMIN — OLANZAPINE 5 MG: 5 TABLET, ORALLY DISINTEGRATING ORAL at 21:14

## 2024-06-17 RX ADMIN — DOCUSATE SODIUM 50 MG AND SENNOSIDES 8.6 MG 2 TABLET: 8.6; 5 TABLET, FILM COATED ORAL at 21:35

## 2024-06-17 RX ADMIN — GUAIFENESIN 1200 MG: 600 TABLET, EXTENDED RELEASE ORAL at 08:29

## 2024-06-17 NOTE — PROGRESS NOTES
[facility] HOSPITALIST PROGRESS NOTE    Subjective     History:   Zeenat Dong is a 89 y.o. female admitted on 6/10/2024 secondary to Sepsis     Procedures:    Patient seen and examined with GILSON RN.    History taken from: patient, chart, and RN.     Earlier in the morning patient was agitated reason she was given Zyprexa 5 mg with good results.  By the time I saw patient patient had no hyperactive delirium.  Patient was pleasant and as per family member at the bedside cognition improved.  Lots of waning of cognition consistent with delirium.    Patient has been accepted to [facility] [place] tomorrow      Objective     Vital Signs  Temp:  [97.8 °F (36.6 °C)] 97.8 °F (36.6 °C)  Heart Rate:  [] 121  Resp:  [16-22] 16  BP: (152-168)/(72-88) 152/88    Intake/Output Summary (Last 24 hours) at 6/17/2024 1146  Last data filed at 6/17/2024 0515  Gross per 24 hour   Intake 2489.42 ml   Output --   Net 2489.42 ml         Physical Exam:  General: Frail Elderly Female awake.   Head:Normocephalic, atraumatic  Eyes:PERRLA, EOMI. Conjunctivae and sclerae normal. No icterus or pallor.  Ears:Ears appear intact with no abnormalities noted.  Throat:No oral lesions or thrush. Oral mucosa moist  Heart:Normal S1 and S2. Regular rate and rhythm. No significant murmur, rubs or gallops appreciated.  Lungs:Respirations regular, even and unlabored. Lungs clear to auscultation B/L. No wheezes, rales or rhonchi.  Abdomen: Soft and nontender. No guarding, rebound tenderness or  organomegaly noted. Bowel sounds present x 4.  Musculoskeletal:Muscular strength intact and equal B/L. No joint swelling, deformity, or tenderness.  Extremities:No clubbing, cyanosis or edema noted. Moves UE and LE equally B/L.  Pulses:Pulses palpable and equal bilaterally.  Skin: No bleeding, bruising or rash.  Lymph nodes: No palpable adenopathy.  Neurologic: AAOx1. Cranial nerves 2 - 12 grossly intact. Sensation intact. DTR  present and equal bilaterally    Results Review:    Results from last 7 days   Lab Units 06/17/24  0753 06/16/24  0702 06/14/24  0112 06/13/24  0134 06/12/24  0755 06/10/24  2101   WBC 10*3/mm3 18.52* 17.43* 16.96* 20.55* 16.97* 20.64*   HEMOGLOBIN g/dL 10.6* 10.8* 11.4* 10.8* 11.3* 12.3   PLATELETS 10*3/mm3 236 227 260 236 205 228     Results from last 7 days   Lab Units 06/17/24  0753 06/16/24  1837 06/16/24  0702 06/14/24  0112 06/13/24  0134 06/12/24 0755 06/11/24  0342   SODIUM mmol/L 147* 147* 149* 145 141 140 141   POTASSIUM mmol/L 3.7 4.0 4.3 4.4 3.9 4.2 4.5   CHLORIDE mmol/L 115* 115* 119* 113* 110* 108* 107   CO2 mmol/L 19.9* 18.1* 16.9* 18.6* 18.9* 20.6* 22.6   BUN mg/dL 34* 37* 37* 29* 24* 22 28*   CREATININE mg/dL 1.37* 1.40* 1.40* 1.22* 1.22* 1.19* 1.42*   CALCIUM mg/dL 8.6 8.9 9.4 9.6 9.3 9.1 9.7*   GLUCOSE mg/dL 123* 278* 114* 194* 223* 239* 216*     Results from last 7 days   Lab Units 06/12/24  0755 06/10/24  2101   BILIRUBIN mg/dL 0.3 0.4   ALK PHOS U/L 77 73   AST (SGOT) U/L 12 13   ALT (SGPT) U/L 12 14     Results from last 7 days   Lab Units 06/11/24  0342 06/10/24  2101   MAGNESIUM mg/dL 1.8 1.3*         Results from last 7 days   Lab Units 06/11/24  0534 06/11/24  0342 06/10/24  2101   HSTROP T ng/L 27* 26* 29*       Imaging Results (Last 24 Hours)       ** No results found for the last 24 hours. **              Medications:  apixaban, 2.5 mg, Oral, BID  budesonide-formoterol, 2 puff, Inhalation, BID - RT  cefepime, 2,000 mg, Intravenous, Q24H  cinacalcet, 30 mg, Oral, Daily  folic acid, 500 mcg, Oral, Daily  guaiFENesin, 1,200 mg, Oral, Q12H  hydrocortisone, 1 Application, Topical, BID  insulin lispro, 2-7 Units, Subcutaneous, 4x Daily AC & at Bedtime  megestrol, 80 mg, Oral, Daily  metoprolol tartrate, 100 mg, Oral, Q12H  metroNIDAZOLE, 500 mg, Intravenous, Q8H  mupirocin, 1 application , Each Nare, BID  NIFEdipine CC, 30 mg, Oral, Q24H  OLANZapine zydis, 5 mg, Oral, Nightly  OLANZapine  zydis, 5 mg, Oral, Once  pantoprazole, 40 mg, Oral, Daily  sodium chloride, 10 mL, Intravenous, Q12H  sodium chloride, 10 mL, Intravenous, Q12H  vitamin B-12, 1,000 mcg, Oral, Once per day on Monday Tuesday Wednesday Thursday Friday      sodium chloride, 100 mL/hr, Last Rate: 100 mL/hr (06/16/24 2009)            Assessment & Plan     Sepsis    Moderate malnutrition    Mrs Zeenat Dong is a 89-year-old female w CKD 3A, hyperlipidemia, hyperparathyroidism and atrial fibrillation who presented to the ER with complaints of weakness and leukocytosis noted on outpatient labs.     Sepsis 2/2  Community Acquired Pneumonia vs Aspiration PNA  --Patient presented with overall weakness, leukocytosis 17K.  Afebrile and hemodynamically stable with CT Chest Imaging (6/10/24): Focal airspace opacity at the left lower lobe favors atelectasis over infiltrate.  -Blood cultures NGTD, Legionella/strep pneumo negative  -Viral PCR negative  -Continue as needed DuoNebs, Symbicort, Symbicort  -Continue cefepime/flagyl  -ID following, recommendations appreciated    Metabolic Encephalopathy  - Secondary to PNA  - Management of underlying etiology    Stage 3A Atrial fibrillation (Paroxysmal)  - Rate controlled Overall  - Continue Metoprolol 100mg BID  - Continue Eliquis 2.5mg BID    Chronic HFpEF  - Not in acute exacerbation    Essential hypertension  - Continue Metoprolol 100mg BID  - Continue Nifedipine 30mg QD    Large pericardial effusion   - No tamponade physiology  -No chest pain/pressure or tightness  -EKG with atrial fibrillation, no acute ischemic changes  -Troponin 26, repeat 27 with a delta of 1  -TTE LVEF 66 to 70%, large greater than 2 cm pericardial effusion without evidence of tamponade physiology  -Repeat limited echo noted pericardial effusion 1 to 2 cm, no evidence of tamponade physiology  -Continue beta-blocker (rate controlled as long as she takes it), Eliquis (reduced dose) for stroke prophylaxis  -Cardiology consulted,  suspected chronic pericardial effusion and recommend infectious disease consult and outpatient follow-up next month with echo  - Parvovirus, CMV, Shi-Barr PCR's have been ordered to rule out viral cause   - Avoiding Pericardiocentesis in the setting of patient advanced age, cognitive impairment, with no signs of Tamponade with repeated TTE limited showing some improvement     Thickened endometrium  -Noted on non-OB ultrasound, evaluated by OB/GYN, recommended outpatient follow-up, nothing further to add at this time     Acute kidney injury on CKD stage IIIa   - Due to prerenal azotemia 2/2 decreased oral intake  - Continue with IVF    Hypernatremia due to free water deficit  - Will change fluids to D5W due to no improvement with 0.45NS IVF     Hyperactive Delirium  -Delirium precautions  -Will avoid Benzodiazepines as this can have paradoxical effect in older adults  -PO zyprexa hs and consider as well during the day     Dysphagia  -SLP recommended: Mechanical soft, ground consistencies w/ thin liquids      Acute on Chronic Physical Debility  - Continue PT/OT, agreeable to SNF    Constipation  - Will give Sennakot BID and Miralax BID    Chronic Medical Conditions:   #Type 2 diabetes mellitus: SSI, adjust as needed   #Hyperlipidemia, statin  #Hyperparathyroidism, Sensipar  #GERD, PPI    VTE Prophylaxis:  Pharmacologic & mechanical VTE prophylaxis orders are present.  GIppx: Pantoprazole 40mg QD  Diet Order   Procedures    Diet: Regular/House; Texture: Mechanical Ground (NDD 2); Fluid Consistency: Thin (IDDSI 0)   Code: Medical Intervention Limits: No intubation (DNI)  Level Of Support Discussed With: Next of Kin (If No Surrogate)  Code Status (Patient has no pulse and is not breathing): No CPR (Do Not Attempt to Resuscitate)  Medical Interventions (Patient has pulse or is breathing): Limited Support  Disposition:[facility] tomorrow      Rodolfo Reyes, MD  06/17/24  11:46 EDT

## 2024-06-17 NOTE — THERAPY TREATMENT NOTE
Acute Care - Physical Therapy Treatment Note   [facility]     Patient Name: Zeenat Dong  : 1928  MRN: [id number]  Today's Date: 2024   Onset of Illness/Injury or Date of Surgery: 06/10/24 (admission date)  Visit Dx:     ICD-10-CM ICD-9-CM   1. Acute renal failure, unspecified acute renal failure type  N17.9 584.9   2. Hypomagnesemia  E83.42 275.2   3. Hallucinations, unspecified  R44.3 780.1   4. Heart failure, unspecified HF chronicity, unspecified heart failure type  I50.9 428.9   5. Pericardial effusion  I31.39 423.9   6. Community acquired pneumonia of left lung, unspecified part of lung  J18.9 486     Patient Active Problem List   Diagnosis    Abdominal distension    Heart failure    Dry skin dermatitis    Gastroesophageal reflux disease without esophagitis    Hypercalcemia    Hyperparathyroidism    Hypertension    Hypokalemia    Hypomagnesemia    Seasonal allergic rhinitis    Shortness of breath    Atrial fibrillation    Weakness    Sepsis    Moderate malnutrition     Past Medical History:   Diagnosis Date    Hyperlipidemia      Past Surgical History:   Procedure Laterality Date    OTHER SURGICAL HISTORY      ear surgery    SKIN BIOPSY       PT Assessment (Last 12 Hours)       PT Evaluation and Treatment       Row Name 24 1507          Physical Therapy Time and Intention    Document Type therapy note (daily note)  -     Mode of Treatment physical therapy  -     Patient Effort adequate  -       Row Name 24 1507          General Information    Patient Profile Reviewed yes  -     Patient Observations cooperative;agree to therapy  -     Patient/Family/Caregiver Comments/Observations Daughter present during treatment  -     Existing Precautions/Restrictions fall  -       Row Name 24 1508          Cognition    Affect/Mental Status (Cognition) confused  -     Follows Commands (Cognition) follows one-step commands;50-74% accuracy;increased processing time needed;initiation  impaired;physical/tactile prompts required;repetition of directions required;verbal cues/prompting required;visual cue  -       Row Name 06/17/24 1504          Bed Mobility    Supine-Sit [place] (Bed Mobility) minimum assist (75% patient effort);moderate assist (50% patient effort);1 person assist  -     Sit-Supine [place] (Bed Mobility) moderate assist (50% patient effort);1 person assist;2 person assist  -     Bed Mobility, Safety Issues cognitive deficits limit understanding;decreased use of arms for pushing/pulling;decreased use of legs for bridging/pushing;impaired trunk control for bed mobility  -     Assistive Device (Bed Mobility) bed rails;draw sheet;head of bed elevated  -       Row Name 06/17/24 1503          Transfers    Comment, (Transfers) Pt sat EOB two times.  When laying down she kept initiating getting up out of bed but once sitting up she wanted to lay back down and would not attempt transfer training.  -       Row Name 06/17/24 1504          Gait/Stairs (Locomotion)    Patient was able to Ambulate no, other medical factors prevent ambulation  -     Reason Patient was unable to Ambulate Cognitive Deficit/Severe Dementia;Excessive Weakness  -       Row Name 06/17/24 1505          Balance    Balance Assessment sitting static balance;sitting dynamic balance  -     Static Sitting Balance minimal assist;1-person assist;contact guard  -     Dynamic Sitting Balance 1-person assist;minimal assist  -     Position, Sitting Balance sitting edge of bed  -       Row Name 06/17/24 1500          Motor Skills    Therapeutic Exercise knee  -       Row Name 06/17/24 1501          Knee (Therapeutic Exercise)    Knee (Therapeutic Exercise) AAROM (active assistive range of motion)  -     Knee AAROM (Therapeutic Exercise) left;right;sitting;flexion;extension;other (see comments)  attempted to engage patient in seated therapeutic exercises but patient had difficulty completing and  participating due to confusion and fatigue once sitting up  -       Row Name 06/17/24 1507          Plan of Care Review    Plan of Care Reviewed With patient;daughter  -     Outcome Evaluation PT treatment completed.  Patient required min to moderate assistance for mobility and was unable to participate in standing/transfer training today.  Continue PT POC.  -       Row Name 06/17/24 1507          Positioning and Restraints    Pre-Treatment Position in bed  -     Post Treatment Position bed  -     In Bed notified nsg;fowlers;call light within reach;encouraged to call for assist;exit alarm on;with family/caregiver;side rails up x3  Lines in tact and needs in reach  -               User Key  (r) = Recorded By, (t) = Taken By, (c) = Cosigned By      Initials Name Provider Type    Barbi Yen PT Physical Therapist                    Physical Therapy Education       Title: PT OT SLP Therapies (Done)       Topic: Physical Therapy (Done)       Point: Mobility training (Done)       Learning Progress Summary             Patient Acceptance, E,TB, VU by FRANCIA at 6/17/2024 0853    Acceptance, TB,E, NL by TRELL at 6/14/2024 0157    Acceptance, TB,E, VU by TRELL at 6/13/2024 0407    Acceptance, E, VU by LESLIE at 6/12/2024 0112                         Point: Home exercise program (Done)       Learning Progress Summary             Patient Acceptance, E,TB, VU by FRANCIA at 6/17/2024 0853    Acceptance, TB,E, NL by TRELL at 6/14/2024 0157    Acceptance, TB,E, VU by TRELL at 6/13/2024 0407    Acceptance, E, VU by LESLIE at 6/12/2024 0112                         Point: Body mechanics (Done)       Learning Progress Summary             Patient Acceptance, E,TB, VU by FRANCIA at 6/17/2024 0853    Acceptance, TB,E, NL by TRELL at 6/14/2024 0157    Acceptance, TB,E, VU by TRELL at 6/13/2024 0407    Acceptance, E, VU by LESLIE at 6/12/2024 0112                         Point: Precautions (Done)       Learning Progress Summary             Patient Acceptance, E,TB,  VU by FRANCIA at 6/17/2024 0853    Acceptance, TB,E, NL by TRELL at 6/14/2024 0157    Acceptance, TB,E, VU by TRELL at 6/13/2024 0407    Acceptance, E, VU by LESLIE at 6/12/2024 0112                                         User Key       Initials Effective Dates Name Provider Type Discipline    RG 06/06/23 -  Shemar Zheng, RN Registered Nurse Nurse    TRELL 03/26/24 -  Kimber Hinojosa, RN Registered Nurse Nurse    LESLIE 02/12/24 -  Johanna Francisco, RN Registered Nurse Nurse                  PT Recommendation and Plan     Plan of Care Reviewed With: patient, daughter  Outcome Evaluation: PT treatment completed.  Patient required min to moderate assistance for mobility and was unable to participate in standing/transfer training today.  Continue PT POC.       Time Calculation:    PT Charges       Row Name 06/17/24 1513             Time Calculation    PT Received On 06/17/24  -KP      PT Goal Re-Cert Due Date 06/25/24  -KP         Timed Charges    95081 - PT Therapeutic Activity Minutes 25  -KP         Total Minutes    Timed Charges Total Minutes 25  -KP       Total Minutes 25  -KP                User Key  (r) = Recorded By, (t) = Taken By, (c) = Cosigned By      Initials Name Provider Type    KP Barbi Choi, PT Physical Therapist                  Therapy Charges for Today       Code Description Service Date Service Provider Modifiers Qty    71045090093 HC PT THERAPEUTIC ACT EA 15 MIN 6/17/2024 Barbi Choi, PT GP 2            PT G-Codes  AM-PAC 6 Clicks Score (PT): 15    Barbi Choi PT  6/17/2024

## 2024-06-17 NOTE — CASE MANAGEMENT/SOCIAL WORK
Discharge Planning Assessment   [facility]     Patient Name: Zeenat Dong  MRN: [id number]  Today's Date: 6/17/2024    Admit Date: 6/10/2024            Discharge Plan       Row Name 06/17/24 0944       Plan    Plan SS spoke with [facility] per Annalise that facility will accept Pt tomorrow on 06/18/24. SS updated Dr. Reyes. SS to follow.                        PAT NicoleW

## 2024-06-17 NOTE — PROGRESS NOTES
PROGRESS NOTE         Patient Identification:  Name:  Zeenat Dong  Age:  89 y.o.  Sex:  female  :  1928  MRN:  [id number]  Visit Number:  [id number]  Primary Care Provider:  Sravani Chapa APRN         LOS: 6 days       ----------------------------------------------------------------------------------------------------------------------  Subjective       Chief Complaints:    Abnormal Lab, Weakness - Generalized, and Altered Mental Status        Interval History:      The patient is resting quietly in bed, drowsy.  Family members at the bedside.  On room air with no apparent distress.  Afebrile.  No reports of diarrhea.  Lung exam is diminished to auscultation bilaterally.  Abdomen soft and nontender, active bowel sounds.  Leukocytosis slightly worse at 18.52.      Review of Systems:    Denies any complaints or issues  ----------------------------------------------------------------------------------------------------------------------      Objective       Current Hospital Meds:  apixaban, 2.5 mg, Oral, BID  budesonide-formoterol, 2 puff, Inhalation, BID - RT  cefepime, 2,000 mg, Intravenous, Q24H  cinacalcet, 30 mg, Oral, Daily  folic acid, 500 mcg, Oral, Daily  guaiFENesin, 1,200 mg, Oral, Q12H  hydrocortisone, 1 Application, Topical, BID  insulin lispro, 2-7 Units, Subcutaneous, 4x Daily AC & at Bedtime  megestrol, 80 mg, Oral, Daily  metoprolol tartrate, 100 mg, Oral, Q12H  metroNIDAZOLE, 500 mg, Intravenous, Q8H  mupirocin, 1 application , Each Nare, BID  NIFEdipine CC, 30 mg, Oral, Q24H  OLANZapine zydis, 5 mg, Oral, Nightly  pantoprazole, 40 mg, Oral, Daily  sodium chloride, 10 mL, Intravenous, Q12H  sodium chloride, 10 mL, Intravenous, Q12H  vitamin B-12, 1,000 mcg, Oral, Once per day on       dextrose, 100 mL/hr, Last Rate: 100 mL/hr (24  1210)      ----------------------------------------------------------------------------------------------------------------------    Vital Signs:  Temp:  [97.8 °F (36.6 °C)] 97.8 °F (36.6 °C)  Heart Rate:  [] 121  Resp:  [16-22] 16  BP: (152-168)/(72-88) 152/88  No data found.    SpO2 Percentage    06/15/24 1854 06/16/24 0733 06/17/24 0706   SpO2: 90% 93% 93%     SpO2:  [93 %] 93 %  on   ;   Device (Oxygen Therapy): room air    Body mass index is 23.38 kg/m².  Wt Readings from Last 3 Encounters:   06/11/24 59.9 kg (132 lb)   12/14/23 60.7 kg (133 lb 12.8 oz)   06/21/16 67.6 kg (149 lb)        Intake/Output Summary (Last 24 hours) at 6/17/2024 1259  Last data filed at 6/17/2024 0515  Gross per 24 hour   Intake 2489.42 ml   Output --   Net 2489.42 ml     Diet: Regular/House; Texture: Mechanical Ground (NDD 2); Fluid Consistency: Thin (IDDSI 0)  ----------------------------------------------------------------------------------------------------------------------      Physical Exam:    Constitutional: Elderly, frail, chronically ill-appearing female.  On room air with no apparent distress.  Family members at the bedside.  The patient is resting quietly, drowsy.    HENT:  Head: Normocephalic and atraumatic.  Mouth:  Moist mucous membranes.    Eyes:  Conjunctivae and EOM are normal.  No scleral icterus.  Neck:  Neck supple.  No JVD present.    Cardiovascular:  Normal rate, regular rhythm and normal heart sounds with no murmur. No edema.  Pulmonary/Chest: Diminished to auscultation bilaterally  Abdominal:  Soft.  Bowel sounds are normal.  No distension and no tenderness.   Musculoskeletal:  No edema, no tenderness, and no deformity.  No swelling or redness of joints.  Neurological: Alert and oriented to person and place  Skin:  Skin is warm and dry.  No rash noted.  No pallor.           ----------------------------------------------------------------------------------------------------------------------  Results from  last 7 days   Lab Units 06/11/24  0534 06/11/24  0342 06/10/24  2101   HSTROP T ng/L 27* 26* 29*     Results from last 7 days   Lab Units 06/10/24  2101   PROBNP pg/mL 9,871.0*       Results from last 7 days   Lab Units 06/11/24  0329   PH, ARTERIAL pH units 7.489*   PO2 ART mm Hg 69.8*   PCO2, ARTERIAL mm Hg 33.7*   HCO3 ART mmol/L 25.5     Results from last 7 days   Lab Units 06/17/24  0753 06/16/24  0702 06/14/24  0112 06/13/24  0134 06/12/24  0755 06/11/24  0047 06/10/24  2101   CRP mg/dL  --   --   --  15.97*  --   --  7.27*   LACTATE mmol/L  --   --   --   --   --  1.8 2.1*   WBC 10*3/mm3 18.52* 17.43* 16.96* 20.55*   < >  --  20.64*   HEMOGLOBIN g/dL 10.6* 10.8* 11.4* 10.8*   < >  --  12.3   HEMATOCRIT % 34.2 36.0 36.7 33.6*   < >  --  38.8   MCV fL 96.1 97.3* 94.8 94.6   < >  --  94.9   MCHC g/dL 31.0* 30.0* 31.1* 32.1   < >  --  31.7   PLATELETS 10*3/mm3 236 227 260 236   < >  --  228    < > = values in this interval not displayed.     Results from last 7 days   Lab Units 06/17/24  0753 06/16/24  1837 06/16/24  0702 06/13/24  0134 06/12/24  0755 06/11/24  0342 06/10/24  2101   SODIUM mmol/L 147* 147* 149*   < > 140 141 141   POTASSIUM mmol/L 3.7 4.0 4.3   < > 4.2 4.5 4.5   MAGNESIUM mg/dL  --   --   --   --   --  1.8 1.3*   CHLORIDE mmol/L 115* 115* 119*   < > 108* 107 107   CO2 mmol/L 19.9* 18.1* 16.9*   < > 20.6* 22.6 20.6*   BUN mg/dL 34* 37* 37*   < > 22 28* 29*   CREATININE mg/dL 1.37* 1.40* 1.40*   < > 1.19* 1.42* 1.60*   CALCIUM mg/dL 8.6 8.9 9.4   < > 9.1 9.7* 9.8*   GLUCOSE mg/dL 123* 278* 114*   < > 239* 216* 281*   ALBUMIN g/dL  --   --   --   --  3.1*  --  3.0*   BILIRUBIN mg/dL  --   --   --   --  0.3  --  0.4   ALK PHOS U/L  --   --   --   --  77  --  73   AST (SGOT) U/L  --   --   --   --  12  --  13   ALT (SGPT) U/L  --   --   --   --  12  --  14    < > = values in this interval not displayed.   Estimated Creatinine Clearance: 22.7 mL/min (A) (by C-G formula based on SCr of 1.37 mg/dL  "(H)).  No results found for: \"AMMONIA\"    Glucose   Date/Time Value Ref Range Status   06/17/2024 1021 147 (H) 70 - 130 mg/dL Final   06/17/2024 0658 126 70 - 130 mg/dL Final   06/16/2024 2011 215 (H) 70 - 130 mg/dL Final   06/16/2024 1647 290 (H) 70 - 130 mg/dL Final   06/16/2024 1122 130 70 - 130 mg/dL Final   06/16/2024 0651 103 70 - 130 mg/dL Final   06/15/2024 2017 178 (H) 70 - 130 mg/dL Final   06/15/2024 1650 220 (H) 70 - 130 mg/dL Final     Lab Results   Component Value Date    HGBA1C 6.60 (H) 06/10/2024     Lab Results   Component Value Date    TSH 3.752 08/17/2015    FREET4 1.09 08/17/2015       Blood Culture   Date Value Ref Range Status   06/11/2024 No growth at 3 days  Preliminary   06/11/2024 No growth at 3 days  Preliminary     No results found for: \"URINECX\"  No results found for: \"WOUNDCX\"  No results found for: \"STOOLCX\"  No results found for: \"RESPCX\"  Pain Management Panel  More data may exist         Latest Ref Rng & Units 6/10/2024 8/17/2015   Pain Management Panel   Creatinine, Urine mg/dL  mg/dL - 129.2  130.0    Amphetamine, Urine Qual Negative Negative  -   Barbiturates Screen, Urine Negative Negative  -   Benzodiazepine Screen, Urine Negative Negative  -   Buprenorphine, Screen, Urine Negative Negative  -   Cocaine Screen, Urine Negative Negative  -   Fentanyl, Urine Negative Negative  -   Methadone Screen , Urine Negative Negative  -   Methamphetamine, Ur Negative Negative  -         ----------------------------------------------------------------------------------------------------------------------  Imaging Results (Last 24 Hours)       ** No results found for the last 24 hours. **            ----------------------------------------------------------------------------------------------------------------------    Pertinent Infectious Disease Results    Cardiology note reviewed, due to the patient's age and comorbidities, risk was felt to be greater than benefit for pericardiocentesis at " this time.  We are unable to determine if pericardial effusion is infectious at this time.  Would recommend to treat with NSAIDs, per literature review.    Transthoracic echo from 6/14 reported moderate 1 to 2 cm pericardial effusion.        Assessment/Plan       Assessment     Pericardial effusion, rule out infectious process  Community-acquired pneumonia        Plan      The patient is resting quietly in bed, drowsy.  Family members at the bedside.  On room air with no apparent distress.  Afebrile.  No reports of diarrhea.  Lung exam is diminished to auscultation bilaterally.  Abdomen soft and nontender, active bowel sounds.  Leukocytosis slightly worse at 18.52.    Recommend to continue with cefepime and metronidazole courses in the setting of community-acquired/aspiration pneumonia.  In the setting of pericardial effusion, parvovirus, CMV, Shi-Barr PCR's have been ordered to rule out viral cause.  We will continue to monitor closely.    ANTIMICROBIAL THERAPY    cefepime 2000 mg IVPB in 100 mL NS (VTB)  metroNIDAZOLE - 500-0.79 MG/100ML-%     Code Status:   Code Status and Medical Interventions:   Ordered at: 06/11/24 0401     Medical Intervention Limits:    No intubation (DNI)     Level Of Support Discussed With:    Next of Kin (If No Surrogate)     Code Status (Patient has no pulse and is not breathing):    No CPR (Do Not Attempt to Resuscitate)     Medical Interventions (Patient has pulse or is breathing):    Limited Support       FIORELLA Frazier  06/17/24  12:59 EDT

## 2024-06-17 NOTE — PLAN OF CARE
Goal Outcome Evaluation:  Plan of Care Reviewed With: patient        Progress: improving  Outcome Evaluation: Patient resting in bed at this time. VSS. Family at bedside. No signs of acute distress noted at this time. Patient voices no concerns at this time. Will continue with plan of care.

## 2024-06-17 NOTE — THERAPY RE-EVALUATION
Acute Care - Speech Language Pathology   Swallow Re-Evaluation [facility]     Patient Name: Zeenat Dong  : 1928  MRN: [id number]  Today's Date: 2024  Onset of Illness/Injury or Date of Surgery: 06/10/24 (admission date)           Admit Date: 6/10/2024    Visit Dx:     ICD-10-CM ICD-9-CM   1. Acute renal failure, unspecified acute renal failure type  N17.9 584.9   2. Hypomagnesemia  E83.42 275.2   3. Hallucinations, unspecified  R44.3 780.1   4. Heart failure, unspecified HF chronicity, unspecified heart failure type  I50.9 428.9   5. Pericardial effusion  I31.39 423.9   6. Community acquired pneumonia of left lung, unspecified part of lung  J18.9 486     Patient Active Problem List   Diagnosis    Abdominal distension    Heart failure    Dry skin dermatitis    Gastroesophageal reflux disease without esophagitis    Hypercalcemia    Hyperparathyroidism    Hypertension    Hypokalemia    Hypomagnesemia    Seasonal allergic rhinitis    Shortness of breath    Atrial fibrillation    Weakness    Sepsis    Moderate malnutrition     Past Medical History:   Diagnosis Date    Hyperlipidemia      Past Surgical History:   Procedure Laterality Date    OTHER SURGICAL HISTORY      ear surgery    SKIN BIOPSY       Zeenat Dong  was seen at bedside this PM on 3N Rm 3348-1 to assess safety/efficacy of swallowing fnx, determine safest/least restrictive diet tolerance.     Social History     Socioeconomic History    Marital status:    Tobacco Use    Smoking status: Never   Vaping Use    Vaping status: Never Used   Substance and Sexual Activity    Alcohol use: No    Drug use: Never      Diet Orders (active) (From admission, onward)       Start     Ordered    06/15/24 1308  DIET MESSAGE Ground meats and veggies  Once        Comments: Ground meats and veggies    06/15/24 1307    06/15/24 1307  Diet: Regular/House; Texture: Mechanical Ground (NDD 2); Fluid Consistency: Thin (IDDSI 0)  Diet Effective Now         06/15/24  1307    06/11/24 0959  DIET MESSAGE Please send apple juice  Once        Comments: Please send apple juice    06/11/24 0934                    Ms. Dong was observed on RA.    Pt's daughter at bedside. She reports pt w/ confusion and agitation this date.     Pt is drowsy, difficulty to understand this date. Pt was positioned upright/centered for po intake. SLP provides pt with few bites of thin ice crea, via tsp and multiple, consecutive sips of thin liquids via straw. Pt with no overt s/s of aspiration with trialed consistencies. Pt does decline further po intake.     Impression: Patient continues with presented w/ premorbid baseline of moderate oral , wfl pharyngeal swallow w/o s/s aspiration. No s/s indicative of silent aspiration.  No odynophagia reported.      SLP Recommendation and Plan      1. Mechanical soft, ground consistencies w/ thin liquids   2. Medications whole in puree/thins. Single presentation. Crushed or via non oral prn.  3. Upright and centered for all po intake.  4. CLYDE precautions.  5. Oral care protocol.  6. Aspiration precautions.  7. Must be fully a/a for all po intake.  8. Direct 1:1 assistance for all po intake      No further SLP f/u warranted as pt felt to be at baseline for swallow function at this time.      D/w patient and family at bedside results and recommendations w/ verbal agreement.      Thank you for allowing me to participate in the care of your patient-   Estefany Ahuja M.A., CCC-SLP     EDUCATION  The patient has been educated in the following areas:   Oral Care/Hydration Modified Diet Instruction.     Time Calculation:     Therapy Charges for Today       Code Description Service Date Service Provider Modifiers Qty    37354093792  ST EVAL ORAL PHARYNG SWALLOW 4 6/17/2024 Estefany Ahuja MA,CCC-SLP GN 1          Estefany Ahuja MA,CCC-SLP  6/17/2024

## 2024-06-17 NOTE — PLAN OF CARE
Goal Outcome Evaluation:  Plan of Care Reviewed With: patient        Progress: no change  Outcome Evaluation: Pt resting in bed at this time. Pt refused bath and to get up. Pt educated. Pt remains confused. No acute changes or concerns at this time. Will continue with plan of care.

## 2024-06-18 ENCOUNTER — APPOINTMENT (OUTPATIENT)
Dept: GENERAL RADIOLOGY | Facility: HOSPITAL | Age: 89
End: 2024-06-18
Payer: MEDICARE

## 2024-06-18 ENCOUNTER — APPOINTMENT (OUTPATIENT)
Dept: CT IMAGING | Facility: HOSPITAL | Age: 89
End: 2024-06-18
Payer: MEDICARE

## 2024-06-18 LAB
ALBUMIN SERPL-MCNC: 3.1 G/DL (ref 3.5–5.2)
ANION GAP SERPL CALCULATED.3IONS-SCNC: 11.4 MMOL/L (ref 5–15)
BUN SERPL-MCNC: 32 MG/DL (ref 8–23)
BUN/CREAT SERPL: 22.1 (ref 7–25)
BURR CELLS BLD QL SMEAR: ABNORMAL
CALCIUM SPEC-SCNC: 8.5 MG/DL (ref 8.2–9.6)
CHLORIDE SERPL-SCNC: 108 MMOL/L (ref 98–107)
CO2 SERPL-SCNC: 19.6 MMOL/L (ref 22–29)
CREAT SERPL-MCNC: 1.45 MG/DL (ref 0.57–1)
DEPRECATED RDW RBC AUTO: 50.1 FL (ref 37–54)
EGFRCR SERPLBLD CKD-EPI 2021: 33.1 ML/MIN/1.73
ERYTHROCYTE [DISTWIDTH] IN BLOOD BY AUTOMATED COUNT: 14.6 % (ref 12.3–15.4)
GLUCOSE BLDC GLUCOMTR-MCNC: 173 MG/DL (ref 70–130)
GLUCOSE BLDC GLUCOMTR-MCNC: 198 MG/DL (ref 70–130)
GLUCOSE BLDC GLUCOMTR-MCNC: 86 MG/DL (ref 70–130)
GLUCOSE BLDC GLUCOMTR-MCNC: 88 MG/DL (ref 70–130)
GLUCOSE SERPL-MCNC: 219 MG/DL (ref 65–99)
HCT VFR BLD AUTO: 35.5 % (ref 34–46.6)
HGB BLD-MCNC: 10.8 G/DL (ref 12–15.9)
LYMPHOCYTES # BLD MANUAL: 3.51 10*3/MM3 (ref 0.7–3.1)
LYMPHOCYTES NFR BLD MANUAL: 3 % (ref 5–12)
MCH RBC QN AUTO: 29 PG (ref 26.6–33)
MCHC RBC AUTO-ENTMCNC: 30.4 G/DL (ref 31.5–35.7)
MCV RBC AUTO: 95.4 FL (ref 79–97)
METAMYELOCYTES NFR BLD MANUAL: 2 % (ref 0–0)
MONOCYTES # BLD: 0.7 10*3/MM3 (ref 0.1–0.9)
NEUTROPHILS # BLD AUTO: 18.73 10*3/MM3 (ref 1.7–7)
NEUTROPHILS NFR BLD MANUAL: 74 % (ref 42.7–76)
NEUTS BAND NFR BLD MANUAL: 6 % (ref 0–5)
PLAT MORPH BLD: NORMAL
PLATELET # BLD AUTO: 262 10*3/MM3 (ref 140–450)
PMV BLD AUTO: 11 FL (ref 6–12)
POLYCHROMASIA BLD QL SMEAR: ABNORMAL
POTASSIUM SERPL-SCNC: 3.9 MMOL/L (ref 3.5–5.2)
RBC # BLD AUTO: 3.72 10*6/MM3 (ref 3.77–5.28)
SCAN SLIDE: NORMAL
SODIUM SERPL-SCNC: 139 MMOL/L (ref 136–145)
VARIANT LYMPHS NFR BLD MANUAL: 15 % (ref 19.6–45.3)
WBC NRBC COR # BLD AUTO: 23.41 10*3/MM3 (ref 3.4–10.8)

## 2024-06-18 PROCEDURE — 82948 REAGENT STRIP/BLOOD GLUCOSE: CPT

## 2024-06-18 PROCEDURE — 85025 COMPLETE CBC W/AUTO DIFF WBC: CPT | Performed by: STUDENT IN AN ORGANIZED HEALTH CARE EDUCATION/TRAINING PROGRAM

## 2024-06-18 PROCEDURE — 94760 N-INVAS EAR/PLS OXIMETRY 1: CPT

## 2024-06-18 PROCEDURE — 63710000001 INSULIN LISPRO (HUMAN) PER 5 UNITS

## 2024-06-18 PROCEDURE — 99232 SBSQ HOSP IP/OBS MODERATE 35: CPT | Performed by: INTERNAL MEDICINE

## 2024-06-18 PROCEDURE — 94799 UNLISTED PULMONARY SVC/PX: CPT

## 2024-06-18 PROCEDURE — 25010000002 CEFEPIME PER 500 MG: Performed by: NURSE PRACTITIONER

## 2024-06-18 PROCEDURE — 71045 X-RAY EXAM CHEST 1 VIEW: CPT

## 2024-06-18 PROCEDURE — 94664 DEMO&/EVAL PT USE INHALER: CPT

## 2024-06-18 PROCEDURE — 25010000002 FUROSEMIDE PER 20 MG: Performed by: INTERNAL MEDICINE

## 2024-06-18 PROCEDURE — 82040 ASSAY OF SERUM ALBUMIN: CPT | Performed by: INTERNAL MEDICINE

## 2024-06-18 PROCEDURE — 25010000002 DIAZEPAM PER 5 MG

## 2024-06-18 PROCEDURE — 25810000003 LACTATED RINGERS PER 1000 ML: Performed by: INTERNAL MEDICINE

## 2024-06-18 PROCEDURE — 25010000002 METRONIDAZOLE 500 MG/100ML SOLUTION: Performed by: NURSE PRACTITIONER

## 2024-06-18 PROCEDURE — 99232 SBSQ HOSP IP/OBS MODERATE 35: CPT | Performed by: NURSE PRACTITIONER

## 2024-06-18 PROCEDURE — 80048 BASIC METABOLIC PNL TOTAL CA: CPT | Performed by: STUDENT IN AN ORGANIZED HEALTH CARE EDUCATION/TRAINING PROGRAM

## 2024-06-18 PROCEDURE — 74176 CT ABD & PELVIS W/O CONTRAST: CPT | Performed by: RADIOLOGY

## 2024-06-18 PROCEDURE — 74176 CT ABD & PELVIS W/O CONTRAST: CPT

## 2024-06-18 PROCEDURE — 85007 BL SMEAR W/DIFF WBC COUNT: CPT | Performed by: STUDENT IN AN ORGANIZED HEALTH CARE EDUCATION/TRAINING PROGRAM

## 2024-06-18 PROCEDURE — 71045 X-RAY EXAM CHEST 1 VIEW: CPT | Performed by: RADIOLOGY

## 2024-06-18 RX ORDER — LACTULOSE 10 G/15ML
20 SOLUTION ORAL EVERY 4 HOURS
Qty: 60 ML | Refills: 0 | Status: DISPENSED | OUTPATIENT
Start: 2024-06-18 | End: 2024-06-19

## 2024-06-18 RX ORDER — DIAZEPAM 5 MG/ML
5 INJECTION, SOLUTION INTRAMUSCULAR; INTRAVENOUS ONCE
Status: COMPLETED | OUTPATIENT
Start: 2024-06-18 | End: 2024-06-18

## 2024-06-18 RX ORDER — OLANZAPINE 5 MG/1
5 TABLET, ORALLY DISINTEGRATING ORAL EVERY 8 HOURS PRN
Status: DISCONTINUED | OUTPATIENT
Start: 2024-06-18 | End: 2024-06-20 | Stop reason: HOSPADM

## 2024-06-18 RX ORDER — SODIUM CHLORIDE, SODIUM LACTATE, POTASSIUM CHLORIDE, CALCIUM CHLORIDE 600; 310; 30; 20 MG/100ML; MG/100ML; MG/100ML; MG/100ML
100 INJECTION, SOLUTION INTRAVENOUS CONTINUOUS
Status: DISCONTINUED | OUTPATIENT
Start: 2024-06-18 | End: 2024-06-18

## 2024-06-18 RX ORDER — METRONIDAZOLE 500 MG/100ML
500 INJECTION, SOLUTION INTRAVENOUS EVERY 8 HOURS
Qty: 2100 ML | Refills: 0 | Status: DISCONTINUED | OUTPATIENT
Start: 2024-06-18 | End: 2024-06-20 | Stop reason: HOSPADM

## 2024-06-18 RX ORDER — FUROSEMIDE 10 MG/ML
20 INJECTION INTRAMUSCULAR; INTRAVENOUS EVERY 6 HOURS
Qty: 4 ML | Refills: 0 | Status: COMPLETED | OUTPATIENT
Start: 2024-06-18 | End: 2024-06-18

## 2024-06-18 RX ADMIN — MEGESTROL ACETATE 80 MG: 40 SUSPENSION ORAL at 08:22

## 2024-06-18 RX ADMIN — Medication 10 ML: at 21:42

## 2024-06-18 RX ADMIN — CEFEPIME 2000 MG: 2 INJECTION, POWDER, FOR SOLUTION INTRAVENOUS at 13:13

## 2024-06-18 RX ADMIN — NIFEDIPINE 30 MG: 30 TABLET, EXTENDED RELEASE ORAL at 08:22

## 2024-06-18 RX ADMIN — DOCUSATE SODIUM 50 MG AND SENNOSIDES 8.6 MG 2 TABLET: 8.6; 5 TABLET, FILM COATED ORAL at 08:22

## 2024-06-18 RX ADMIN — Medication 10 ML: at 08:25

## 2024-06-18 RX ADMIN — PANTOPRAZOLE SODIUM 40 MG: 40 TABLET, DELAYED RELEASE ORAL at 08:24

## 2024-06-18 RX ADMIN — METOPROLOL TARTRATE 100 MG: 100 TABLET, FILM COATED ORAL at 21:42

## 2024-06-18 RX ADMIN — Medication 10 ML: at 08:24

## 2024-06-18 RX ADMIN — BUDESONIDE AND FORMOTEROL FUMARATE DIHYDRATE 2 PUFF: 160; 4.5 AEROSOL RESPIRATORY (INHALATION) at 07:34

## 2024-06-18 RX ADMIN — INSULIN LISPRO 2 UNITS: 100 INJECTION, SOLUTION INTRAVENOUS; SUBCUTANEOUS at 08:23

## 2024-06-18 RX ADMIN — GUAIFENESIN 1200 MG: 600 TABLET, EXTENDED RELEASE ORAL at 08:23

## 2024-06-18 RX ADMIN — DIAZEPAM 5 MG: 5 INJECTION, SOLUTION INTRAMUSCULAR; INTRAVENOUS at 01:16

## 2024-06-18 RX ADMIN — FUROSEMIDE 20 MG: 10 INJECTION, SOLUTION INTRAMUSCULAR; INTRAVENOUS at 16:57

## 2024-06-18 RX ADMIN — INSULIN LISPRO 2 UNITS: 100 INJECTION, SOLUTION INTRAVENOUS; SUBCUTANEOUS at 11:58

## 2024-06-18 RX ADMIN — FUROSEMIDE 20 MG: 10 INJECTION, SOLUTION INTRAMUSCULAR; INTRAVENOUS at 23:19

## 2024-06-18 RX ADMIN — HYDROCORTISONE 1 APPLICATION: 10 OINTMENT TOPICAL at 21:53

## 2024-06-18 RX ADMIN — Medication 500 MCG: at 08:24

## 2024-06-18 RX ADMIN — OLANZAPINE 5 MG: 5 TABLET, ORALLY DISINTEGRATING ORAL at 21:42

## 2024-06-18 RX ADMIN — METRONIDAZOLE 500 MG: 500 INJECTION, SOLUTION INTRAVENOUS at 05:17

## 2024-06-18 RX ADMIN — METRONIDAZOLE 500 MG: 500 INJECTION, SOLUTION INTRAVENOUS at 13:13

## 2024-06-18 RX ADMIN — APIXABAN 2.5 MG: 2.5 TABLET, FILM COATED ORAL at 08:22

## 2024-06-18 RX ADMIN — SODIUM CHLORIDE, POTASSIUM CHLORIDE, SODIUM LACTATE AND CALCIUM CHLORIDE 100 ML/HR: 600; 310; 30; 20 INJECTION, SOLUTION INTRAVENOUS at 09:15

## 2024-06-18 RX ADMIN — CINACALCET 30 MG: 30 TABLET, FILM COATED ORAL at 08:24

## 2024-06-18 RX ADMIN — LACTULOSE 20 G: 20 SOLUTION ORAL at 16:57

## 2024-06-18 RX ADMIN — Medication 1000 MCG: at 08:24

## 2024-06-18 RX ADMIN — POLYETHYLENE GLYCOL (3350) 17 G: 17 POWDER, FOR SOLUTION ORAL at 08:23

## 2024-06-18 RX ADMIN — METRONIDAZOLE 500 MG: 500 INJECTION, SOLUTION INTRAVENOUS at 21:42

## 2024-06-18 RX ADMIN — APIXABAN 2.5 MG: 2.5 TABLET, FILM COATED ORAL at 21:42

## 2024-06-18 RX ADMIN — METOPROLOL TARTRATE 100 MG: 100 TABLET, FILM COATED ORAL at 08:24

## 2024-06-18 RX ADMIN — HYDROCORTISONE 1 APPLICATION: 10 OINTMENT TOPICAL at 08:24

## 2024-06-18 NOTE — NURSING NOTE
Pt's family requested all 4 side rails remain up on bed. Pt's family stated she would try to get up if we didn't keep them up and asked for them to remain up. Pt's family educated on the risks associated with this.

## 2024-06-18 NOTE — CASE MANAGEMENT/SOCIAL WORK
Discharge Planning Assessment   [facility]     Patient Name: Zeenat Dong  MRN: [id number]  Today's Date: 6/18/2024    Admit Date: 6/10/2024       Discharge Plan       Row Name 06/18/24 0935       Plan    Plan SS confirmed with [facility] per Annalise that Pt continues to have a bed available if stable. Per Annalise, Pt will need to be inside facility by 2:00pm due to admissions nurse leaving at 3:00pm. SS notiifed Dr. Reyes. SS to follow.                    PAT NicoleW

## 2024-06-18 NOTE — PROGRESS NOTES
PROGRESS NOTE         Patient Identification:  Name:  Zeenat Dong  Age:  89 y.o.  Sex:  female  :  1928  MRN:  [id number]  Visit Number:  [id number]  Primary Care Provider:  Sravani Chapa APRN         LOS: 7 days       ----------------------------------------------------------------------------------------------------------------------  Subjective       Chief Complaints:    Abnormal Lab, Weakness - Generalized, and Altered Mental Status        Interval History:      The patient is very drowsy, slightly difficult to arouse this morning.  Family members at the bedside reports she is very confused and agitated overnight.  On 3 L nasal cannula with no apparent distress.  Afebrile.  The family members continue to report constipation.  Lung exam is diminished bilaterally to auscultation.  The abdomen is more distended today, tender on palpation as the patient grimaced during my exam.  Leukocytosis worsened this morning at 23.41.  Chest x-ray reported CHF/edema with small pleural effusions.  Bibasilar airspace disease.  CT abdomen pelvis without contrast reported bibasilar consolidative airspace disease increased from previous.  May present atelectasis and/or pneumonia.  Marked cardiomegaly with small pericardial effusion and bilateral pleural effusions noted.  Correlate for CHF.  Anasarca.  Endometrial thickening again noted.  Refer to ultrasound findings.  Advanced atherosclerosis without evidence of aneurysm.  Stable.  Sigmoid diverticulosis without evidence of diverticulitis.  No bowel obstruction.      Review of Systems:    JOO due to mental status  ----------------------------------------------------------------------------------------------------------------------      Objective       \[facility]\"" Meds:  apixaban, 2.5 mg, Oral, BID  budesonide-formoterol, 2 puff, Inhalation, BID - RT  cefepime, 2,000 mg, Intravenous, Q24H  cinacalcet, 30 mg, Oral, Daily  folic acid, 500 mcg, Oral,  Daily  guaiFENesin, 1,200 mg, Oral, Q12H  hydrocortisone, 1 Application, Topical, BID  insulin lispro, 2-7 Units, Subcutaneous, 4x Daily AC & at Bedtime  iopamidol, 100 mL, Intravenous, Once in imaging  megestrol, 80 mg, Oral, Daily  melatonin, 5 mg, Oral, Nightly  metoprolol tartrate, 100 mg, Oral, Q12H  metroNIDAZOLE, 500 mg, Intravenous, Q8H  metroNIDAZOLE, 500 mg, Intravenous, Q8H  NIFEdipine CC, 30 mg, Oral, Q24H  OLANZapine zydis, 5 mg, Oral, Nightly  pantoprazole, 40 mg, Oral, Daily  polyethylene glycol, 17 g, Oral, BID  senna-docusate sodium, 2 tablet, Oral, BID  sodium chloride, 10 mL, Intravenous, Q12H  sodium chloride, 10 mL, Intravenous, Q12H  vitamin B-12, 1,000 mcg, Oral, Once per day on Monday Tuesday Wednesday Thursday Friday           ----------------------------------------------------------------------------------------------------------------------    Vital Signs:  Temp:  [98 °F (36.7 °C)-98.1 °F (36.7 °C)] 98.1 °F (36.7 °C)  Heart Rate:  [] 123  Resp:  [16-18] 18  BP: (133-172)/(89-98) 133/89  No data found.    SpO2 Percentage    06/17/24 1904 06/18/24 0734 06/18/24 0744   SpO2: 94% 98% 97%     SpO2:  [94 %-98 %] 97 %  on  Flow (L/min):  [2-3] 2;   Device (Oxygen Therapy): nasal cannula    Body mass index is 23.38 kg/m².  Wt Readings from Last 3 Encounters:   06/11/24 59.9 kg (132 lb)   12/14/23 60.7 kg (133 lb 12.8 oz)   06/21/16 67.6 kg (149 lb)        Intake/Output Summary (Last 24 hours) at 6/18/2024 1217  Last data filed at 6/18/2024 0400  Gross per 24 hour   Intake 180 ml   Output --   Net 180 ml     Diet: Regular/House; Texture: Mechanical Ground (NDD 2); Fluid Consistency: Thin (IDDSI 0)  ----------------------------------------------------------------------------------------------------------------------      Physical Exam:    Constitutional: Elderly, frail, chronically ill-appearing female.  Drowsy, difficult to arouse.  Family at the bedside reporting overnight confusion and  agitation.  Primary hospitalist at the bedside at the time of my exam.  HENT:  Head: Normocephalic and atraumatic.  Mouth:  Moist mucous membranes.    Eyes:  Conjunctivae and EOM are normal.  No scleral icterus.  Neck:  Neck supple.  No JVD present.    Cardiovascular:  Normal rate, regular rhythm and normal heart sounds with no murmur. No edema.  Pulmonary/Chest: Remains diminished to auscultation bilaterally  Abdominal: Abdomen is more distended and tender to palpation.  Musculoskeletal:  No edema, no tenderness, and no deformity.  No swelling or redness of joints.  Neurological: JOO due to mental status  Skin:  Skin is warm and dry.  No rash noted.  No pallor.           ----------------------------------------------------------------------------------------------------------------------                      Results from last 7 days   Lab Units 06/18/24  0714 06/17/24  0753 06/16/24  0702 06/14/24  0112 06/13/24  0134   CRP mg/dL  --   --   --   --  15.97*   WBC 10*3/mm3 23.41* 18.52* 17.43*   < > 20.55*   HEMOGLOBIN g/dL 10.8* 10.6* 10.8*   < > 10.8*   HEMATOCRIT % 35.5 34.2 36.0   < > 33.6*   MCV fL 95.4 96.1 97.3*   < > 94.6   MCHC g/dL 30.4* 31.0* 30.0*   < > 32.1   PLATELETS 10*3/mm3 262 236 227   < > 236    < > = values in this interval not displayed.     Results from last 7 days   Lab Units 06/18/24  0714 06/17/24  0753 06/16/24  1837 06/13/24  0134 06/12/24  0755   SODIUM mmol/L 139 147* 147*   < > 140   POTASSIUM mmol/L 3.9 3.7 4.0   < > 4.2   CHLORIDE mmol/L 108* 115* 115*   < > 108*   CO2 mmol/L 19.6* 19.9* 18.1*   < > 20.6*   BUN mg/dL 32* 34* 37*   < > 22   CREATININE mg/dL 1.45* 1.37* 1.40*   < > 1.19*   CALCIUM mg/dL 8.5 8.6 8.9   < > 9.1   GLUCOSE mg/dL 219* 123* 278*   < > 239*   ALBUMIN g/dL  --   --   --   --  3.1*   BILIRUBIN mg/dL  --   --   --   --  0.3   ALK PHOS U/L  --   --   --   --  77   AST (SGOT) U/L  --   --   --   --  12   ALT (SGPT) U/L  --   --   --   --  12    < > = values in this  "interval not displayed.   Estimated Creatinine Clearance: 21.5 mL/min (A) (by C-G formula based on SCr of 1.45 mg/dL (H)).  No results found for: \"AMMONIA\"    Glucose   Date/Time Value Ref Range Status   06/18/2024 1059 173 (H) 70 - 130 mg/dL Final   06/18/2024 0628 198 (H) 70 - 130 mg/dL Final   06/17/2024 2057 238 (H) 70 - 130 mg/dL Final   06/17/2024 1603 179 (H) 70 - 130 mg/dL Final   06/17/2024 1021 147 (H) 70 - 130 mg/dL Final   06/17/2024 0658 126 70 - 130 mg/dL Final   06/16/2024 2011 215 (H) 70 - 130 mg/dL Final   06/16/2024 1647 290 (H) 70 - 130 mg/dL Final     Lab Results   Component Value Date    HGBA1C 6.60 (H) 06/10/2024     Lab Results   Component Value Date    TSH 3.752 08/17/2015    FREET4 1.09 08/17/2015       Blood Culture   Date Value Ref Range Status   06/11/2024 No growth at 3 days  Preliminary   06/11/2024 No growth at 3 days  Preliminary     No results found for: \"URINECX\"  No results found for: \"WOUNDCX\"  No results found for: \"STOOLCX\"  No results found for: \"RESPCX\"  Pain Management Panel  More data may exist         Latest Ref Rng & Units 6/10/2024 8/17/2015   Pain Management Panel   Creatinine, Urine mg/dL  mg/dL - 129.2  130.0    Amphetamine, Urine Qual Negative Negative  -   Barbiturates Screen, Urine Negative Negative  -   Benzodiazepine Screen, Urine Negative Negative  -   Buprenorphine, Screen, Urine Negative Negative  -   Cocaine Screen, Urine Negative Negative  -   Fentanyl, Urine Negative Negative  -   Methadone Screen , Urine Negative Negative  -   Methamphetamine, Ur Negative Negative  -         ----------------------------------------------------------------------------------------------------------------------  Imaging Results (Last 24 Hours)       Procedure Component Value Units Date/Time    CT Abdomen Pelvis Without Contrast [[id number]] Collected: 06/18/24 1119     Updated: 06/18/24 1123    Narrative:      EXAM:    CT Abdomen and Pelvis Without Intravenous Contrast     EXAM " DATE:    6/18/2024 10:42 AM     CLINICAL HISTORY:    Abdominal Tenderness (genalized) and Distended; N17.9-Acute kidney  failure, unspecified; E83.42-Hypomagnesemia; R44.3-Hallucinations,  unspecified; I50.9-Heart failure, unspecified; I31.39-Other pericardial  effusion (noninflammatory); J18.9-Pneumonia, unspecified organism     TECHNIQUE:    Axial computed tomography images of the abdomen and pelvis without  intravenous contrast.  Sagittal and coronal reformatted images were  created and reviewed.  This CT exam was performed using one or more of  the following dose reduction techniques:  automated exposure control,  adjustment of the mA and/or kV according to patient size, and/or use of  iterative reconstruction technique.     COMPARISON:    6/10/2024     FINDINGS:    Lung bases:  Bibasilar consolidative airspace disease increased from  previous. May present atelectasis and/or pneumonia.    Heart:  Marked cardiomegaly with small pericardial effusion and  bilateral pleural effusions noted.      ABDOMEN:    Liver:  Unremarkable as visualized.    Gallbladder and bile ducts:  Unremarkable as visualized.  No calcified  stones.  No ductal dilation.    Pancreas:  Unremarkable as visualized.  No ductal dilation.    Spleen:  Unremarkable as visualized.  No splenomegaly.    Adrenals:  Unremarkable as visualized.  No mass.    Kidneys and ureters:  Unremarkable as visualized.  No renal or  ureteral stones. No obstructive uropathy.    Stomach and bowel:  Sigmoid diverticulosis without evidence of  diverticulitis.  No obstruction.      PELVIS:    Appendix:  Normal appendix.    Bladder:  Unremarkable as visualized.  No stones.    Reproductive:  Endometrial thickening again noted. Refer to ultrasound  findings.      ABDOMEN and PELVIS:    Intraperitoneal space:  Unremarkable as visualized.  No significant  fluid collection.  No pneumoperitoneum identified.    Bones/joints:  Stable appearance of the bony structures  with  degenerative changes noted lumbar spine and mild multilevel stenosis.   No dislocation.  No acute bony changes compared to the previous exam.    Soft tissues:  Anasarca.    Vasculature:  Advanced atherosclerosis without evidence of aneurysm.  Stable.    Lymph nodes:  Unremarkable as visualized.  No enlarged lymph nodes.       Impression:      1.  Bibasilar consolidative airspace disease increased from previous.  May present atelectasis and/or pneumonia.  2.  Marked cardiomegaly with small pericardial effusion and bilateral  pleural effusions noted.  Correlate for CHF.  3.  Anasarca.  4.  Endometrial thickening again noted. Refer to ultrasound findings.  5.  Advanced atherosclerosis without evidence of aneurysm. Stable.  6.  Sigmoid diverticulosis without evidence of diverticulitis.  No bowel  obstruction.  7. Other incidental/nonacute findings as above.        This report was finalized on 6/18/2024 11:21 AM by Dr. Marquis Ruiz MD.       XR Chest 1 View [[id number]] Collected: 06/18/24 1110     Updated: 06/18/24 1113    Narrative:      EXAM:    XR Chest, 1 View     EXAM DATE:    6/18/2024 10:15 AM     CLINICAL HISTORY:    Hypoxia and tachypnea (Aspiration Pneumonitis vs PNA vs Fliud  Overload); N17.9-Acute kidney failure, unspecified;  E83.42-Hypomagnesemia; R44.3-Hallucinations, unspecified; I50.9-Heart  failure, unspecified; I31.39-Other pericardial effusion  (noninflammatory); J18.9-Pneumonia, unspecified organism     TECHNIQUE:    Frontal view of the chest.     COMPARISON:    6/10/2024     FINDINGS:    Lungs and pleural spaces:  Interstitial edema.  Perihilar opacities.   Pulmonary vascular congestion.  No pneumothorax.    Heart:  Cardiomegaly.    Mediastinum:  Unremarkable as visualized.  Normal mediastinal contour.    Bones/joints:  Small effusions.  No acute fracture.       Impression:      1.  CHF/edema with small pleural effusions.  2.  Bibasilar airspace disease.        This report was finalized on  6/18/2024 11:11 AM by Dr. Marquis Ruiz MD.               ----------------------------------------------------------------------------------------------------------------------    Pertinent Infectious Disease Results    Cardiology note reviewed, due to the patient's age and comorbidities, risk was felt to be greater than benefit for pericardiocentesis at this time.  We are unable to determine if pericardial effusion is infectious at this time.  Would recommend to treat with NSAIDs, per literature review.    Transthoracic echo from 6/14 reported moderate 1 to 2 cm pericardial effusion.        Assessment/Plan       Assessment     Pericardial effusion, rule out infectious process  Community-acquired pneumonia        Plan      The patient is very drowsy, slightly difficult to arouse this morning.  Family members at the bedside reports she is very confused and agitated overnight.  On 3 L nasal cannula with no apparent distress.  Afebrile.  The family members continue to report constipation.  Lung exam is diminished bilaterally to auscultation.  The abdomen is more distended today, tender on palpation as the patient grimaced during my exam.  Leukocytosis worsened this morning at 23.41.  Chest x-ray reported CHF/edema with small pleural effusions.  Bibasilar airspace disease.  CT abdomen pelvis without contrast reported bibasilar consolidative airspace disease increased from previous.  May present atelectasis and/or pneumonia.  Marked cardiomegaly with small pericardial effusion and bilateral pleural effusions noted.  Correlate for CHF.  Anasarca.  Endometrial thickening again noted.  Refer to ultrasound findings.  Advanced atherosclerosis without evidence of aneurysm.  Stable.  Sigmoid diverticulosis without evidence of diverticulitis.  No bowel obstruction.    The patient was planned to complete cefepime and metronidazole courses for community-acquired/aspiration pneumonia however in the setting of leukocytosis,  worsening mental status, abdominal tenderness and distention we will extend cefepime and metronidazole courses for possible intra-abdominal infection.  Parvovirus, CMV, Shi-Barr PCR's are pending.  May plan to escalate antibiotic coverage tomorrow pending clinical course.  We will continue to monitor closely and adjust antibiotic coverage as appropriate.    ANTIMICROBIAL THERAPY    cefepime 2000 mg IVPB in 100 mL NS (VTB)  metroNIDAZOLE - 500-0.79 MG/100ML-%, 500-0.79 MG/100ML-%     Code Status:   Code Status and Medical Interventions:   Ordered at: 06/11/24 0401     Medical Intervention Limits:    No intubation (DNI)     Level Of Support Discussed With:    Next of Kin (If No Surrogate)     Code Status (Patient has no pulse and is not breathing):    No CPR (Do Not Attempt to Resuscitate)     Medical Interventions (Patient has pulse or is breathing):    Limited Support       Johanna Chapa, FIORELLA  06/18/24  12:17 EDT

## 2024-06-18 NOTE — PROGRESS NOTES
[facility] HOSPITALIST PROGRESS NOTE    Subjective     History:   Zeenat Dong is a 89 y.o. female admitted on 6/10/2024 secondary to Sepsis     Procedures:    Patient seen and examined with GILSON RN.    History taken from: patient, chart, and RN.    Patient today with new oxygen requirement and distended abdomen. Fluids were stopped as there was some concern of fluid overload.     CXR and CT abdomen/pelvis were ordered.     CXR (6/18/24): CHF/edema with small pleural effusions. Bibasilar airspace disease.     CT Abdomen Pelvis (6/18/24):  - Bibasilar consolidative airspace disease increased from previous. May present atelectasis and/or pneumonia.  - Marked cardiomegaly with small pericardial effusion and bilateral pleural effusions noted.  Correlate for CHF.  -  Anasarca.    Patient was started on Furosemide 20mg IV x2.     Antibiotics has being extended.       Objective     Vital Signs  Temp:  [98.1 °F (36.7 °C)] 98.1 °F (36.7 °C)  Heart Rate:  [] 129  Resp:  [18-20] 20  BP: (133-165)/(89-96) 133/89    Intake/Output Summary (Last 24 hours) at 6/18/2024 1723  Last data filed at 6/18/2024 1400  Gross per 24 hour   Intake 500 ml   Output --   Net 500 ml         Physical Exam:  General: Frail Elderly Female awake.   Head:Normocephalic, atraumatic  Eyes:PERRLA, EOMI. Conjunctivae and sclerae normal. No icterus or pallor.  Ears:Ears appear intact with no abnormalities noted.  Throat:No oral lesions or thrush. Oral mucosa moist  Heart:Normal S1 and S2. Regular rate and rhythm. No significant murmur, rubs or gallops appreciated.  Lungs:Respirations regular, even and unlabored. Lungs clear to auscultation B/L. No wheezes, rales or rhonchi.  Abdomen: Soft and nontender. No guarding, rebound tenderness or  organomegaly noted. Bowel sounds present x 4.  Musculoskeletal:Muscular strength intact and equal B/L. No joint swelling, deformity, or tenderness.  Extremities:No clubbing, cyanosis or edema noted.  Moves UE and LE equally B/L.  Pulses:Pulses palpable and equal bilaterally.  Skin: No bleeding, bruising or rash.  Lymph nodes: No palpable adenopathy.  Neurologic: AAOx1. Cranial nerves 2 - 12 grossly intact. Sensation intact. DTR present and equal bilaterally    Results Review:    Results from last 7 days   Lab Units 06/18/24  0714 06/17/24  0753 06/16/24  0702 06/14/24  0112 06/13/24  0134 06/12/24  0755   WBC 10*3/mm3 23.41* 18.52* 17.43* 16.96* 20.55* 16.97*   HEMOGLOBIN g/dL 10.8* 10.6* 10.8* 11.4* 10.8* 11.3*   PLATELETS 10*3/mm3 262 236 227 260 236 205     Results from last 7 days   Lab Units 06/18/24  0714 06/17/24  0753 06/16/24  1837 06/16/24  0702 06/14/24  0112 06/13/24  0134 06/12/24  0755   SODIUM mmol/L 139 147* 147* 149* 145 141 140   POTASSIUM mmol/L 3.9 3.7 4.0 4.3 4.4 3.9 4.2   CHLORIDE mmol/L 108* 115* 115* 119* 113* 110* 108*   CO2 mmol/L 19.6* 19.9* 18.1* 16.9* 18.6* 18.9* 20.6*   BUN mg/dL 32* 34* 37* 37* 29* 24* 22   CREATININE mg/dL 1.45* 1.37* 1.40* 1.40* 1.22* 1.22* 1.19*   CALCIUM mg/dL 8.5 8.6 8.9 9.4 9.6 9.3 9.1   GLUCOSE mg/dL 219* 123* 278* 114* 194* 223* 239*     Results from last 7 days   Lab Units 06/12/24  0755   BILIRUBIN mg/dL 0.3   ALK PHOS U/L 77   AST (SGOT) U/L 12   ALT (SGPT) U/L 12                       Imaging Results (Last 24 Hours)       Procedure Component Value Units Date/Time    CT Abdomen Pelvis Without Contrast [[id number]] Collected: 06/18/24 1119     Updated: 06/18/24 1123    Narrative:      EXAM:    CT Abdomen and Pelvis Without Intravenous Contrast     EXAM DATE:    6/18/2024 10:42 AM     CLINICAL HISTORY:    Abdominal Tenderness (genalized) and Distended; N17.9-Acute kidney  failure, unspecified; E83.42-Hypomagnesemia; R44.3-Hallucinations,  unspecified; I50.9-Heart failure, unspecified; I31.39-Other pericardial  effusion (noninflammatory); J18.9-Pneumonia, unspecified organism     TECHNIQUE:    Axial computed tomography images of the abdomen and pelvis  without  intravenous contrast.  Sagittal and coronal reformatted images were  created and reviewed.  This CT exam was performed using one or more of  the following dose reduction techniques:  automated exposure control,  adjustment of the mA and/or kV according to patient size, and/or use of  iterative reconstruction technique.     COMPARISON:    6/10/2024     FINDINGS:    Lung bases:  Bibasilar consolidative airspace disease increased from  previous. May present atelectasis and/or pneumonia.    Heart:  Marked cardiomegaly with small pericardial effusion and  bilateral pleural effusions noted.      ABDOMEN:    Liver:  Unremarkable as visualized.    Gallbladder and bile ducts:  Unremarkable as visualized.  No calcified  stones.  No ductal dilation.    Pancreas:  Unremarkable as visualized.  No ductal dilation.    Spleen:  Unremarkable as visualized.  No splenomegaly.    Adrenals:  Unremarkable as visualized.  No mass.    Kidneys and ureters:  Unremarkable as visualized.  No renal or  ureteral stones. No obstructive uropathy.    Stomach and bowel:  Sigmoid diverticulosis without evidence of  diverticulitis.  No obstruction.      PELVIS:    Appendix:  Normal appendix.    Bladder:  Unremarkable as visualized.  No stones.    Reproductive:  Endometrial thickening again noted. Refer to ultrasound  findings.      ABDOMEN and PELVIS:    Intraperitoneal space:  Unremarkable as visualized.  No significant  fluid collection.  No pneumoperitoneum identified.    Bones/joints:  Stable appearance of the bony structures with  degenerative changes noted lumbar spine and mild multilevel stenosis.   No dislocation.  No acute bony changes compared to the previous exam.    Soft tissues:  Anasarca.    Vasculature:  Advanced atherosclerosis without evidence of aneurysm.  Stable.    Lymph nodes:  Unremarkable as visualized.  No enlarged lymph nodes.       Impression:      1.  Bibasilar consolidative airspace disease increased from  previous.  May present atelectasis and/or pneumonia.  2.  Marked cardiomegaly with small pericardial effusion and bilateral  pleural effusions noted.  Correlate for CHF.  3.  Anasarca.  4.  Endometrial thickening again noted. Refer to ultrasound findings.  5.  Advanced atherosclerosis without evidence of aneurysm. Stable.  6.  Sigmoid diverticulosis without evidence of diverticulitis.  No bowel  obstruction.  7. Other incidental/nonacute findings as above.        This report was finalized on 6/18/2024 11:21 AM by Dr. Marquis Ruiz MD.       XR Chest 1 View [[id number]] Collected: 06/18/24 1110     Updated: 06/18/24 1113    Narrative:      EXAM:    XR Chest, 1 View     EXAM DATE:    6/18/2024 10:15 AM     CLINICAL HISTORY:    Hypoxia and tachypnea (Aspiration Pneumonitis vs PNA vs Fliud  Overload); N17.9-Acute kidney failure, unspecified;  E83.42-Hypomagnesemia; R44.3-Hallucinations, unspecified; I50.9-Heart  failure, unspecified; I31.39-Other pericardial effusion  (noninflammatory); J18.9-Pneumonia, unspecified organism     TECHNIQUE:    Frontal view of the chest.     COMPARISON:    6/10/2024     FINDINGS:    Lungs and pleural spaces:  Interstitial edema.  Perihilar opacities.   Pulmonary vascular congestion.  No pneumothorax.    Heart:  Cardiomegaly.    Mediastinum:  Unremarkable as visualized.  Normal mediastinal contour.    Bones/joints:  Small effusions.  No acute fracture.       Impression:      1.  CHF/edema with small pleural effusions.  2.  Bibasilar airspace disease.        This report was finalized on 6/18/2024 11:11 AM by Dr. Marquis Ruiz MD.                 Medications:  apixaban, 2.5 mg, Oral, BID  budesonide-formoterol, 2 puff, Inhalation, BID - RT  cefepime, 2,000 mg, Intravenous, Q24H  cinacalcet, 30 mg, Oral, Daily  folic acid, 500 mcg, Oral, Daily  furosemide, 20 mg, Intravenous, Q6H  guaiFENesin, 1,200 mg, Oral, Q12H  hydrocortisone, 1 Application, Topical, BID  insulin lispro, 2-7 Units,  Subcutaneous, 4x Daily AC & at Bedtime  iopamidol, 100 mL, Intravenous, Once in imaging  lactulose, 20 g, Oral, Q4H  megestrol, 80 mg, Oral, Daily  melatonin, 5 mg, Oral, Nightly  metoprolol tartrate, 100 mg, Oral, Q12H  metroNIDAZOLE, 500 mg, Intravenous, Q8H  NIFEdipine CC, 30 mg, Oral, Q24H  OLANZapine zydis, 5 mg, Oral, Nightly  pantoprazole, 40 mg, Oral, Daily  polyethylene glycol, 17 g, Oral, BID  senna-docusate sodium, 2 tablet, Oral, BID  sodium chloride, 10 mL, Intravenous, Q12H  sodium chloride, 10 mL, Intravenous, Q12H  vitamin B-12, 1,000 mcg, Oral, Once per day on Monday Tuesday Wednesday Thursday Friday                 Assessment & Plan     Sepsis    Moderate malnutrition    Mrs Zeenat Dong is a 89-year-old female w CKD 3A, hyperlipidemia, hyperparathyroidism and atrial fibrillation who presented to the ER with complaints of weakness and leukocytosis noted on outpatient labs.     Sepsis 2/2  Community Acquired Pneumonia vs Aspiration PNA  --Patient presented with overall weakness, leukocytosis 17K.  Afebrile and hemodynamically stable with CT Chest Imaging (6/10/24): Focal airspace opacity at the left lower lobe favors atelectasis over infiltrate.  -Blood cultures NGTD, Legionella/strep pneumo negative  -Viral PCR negative  - CT Abdomen/pelvis (6/18/24):  Bibasilar consolidative airspace disease increased from previous.   -Continue as needed DuoNebs, Symbicort, Symbicort  -Continue cefepime/flagyl (extended in the setting of persistent leukocytosis)  -ID following, recommendations appreciated    Metabolic Encephalopathy  - Secondary to PNA  - Management of underlying etiology    Stage 3A Atrial fibrillation (Paroxysmal)  - Rate controlled Overall with some episodes of RVR  - Continue Metoprolol 100mg BID  - Continue Eliquis 2.5mg BID    Acute on Chronic HFpEF  - Signs of fluid overload (Pulmonary Vascular congestoin and possible Anasarca) with new oxygen requirement  - Will give Lasix 20mg IV  x2    Acute Hypoxemia 2/2 Acute on Chronic HFpEF  - Treatment as per above    Essential hypertension  - Continue Metoprolol 100mg BID  - Continue Nifedipine 30mg QD    Large pericardial effusion   - No tamponade physiology  -No chest pain/pressure or tightness  -EKG with atrial fibrillation, no acute ischemic changes  -Troponin 26, repeat 27 with a delta of 1  -TTE LVEF 66 to 70%, large greater than 2 cm pericardial effusion without evidence of tamponade physiology  -Repeat limited echo noted pericardial effusion 1 to 2 cm, no evidence of tamponade physiology  -Continue beta-blocker (rate controlled as long as she takes it), Eliquis (reduced dose) for stroke prophylaxis  -Cardiology consulted, suspected chronic pericardial effusion and recommend infectious disease consult and outpatient follow-up next month with echo  - Parvovirus, CMV, Shi-Barr PCR's have been ordered to rule out viral cause   - Avoiding Pericardiocentesis in the setting of patient advanced age, cognitive impairment, with no signs of Tamponade with repeated TTE limited showing some improvement     Thickened endometrium  -Noted on non-OB ultrasound, evaluated by OB/GYN, recommended outpatient follow-up, nothing further to add at this time     Acute kidney injury on CKD stage IIIa   - Due to prerenal azotemia 2/2 decreased oral intake  - IVF stopped due to fluid overload    Hypernatremia due to free water deficit  - Resolved with D5W; IVF stopped in the setting of fluid overload.     Hyperactive Delirium  -Delirium precautions  -Will avoid Benzodiazepines as this can have paradoxical effect in older adults  -PO zyprexa hs and consider as well during the day     Dysphagia  -SLP recommended: Mechanical soft, ground consistencies w/ thin liquids      Acute on Chronic Physical Debility  - Continue PT/OT, agreeable to SNF    Constipation  - Will give Lactulose 20grams x2  - Continue with Sennakot BID and Miralax BID    Chronic Medical Conditions:    #Type 2 diabetes mellitus: SSI, adjust as needed   #Hyperlipidemia, statin  #Hyperparathyroidism, Sensipar  #GERD, PPI    VTE Prophylaxis:  Pharmacologic & mechanical VTE prophylaxis orders are present.  GIppx: Pantoprazole 40mg QD  Diet Order   Procedures    Diet: Regular/House; Texture: Mechanical Ground (NDD 2); Fluid Consistency: Thin (IDDSI 0)   Code: Medical Intervention Limits: No intubation (DNI)  Level Of Support Discussed With: Next of Kin (If No Surrogate)  Code Status (Patient has no pulse and is not breathing): No CPR (Do Not Attempt to Resuscitate)  Medical Interventions (Patient has pulse or is breathing): Limited Support  Disposition:[facility] pending clinical improvement.       Rodolfo Reyes, MD  06/18/24  17:23 EDT

## 2024-06-18 NOTE — PLAN OF CARE
Goal Outcome Evaluation:  Plan of Care Reviewed With: patient        Progress: no change  Outcome Evaluation: Pt became restless this shift and refused to keep her oxygen on, she became short of breath and tachycardic, NP notified and orders placed and followed. VSS on 3L NC. Pt's family requested all 4 side rails be raised and educated on risks. Pt resting in bed at this time. Pt reports no further concerns at this time. Will follow POC.

## 2024-06-18 NOTE — PROGRESS NOTES
Antimicrobial Length of Therapy:  Cefepime day 6/6    Thank you,  Jono Gomez  Pharmacy Resident  6/18/2024  11:23 EDT

## 2024-06-19 LAB
A-A DO2: 75 MMHG (ref 0–300)
ANION GAP SERPL CALCULATED.3IONS-SCNC: 12.5 MMOL/L (ref 5–15)
ANISOCYTOSIS BLD QL: ABNORMAL
ARTERIAL PATENCY WRIST A: ABNORMAL
ATMOSPHERIC PRESS: 734 MMHG
BASE EXCESS BLDA CALC-SCNC: -1.4 MMOL/L (ref 0–2)
BASOPHILS # BLD AUTO: 0.07 10*3/MM3 (ref 0–0.2)
BASOPHILS NFR BLD AUTO: 0.4 % (ref 0–1.5)
BDY SITE: ABNORMAL
BUN SERPL-MCNC: 27 MG/DL (ref 8–23)
BUN/CREAT SERPL: 20.6 (ref 7–25)
BURR CELLS BLD QL SMEAR: ABNORMAL
CALCIUM SPEC-SCNC: 8.5 MG/DL (ref 8.2–9.6)
CHLORIDE SERPL-SCNC: 110 MMOL/L (ref 98–107)
CO2 BLDA-SCNC: 23.1 MMOL/L (ref 22–33)
CO2 SERPL-SCNC: 16.5 MMOL/L (ref 22–29)
COHGB MFR BLD: 1.6 % (ref 0–5)
CREAT SERPL-MCNC: 1.31 MG/DL (ref 0.57–1)
DACRYOCYTES BLD QL SMEAR: ABNORMAL
DEPRECATED RDW RBC AUTO: 52 FL (ref 37–54)
EGFRCR SERPLBLD CKD-EPI 2021: 37.4 ML/MIN/1.73
ELLIPTOCYTES BLD QL SMEAR: ABNORMAL
EOSINOPHIL # BLD AUTO: 0.17 10*3/MM3 (ref 0–0.4)
EOSINOPHIL NFR BLD AUTO: 1 % (ref 0.3–6.2)
ERYTHROCYTE [DISTWIDTH] IN BLOOD BY AUTOMATED COUNT: 14.6 % (ref 12.3–15.4)
GAS FLOW AIRWAY: 1 LPM
GLUCOSE BLDC GLUCOMTR-MCNC: 182 MG/DL (ref 70–130)
GLUCOSE BLDC GLUCOMTR-MCNC: 71 MG/DL (ref 70–130)
GLUCOSE BLDC GLUCOMTR-MCNC: 79 MG/DL (ref 70–130)
GLUCOSE BLDC GLUCOMTR-MCNC: 83 MG/DL (ref 70–130)
GLUCOSE SERPL-MCNC: 68 MG/DL (ref 65–99)
HCO3 BLDA-SCNC: 22.1 MMOL/L (ref 20–26)
HCT VFR BLD AUTO: 39.9 % (ref 34–46.6)
HCT VFR BLD CALC: 35.3 % (ref 38–51)
HGB BLD-MCNC: 11.8 G/DL (ref 12–15.9)
HGB BLDA-MCNC: 11.5 G/DL (ref 13.5–17.5)
HYPOCHROMIA BLD QL: ABNORMAL
IMM GRANULOCYTES # BLD AUTO: 0.65 10*3/MM3 (ref 0–0.05)
IMM GRANULOCYTES NFR BLD AUTO: 3.8 % (ref 0–0.5)
INHALED O2 CONCENTRATION: 24 %
LYMPHOCYTES # BLD AUTO: 1.87 10*3/MM3 (ref 0.7–3.1)
LYMPHOCYTES # BLD MANUAL: 1.88 10*3/MM3 (ref 0.7–3.1)
LYMPHOCYTES NFR BLD AUTO: 10.9 % (ref 19.6–45.3)
LYMPHOCYTES NFR BLD MANUAL: 4 % (ref 5–12)
Lab: ABNORMAL
Lab: ABNORMAL
MAGNESIUM SERPL-MCNC: 1.6 MG/DL (ref 1.7–2.3)
MCH RBC QN AUTO: 29.4 PG (ref 26.6–33)
MCHC RBC AUTO-ENTMCNC: 29.6 G/DL (ref 31.5–35.7)
MCV RBC AUTO: 99.3 FL (ref 79–97)
METHGB BLD QL: 0.3 % (ref 0–3)
MODALITY: ABNORMAL
MONOCYTES # BLD AUTO: 1.25 10*3/MM3 (ref 0.1–0.9)
MONOCYTES # BLD: 0.68 10*3/MM3 (ref 0.1–0.9)
MONOCYTES NFR BLD AUTO: 7.3 % (ref 5–12)
NEUTROPHILS # BLD AUTO: 14.53 10*3/MM3 (ref 1.7–7)
NEUTROPHILS NFR BLD AUTO: 13.08 10*3/MM3 (ref 1.7–7)
NEUTROPHILS NFR BLD AUTO: 76.6 % (ref 42.7–76)
NEUTROPHILS NFR BLD MANUAL: 84 % (ref 42.7–76)
NEUTS BAND NFR BLD MANUAL: 1 % (ref 0–5)
NOTIFIED BY: ABNORMAL
NOTIFIED WHO: ABNORMAL
NRBC BLD AUTO-RTO: 0.2 /100 WBC (ref 0–0.2)
OXYHGB MFR BLDV: 88.1 % (ref 94–99)
PCO2 BLDA: 32.3 MM HG (ref 35–45)
PCO2 TEMP ADJ BLD: ABNORMAL MM[HG]
PH BLDA: 7.44 PH UNITS (ref 7.35–7.45)
PH, TEMP CORRECTED: ABNORMAL
PHOSPHATE SERPL-MCNC: 2.5 MG/DL (ref 2.5–4.5)
PLAT MORPH BLD: NORMAL
PLATELET # BLD AUTO: 198 10*3/MM3 (ref 140–450)
PMV BLD AUTO: 11.3 FL (ref 6–12)
PO2 BLDA: 53.5 MM HG (ref 83–108)
PO2 TEMP ADJ BLD: ABNORMAL MM[HG]
POLYCHROMASIA BLD QL SMEAR: ABNORMAL
POTASSIUM SERPL-SCNC: 3.6 MMOL/L (ref 3.5–5.2)
RBC # BLD AUTO: 4.02 10*6/MM3 (ref 3.77–5.28)
SAO2 % BLDCOA: 89.8 % (ref 94–99)
SODIUM SERPL-SCNC: 139 MMOL/L (ref 136–145)
VARIANT LYMPHS NFR BLD MANUAL: 11 % (ref 19.6–45.3)
VENTILATOR MODE: ABNORMAL
WBC NRBC COR # BLD AUTO: 17.09 10*3/MM3 (ref 3.4–10.8)

## 2024-06-19 PROCEDURE — 99232 SBSQ HOSP IP/OBS MODERATE 35: CPT | Performed by: INTERNAL MEDICINE

## 2024-06-19 PROCEDURE — 94664 DEMO&/EVAL PT USE INHALER: CPT

## 2024-06-19 PROCEDURE — 63710000001 INSULIN LISPRO (HUMAN) PER 5 UNITS

## 2024-06-19 PROCEDURE — 85007 BL SMEAR W/DIFF WBC COUNT: CPT | Performed by: INTERNAL MEDICINE

## 2024-06-19 PROCEDURE — 94799 UNLISTED PULMONARY SVC/PX: CPT

## 2024-06-19 PROCEDURE — 25010000002 CEFEPIME PER 500 MG: Performed by: NURSE PRACTITIONER

## 2024-06-19 PROCEDURE — 82948 REAGENT STRIP/BLOOD GLUCOSE: CPT

## 2024-06-19 PROCEDURE — 83050 HGB METHEMOGLOBIN QUAN: CPT

## 2024-06-19 PROCEDURE — 25010000002 METRONIDAZOLE 500 MG/100ML SOLUTION: Performed by: NURSE PRACTITIONER

## 2024-06-19 PROCEDURE — 82375 ASSAY CARBOXYHB QUANT: CPT

## 2024-06-19 PROCEDURE — 36600 WITHDRAWAL OF ARTERIAL BLOOD: CPT

## 2024-06-19 PROCEDURE — 94761 N-INVAS EAR/PLS OXIMETRY MLT: CPT

## 2024-06-19 PROCEDURE — 82805 BLOOD GASES W/O2 SATURATION: CPT

## 2024-06-19 PROCEDURE — 94760 N-INVAS EAR/PLS OXIMETRY 1: CPT

## 2024-06-19 PROCEDURE — 25010000002 FUROSEMIDE PER 20 MG: Performed by: INTERNAL MEDICINE

## 2024-06-19 PROCEDURE — 83735 ASSAY OF MAGNESIUM: CPT | Performed by: INTERNAL MEDICINE

## 2024-06-19 PROCEDURE — 25010000002 POTASSIUM CHLORIDE 10 MEQ/100ML SOLUTION: Performed by: INTERNAL MEDICINE

## 2024-06-19 PROCEDURE — 84100 ASSAY OF PHOSPHORUS: CPT | Performed by: INTERNAL MEDICINE

## 2024-06-19 PROCEDURE — 99232 SBSQ HOSP IP/OBS MODERATE 35: CPT | Performed by: NURSE PRACTITIONER

## 2024-06-19 PROCEDURE — 80048 BASIC METABOLIC PNL TOTAL CA: CPT | Performed by: INTERNAL MEDICINE

## 2024-06-19 PROCEDURE — 25010000002 MAGNESIUM SULFATE IN D5W 1G/100ML (PREMIX) 1-5 GM/100ML-% SOLUTION: Performed by: INTERNAL MEDICINE

## 2024-06-19 PROCEDURE — 85025 COMPLETE CBC W/AUTO DIFF WBC: CPT | Performed by: INTERNAL MEDICINE

## 2024-06-19 RX ORDER — POTASSIUM CHLORIDE 20 MEQ/1
20 TABLET, EXTENDED RELEASE ORAL EVERY 6 HOURS
Qty: 3 TABLET | Refills: 0 | Status: DISCONTINUED | OUTPATIENT
Start: 2024-06-19 | End: 2024-06-19

## 2024-06-19 RX ORDER — MAGNESIUM SULFATE 1 G/100ML
1 INJECTION INTRAVENOUS ONCE
Qty: 100 ML | Refills: 0 | Status: COMPLETED | OUTPATIENT
Start: 2024-06-19 | End: 2024-06-19

## 2024-06-19 RX ORDER — POTASSIUM CHLORIDE 7.45 MG/ML
10 INJECTION INTRAVENOUS
Status: COMPLETED | OUTPATIENT
Start: 2024-06-19 | End: 2024-06-19

## 2024-06-19 RX ORDER — MAGNESIUM CARB/ALUMINUM HYDROX 105-160MG
296 TABLET,CHEWABLE ORAL ONCE
Status: COMPLETED | OUTPATIENT
Start: 2024-06-19 | End: 2024-06-19

## 2024-06-19 RX ORDER — FUROSEMIDE 10 MG/ML
20 INJECTION INTRAMUSCULAR; INTRAVENOUS EVERY 6 HOURS
Qty: 6 ML | Refills: 0 | Status: COMPLETED | OUTPATIENT
Start: 2024-06-19 | End: 2024-06-19

## 2024-06-19 RX ADMIN — GUAIFENESIN 1200 MG: 600 TABLET, EXTENDED RELEASE ORAL at 08:38

## 2024-06-19 RX ADMIN — POTASSIUM CHLORIDE 10 MEQ: 7.46 INJECTION, SOLUTION INTRAVENOUS at 21:15

## 2024-06-19 RX ADMIN — FUROSEMIDE 20 MG: 10 INJECTION, SOLUTION INTRAMUSCULAR; INTRAVENOUS at 09:50

## 2024-06-19 RX ADMIN — METRONIDAZOLE 500 MG: 500 INJECTION, SOLUTION INTRAVENOUS at 05:17

## 2024-06-19 RX ADMIN — INSULIN LISPRO 2 UNITS: 100 INJECTION, SOLUTION INTRAVENOUS; SUBCUTANEOUS at 11:31

## 2024-06-19 RX ADMIN — APIXABAN 2.5 MG: 2.5 TABLET, FILM COATED ORAL at 08:37

## 2024-06-19 RX ADMIN — DOCUSATE SODIUM 50 MG AND SENNOSIDES 8.6 MG 2 TABLET: 8.6; 5 TABLET, FILM COATED ORAL at 08:37

## 2024-06-19 RX ADMIN — HYDROCORTISONE 1 APPLICATION: 10 OINTMENT TOPICAL at 08:38

## 2024-06-19 RX ADMIN — FUROSEMIDE 20 MG: 10 INJECTION, SOLUTION INTRAMUSCULAR; INTRAVENOUS at 21:16

## 2024-06-19 RX ADMIN — Medication 500 MCG: at 08:37

## 2024-06-19 RX ADMIN — Medication 5 MG: at 21:17

## 2024-06-19 RX ADMIN — OLANZAPINE 5 MG: 5 TABLET, ORALLY DISINTEGRATING ORAL at 21:17

## 2024-06-19 RX ADMIN — CINACALCET 30 MG: 30 TABLET, FILM COATED ORAL at 08:37

## 2024-06-19 RX ADMIN — POTASSIUM CHLORIDE 10 MEQ: 7.46 INJECTION, SOLUTION INTRAVENOUS at 19:34

## 2024-06-19 RX ADMIN — Medication 10 ML: at 21:17

## 2024-06-19 RX ADMIN — DOCUSATE SODIUM 50 MG AND SENNOSIDES 8.6 MG 2 TABLET: 8.6; 5 TABLET, FILM COATED ORAL at 21:17

## 2024-06-19 RX ADMIN — NIFEDIPINE 30 MG: 30 TABLET, EXTENDED RELEASE ORAL at 08:37

## 2024-06-19 RX ADMIN — MEGESTROL ACETATE 80 MG: 40 SUSPENSION ORAL at 08:37

## 2024-06-19 RX ADMIN — POLYETHYLENE GLYCOL (3350) 17 G: 17 POWDER, FOR SOLUTION ORAL at 21:16

## 2024-06-19 RX ADMIN — PANTOPRAZOLE SODIUM 40 MG: 40 TABLET, DELAYED RELEASE ORAL at 08:37

## 2024-06-19 RX ADMIN — POTASSIUM CHLORIDE 20 MEQ: 1500 TABLET, EXTENDED RELEASE ORAL at 09:49

## 2024-06-19 RX ADMIN — POTASSIUM CHLORIDE 10 MEQ: 7.46 INJECTION, SOLUTION INTRAVENOUS at 17:44

## 2024-06-19 RX ADMIN — Medication 1000 MCG: at 08:37

## 2024-06-19 RX ADMIN — MAJOR MAGNESIUM CITRATE ORAL SOLUTION - LEMON 296 ML: 1.75 LIQUID ORAL at 17:44

## 2024-06-19 RX ADMIN — BUDESONIDE AND FORMOTEROL FUMARATE DIHYDRATE 2 PUFF: 160; 4.5 AEROSOL RESPIRATORY (INHALATION) at 19:28

## 2024-06-19 RX ADMIN — POLYETHYLENE GLYCOL (3350) 17 G: 17 POWDER, FOR SOLUTION ORAL at 08:36

## 2024-06-19 RX ADMIN — MAGNESIUM GLUCONATE 500 MG ORAL TABLET 800 MG: 500 TABLET ORAL at 09:49

## 2024-06-19 RX ADMIN — GUAIFENESIN 1200 MG: 600 TABLET, EXTENDED RELEASE ORAL at 21:16

## 2024-06-19 RX ADMIN — METRONIDAZOLE 500 MG: 500 INJECTION, SOLUTION INTRAVENOUS at 13:39

## 2024-06-19 RX ADMIN — MAGNESIUM SULFATE HEPTAHYDRATE 1 G: 1 INJECTION, SOLUTION INTRAVENOUS at 09:49

## 2024-06-19 RX ADMIN — METOPROLOL TARTRATE 100 MG: 100 TABLET, FILM COATED ORAL at 21:17

## 2024-06-19 RX ADMIN — CEFEPIME 2000 MG: 2 INJECTION, POWDER, FOR SOLUTION INTRAVENOUS at 13:39

## 2024-06-19 RX ADMIN — METRONIDAZOLE 500 MG: 500 INJECTION, SOLUTION INTRAVENOUS at 21:16

## 2024-06-19 RX ADMIN — POTASSIUM CHLORIDE 10 MEQ: 7.46 INJECTION, SOLUTION INTRAVENOUS at 16:21

## 2024-06-19 RX ADMIN — APIXABAN 2.5 MG: 2.5 TABLET, FILM COATED ORAL at 21:17

## 2024-06-19 RX ADMIN — METOPROLOL TARTRATE 100 MG: 100 TABLET, FILM COATED ORAL at 08:38

## 2024-06-19 RX ADMIN — HYDROCORTISONE 1 APPLICATION: 10 OINTMENT TOPICAL at 21:17

## 2024-06-19 RX ADMIN — FUROSEMIDE 20 MG: 10 INJECTION, SOLUTION INTRAMUSCULAR; INTRAVENOUS at 16:21

## 2024-06-19 NOTE — PLAN OF CARE
Goal Outcome Evaluation:  Plan of Care Reviewed With: patient        Progress: no change  Outcome Evaluation: Pt resting in bed at this time. Elevated BP and scheduled BP medication given. VSS on 1L NC. IV ABX given per orders. Pt reports no further concerns at this time. Will continue POC.

## 2024-06-19 NOTE — PROGRESS NOTES
PROGRESS NOTE         Patient Identification:  Name:  Zeenat Dong  Age:  89 y.o.  Sex:  female  :  1928  MRN:  [id number]  Visit Number:  [id number]  Primary Care Provider:  Sravani Chapa APRN         LOS: 8 days       ----------------------------------------------------------------------------------------------------------------------  Subjective       Chief Complaints:    Abnormal Lab, Weakness - Generalized, and Altered Mental Status        Interval History:      Patient resting in bed this morning.  Drowsy and sedate.  Currently on 1 L per nasal cannula with no apparent distress.  Diminished lung sounds bilaterally.  Abdomen remains distended.  WBC improving at 17.09.    Review of Systems:    JOO due to mental status  ----------------------------------------------------------------------------------------------------------------------      Objective       Current Hospital Meds:  apixaban, 2.5 mg, Oral, BID  budesonide-formoterol, 2 puff, Inhalation, BID - RT  cefepime, 2,000 mg, Intravenous, Q24H  cinacalcet, 30 mg, Oral, Daily  folic acid, 500 mcg, Oral, Daily  furosemide, 20 mg, Intravenous, Q6H  guaiFENesin, 1,200 mg, Oral, Q12H  hydrocortisone, 1 Application, Topical, BID  insulin lispro, 2-7 Units, Subcutaneous, 4x Daily AC & at Bedtime  iopamidol, 100 mL, Intravenous, Once in imaging  magnesium oxide, 800 mg, Oral, BID  megestrol, 80 mg, Oral, Daily  melatonin, 5 mg, Oral, Nightly  metoprolol tartrate, 100 mg, Oral, Q12H  metroNIDAZOLE, 500 mg, Intravenous, Q8H  NIFEdipine CC, 30 mg, Oral, Q24H  OLANZapine zydis, 5 mg, Oral, Nightly  pantoprazole, 40 mg, Oral, Daily  polyethylene glycol, 17 g, Oral, BID  potassium chloride, 20 mEq, Oral, Q6H  senna-docusate sodium, 2 tablet, Oral, BID  sodium chloride, 10 mL, Intravenous, Q12H  sodium chloride, 10 mL, Intravenous, Q12H  vitamin B-12, 1,000 mcg, Oral, Once per day on             ----------------------------------------------------------------------------------------------------------------------    Vital Signs:  Temp:  [98 °F (36.7 °C)-98.6 °F (37 °C)] 98 °F (36.7 °C)  Heart Rate:  [100-129] 102  Resp:  [18-20] 20  BP: (132-182)/() 147/77  Mean Arterial Pressure (Non-Invasive) for the past 24 hrs (Last 3 readings):   Noninvasive MAP (mmHg)   06/19/24 0343 90   06/18/24 1901 100       SpO2 Percentage    06/19/24 0343 06/19/24 0600 06/19/24 0648   SpO2: 96% 96% 95%     SpO2:  [95 %-98 %] 95 %  on  Flow (L/min):  [1] 1;   Device (Oxygen Therapy): nasal cannula    Body mass index is 23.38 kg/m².  Wt Readings from Last 3 Encounters:   06/11/24 59.9 kg (132 lb)   12/14/23 60.7 kg (133 lb 12.8 oz)   06/21/16 67.6 kg (149 lb)        Intake/Output Summary (Last 24 hours) at 6/19/2024 1343  Last data filed at 6/19/2024 0800  Gross per 24 hour   Intake 240 ml   Output --   Net 240 ml     Diet: Regular/House; Texture: Mechanical Ground (NDD 2); Fluid Consistency: Thin (IDDSI 0)  ----------------------------------------------------------------------------------------------------------------------      Physical Exam:    Constitutional: Elderly, frail, chronically ill-appearing female.  Drowsy, difficult to arouse.    HENT:  Head: Normocephalic and atraumatic.  Mouth:  Moist mucous membranes.    Eyes:  Conjunctivae and EOM are normal.  No scleral icterus.  Neck:  Neck supple.  No JVD present.    Cardiovascular:  Normal rate, regular rhythm and normal heart sounds with no murmur. No edema.  Pulmonary/Chest: Remains diminished to auscultation bilaterally  Abdominal: Abdomen is distended and tender to palpation.  Musculoskeletal:  No edema, no tenderness, and no deformity.  No swelling or redness of joints.  Neurological: JOO due to mental status  Skin:  Skin is warm and dry.  No rash noted.  No pallor.  "          ----------------------------------------------------------------------------------------------------------------------                Results from last 7 days   Lab Units 06/19/24  1009   PH, ARTERIAL pH units 7.444   PO2 ART mm Hg 53.5*   PCO2, ARTERIAL mm Hg 32.3*   HCO3 ART mmol/L 22.1       Results from last 7 days   Lab Units 06/19/24 0049 06/18/24 0714 06/17/24 0753 06/14/24  0112 06/13/24  0134   CRP mg/dL  --   --   --   --  15.97*   WBC 10*3/mm3 17.09* 23.41* 18.52*   < > 20.55*   HEMOGLOBIN g/dL 11.8* 10.8* 10.6*   < > 10.8*   HEMATOCRIT % 39.9 35.5 34.2   < > 33.6*   MCV fL 99.3* 95.4 96.1   < > 94.6   MCHC g/dL 29.6* 30.4* 31.0*   < > 32.1   PLATELETS 10*3/mm3 198 262 236   < > 236    < > = values in this interval not displayed.     Results from last 7 days   Lab Units 06/19/24 0049 06/18/24 0714 06/17/24 0753   SODIUM mmol/L 139 139 147*   POTASSIUM mmol/L 3.6 3.9 3.7   MAGNESIUM mg/dL 1.6*  --   --    CHLORIDE mmol/L 110* 108* 115*   CO2 mmol/L 16.5* 19.6* 19.9*   BUN mg/dL 27* 32* 34*   CREATININE mg/dL 1.31* 1.45* 1.37*   CALCIUM mg/dL 8.5 8.5 8.6   GLUCOSE mg/dL 68 219* 123*   ALBUMIN g/dL  --  3.1*  --    Estimated Creatinine Clearance: 23.8 mL/min (A) (by C-G formula based on SCr of 1.31 mg/dL (H)).  No results found for: \"AMMONIA\"    Glucose   Date/Time Value Ref Range Status   06/19/2024 1114 182 (H) 70 - 130 mg/dL Final   06/19/2024 0730 83 70 - 130 mg/dL Final   06/18/2024 2140 86 70 - 130 mg/dL Final   06/18/2024 1621 88 70 - 130 mg/dL Final   06/18/2024 1059 173 (H) 70 - 130 mg/dL Final   06/18/2024 0628 198 (H) 70 - 130 mg/dL Final   06/17/2024 2057 238 (H) 70 - 130 mg/dL Final   06/17/2024 1603 179 (H) 70 - 130 mg/dL Final     Lab Results   Component Value Date    HGBA1C 6.60 (H) 06/10/2024     Lab Results   Component Value Date    TSH 3.752 08/17/2015    FREET4 1.09 08/17/2015       Blood Culture   Date Value Ref Range Status   06/11/2024 No growth at 3 days  Preliminary " "  06/11/2024 No growth at 3 days  Preliminary     No results found for: \"URINECX\"  No results found for: \"WOUNDCX\"  No results found for: \"STOOLCX\"  No results found for: \"RESPCX\"  Pain Management Panel  More data may exist         Latest Ref Rng & Units 6/10/2024 8/17/2015   Pain Management Panel   Creatinine, Urine mg/dL  mg/dL - 129.2  130.0    Amphetamine, Urine Qual Negative Negative  -   Barbiturates Screen, Urine Negative Negative  -   Benzodiazepine Screen, Urine Negative Negative  -   Buprenorphine, Screen, Urine Negative Negative  -   Cocaine Screen, Urine Negative Negative  -   Fentanyl, Urine Negative Negative  -   Methadone Screen , Urine Negative Negative  -   Methamphetamine, Ur Negative Negative  -         ----------------------------------------------------------------------------------------------------------------------  Imaging Results (Last 24 Hours)       ** No results found for the last 24 hours. **            ----------------------------------------------------------------------------------------------------------------------    Pertinent Infectious Disease Results    Cardiology note reviewed, due to the patient's age and comorbidities, risk was felt to be greater than benefit for pericardiocentesis at this time.  We are unable to determine if pericardial effusion is infectious at this time.  Would recommend to treat with NSAIDs, per literature review.    Transthoracic echo from 6/14 reported moderate 1 to 2 cm pericardial effusion.        Assessment/Plan       Assessment     Pericardial effusion, rule out infectious process  Community-acquired pneumonia        Plan        Patient resting in bed this morning.  Drowsy and sedate.  Currently on 1 L per nasal cannula with no apparent distress.  Diminished lung sounds bilaterally.  Abdomen remains distended.  WBC improving at 17.09.    Continues on cefepime and metronidazole for possible underlying intra-abdominal infection causing leukocytosis.  " Parvovirus, CMV, Shi-Lai PCR's are pending.  May plan to escalate antibiotic coverage tomorrow pending clinical course.  We will continue to monitor closely and adjust antibiotic coverage as appropriate.    ANTIMICROBIAL THERAPY    cefepime 2000 mg IVPB in 100 mL NS (VTB)  metroNIDAZOLE - 500-0.79 MG/100ML-%     Code Status:   Code Status and Medical Interventions:   Ordered at: 06/11/24 0401     Medical Intervention Limits:    No intubation (DNI)     Level Of Support Discussed With:    Next of Kin (If No Surrogate)     Code Status (Patient has no pulse and is not breathing):    No CPR (Do Not Attempt to Resuscitate)     Medical Interventions (Patient has pulse or is breathing):    Limited Support       FIORELLA Crenshaw  06/19/24  13:43 EDT

## 2024-06-19 NOTE — PLAN OF CARE
Goal Outcome Evaluation:  Plan of Care Reviewed With: patient        Progress: declining  Outcome Evaluation: Patient resting in bed at this time with family at bedside. Patient is very drowsy this shift, sleeping between cares. Alert to self, confusion noted. VSS, 1L NC. Potassium and magnesium given this shift per orders. Patient had bath this shift. No other issues noted at this time. Will continue with POC.

## 2024-06-19 NOTE — PROGRESS NOTES
[facility] HOSPITALIST PROGRESS NOTE    Subjective     History:   Zeenat Dong is a 89 y.o. female admitted on 6/10/2024 secondary to Sepsis     Procedures:    Patient seen and examined with GILSON RN.    History taken from: patient, chart, and RN.    Despite extended period of antibiotics and management of multiple acute and chronic medical conditions patient clinical status has not improved overall with persistent delirium and physical debility.    Discussed with Daughter at the bedside who will consider hospice at home.       Objective     Vital Signs  Temp:  [98 °F (36.7 °C)-98.6 °F (37 °C)] 98 °F (36.7 °C)  Heart Rate:  [100-129] 102  Resp:  [18-20] 20  BP: (132-182)/() 147/77    Intake/Output Summary (Last 24 hours) at 6/19/2024 0848  Last data filed at 6/19/2024 0017  Gross per 24 hour   Intake 380 ml   Output --   Net 380 ml         Physical Exam:  General: Frail Elderly Female awake.   Head:Normocephalic, atraumatic  Eyes:PERRLA, EOMI. Conjunctivae and sclerae normal. No icterus or pallor.  Ears:Ears appear intact with no abnormalities noted.  Throat:No oral lesions or thrush. Oral mucosa moist  Heart:Normal S1 and S2. Regular rate and rhythm. No significant murmur, rubs or gallops appreciated.  Lungs:Respirations regular, even and unlabored. Lungs clear to auscultation B/L. No wheezes, rales or rhonchi.  Abdomen: Soft and nontender. No guarding, rebound tenderness or  organomegaly noted. Bowel sounds present x 4.  Musculoskeletal:Muscular strength intact and equal B/L. No joint swelling, deformity, or tenderness.  Extremities:No clubbing, cyanosis or edema noted. Moves UE and LE equally B/L.  Pulses:Pulses palpable and equal bilaterally.  Skin: No bleeding, bruising or rash.  Lymph nodes: No palpable adenopathy.  Neurologic: AAOx1. Cranial nerves 2 - 12 grossly intact. Sensation intact. DTR present and equal bilaterally    Results Review:    Results from last 7 days   Lab Units  06/19/24  0049 06/18/24  0714 06/17/24  0753 06/16/24  0702 06/14/24  0112 06/13/24  0134   WBC 10*3/mm3 17.09* 23.41* 18.52* 17.43* 16.96* 20.55*   HEMOGLOBIN g/dL 11.8* 10.8* 10.6* 10.8* 11.4* 10.8*   PLATELETS 10*3/mm3 198 262 236 227 260 236     Results from last 7 days   Lab Units 06/19/24  0049 06/18/24  0714 06/17/24  0753 06/16/24  1837 06/16/24  0702 06/14/24  0112 06/13/24  0134   SODIUM mmol/L 139 139 147* 147* 149* 145 141   POTASSIUM mmol/L 3.6 3.9 3.7 4.0 4.3 4.4 3.9   CHLORIDE mmol/L 110* 108* 115* 115* 119* 113* 110*   CO2 mmol/L 16.5* 19.6* 19.9* 18.1* 16.9* 18.6* 18.9*   BUN mg/dL 27* 32* 34* 37* 37* 29* 24*   CREATININE mg/dL 1.31* 1.45* 1.37* 1.40* 1.40* 1.22* 1.22*   CALCIUM mg/dL 8.5 8.5 8.6 8.9 9.4 9.6 9.3   GLUCOSE mg/dL 68 219* 123* 278* 114* 194* 223*           Results from last 7 days   Lab Units 06/19/24  0049   MAGNESIUM mg/dL 1.6*                   Imaging Results (Last 24 Hours)       Procedure Component Value Units Date/Time    CT Abdomen Pelvis Without Contrast [[id number]] Collected: 06/18/24 1119     Updated: 06/18/24 1123    Narrative:      EXAM:    CT Abdomen and Pelvis Without Intravenous Contrast     EXAM DATE:    6/18/2024 10:42 AM     CLINICAL HISTORY:    Abdominal Tenderness (genalized) and Distended; N17.9-Acute kidney  failure, unspecified; E83.42-Hypomagnesemia; R44.3-Hallucinations,  unspecified; I50.9-Heart failure, unspecified; I31.39-Other pericardial  effusion (noninflammatory); J18.9-Pneumonia, unspecified organism     TECHNIQUE:    Axial computed tomography images of the abdomen and pelvis without  intravenous contrast.  Sagittal and coronal reformatted images were  created and reviewed.  This CT exam was performed using one or more of  the following dose reduction techniques:  automated exposure control,  adjustment of the mA and/or kV according to patient size, and/or use of  iterative reconstruction technique.     COMPARISON:    6/10/2024     FINDINGS:    Lung  bases:  Bibasilar consolidative airspace disease increased from  previous. May present atelectasis and/or pneumonia.    Heart:  Marked cardiomegaly with small pericardial effusion and  bilateral pleural effusions noted.      ABDOMEN:    Liver:  Unremarkable as visualized.    Gallbladder and bile ducts:  Unremarkable as visualized.  No calcified  stones.  No ductal dilation.    Pancreas:  Unremarkable as visualized.  No ductal dilation.    Spleen:  Unremarkable as visualized.  No splenomegaly.    Adrenals:  Unremarkable as visualized.  No mass.    Kidneys and ureters:  Unremarkable as visualized.  No renal or  ureteral stones. No obstructive uropathy.    Stomach and bowel:  Sigmoid diverticulosis without evidence of  diverticulitis.  No obstruction.      PELVIS:    Appendix:  Normal appendix.    Bladder:  Unremarkable as visualized.  No stones.    Reproductive:  Endometrial thickening again noted. Refer to ultrasound  findings.      ABDOMEN and PELVIS:    Intraperitoneal space:  Unremarkable as visualized.  No significant  fluid collection.  No pneumoperitoneum identified.    Bones/joints:  Stable appearance of the bony structures with  degenerative changes noted lumbar spine and mild multilevel stenosis.   No dislocation.  No acute bony changes compared to the previous exam.    Soft tissues:  Anasarca.    Vasculature:  Advanced atherosclerosis without evidence of aneurysm.  Stable.    Lymph nodes:  Unremarkable as visualized.  No enlarged lymph nodes.       Impression:      1.  Bibasilar consolidative airspace disease increased from previous.  May present atelectasis and/or pneumonia.  2.  Marked cardiomegaly with small pericardial effusion and bilateral  pleural effusions noted.  Correlate for CHF.  3.  Anasarca.  4.  Endometrial thickening again noted. Refer to ultrasound findings.  5.  Advanced atherosclerosis without evidence of aneurysm. Stable.  6.  Sigmoid diverticulosis without evidence of diverticulitis.   No bowel  obstruction.  7. Other incidental/nonacute findings as above.        This report was finalized on 6/18/2024 11:21 AM by Dr. Marquis Ruiz MD.       XR Chest 1 View [[id number]] Collected: 06/18/24 1110     Updated: 06/18/24 1113    Narrative:      EXAM:    XR Chest, 1 View     EXAM DATE:    6/18/2024 10:15 AM     CLINICAL HISTORY:    Hypoxia and tachypnea (Aspiration Pneumonitis vs PNA vs Fliud  Overload); N17.9-Acute kidney failure, unspecified;  E83.42-Hypomagnesemia; R44.3-Hallucinations, unspecified; I50.9-Heart  failure, unspecified; I31.39-Other pericardial effusion  (noninflammatory); J18.9-Pneumonia, unspecified organism     TECHNIQUE:    Frontal view of the chest.     COMPARISON:    6/10/2024     FINDINGS:    Lungs and pleural spaces:  Interstitial edema.  Perihilar opacities.   Pulmonary vascular congestion.  No pneumothorax.    Heart:  Cardiomegaly.    Mediastinum:  Unremarkable as visualized.  Normal mediastinal contour.    Bones/joints:  Small effusions.  No acute fracture.       Impression:      1.  CHF/edema with small pleural effusions.  2.  Bibasilar airspace disease.        This report was finalized on 6/18/2024 11:11 AM by Dr. Marquis Ruiz MD.                 Medications:  apixaban, 2.5 mg, Oral, BID  budesonide-formoterol, 2 puff, Inhalation, BID - RT  cefepime, 2,000 mg, Intravenous, Q24H  cinacalcet, 30 mg, Oral, Daily  folic acid, 500 mcg, Oral, Daily  guaiFENesin, 1,200 mg, Oral, Q12H  hydrocortisone, 1 Application, Topical, BID  insulin lispro, 2-7 Units, Subcutaneous, 4x Daily AC & at Bedtime  iopamidol, 100 mL, Intravenous, Once in imaging  megestrol, 80 mg, Oral, Daily  melatonin, 5 mg, Oral, Nightly  metoprolol tartrate, 100 mg, Oral, Q12H  metroNIDAZOLE, 500 mg, Intravenous, Q8H  NIFEdipine CC, 30 mg, Oral, Q24H  OLANZapine zydis, 5 mg, Oral, Nightly  pantoprazole, 40 mg, Oral, Daily  polyethylene glycol, 17 g, Oral, BID  senna-docusate sodium, 2 tablet, Oral, BID  sodium  chloride, 10 mL, Intravenous, Q12H  sodium chloride, 10 mL, Intravenous, Q12H  vitamin B-12, 1,000 mcg, Oral, Once per day on Monday Tuesday Wednesday Thursday Friday                 Assessment & Plan     Sepsis    Moderate malnutrition    Mrs Zeenat Dong is a 89-year-old female w CKD 3A, hyperlipidemia, hyperparathyroidism and atrial fibrillation who presented to the ER with complaints of weakness and leukocytosis noted on outpatient labs.     Advance Care Planning/Goals of Care/Serious Illness Conversation  - Despite extended period of antibiotics and management of multiple acute and chronic medical conditions patient clinical status has not improved overall with persistent delirium and physical debility.  -Discussed with Daughter at the bedside who will consider hospice at home.     Sepsis 2/2  Community Acquired Pneumonia vs Aspiration PNA  --Patient presented with overall weakness, leukocytosis 17K.  Afebrile and hemodynamically stable with CT Chest Imaging (6/10/24): Focal airspace opacity at the left lower lobe favors atelectasis over infiltrate.  -Blood cultures NGTD, Legionella/strep pneumo negative  -Viral PCR negative  - CT Abdomen/pelvis (6/18/24):  Bibasilar consolidative airspace disease increased from previous.   -Continue as needed DuoNebs, Symbicort, Symbicort  -Continue cefepime/flagyl (extended in the setting of persistent leukocytosis)  -ID following, recommendations appreciated    Metabolic Encephalopathy  - Secondary to PNA  - Management of underlying etiology    Stage 3A Atrial fibrillation (Paroxysmal)  - Rate controlled Overall with some episodes of RVR  - Continue Metoprolol 100mg BID  - Continue Eliquis 2.5mg BID    Acute on Chronic HFpEF   - Improving  -  CXR (6/18/24): Signs of fluid overload (Pulmonary Vascular congestoin and possible Anasarca) with new oxygen requirement  - Will continue with Lasix 20mg IV x3    Acute Hypoxemia 2/2 Acute on Chronic HFpEF  - Improving  - Treatment as  per above    Essential hypertension  - Continue Metoprolol 100mg BID  - Continue Nifedipine 30mg QD    Large pericardial effusion   - No tamponade physiology  -No chest pain/pressure or tightness  -EKG with atrial fibrillation, no acute ischemic changes  -Troponin 26, repeat 27 with a delta of 1  -TTE LVEF 66 to 70%, large greater than 2 cm pericardial effusion without evidence of tamponade physiology  -Repeat limited echo noted pericardial effusion 1 to 2 cm, no evidence of tamponade physiology  -Continue beta-blocker (rate controlled as long as she takes it), Eliquis (reduced dose) for stroke prophylaxis  -Cardiology consulted, suspected chronic pericardial effusion and recommend infectious disease consult and outpatient follow-up next month with echo  - Parvovirus, CMV, Shi-Barr PCR's have been ordered to rule out viral cause   - Avoiding Pericardiocentesis in the setting of patient advanced age, cognitive impairment, with no signs of Tamponade with repeated TTE limited showing some improvement     Thickened endometrium  -Noted on non-OB ultrasound, evaluated by OB/GYN, recommended outpatient follow-up, nothing further to add at this time     Acute kidney injury on CKD stage IIIa   - Due to prerenal azotemia 2/2 decreased oral intake  - IVF stopped due to fluid overload    Hypernatremia due to free water deficit  - Resolved with D5W; IVF stopped in the setting of fluid overload.     Hyperactive Delirium  -Delirium precautions  -Will avoid Benzodiazepines as this can have paradoxical effect in older adults  -PO zyprexa hs and consider as well during the day     Dysphagia  -SLP recommended: Mechanical soft, ground consistencies w/ thin liquids      Acute on Chronic Physical Debility  - Continue PT/OT, agreeable to SNF    Constipation  - S/P Lactulose 20grams x2  - Continue with Sennakot BID and Miralax BID  - Will give Magnessium Citrate x1    Chronic Medical Conditions:   #Type 2 diabetes mellitus: SSI, adjust  as needed   #Hyperlipidemia, statin  #Hyperparathyroidism, Sensipar  #GERD, PPI    VTE Prophylaxis:  Pharmacologic & mechanical VTE prophylaxis orders are present.  GIppx: Pantoprazole 40mg QD  Diet Order   Procedures    Diet: Regular/House; Texture: Mechanical Ground (NDD 2); Fluid Consistency: Thin (IDDSI 0)   Code: Medical Intervention Limits: No intubation (DNI)  Level Of Support Discussed With: Next of Kin (If No Surrogate)  Code Status (Patient has no pulse and is not breathing): No CPR (Do Not Attempt to Resuscitate)  Medical Interventions (Patient has pulse or is breathing): Limited Support  Disposition: Discuss with Daughter at the bedside who will consider hospice at home.       Rodolfo Reyes, MD  06/19/24  08:48 EDT

## 2024-06-19 NOTE — CASE MANAGEMENT/SOCIAL WORK
Discharge Planning Assessment  [facility]     Patient Name: Zeenat Dong  MRN: [id number]  Today's Date: 6/19/2024    Admit Date: 6/10/2024         Discharge Plan       Row Name 06/19/24 1553       Plan    Plan Pt discussed with Dr. Reyes who states Pt is not medically stable for discharge to NH today. SS spoke with [facility] per Annalise who states they will be filling their short term rehab bed but are agreeable to review Pt's referral again should Pt's dtr want long term care placement. SS notified Pt's dtr Jeff at bedside. Per Dtr she and Dr Reyes have been discussing possible hospice. SS provided Pt's dtr with information about LTC placement and hospice. SS explained that [facility] does not have a hospice/comfort care contract but local NH's in [facility] have a LTC medicaid  that can speak with her about LTC medicaid pending. Dtr states she is not interested in LTC placement with hospice but would like to hear more about what hospice can offer at home. SS discussed caregiver agencies including [facility] and [facility]. Dtr states Pt's spouse Magen Dong was a . Dtr states she has a file# for spouse that is [id number] and has attempted to file for [facility] benefits for aides and services at home. SS attempted to call [facility] [phone] ext [phone] and left a message checking on status of aides and services application but was not successful. Due to Juneteenth holiday VA is closed. SS notified Dr. Reyes who states he plans to consult palliative care. Pt currently lives with her dtr Jeff. Pt does not utilize home health services. Pt utilizes home oxygen prn via unknown provider. Pt utilizes a quad cane, nebulizer, hospital bed, grab bars, shower chair via private purchase. Pt would benefit from a hospital bed that has a trapeze bar. SS to follow.                    JONATHAN Nicole

## 2024-06-20 ENCOUNTER — APPOINTMENT (OUTPATIENT)
Dept: GENERAL RADIOLOGY | Facility: HOSPITAL | Age: 89
End: 2024-06-20
Payer: MEDICARE

## 2024-06-20 VITALS
HEIGHT: 63 IN | TEMPERATURE: 98.2 F | RESPIRATION RATE: 18 BRPM | DIASTOLIC BLOOD PRESSURE: 84 MMHG | OXYGEN SATURATION: 92 % | HEART RATE: 106 BPM | BODY MASS INDEX: 23.39 KG/M2 | WEIGHT: 132 LBS | SYSTOLIC BLOOD PRESSURE: 157 MMHG

## 2024-06-20 PROBLEM — A41.9 SEPSIS: Status: RESOLVED | Noted: 2024-06-11 | Resolved: 2024-06-20

## 2024-06-20 LAB
ANION GAP SERPL CALCULATED.3IONS-SCNC: 13.3 MMOL/L (ref 5–15)
B19V IGG SER IA-ACNC: 7.9 INDEX (ref 0–0.8)
B19V IGM SER IA-ACNC: 0.4 INDEX (ref 0–0.8)
BASOPHILS # BLD AUTO: 0.06 10*3/MM3 (ref 0–0.2)
BASOPHILS NFR BLD AUTO: 0.3 % (ref 0–1.5)
BUN SERPL-MCNC: 25 MG/DL (ref 8–23)
BUN/CREAT SERPL: 18.8 (ref 7–25)
CALCIUM SPEC-SCNC: 8.5 MG/DL (ref 8.2–9.6)
CHLORIDE SERPL-SCNC: 111 MMOL/L (ref 98–107)
CMV DNA SERPL NAA+PROBE-ACNC: NEGATIVE IU/ML
CMV DNA SERPL NAA+PROBE-LOG IU: NORMAL LOG10 IU/ML
CO2 SERPL-SCNC: 21.7 MMOL/L (ref 22–29)
CREAT SERPL-MCNC: 1.33 MG/DL (ref 0.57–1)
DEPRECATED RDW RBC AUTO: 54.3 FL (ref 37–54)
EBV DNA # SERPL NAA+PROBE: 1.72 LOG10 IU/ML
EBV DNA SERPL NAA+PROBE-ACNC: 53 IU/ML
EGFRCR SERPLBLD CKD-EPI 2021: 36.7 ML/MIN/1.73
EOSINOPHIL # BLD AUTO: 0.14 10*3/MM3 (ref 0–0.4)
EOSINOPHIL NFR BLD AUTO: 0.7 % (ref 0.3–6.2)
ERYTHROCYTE [DISTWIDTH] IN BLOOD BY AUTOMATED COUNT: 15.1 % (ref 12.3–15.4)
GLUCOSE BLDC GLUCOMTR-MCNC: 120 MG/DL (ref 70–130)
GLUCOSE BLDC GLUCOMTR-MCNC: 77 MG/DL (ref 70–130)
GLUCOSE SERPL-MCNC: 86 MG/DL (ref 65–99)
HCT VFR BLD AUTO: 39.5 % (ref 34–46.6)
HGB BLD-MCNC: 11.8 G/DL (ref 12–15.9)
IMM GRANULOCYTES # BLD AUTO: 0.39 10*3/MM3 (ref 0–0.05)
IMM GRANULOCYTES NFR BLD AUTO: 2.1 % (ref 0–0.5)
LYMPHOCYTES # BLD AUTO: 1.45 10*3/MM3 (ref 0.7–3.1)
LYMPHOCYTES NFR BLD AUTO: 7.6 % (ref 19.6–45.3)
MAGNESIUM SERPL-MCNC: 1.9 MG/DL (ref 1.7–2.3)
MCH RBC QN AUTO: 30 PG (ref 26.6–33)
MCHC RBC AUTO-ENTMCNC: 29.9 G/DL (ref 31.5–35.7)
MCV RBC AUTO: 100.5 FL (ref 79–97)
MONOCYTES # BLD AUTO: 1.26 10*3/MM3 (ref 0.1–0.9)
MONOCYTES NFR BLD AUTO: 6.6 % (ref 5–12)
NEUTROPHILS NFR BLD AUTO: 15.7 10*3/MM3 (ref 1.7–7)
NEUTROPHILS NFR BLD AUTO: 82.7 % (ref 42.7–76)
NRBC BLD AUTO-RTO: 0.2 /100 WBC (ref 0–0.2)
PHOSPHATE SERPL-MCNC: 2.3 MG/DL (ref 2.5–4.5)
PLATELET # BLD AUTO: 191 10*3/MM3 (ref 140–450)
PMV BLD AUTO: 11.6 FL (ref 6–12)
POTASSIUM SERPL-SCNC: 4 MMOL/L (ref 3.5–5.2)
PROCALCITONIN SERPL-MCNC: 0.18 NG/ML (ref 0–0.25)
RBC # BLD AUTO: 3.93 10*6/MM3 (ref 3.77–5.28)
SODIUM SERPL-SCNC: 146 MMOL/L (ref 136–145)
WBC NRBC COR # BLD AUTO: 19 10*3/MM3 (ref 3.4–10.8)

## 2024-06-20 PROCEDURE — 94799 UNLISTED PULMONARY SVC/PX: CPT

## 2024-06-20 PROCEDURE — 71045 X-RAY EXAM CHEST 1 VIEW: CPT

## 2024-06-20 PROCEDURE — 97110 THERAPEUTIC EXERCISES: CPT

## 2024-06-20 PROCEDURE — 97530 THERAPEUTIC ACTIVITIES: CPT

## 2024-06-20 PROCEDURE — 84100 ASSAY OF PHOSPHORUS: CPT | Performed by: INTERNAL MEDICINE

## 2024-06-20 PROCEDURE — 99232 SBSQ HOSP IP/OBS MODERATE 35: CPT | Performed by: NURSE PRACTITIONER

## 2024-06-20 PROCEDURE — 82948 REAGENT STRIP/BLOOD GLUCOSE: CPT

## 2024-06-20 PROCEDURE — 99239 HOSP IP/OBS DSCHRG MGMT >30: CPT | Performed by: INTERNAL MEDICINE

## 2024-06-20 PROCEDURE — 71045 X-RAY EXAM CHEST 1 VIEW: CPT | Performed by: RADIOLOGY

## 2024-06-20 PROCEDURE — 83735 ASSAY OF MAGNESIUM: CPT | Performed by: INTERNAL MEDICINE

## 2024-06-20 PROCEDURE — 94761 N-INVAS EAR/PLS OXIMETRY MLT: CPT

## 2024-06-20 PROCEDURE — 85025 COMPLETE CBC W/AUTO DIFF WBC: CPT | Performed by: INTERNAL MEDICINE

## 2024-06-20 PROCEDURE — 80048 BASIC METABOLIC PNL TOTAL CA: CPT | Performed by: INTERNAL MEDICINE

## 2024-06-20 PROCEDURE — 94664 DEMO&/EVAL PT USE INHALER: CPT

## 2024-06-20 PROCEDURE — 25010000002 METRONIDAZOLE 500 MG/100ML SOLUTION: Performed by: NURSE PRACTITIONER

## 2024-06-20 PROCEDURE — 84145 PROCALCITONIN (PCT): CPT | Performed by: INTERNAL MEDICINE

## 2024-06-20 RX ORDER — AMOXICILLIN 250 MG
2 CAPSULE ORAL 2 TIMES DAILY
Qty: 120 TABLET | Refills: 0 | Status: SHIPPED | OUTPATIENT
Start: 2024-06-20 | End: 2024-07-20

## 2024-06-20 RX ORDER — NIFEDIPINE 30 MG
30 TABLET, EXTENDED RELEASE ORAL
Qty: 30 TABLET | Refills: 0 | Status: SHIPPED | OUTPATIENT
Start: 2024-06-21 | End: 2024-07-21

## 2024-06-20 RX ORDER — ACETAMINOPHEN 500 MG
1000 TABLET ORAL EVERY 8 HOURS PRN
Qty: 90 TABLET | Refills: 0 | Status: SHIPPED | OUTPATIENT
Start: 2024-06-20 | End: 2024-07-20

## 2024-06-20 RX ORDER — OLANZAPINE 5 MG/1
5 TABLET, ORALLY DISINTEGRATING ORAL EVERY 8 HOURS PRN
Qty: 30 TABLET | Refills: 0 | Status: SHIPPED | OUTPATIENT
Start: 2024-06-20

## 2024-06-20 RX ORDER — FUROSEMIDE 40 MG/1
40 TABLET ORAL DAILY
Start: 2024-06-20

## 2024-06-20 RX ORDER — POLYETHYLENE GLYCOL 3350 17 G/17G
17 POWDER, FOR SOLUTION ORAL 2 TIMES DAILY
Qty: 60 PACKET | Refills: 0 | Status: SHIPPED | OUTPATIENT
Start: 2024-06-20 | End: 2024-07-20

## 2024-06-20 RX ADMIN — METRONIDAZOLE 500 MG: 500 INJECTION, SOLUTION INTRAVENOUS at 06:11

## 2024-06-20 RX ADMIN — NIFEDIPINE 30 MG: 30 TABLET, EXTENDED RELEASE ORAL at 09:10

## 2024-06-20 RX ADMIN — GUAIFENESIN 1200 MG: 600 TABLET, EXTENDED RELEASE ORAL at 09:09

## 2024-06-20 RX ADMIN — APIXABAN 2.5 MG: 2.5 TABLET, FILM COATED ORAL at 09:10

## 2024-06-20 RX ADMIN — DOCUSATE SODIUM 50 MG AND SENNOSIDES 8.6 MG 2 TABLET: 8.6; 5 TABLET, FILM COATED ORAL at 09:09

## 2024-06-20 RX ADMIN — Medication 10 ML: at 09:11

## 2024-06-20 RX ADMIN — Medication 1000 MCG: at 09:10

## 2024-06-20 RX ADMIN — BUDESONIDE AND FORMOTEROL FUMARATE DIHYDRATE 2 PUFF: 160; 4.5 AEROSOL RESPIRATORY (INHALATION) at 07:34

## 2024-06-20 RX ADMIN — POLYETHYLENE GLYCOL (3350) 17 G: 17 POWDER, FOR SOLUTION ORAL at 09:09

## 2024-06-20 RX ADMIN — HYDROCORTISONE 1 APPLICATION: 10 OINTMENT TOPICAL at 09:11

## 2024-06-20 RX ADMIN — Medication 10 ML: at 09:10

## 2024-06-20 RX ADMIN — PANTOPRAZOLE SODIUM 40 MG: 40 TABLET, DELAYED RELEASE ORAL at 09:10

## 2024-06-20 RX ADMIN — METOPROLOL TARTRATE 100 MG: 100 TABLET, FILM COATED ORAL at 09:10

## 2024-06-20 RX ADMIN — Medication 500 MCG: at 09:10

## 2024-06-20 RX ADMIN — CINACALCET 30 MG: 30 TABLET, FILM COATED ORAL at 09:10

## 2024-06-20 RX ADMIN — MEGESTROL ACETATE 80 MG: 40 SUSPENSION ORAL at 09:11

## 2024-06-20 NOTE — CONSULTS
Palliative Care Initial Consult     Attending Physician: Reyes, Rodolfo, MD  Referring Provider: Rodolfo Reyes MD    assistance with advance directives, assistance with clarification of goals of care, hospice referral or discussion, and psychosocial support  Code Status:   Code Status and Medical Interventions:   Ordered at: 06/11/24 0401     Medical Intervention Limits:    No intubation (DNI)     Level Of Support Discussed With:    Next of Kin (If No Surrogate)     Code Status (Patient has no pulse and is not breathing):    No CPR (Do Not Attempt to Resuscitate)     Medical Interventions (Patient has pulse or is breathing):    Limited Support      Advanced Directives: Advance Directive Status: Patient does not have advance directive   Healthcare surrogate: Dena Deras  Goals of Care: After discussion with pts dena Aguilar and her daughter at bedside, they have decided that they just want Zeenat to be comfortable in what time she did have and would like to take Zeenat home with hospice.    HPI:  Zeenat Dong is a 89 y.o. female admitted on due abnormal labs, generalized weakness and altered mental status. Vaughn has a medical history of  hypertension, hyperlipidemia, hypercalcemia, hyperparathyroidism, GERD, CHF, atrial fibrillation, chronic kidney disease stage IIIa, type II DM. Per pts dena Aguilar she has not been eating or drinking very well and has progressively becoming weaker.     ED, vitals were temperature 98.1, , RR 18, /69, SpO2 97% on room air.  Significant for initial troponin 29, repeat pending.  proBNP 9871.  BUN 29, creatinine 1.60.  Magnesium 1.3.  Ammonia 57, CRP 7.27, lactate 2.1.  WBC 20.64 with left shift.  CT abdomen/pelvis with no acute intra-abdominal process.  CT chest shows focal airspace opacity left lower lobe favors atelectasis over infiltrate.  Also small left pleural effusion.  Cardiomegaly.  Small pericardial effusion.     Palliative consulted for GOC/ACP,  support and hospice referral discussion.    ROS: Negative except as above in HPI.     Past Medical History:   Diagnosis Date    Hyperlipidemia      Past Surgical History:   Procedure Laterality Date    OTHER SURGICAL HISTORY      ear surgery    SKIN BIOPSY       Social History     Socioeconomic History    Marital status:    Tobacco Use    Smoking status: Never   Vaping Use    Vaping status: Never Used   Substance and Sexual Activity    Alcohol use: No    Drug use: Never     Family History   Problem Relation Age of Onset    Other Father         cardiovascular disease    Kidney disease Sister        No Known Allergies    Current Facility-Administered Medications   Medication Dose Route Frequency Provider Last Rate Last Admin    acetaminophen (TYLENOL) tablet 650 mg  650 mg Oral Q6H PRN Reji Calvillo DO        apixaban (ELIQUIS) tablet 2.5 mg  2.5 mg Oral BID Reji Calvillo DO   2.5 mg at 06/20/24 0910    bisacodyl (DULCOLAX) EC tablet 5 mg  5 mg Oral Daily PRN Sravanthi Richard MD        And    bisacodyl (DULCOLAX) suppository 10 mg  10 mg Rectal Daily PRN Sravanthi Richard MD   10 mg at 06/17/24 0515    budesonide-formoterol (SYMBICORT) 160-4.5 MCG/ACT inhaler 2 puff  2 puff Inhalation BID - RT Reji Calvillo DO   2 puff at 06/20/24 0734    cefepime 2000 mg IVPB in 100 mL NS (VTB)  2,000 mg Intravenous Q24H Johanna Chapa APRN   2,000 mg at 06/19/24 1339    cetirizine (zyrTEC) tablet 10 mg  10 mg Oral Daily PRN Reji Calvillo DO        cinacalcet (SENSIPAR) tablet 30 mg  30 mg Oral Daily Reji Calvillo DO   30 mg at 06/20/24 0910    dextrose (D50W) (25 g/50 mL) IV injection 25 g  25 g Intravenous Q15 Min PRN Lilli Sharpe PA-C        dextrose (GLUTOSE) oral gel 15 g  15 g Oral Q15 Min PRN Lilli Sharpe PA-C        erythromycin (ROMYCIN) ophthalmic ointment 1 Application  1 Application Both Eyes BID PRN Reji Calvillo DO        folic  acid (FOLVITE) tablet 500 mcg  500 mcg Oral Daily Reji Calvillo DO   500 mcg at 06/20/24 0910    glucagon HCl (Diagnostic) injection 1 mg  1 mg Intramuscular Q15 Min PRN Lilli Sharpe PA-C        guaiFENesin (MUCINEX) 12 hr tablet 1,200 mg  1,200 mg Oral Q12H Reji Calvillo DO   1,200 mg at 06/20/24 0909    hydrocortisone 1 % ointment 1 Application  1 Application Topical BID Reji Calvillo DO   1 Application at 06/20/24 0911    Insulin Lispro (humaLOG) injection 2-7 Units  2-7 Units Subcutaneous 4x Daily AC & at Bedtime Lilli Sharpe PA-C   2 Units at 06/19/24 1131    iopamidol (ISOVUE-300) 61 % injection 100 mL  100 mL Intravenous Once in imaging Reyes, Rodolfo, MD        ipratropium (ATROVENT) nebulizer solution 0.5 mg  0.5 mg Nebulization Q6H PRN Lilli Sharpe PA-C        Magnesium Low Dose Replacement - Follow Nurse / BPA Driven Protocol   Does not apply PRN Sravanthi Richard MD        megestrol (MEGACE) 40 MG/ML suspension 80 mg  80 mg Oral Daily Reji Calvillo DO   80 mg at 06/20/24 0911    melatonin tablet 5 mg  5 mg Oral Nightly Reyes, Rodolfo, MD   5 mg at 06/19/24 2117    metoprolol tartrate (LOPRESSOR) injection 5 mg  5 mg Intravenous Q6H PRN Reji Calvillo DO   5 mg at 06/15/24 0910    metoprolol tartrate (LOPRESSOR) tablet 100 mg  100 mg Oral Q12H Reji Calvillo DO   100 mg at 06/20/24 0910    metroNIDAZOLE (FLAGYL) IVPB 500 mg  500 mg Intravenous Q8H Johanna Chapa APRN 200 mL/hr at 06/20/24 0611 500 mg at 06/20/24 0611    NIFEdipine CC (ADALAT CC) 24 hr tablet 30 mg  30 mg Oral Q24H Reji Calvillo DO   30 mg at 06/20/24 0910    nitroglycerin (NITROSTAT) SL tablet 0.4 mg  0.4 mg Sublingual Q5 Min PRN Sravanthi Richard MD        OLANZapine zydis (zyPREXA) disintegrating tablet 5 mg  5 mg Oral Nightly Reji Calvillo DO   5 mg at 06/19/24 2117    OLANZapine zydis (zyPREXA) disintegrating tablet 5 mg  5  mg Oral Q8H PRN Reyes, Rodolfo, MD        ondansetron (ZOFRAN) injection 4 mg  4 mg Intravenous Q6H PRN Reji Calvillo DO        pantoprazole (PROTONIX) EC tablet 40 mg  40 mg Oral Daily Reji Calvillo DO   40 mg at 06/20/24 0910    polyethylene glycol (MIRALAX) packet 17 g  17 g Oral BID Reyes, Rodolfo, MD   17 g at 06/20/24 0909    Potassium Replacement - Follow Nurse / BPA Driven Protocol   Does not apply PRN Sravanthi Richard MD        sennosides-docusate (PERICOLACE) 8.6-50 MG per tablet 2 tablet  2 tablet Oral BID Reyes, Rodolfo, MD   2 tablet at 06/20/24 0909    sodium chloride 0.9 % flush 10 mL  10 mL Intravenous PRN Haylee Rawls DO        sodium chloride 0.9 % flush 10 mL  10 mL Intravenous Q12H Sravanthi Richard MD   10 mL at 06/20/24 0911    sodium chloride 0.9 % flush 10 mL  10 mL Intravenous PRN Sravanthi Richard MD        sodium chloride 0.9 % flush 10 mL  10 mL Intravenous Q12H Reji Calvillo DO   10 mL at 06/20/24 0910    sodium chloride 0.9 % flush 10 mL  10 mL Intravenous PRN Reji Calvillo DO        sodium chloride 0.9 % infusion 40 mL  40 mL Intravenous PRN Sravanthi Richard MD        sodium chloride 0.9 % infusion 40 mL  40 mL Intravenous PRN Reji Calvillo DO        vitamin B-12 (CYANOCOBALAMIN) tablet 1,000 mcg  1,000 mcg Oral Once per day on Monday Tuesday Wednesday Thursday Friday Reji Calvillo DO   1,000 mcg at 06/20/24 0910          acetaminophen    [DISCONTINUED] senna-docusate sodium **AND** [DISCONTINUED] polyethylene glycol **AND** bisacodyl **AND** bisacodyl    cetirizine    dextrose    dextrose    erythromycin    glucagon (human recombinant)    ipratropium    Magnesium Low Dose Replacement - Follow Nurse / BPA Driven Protocol    metoprolol tartrate    nitroglycerin    OLANZapine zydis    ondansetron    Potassium Replacement - Follow Nurse / BPA Driven Protocol    sodium chloride    sodium chloride    sodium  "chloride    sodium chloride    sodium chloride    Current medication reviewed for route, type, dose and frequency and are current per MAR.    Palliative Performance Scale Score:     /73 (BP Location: Left arm, Patient Position: Lying)   Pulse 116   Temp 98 °F (36.7 °C) (Oral)   Resp 18   Ht 160 cm (63\")   Wt 59.9 kg (132 lb)   SpO2 97%   BMI 23.38 kg/m²     Intake/Output Summary (Last 24 hours) at 6/20/2024 1614  Last data filed at 6/20/2024 1200  Gross per 24 hour   Intake 720 ml   Output --   Net 720 ml       PE:  General Appearance:    Chronically ill appearing, alert,frail elderly , NAD   HEENT:    NC/AT, without obvious abnormality, EOMI, anicteric    Neck:   supple, trachea midline, no JVD   Lungs:     Unlabored respirations, no wheezing rhonchi or rales, diminished in bilateral bases    Heart:    RRR, normal S1 and S2, no M/R/G   Abdomen:     Soft, NT, ND, NABS    Extremities:   Moves all extremities, no edema   Pulses:   Pulses palpable and equal bilaterally   Skin:   Warm, dry   Neurologic:   Alert times one only    Psych:   Calm, appropriate       Labs:   Results from last 7 days   Lab Units 06/20/24  0131   WBC 10*3/mm3 19.00*   HEMOGLOBIN g/dL 11.8*   HEMATOCRIT % 39.5   PLATELETS 10*3/mm3 191     Results from last 7 days   Lab Units 06/20/24  0619   SODIUM mmol/L 146*   POTASSIUM mmol/L 4.0   CHLORIDE mmol/L 111*   CO2 mmol/L 21.7*   BUN mg/dL 25*   CREATININE mg/dL 1.33*   GLUCOSE mg/dL 86   CALCIUM mg/dL 8.5     Results from last 7 days   Lab Units 06/20/24  0619   SODIUM mmol/L 146*   POTASSIUM mmol/L 4.0   CHLORIDE mmol/L 111*   CO2 mmol/L 21.7*   BUN mg/dL 25*   CREATININE mg/dL 1.33*   CALCIUM mg/dL 8.5   GLUCOSE mg/dL 86     Imaging Results (Last 72 Hours)       Procedure Component Value Units Date/Time    XR Chest 1 View [[id number]] Collected: 06/20/24 1103     Updated: 06/20/24 1106    Narrative:      EXAM:    XR Chest, 1 View     EXAM DATE:    6/20/2024 9:33 AM     CLINICAL " HISTORY:    Fluid Overlaod re-evaluatoin; N17.9-Acute kidney failure, unspecified;  E83.42-Hypomagnesemia; R44.3-Hallucinations, unspecified; I50.9-Heart  failure, unspecified; I31.39-Other pericardial effusion  (noninflammatory); J18.9-Pneumonia, unspecified organism     TECHNIQUE:    Frontal view of the chest.     COMPARISON:    6/18/2024     FINDINGS:    Lungs and pleural spaces:  Interstitial edema.  Left basilar airspace  disease.  Pulmonary vascular congestion.  No pneumothorax.    Heart:  Cardiomegaly.    Mediastinum:  Unremarkable as visualized.  Normal mediastinal contour.    Bones/joints:  Small effusions.  No acute fracture.       Impression:        CHF/edema with small effusions and left basilar airspace disease. May  be slightly increased from the previous exam.        This report was finalized on 6/20/2024 11:04 AM by Dr. Marquis Ruiz MD.       CT Abdomen Pelvis Without Contrast [[id number]] Collected: 06/18/24 1119     Updated: 06/18/24 1123    Narrative:      EXAM:    CT Abdomen and Pelvis Without Intravenous Contrast     EXAM DATE:    6/18/2024 10:42 AM     CLINICAL HISTORY:    Abdominal Tenderness (genalized) and Distended; N17.9-Acute kidney  failure, unspecified; E83.42-Hypomagnesemia; R44.3-Hallucinations,  unspecified; I50.9-Heart failure, unspecified; I31.39-Other pericardial  effusion (noninflammatory); J18.9-Pneumonia, unspecified organism     TECHNIQUE:    Axial computed tomography images of the abdomen and pelvis without  intravenous contrast.  Sagittal and coronal reformatted images were  created and reviewed.  This CT exam was performed using one or more of  the following dose reduction techniques:  automated exposure control,  adjustment of the mA and/or kV according to patient size, and/or use of  iterative reconstruction technique.     COMPARISON:    6/10/2024     FINDINGS:    Lung bases:  Bibasilar consolidative airspace disease increased from  previous. May present atelectasis  and/or pneumonia.    Heart:  Marked cardiomegaly with small pericardial effusion and  bilateral pleural effusions noted.      ABDOMEN:    Liver:  Unremarkable as visualized.    Gallbladder and bile ducts:  Unremarkable as visualized.  No calcified  stones.  No ductal dilation.    Pancreas:  Unremarkable as visualized.  No ductal dilation.    Spleen:  Unremarkable as visualized.  No splenomegaly.    Adrenals:  Unremarkable as visualized.  No mass.    Kidneys and ureters:  Unremarkable as visualized.  No renal or  ureteral stones. No obstructive uropathy.    Stomach and bowel:  Sigmoid diverticulosis without evidence of  diverticulitis.  No obstruction.      PELVIS:    Appendix:  Normal appendix.    Bladder:  Unremarkable as visualized.  No stones.    Reproductive:  Endometrial thickening again noted. Refer to ultrasound  findings.      ABDOMEN and PELVIS:    Intraperitoneal space:  Unremarkable as visualized.  No significant  fluid collection.  No pneumoperitoneum identified.    Bones/joints:  Stable appearance of the bony structures with  degenerative changes noted lumbar spine and mild multilevel stenosis.   No dislocation.  No acute bony changes compared to the previous exam.    Soft tissues:  Anasarca.    Vasculature:  Advanced atherosclerosis without evidence of aneurysm.  Stable.    Lymph nodes:  Unremarkable as visualized.  No enlarged lymph nodes.       Impression:      1.  Bibasilar consolidative airspace disease increased from previous.  May present atelectasis and/or pneumonia.  2.  Marked cardiomegaly with small pericardial effusion and bilateral  pleural effusions noted.  Correlate for CHF.  3.  Anasarca.  4.  Endometrial thickening again noted. Refer to ultrasound findings.  5.  Advanced atherosclerosis without evidence of aneurysm. Stable.  6.  Sigmoid diverticulosis without evidence of diverticulitis.  No bowel  obstruction.  7. Other incidental/nonacute findings as above.        This report was  finalized on 6/18/2024 11:21 AM by Dr. Marquis Ruiz MD.       XR Chest 1 View [[id number]] Collected: 06/18/24 1110     Updated: 06/18/24 1113    Narrative:      EXAM:    XR Chest, 1 View     EXAM DATE:    6/18/2024 10:15 AM     CLINICAL HISTORY:    Hypoxia and tachypnea (Aspiration Pneumonitis vs PNA vs Fliud  Overload); N17.9-Acute kidney failure, unspecified;  E83.42-Hypomagnesemia; R44.3-Hallucinations, unspecified; I50.9-Heart  failure, unspecified; I31.39-Other pericardial effusion  (noninflammatory); J18.9-Pneumonia, unspecified organism     TECHNIQUE:    Frontal view of the chest.     COMPARISON:    6/10/2024     FINDINGS:    Lungs and pleural spaces:  Interstitial edema.  Perihilar opacities.   Pulmonary vascular congestion.  No pneumothorax.    Heart:  Cardiomegaly.    Mediastinum:  Unremarkable as visualized.  Normal mediastinal contour.    Bones/joints:  Small effusions.  No acute fracture.       Impression:      1.  CHF/edema with small pleural effusions.  2.  Bibasilar airspace disease.        This report was finalized on 6/18/2024 11:11 AM by Dr. Marquis Ruiz MD.               Diagnostics: Reviewed    A: Zeenat Dong is a 89 y.o. female admitted on due abnormal labs, generalized weakness and altered mental status. Vaughn has a medical history of  hypertension, hyperlipidemia, hypercalcemia, hyperparathyroidism, GERD, CHF, atrial fibrillation, chronic kidney disease stage IIIa, type II DM. Per pts daughter Jeff she has not been eating or drinking very well and has progressively becoming weaker.     ED, vitals were temperature 98.1, , RR 18, /69, SpO2 97% on room air.  Significant for initial troponin 29, repeat pending.  proBNP 9871.  BUN 29, creatinine 1.60.  Magnesium 1.3.  Ammonia 57, CRP 7.27, lactate 2.1.  WBC 20.64 with left shift.  CT abdomen/pelvis with no acute intra-abdominal process.  CT chest shows focal airspace opacity left lower lobe favors atelectasis over infiltrate.   Also small left pleural effusion.  Cardiomegaly.  Small pericardial effusion.     Palliative consulted for GOC/ACP, support and hospice referral discussion.           P:   Per discussion with Dr Reyes he states that Zeenat's family was interesting in discussing hospice services. After discussion with daughter Jeff regarding what hospice services offered she stated that she would like to sign her mother up for hospice at home. I put referral in, spoke with Dr Reyes and let him know as well as spoke with [facility] regarding pt discharge with  hospice today, that family would like new hospital bed and O2 however she can d/c ahead of equipment as pt does have a bed and O2/ EMS DNR completed by Tayler Hand and on pts chart.    We appreciate the consult and the opportunity to participate in Zeenat Dong's care. We will continue to follow along. Please do not hesitate to contact us regarding further symptom management or goals of care needs, including after hours or on weekends via our on call provider at [phone].     Time: 55 minutes spent reviewing medical and medication records, assessing and examining patient, discussing with family, answering questions, providing some guidance about a plan and documentation of care, and coordinating care with other healthcare members, with > 50% time spent face to face.     Kelly Pastrana, APRN    6/20/2024

## 2024-06-20 NOTE — DISCHARGE PLACEMENT REQUEST
"Zeenat Henriquez (89 y.o. Female)       Date of Birth   1928    Social Security Number       Address   [address]  [facility] [address]    Home Phone   [phone]    MRN   [id number]       [facility]    Marital Status                               Admission Date   6/10/24    Admission Type   Emergency    Admitting Provider   Sravanthi Richard MD    Attending Provider   Reyes, Rodolfo, MD    Department, Room/Bed   [address], 3348/1S       Discharge Date       Discharge Disposition       Discharge Destination                                 Attending Provider: Reyes, Rodolfo, MD    Allergies: No Known Allergies    Isolation: None   Infection: None   Code Status: No CPR    Ht: 160 cm (63\")   Wt: 59.9 kg (132 lb)    Admission Cmt: None   Principal Problem: Sepsis [A41.9]                   Active Insurance as of 6/10/2024       Primary Coverage       Payor Plan Insurance Group Employer/Plan Group    MEDICARE MEDICARE A & B        Payor Plan Address Payor Plan Phone Number Payor Plan Fax Number Effective Dates    [address] [phone]  1993 - None Entered    [place] [address]         Subscriber Name Subscriber Birth Date Member ID       ZEENAT HENRIQUEZ 1928 [id number]                     Emergency Contacts        (Rel.) Home Phone Work Phone Mobile Phone    Jeff Deras (Daughter) [phone] --          [address] [address]  Phone:  [phone]  Fax:  [phone]        Patient: ROOM: [facility]   Zeenat Henriquez MRN:  [id number]   [address]   [facility] [address] :  1928  SSN:    Phone: [phone] Sex:  F   PCP: Sravani Chapa                 Emergency Contact Information       Name Relation Home Work Mobile     Patti Derasne Daughter [phone]            INSURANCE PAYOR PLAN GROUP # SUBSCRIBER ID   Primary:    MEDICARE [id number]   [id number] "   Admitting Diagnosis: Sepsis [A41.9]  Order Date:  2024        Case Management  Consult       (Order ID: [id number])     Diagnosis:         Priority:  Routine Expected Date:   Expiration Date:        Interval:   Count:    Reason for Consult: 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  I have discussed and formulated the assessment and plan with COLIN Reeder.  The patient meets criteria for severe sepsis per CMS, as lactic acid is 2.1, white blood cell count 20,640, heart rates .  Imaging shows a left lower lobe infiltrate and she appears to be in acute kidney injury as the baseline creatinine is 0.95 and the admission creatinine is 1.6.  Patient does have some baseline dementia.  We will start the patient on ceftriaxone and doxycycline 9; I have ordered a sputum culture.  I have consulted palliative care for goals of care discussion.  I have also started her on normal saline at 75 mL/h for the acute kidney injury.  We are using SCUDs for DVT prophylaxis.  The patient is admitted to the medical surgical floor but due to lack of beds she is currently a border in ER room 117.                       [facility] Medicine Services  HISTORY & PHYSICAL    Patient Identification:  Name:  Zeenat Dong  Age:  89 y.o.  Sex:  female  :  1928  MRN:  [id number]   Visit Number:  [id number]  Admit Date:  6/10/2024   Primary Care Physician:  Fatmata Dong APRN     Subjective     Chief complaint:   Chief Complaint   Patient presents with    Abnormal Lab    Weakness - Generalized    Altered Mental Status     History of presenting illness:   Patient is a 89 y.o. female with past medical history significant for hypertension, hyperlipidemia, hypercalcemia, hyperparathyroidism, GERD, CHF, atrial fibrillation, chronic kidney disease stage IIIa, type II DM that presented to the [facility] emergency department for evaluation of altered mental status.    Patient examined at bedside in ED.  Patient present presented to the ED with daughter and granddaughter due to concerns of worsening altered mental status and hallucinations over the past week.  Said similar symptoms in the past when diagnosed with UTI, however she was negative for UTI at her PCP earlier this week..  Daughter reports the patient has had a poor appetite as well, and does not eat or drink very much.  Reports patient is typically on 40 mg of Lasix daily, however given worsening weakness there was concern she may have been dehydrated so she was decreased to 20 mg of Lasix daily saw her PCP last week.  However, patient did take full 40 mg the past 2 days.  Granddaughter Notes that patient has become significantly weaker over the past few days.  At the time of my exam, patient was lethargic.  She does arouse to verbal or tactile stimuli, and occasionally responded verbally.  She would not answer any orientation questions or ROS.  Patient does live with her daughter and has multiple other family numbers to help provide care for her.    Upon arrival to the ED, vitals were temperature 98.1, , RR 18, /69, SpO2 97% on room air.  Significant for initial troponin 29, repeat pending.  proBNP 9871.  BUN 29, creatinine 1.60.  Magnesium 1.3.  Ammonia 57, CRP 7.27, lactate 2.1.  WBC 20.64 with left shift.  CT abdomen/pelvis with no acute  intra-abdominal process.  CT chest shows focal airspace opacity left lower lobe favors atelectasis over infiltrate.  Also small left pleural effusion.  Cardiomegaly.  Small pericardial effusion.  CT head no acute intracranial process.    Patient has been admitted to the medical telemetry.  ---------------------------------------------------------------------------------------------------------------------   Review of Systems   Unable to perform ROS: Mental status change      ---------------------------------------------------------------------------------------------------------------------   Past Medical History:   Diagnosis Date    Hyperlipidemia      Past Surgical History:   Procedure Laterality Date    OTHER SURGICAL HISTORY      ear surgery    SKIN BIOPSY       Family History   Problem Relation Age of Onset    Other Father         cardiovascular disease    Kidney disease Sister      Social History     Socioeconomic History    Marital status:    Tobacco Use    Smoking status: Never   Vaping Use    Vaping status: Never Used   Substance and Sexual Activity    Alcohol use: No    Drug use: Never     ---------------------------------------------------------------------------------------------------------------------   Allergies:  Patient has no known allergies.  ---------------------------------------------------------------------------------------------------------------------   Medications below are reported home medications pulling from within the system; at this time, these medications have not been reconciled unless otherwise specified and are in the verification process for further verifcation as current home medications.    Prior to Admission Medications       Prescriptions Last Dose Informant Patient Reported? Taking?    apixaban (ELIQUIS) 5 MG tablet tablet  Family Member, Other Yes No    Take 1 tablet by mouth 2 (Two) Times a Day.    cetirizine (zyrTEC) 10 MG tablet  Family Member, Other Yes No     Take 1 tablet by mouth Daily.    cinacalcet (SENSIPAR) 30 MG tablet  Family Member, Other No No    Take 1 tablet by mouth daily.    erythromycin (ROMYCIN) 5 MG/GM ophthalmic ointment  Family Member, Other Yes No    Administer 1 application  to both eyes 3 (Three) Times a Day As Needed (for redness/dryness).    ezetimibe (ZETIA) 10 MG tablet  Family Member, Other Yes No    Take 1 tablet by mouth Daily.    furosemide (LASIX) 40 MG tablet  Family Member, Other Yes No    Take 1 tablet by mouth Daily.    losartan (COZAAR) 50 MG tablet  Family Member, Other Yes No    Take 1 tablet by mouth 2 (Two) Times a Day.    metoprolol tartrate (LOPRESSOR) 100 MG tablet  Family Member, Other No No    Take 1 tablet by mouth 2 (two) times a day. For hypertension.    pantoprazole (PROTONIX) 40 MG EC tablet  Family Member, Other No No    Take 1 tablet by mouth daily. For gerd without esophagitis.    potassium chloride (K-DUR,KLOR-CON) 10 MEQ CR tablet  Family Member, Other No No    Take 1 tablet by mouth daily. For hypokalemia.    vitamin D (ERGOCALCIFEROL) 1.25 MG (44129 UT) capsule capsule  Family Member, Other Yes No    Take 1 capsule by mouth 1 (One) Time Per Week.          ---------------------------------------------------------------------------------------------------------------------    Objective     Hospital Scheduled Meds:  sodium chloride, 10 mL, Intravenous, Q12H      sodium chloride, 75 mL/hr, Last Rate: 75 mL/hr (06/11/24 0319)        Current listed hospital scheduled medications may not yet reflect those currently placed in orders that are signed and held, awaiting patient's arrival to floor/unit.    ---------------------------------------------------------------------------------------------------------------------   Vital Signs:  Temp:  [98.9 °F (37.2 °C)] 98.9 °F (37.2 °C)  Heart Rate:  [] 93  Resp:  [18] 18  BP: (127-165)/(68-94) 162/68  Mean Arterial Pressure (Non-Invasive) for the past 24 hrs (Last 3  readings):   Noninvasive MAP (mmHg)   06/11/24 0215 117   06/11/24 0200 91   06/11/24 0145 105     SpO2 Percentage    06/11/24 0145 06/11/24 0200 06/11/24 0215   SpO2: 98% 98% 98%     SpO2:  [96 %-100 %] 98 %  on   ;   Device (Oxygen Therapy): room air    Body mass index is 25.78 kg/m².  Wt Readings from Last 3 Encounters:   06/10/24 59.9 kg (132 lb)   12/14/23 60.7 kg (133 lb 12.8 oz)   06/21/16 67.6 kg (149 lb)     ---------------------------------------------------------------------------------------------------------------------   Physical Exam:  Physical Exam  Constitutional:       General: She is not in acute distress.     Appearance: Normal appearance. She is ill-appearing.   HENT:      Head: Normocephalic and atraumatic.      Right Ear: External ear normal.      Left Ear: External ear normal.      Nose: No nasal deformity.      Mouth/Throat:      Lips: Pink.      Mouth: Mucous membranes are moist.   Eyes:      Conjunctiva/sclera: Conjunctivae normal.      Pupils: Pupils are equal, round, and reactive to light.   Cardiovascular:      Rate and Rhythm: Normal rate. Rhythm irregularly irregular.      Pulses:           Dorsalis pedis pulses are 2+ on the right side and 2+ on the left side.      Heart sounds: Normal heart sounds.   Pulmonary:      Effort: Pulmonary effort is normal.      Breath sounds: Examination of the right-lower field reveals decreased breath sounds. Examination of the left-lower field reveals decreased breath sounds. Decreased breath sounds present. No wheezing, rhonchi or rales.   Abdominal:      General: Abdomen is flat. Bowel sounds are normal.      Palpations: Abdomen is soft.      Tenderness: There is no guarding or rebound.   Genitourinary:     Comments: No calhoun catheter in place   Musculoskeletal:      Cervical back: Neck supple. Normal range of motion.      Right lower leg: No edema.      Left lower leg: No edema.   Skin:     General: Skin is warm and dry.   Neurological:       General: No focal deficit present.      Mental Status: She is alert and easily aroused. She is lethargic and confused.   Psychiatric:         Mood and Affect: Mood normal.         Behavior: Behavior normal.       ---------------------------------------------------------------------------------------------------------------------  EKG: Atrial fibrillation.  Heart rate 95.          I have personally reviewed the EKG/Telemetry strip  ---------------------------------------------------------------------------------------------------------------------   Results from last 7 days   Lab Units 06/10/24  2101   HSTROP T ng/L 29*     Results from last 7 days   Lab Units 06/10/24  2101   PROBNP pg/mL 9,871.0*       Results from last 7 days   Lab Units 06/11/24  0329   PH, ARTERIAL pH units 7.489*   PO2 ART mm Hg 69.8*   PCO2, ARTERIAL mm Hg 33.7*   HCO3 ART mmol/L 25.5     Results from last 7 days   Lab Units 06/11/24  0047 06/10/24  2101   CRP mg/dL  --  7.27*   LACTATE mmol/L 1.8 2.1*   WBC 10*3/mm3  --  20.64*   HEMOGLOBIN g/dL  --  12.3   HEMATOCRIT %  --  38.8   MCV fL  --  94.9   MCHC g/dL  --  31.7   PLATELETS 10*3/mm3  --  228     Results from last 7 days   Lab Units 06/10/24  2101   SODIUM mmol/L 141   POTASSIUM mmol/L 4.5   MAGNESIUM mg/dL 1.3*   CHLORIDE mmol/L 107   CO2 mmol/L 20.6*   BUN mg/dL 29*   CREATININE mg/dL 1.60*   CALCIUM mg/dL 9.8*   GLUCOSE mg/dL 281*   ALBUMIN g/dL 3.0*   BILIRUBIN mg/dL 0.4   ALK PHOS U/L 73   AST (SGOT) U/L 13   ALT (SGPT) U/L 14   Estimated Creatinine Clearance: 16.7 mL/min (A) (by C-G formula based on SCr of 1.6 mg/dL (H)).  Ammonia   Date Value Ref Range Status   06/10/2024 57 (H) 11 - 51 umol/L Final       Glucose   Date/Time Value Ref Range Status   06/10/2024 2159 244 (H) 70 - 130 mg/dL Final     Lab Results   Component Value Date    HGBA1C 6.9 (H) 08/18/2015     Lab Results   Component Value Date    TSH 3.752 08/17/2015    FREET4 1.09 08/17/2015       No results found for:  "\"BLOODCX\"  No results found for: \"URINECX\"  No results found for: \"WOUNDCX\"  No results found for: \"STOOLCX\"  No results found for: \"RESPCX\"  No results found for: \"MRSACX\"  Pain Management Panel  More data may exist         Latest Ref Rng & Units 6/10/2024 8/17/2015   Pain Management Panel   Creatinine, Urine mg/dL  mg/dL - 129.2  130.0    Amphetamine, Urine Qual Negative Negative  -   Barbiturates Screen, Urine Negative Negative  -   Benzodiazepine Screen, Urine Negative Negative  -   Buprenorphine, Screen, Urine Negative Negative  -   Cocaine Screen, Urine Negative Negative  -   Fentanyl, Urine Negative Negative  -   Methadone Screen , Urine Negative Negative  -   Methamphetamine, Ur Negative Negative  -     I have personally reviewed the above laboratory results.   ---------------------------------------------------------------------------------------------------------------------  Imaging Results (Last 7 Days)       Procedure Component Value Units Date/Time    CT Abdomen Pelvis Without Contrast [[id number]] Collected: 06/10/24 2338     Updated: 06/10/24 2340    Narrative:      Noncontrast CT abdomen and pelvis     Indications: Altered mental status     Technique: Noncontrast CT images of the abdomen and pelvis were  obtained.  Limited exposure control, adjustment of the MA and/or KV  according to patient size or use of an iterative reconstruction  technique was utilized.     Findings CT abdomen pelvis:     No prior studies available.     The liver and spleen are normal.  There is no biliary dilation.     The pancreas is normal.     The adrenal glands and kidneys are normal.     There is no abdominal or retroperitoneal lymphadenopathy.     The bowel loops are normal in course and caliber.  There is  diverticulosis of the descending and sigmoid colon.     There is no pelvic mass or free fluid or lymphadenopathy.  Endometrial  cavity is abnormally dilated measuring 1.3 cm.     The osseous structures are normal.    "    Impression:      Impression:  1.  No CT evidence of an acute intra-abdominal or intrapelvic process.  2.  Abnormally dilated endometrium.  Suggest nonemergent follow-up with  pelvic ultrasound     This report was finalized on 6/10/2024 11:38 PM by Renard Roberts MD.       CT Chest Without Contrast Diagnostic [[id number]] Collected: 06/10/24 2335     Updated: 06/10/24 2337    Narrative:      Noncontrast CT chest     Indications: Altered mental status     Technique: Noncontrast CT images of the chest were obtained.  Limited  exposure control, adjustment of the MA and/or KV according to patient  size or use of an iterative reconstruction technique was utilized.     Findings CT chest:     Comparison is made with a prior study dated 12/7/2023     The heart is enlarged.  A small pericardial effusion is present.   Thoracic aorta is normal in course and caliber.  There is no hilar or  mediastinal lymphadenopathy.  Atherosclerotic calcifications involve the  thoracic aorta and coronary arteries.     There is a focal airspace opacity at the left lung base favoring  atelectasis over infiltrate.  Small left pleural effusion is present.     Degenerative changes involve the thoracic spine.          Impression:      Impression:  1.  Focal airspace opacity at the left lower lobe favors atelectasis  over infiltrate.  There also is a small left pleural effusion.  2.  Cardiomegaly  3.  Small pericardial effusion, new since the prior study     This report was finalized on 6/10/2024 11:35 PM by Renard Roberts MD.       CT Head Without Contrast [[id number]] Collected: 06/10/24 2332     Updated: 06/10/24 2335    Narrative:      Noncontrast CT brain     Indications: Altered mental status     Technique: Noncontrast CT images of the brain were obtained.  Limited  exposure control, adjustment of the MA and/or KV according to patient  size or use of an iterative reconstruction technique was utilized.     Findings:     No prior studies  available.     There is no evidence of acute intracranial hemorrhage.  The ventricles  are normal in size and position.  There is no mass-effect or midline  shift.  Patchy and confluent areas of hypodensity involve the  periventricular deep white matter compatible sequelae of chronic small  vessel ischemic disease.  No abnormal extra-axial fluid collections are  demonstrated.     The bony calvarium is normal.       Impression:      Impression:     No CT evidence of an acute intracranial process.     This report was finalized on 6/10/2024 11:32 PM by Renard Roberts MD.       XR Chest 1 View [[id number]] Collected: 06/10/24 2201     Updated: 06/10/24 2204    Narrative:      PROCEDURE: Portable chest x-ray examination performed on Ruma 10, 2024.  Single view. Upright position.     HISTORY: Weakness. Altered mental status.     COMPARISON: None.     FINDINGS:     Enlarged heart size  Elevated right hemidiaphragm.  Coarsened bronchovascular pattern to the lungs.  Possible left lower lobe pneumonia.  Possible small left pleural effusion.  No interstitial edema or pneumothorax.  No free air in the upper abdomen.       Impression:         1.  Enlarged heart size.  2.  Left lower lobe atelectasis versus pneumonia with small left pleural  effusion.  3.  Mild elevated right hemidiaphragm.  4.  No pneumothorax.     This report was finalized on 6/10/2024 10:02 PM by Joseph Harmon MD.             I have personally reviewed the above radiology results.     Last Echocardiogram:  Results for orders placed during the hospital encounter of 12/07/23    Adult Transthoracic Echo Complete W/ Cont if Necessary Per Protocol    Interpretation Summary    Left ventricular systolic function is normal. Left ventricular ejection fraction appears to be 61 - 65%.    Left ventricular diastolic function is consistent with (grade II w/high LAP) pseudonormalization.    The left atrial cavity is mild to moderately dilated.    The right atrial cavity  is mildly  dilated.    Moderate aortic valve regurgitation is present.    Estimated right ventricular systolic pressure from tricuspid regurgitation is moderately elevated (45-55 mmHg).    ---------------------------------------------------------------------------------------------------------------------    Assessment & Plan      Active Hospital Problems    Diagnosis  POA    **Sepsis [A41.9]  Yes     Sepsis secondary to left lower lobe pneumonia, POA  CT chest significant for atelectasis versus pneumonia.  Will treat as pneumonia given elevated inflammatory markers and change in mental status.  Continue/add supplemental oxygen via nasal cannula to maintain O2 > or equal to 90%.  Order urinary legionella, strep pneumo.  PCR negative in ED.  PRN nebs every 6 hours.   Doxycycline and Rocephin given in ED.  Continue these as empiric antibiotic coverage at this time.  Blood cultures obtained in ED  No hypoxia on ABG.  Continue to monitor respiratory status.  Acute kidney injury on CKD stage IIIa, POA  Hypomagnesemia, POA  Baseline Cr 1.09 in January, was up to 1.60 on admission  Continue mIVFs, Trend Cr and urine output, avoid nephrotoxins, NSAIDs, dehydration and contrast as able.  Magnesium replacement ordered.  Continue to replace as indicated.  Repeat BMP in the a.m.   Generalized weakness, POA  Altered mental status, POA  Likely secondary to pneumonia and WINSTON.  Monitor improvement with treatment.  PT/OT/SLP evaluations.    CHRONIC MEDICAL PROBLEMS    Atrial fibrillation, POA  Resume home rate control medications and Eliquis pending med rec  HFpEF  Clinically compensated on exam.  proBNP elevated, however secondary to elevated creatinine.  Monitor closely for signs of volume overload given fluid resuscitation for WINSTON.  Hold Lasix for now.  Essential hypertension  BP appears well controlled   Plan to resume home antihypertensive regimen once reconciled per pharmacy with appropriate holding parameters to prevent  hypotension and/or bradycardia   Closely monitor BP per hospital protocol, titrate medications as necessary  Type II DM  Hemoglobin A1C ordered to evaluate glycemic control.   No diabetic medications on preliminary med rec.  Hold any oral antihyperglycemic's to prevent hypoglycemia.    Start sliding scale insulin.  Accu-Cheks and hypoglycemia protocol in place.    Hyperlipidemia   Hypercalcemia  Hyperparathyroidism  GERD  Restart home medications pending med rec    F/E/N: IV normal saline at 75 mill per hour.  Continue monitor electrolytes and replace as indicated.  NPO.    ---------------------------------------------------  DVT Prophylaxis: Chronically anticoagulated with Eliquis  Activity: Up with assistance    The patient is considered to be a high risk patient due to: Sepsis secondary to left lower lobe pneumonia, WINSTON on CKD    INPATIENT status due to the need for care which can only be reasonably provided in an hospital setting such as aggressive/expedited ancillary services and/or consultation services, the necessity for IV medications, close physician monitoring and/or the possible need for procedures.  In such, I feel patient’s risk for adverse outcomes and need for care warrant INPATIENT evaluation and predict the patient’s care encounter to likely last beyond 2 midnights.      Code Status: DNR/DNI    Disposition/Discharge planning: Pending clinical course, tentative plans to return home with family pending clinical improvement    I have discussed the patient's assessment and plan with Dr. Richard.      Lilli Sharpe PA-C  Hospitalist Service -- [facility]       06/11/24  04:02 EDT    Attending Physician: Dr. Richard.      Electronically signed by Sravanthi Richard MD at 06/11/24 0640       Vital Signs (last day)       Date/Time Temp Temp src Pulse Resp BP Patient Position SpO2    06/20/24 0734 -- -- 116 18 -- -- 97    06/20/24 0700 98 (36.7) Oral 100 18 148/73 Lying --    06/20/24 0300 -- --  80 -- -- -- --    06/19/24 2117 -- -- 125 -- 126/67 -- --    06/19/24 1928 -- -- 110 18 -- -- 98    06/19/24 1400 98.3 (36.8) Oral 102 18 121/67 Lying 98    06/19/24 0648 -- -- -- -- -- -- 95    06/19/24 0600 98 (36.7) Axillary 102 20 147/77 Lying 96    06/19/24 0343 98.2 (36.8) Axillary 100 20 153/75 Lying 96          Intake & Output (last day)         06/19 0701  06/20 0700 06/20 0701 06/21 0700    P.O. 420     IV Piggyback      Total Intake(mL/kg) 420 (7)     Net +420           Urine Unmeasured Occurrence 7 x           Current Facility-Administered Medications   Medication Dose Route Frequency Provider Last Rate Last Admin    acetaminophen (TYLENOL) tablet 650 mg  650 mg Oral Q6H PRN Reji Calvillo DO        apixaban (ELIQUIS) tablet 2.5 mg  2.5 mg Oral BID Reji Calvillo DO   2.5 mg at 06/20/24 0910    bisacodyl (DULCOLAX) EC tablet 5 mg  5 mg Oral Daily PRN Sravanthi Richard MD        And    bisacodyl (DULCOLAX) suppository 10 mg  10 mg Rectal Daily PRN Sravanthi Richard MD   10 mg at 06/17/24 0515    budesonide-formoterol (SYMBICORT) 160-4.5 MCG/ACT inhaler 2 puff  2 puff Inhalation BID - RT Reji Calvillo DO   2 puff at 06/20/24 0734    cefepime 2000 mg IVPB in 100 mL NS (VTB)  2,000 mg Intravenous Q24H Johanna Chapa, APRN   2,000 mg at 06/19/24 1339    cetirizine (zyrTEC) tablet 10 mg  10 mg Oral Daily PRN Reji Calvillo DO        cinacalcet (SENSIPAR) tablet 30 mg  30 mg Oral Daily Reji Calvillo DO   30 mg at 06/20/24 0910    dextrose (D50W) (25 g/50 mL) IV injection 25 g  25 g Intravenous Q15 Min PRN Lilli Sharpe PA-C        dextrose (GLUTOSE) oral gel 15 g  15 g Oral Q15 Min PRN Lilli Sharpe PA-C        erythromycin (ROMYCIN) ophthalmic ointment 1 Application  1 Application Both Eyes BID PRN Reji Calvillo DO        folic acid (FOLVITE) tablet 500 mcg  500 mcg Oral Daily Reji Calvillo DO   500 mcg at 06/20/24 0910     glucagon HCl (Diagnostic) injection 1 mg  1 mg Intramuscular Q15 Min PRN Lilli Sharpe PA-C        guaiFENesin (MUCINEX) 12 hr tablet 1,200 mg  1,200 mg Oral Q12H Reji Calvillo DO   1,200 mg at 06/20/24 0909    hydrocortisone 1 % ointment 1 Application  1 Application Topical BID Reji Calvillo DO   1 Application at 06/20/24 0911    Insulin Lispro (humaLOG) injection 2-7 Units  2-7 Units Subcutaneous 4x Daily AC & at Bedtime Lilli Sharpe PA-C   2 Units at 06/19/24 1131    iopamidol (ISOVUE-300) 61 % injection 100 mL  100 mL Intravenous Once in imaging Reyes, Rodolfo, MD        ipratropium (ATROVENT) nebulizer solution 0.5 mg  0.5 mg Nebulization Q6H PRN Lilli Sharpe PA-C        Magnesium Low Dose Replacement - Follow Nurse / BPA Driven Protocol   Does not apply PRN Sravanthi Richard MD        megestrol (MEGACE) 40 MG/ML suspension 80 mg  80 mg Oral Daily Reji Calvillo DO   80 mg at 06/20/24 0911    melatonin tablet 5 mg  5 mg Oral Nightly Reyes, Rodolfo, MD   5 mg at 06/19/24 2117    metoprolol tartrate (LOPRESSOR) injection 5 mg  5 mg Intravenous Q6H PRN Reji Calvillo DO   5 mg at 06/15/24 0910    metoprolol tartrate (LOPRESSOR) tablet 100 mg  100 mg Oral Q12H Reji Calvillo DO   100 mg at 06/20/24 0910    metroNIDAZOLE (FLAGYL) IVPB 500 mg  500 mg Intravenous Q8H Johanna Chapa APRN 200 mL/hr at 06/20/24 0611 500 mg at 06/20/24 0611    NIFEdipine CC (ADALAT CC) 24 hr tablet 30 mg  30 mg Oral Q24H Reji Calvillo DO   30 mg at 06/20/24 0910    nitroglycerin (NITROSTAT) SL tablet 0.4 mg  0.4 mg Sublingual Q5 Min PRN Sravanthi Richard MD        OLANZapine zydis (zyPREXA) disintegrating tablet 5 mg  5 mg Oral Nightly Reji Calvillo DO   5 mg at 06/19/24 2117    OLANZapine zydis (zyPREXA) disintegrating tablet 5 mg  5 mg Oral Q8H PRN Reyes, Rodolfo, MD        ondansetron (ZOFRAN) injection 4 mg  4 mg Intravenous Q6H PRN  Reji Calvillo DO        pantoprazole (PROTONIX) EC tablet 40 mg  40 mg Oral Daily Reji Calvillo DO   40 mg at 24 0910    polyethylene glycol (MIRALAX) packet 17 g  17 g Oral BID Reyes, Rodolfo, MD   17 g at 24 0909    Potassium Replacement - Follow Nurse / BPA Driven Protocol   Does not apply PRN Sravanthi Richard MD        sennosides-docusate (PERICOLACE) 8.6-50 MG per tablet 2 tablet  2 tablet Oral BID Reyes, Rodolfo, MD   2 tablet at 24 0909    sodium chloride 0.9 % flush 10 mL  10 mL Intravenous PRN Haylee Rawls,         sodium chloride 0.9 % flush 10 mL  10 mL Intravenous Q12H Sravanthi Richard MD   10 mL at 24 0911    sodium chloride 0.9 % flush 10 mL  10 mL Intravenous PRN Sravanthi Richard MD        sodium chloride 0.9 % flush 10 mL  10 mL Intravenous Q12H Reji Calvillo DO   10 mL at 24 0910    sodium chloride 0.9 % flush 10 mL  10 mL Intravenous PRN Reji Calvillo DO        sodium chloride 0.9 % infusion 40 mL  40 mL Intravenous PRN Sravanthi Richard MD        sodium chloride 0.9 % infusion 40 mL  40 mL Intravenous PRN Reji Calvillo DO        vitamin B-12 (CYANOCOBALAMIN) tablet 1,000 mcg  1,000 mcg Oral Once per day on  Reji Calvillo DO   1,000 mcg at 24 0910     Operative/Procedure Notes (most recent note)    No notes of this type exist for this encounter.          Physician Progress Notes (most recent note)        Seema Toribio APRN at 24 1343                     PROGRESS NOTE         Patient Identification:  Name:  Zeenat Dong  Age:  89 y.o.  Sex:  female  :  1928  MRN:  [id number]  Visit Number:  [id number]  Primary Care Provider:  Sravani Chapa APRN         LOS: 8 days       ----------------------------------------------------------------------------------------------------------------------  Subjective       Chief  Complaints:    Abnormal Lab, Weakness - Generalized, and Altered Mental Status        Interval History:      Patient resting in bed this morning.  Drowsy and sedate.  Currently on 1 L per nasal cannula with no apparent distress.  Diminished lung sounds bilaterally.  Abdomen remains distended.  WBC improving at 17.09.    Review of Systems:    JOO due to mental status  ----------------------------------------------------------------------------------------------------------------------      Objective       Current Hospital Meds:  apixaban, 2.5 mg, Oral, BID  budesonide-formoterol, 2 puff, Inhalation, BID - RT  cefepime, 2,000 mg, Intravenous, Q24H  cinacalcet, 30 mg, Oral, Daily  folic acid, 500 mcg, Oral, Daily  furosemide, 20 mg, Intravenous, Q6H  guaiFENesin, 1,200 mg, Oral, Q12H  hydrocortisone, 1 Application, Topical, BID  insulin lispro, 2-7 Units, Subcutaneous, 4x Daily AC & at Bedtime  iopamidol, 100 mL, Intravenous, Once in imaging  magnesium oxide, 800 mg, Oral, BID  megestrol, 80 mg, Oral, Daily  melatonin, 5 mg, Oral, Nightly  metoprolol tartrate, 100 mg, Oral, Q12H  metroNIDAZOLE, 500 mg, Intravenous, Q8H  NIFEdipine CC, 30 mg, Oral, Q24H  OLANZapine zydis, 5 mg, Oral, Nightly  pantoprazole, 40 mg, Oral, Daily  polyethylene glycol, 17 g, Oral, BID  potassium chloride, 20 mEq, Oral, Q6H  senna-docusate sodium, 2 tablet, Oral, BID  sodium chloride, 10 mL, Intravenous, Q12H  sodium chloride, 10 mL, Intravenous, Q12H  vitamin B-12, 1,000 mcg, Oral, Once per day on Monday Tuesday Wednesday Thursday Friday           ----------------------------------------------------------------------------------------------------------------------    Vital Signs:  Temp:  [98 °F (36.7 °C)-98.6 °F (37 °C)] 98 °F (36.7 °C)  Heart Rate:  [100-129] 102  Resp:  [18-20] 20  BP: (132-182)/() 147/77  Mean Arterial Pressure (Non-Invasive) for the past 24 hrs (Last 3 readings):   Noninvasive MAP (mmHg)   06/19/24 0343 90    06/18/24 1901 100       SpO2 Percentage    06/19/24 0343 06/19/24 0600 06/19/24 0648   SpO2: 96% 96% 95%     SpO2:  [95 %-98 %] 95 %  on  Flow (L/min):  [1] 1;   Device (Oxygen Therapy): nasal cannula    Body mass index is 23.38 kg/m².  Wt Readings from Last 3 Encounters:   06/11/24 59.9 kg (132 lb)   12/14/23 60.7 kg (133 lb 12.8 oz)   06/21/16 67.6 kg (149 lb)        Intake/Output Summary (Last 24 hours) at 6/19/2024 1343  Last data filed at 6/19/2024 0800  Gross per 24 hour   Intake 240 ml   Output --   Net 240 ml     Diet: Regular/House; Texture: Mechanical Ground (NDD 2); Fluid Consistency: Thin (IDDSI 0)  ----------------------------------------------------------------------------------------------------------------------      Physical Exam:    Constitutional: Elderly, frail, chronically ill-appearing female.  Drowsy, difficult to arouse.    HENT:  Head: Normocephalic and atraumatic.  Mouth:  Moist mucous membranes.    Eyes:  Conjunctivae and EOM are normal.  No scleral icterus.  Neck:  Neck supple.  No JVD present.    Cardiovascular:  Normal rate, regular rhythm and normal heart sounds with no murmur. No edema.  Pulmonary/Chest: Remains diminished to auscultation bilaterally  Abdominal: Abdomen is distended and tender to palpation.  Musculoskeletal:  No edema, no tenderness, and no deformity.  No swelling or redness of joints.  Neurological: JOO due to mental status  Skin:  Skin is warm and dry.  No rash noted.  No pallor.           ----------------------------------------------------------------------------------------------------------------------                Results from last 7 days   Lab Units 06/19/24  1009   PH, ARTERIAL pH units 7.444   PO2 ART mm Hg 53.5*   PCO2, ARTERIAL mm Hg 32.3*   HCO3 ART mmol/L 22.1       Results from last 7 days   Lab Units 06/19/24  0049 06/18/24  0714 06/17/24  0753 06/14/24  0112 06/13/24  0134   CRP mg/dL  --   --   --   --  15.97*   WBC 10*3/mm3 17.09* 23.41* 18.52*   " < > 20.55*   HEMOGLOBIN g/dL 11.8* 10.8* 10.6*   < > 10.8*   HEMATOCRIT % 39.9 35.5 34.2   < > 33.6*   MCV fL 99.3* 95.4 96.1   < > 94.6   MCHC g/dL 29.6* 30.4* 31.0*   < > 32.1   PLATELETS 10*3/mm3 198 262 236   < > 236    < > = values in this interval not displayed.     Results from last 7 days   Lab Units 06/19/24  0049 06/18/24  0714 06/17/24  0753   SODIUM mmol/L 139 139 147*   POTASSIUM mmol/L 3.6 3.9 3.7   MAGNESIUM mg/dL 1.6*  --   --    CHLORIDE mmol/L 110* 108* 115*   CO2 mmol/L 16.5* 19.6* 19.9*   BUN mg/dL 27* 32* 34*   CREATININE mg/dL 1.31* 1.45* 1.37*   CALCIUM mg/dL 8.5 8.5 8.6   GLUCOSE mg/dL 68 219* 123*   ALBUMIN g/dL  --  3.1*  --    Estimated Creatinine Clearance: 23.8 mL/min (A) (by C-G formula based on SCr of 1.31 mg/dL (H)).  No results found for: \"AMMONIA\"    Glucose   Date/Time Value Ref Range Status   06/19/2024 1114 182 (H) 70 - 130 mg/dL Final   06/19/2024 0730 83 70 - 130 mg/dL Final   06/18/2024 2140 86 70 - 130 mg/dL Final   06/18/2024 1621 88 70 - 130 mg/dL Final   06/18/2024 1059 173 (H) 70 - 130 mg/dL Final   06/18/2024 0628 198 (H) 70 - 130 mg/dL Final   06/17/2024 2057 238 (H) 70 - 130 mg/dL Final   06/17/2024 1603 179 (H) 70 - 130 mg/dL Final     Lab Results   Component Value Date    HGBA1C 6.60 (H) 06/10/2024     Lab Results   Component Value Date    TSH 3.752 08/17/2015    FREET4 1.09 08/17/2015       Blood Culture   Date Value Ref Range Status   06/11/2024 No growth at 3 days  Preliminary   06/11/2024 No growth at 3 days  Preliminary     No results found for: \"URINECX\"  No results found for: \"WOUNDCX\"  No results found for: \"STOOLCX\"  No results found for: \"RESPCX\"  Pain Management Panel  More data may exist         Latest Ref Rng & Units 6/10/2024 8/17/2015   Pain Management Panel   Creatinine, Urine mg/dL  mg/dL - 129.2  130.0    Amphetamine, Urine Qual Negative Negative  -   Barbiturates Screen, Urine Negative Negative  -   Benzodiazepine Screen, Urine Negative Negative  " -   Buprenorphine, Screen, Urine Negative Negative  -   Cocaine Screen, Urine Negative Negative  -   Fentanyl, Urine Negative Negative  -   Methadone Screen , Urine Negative Negative  -   Methamphetamine, Ur Negative Negative  -         ----------------------------------------------------------------------------------------------------------------------  Imaging Results (Last 24 Hours)       ** No results found for the last 24 hours. **            ----------------------------------------------------------------------------------------------------------------------    Pertinent Infectious Disease Results    Cardiology note reviewed, due to the patient's age and comorbidities, risk was felt to be greater than benefit for pericardiocentesis at this time.  We are unable to determine if pericardial effusion is infectious at this time.  Would recommend to treat with NSAIDs, per literature review.    Transthoracic echo from 6/14 reported moderate 1 to 2 cm pericardial effusion.        Assessment/Plan       Assessment     Pericardial effusion, rule out infectious process  Community-acquired pneumonia        Plan        Patient resting in bed this morning.  Drowsy and sedate.  Currently on 1 L per nasal cannula with no apparent distress.  Diminished lung sounds bilaterally.  Abdomen remains distended.  WBC improving at 17.09.    Continues on cefepime and metronidazole for possible underlying intra-abdominal infection causing leukocytosis.  Parvovirus, CMV, Shi-Barr PCR's are pending.  May plan to escalate antibiotic coverage tomorrow pending clinical course.  We will continue to monitor closely and adjust antibiotic coverage as appropriate.    ANTIMICROBIAL THERAPY    cefepime 2000 mg IVPB in 100 mL NS (VTB)  metroNIDAZOLE - 500-0.79 MG/100ML-%     Code Status:   Code Status and Medical Interventions:   Ordered at: 06/11/24 0401     Medical Intervention Limits:    No intubation (DNI)     Level Of Support Discussed With:     Next of Kin (If No Surrogate)     Code Status (Patient has no pulse and is not breathing):    No CPR (Do Not Attempt to Resuscitate)     Medical Interventions (Patient has pulse or is breathing):    Limited Support       FIORELLA Crenshaw  06/19/24  13:43 EDT      Electronically signed by Seema Toribio APRN at 06/19/24 1345          Consult Notes (most recent note)        Meggan Harrington DO at 06/14/24 8161          Referring Provider: Reji Calvillo DO  Reason for Consultation: Thickened Endometrium    Patient Care Team:  No Known Provider as PCP - General  Meggan Harrington DO-OBGYN    Chief complaint 89 yo Female who underwent CT with incidental finding of thickened endometrium.    Subjective  .     History of present illness:  89 yo Female who underwent CT with incidental finding of thickened endometrium.  U/S today notes EMS of 13mm.  Pt herself is unable to provide a history but her daughter is present.  Per nursing and pt's daughter there has been no vaginal bleeding or vaginal discharge.  She is admitted for Pneumonia and sepsis.        Review of Systems  Unable to obtain from patient directly but per family and nursing pt having poor appetitie, constipation and abdominal discomfort.     History  Past Medical History:   Diagnosis Date    Hyperlipidemia    ,   Past Surgical History:   Procedure Laterality Date    OTHER SURGICAL HISTORY      ear surgery    SKIN BIOPSY     ,   Family History   Problem Relation Age of Onset    Other Father         cardiovascular disease    Kidney disease Sister    ,   Social History     Socioeconomic History    Marital status:    Tobacco Use    Smoking status: Never   Vaping Use    Vaping status: Never Used   Substance and Sexual Activity    Alcohol use: No    Drug use: Never     E-cigarette/Vaping    E-cigarette/Vaping Use Never User      E-cigarette/Vaping Substances     E-cigarette/Vaping Devices         , Scheduled Meds:  apixaban, 2.5 mg, Oral,  BID  budesonide-formoterol, 2 puff, Inhalation, BID - RT  cefepime, 2,000 mg, Intravenous, Q24H  cinacalcet, 30 mg, Oral, Daily  folic acid, 500 mcg, Oral, Daily  guaiFENesin, 1,200 mg, Oral, Q12H  hydrocortisone, 1 Application, Topical, BID  insulin lispro, 2-7 Units, Subcutaneous, 4x Daily AC & at Bedtime  metoprolol tartrate, 100 mg, Oral, Q12H  metroNIDAZOLE, 500 mg, Intravenous, Q8H  mupirocin, 1 application , Each Nare, BID  NIFEdipine CC, 30 mg, Oral, Q24H  OLANZapine zydis, 5 mg, Oral, Nightly  pantoprazole, 40 mg, Oral, Daily  predniSONE, 40 mg, Oral, Daily  sodium chloride, 10 mL, Intravenous, Q12H  sodium chloride, 10 mL, Intravenous, Q12H  vitamin B-12, 1,000 mcg, Oral, Once per day on Monday Tuesday Wednesday Thursday Friday   , Continuous Infusions:   , PRN Meds:    acetaminophen    senna-docusate sodium **AND** polyethylene glycol **AND** bisacodyl **AND** bisacodyl    cetirizine    dextrose    dextrose    erythromycin    glucagon (human recombinant)    ipratropium    Magnesium Low Dose Replacement - Follow Nurse / BPA Driven Protocol    melatonin    metoprolol tartrate    nitroglycerin    ondansetron    Potassium Replacement - Follow Nurse / BPA Driven Protocol    sodium chloride    sodium chloride    sodium chloride    sodium chloride    sodium chloride    ziprasidone (GEODON) 20 mg in sterile water (preservative free) 1 mL injection, and Allergies:  Patient has no known allergies.    Objective      Vital Signs   Temp:    Pulse:    Resp:    BP:     Physical Exam:   Gen: NAD    Results Review:   CT and U/S reviewed      Assessment & Plan      Active Problems:          Thickened endometrium- images are reviewed and simple fluid within the endometrium noted as was measured.  Actual endometrium appears thin.  Pt is having no bleeding or discharge.  With cervical stenosis it is often noted for fluid to fill endometrium.  Her daughter does not want to have TVUS or EMB.  I have offered outpatient imaging  in my office if desired.  I do not feel this is pyometrium as WBC decreasing and fluid is simple in appearance.  Will sign off but thank you for the consult and available if needed.     I discussed the patients findings and my recommendations with patient, family, and nursing staff      Meggan Harrington DO  Date: 6/14/2024  Time: 17:51 EDT      Time: More than 50% of time spent in counseling and coordination of care:  Total face-to-face/floor time 30 min.  Time spent in counseling 20 min. Counseling included the following topics: endometrial thickening in menopause, endometrial fluid, endometrial hyperplasia and how diagnosed and why.  F/u in office discussed as well.       Electronically signed by Meggan Hrarington DO at 06/14/24 1801          Nutrition Notes (most recent note)        Beatrice Gallegos RD at 06/11/24 1653          Adult Nutrition  Assessment/PES    Patient Name:  Zeenat Dong  YOB: 1928  MRN: [id number]  Admit Date:  6/10/2024    Assessment Date:  6/11/2024    Comments:  Patient meets criteria for moderate malnutrition related to poor intakes, muscle wasting.      Will start ONS BID and encourage intakes.  Patient at times has trouble with chopping foods, and patient's daughter said she would chop foods, did not want kitchen doing at this time.     Reason for Assessment       Row Name 06/11/24 1641          Reason for Assessment    Reason For Assessment per organizational policy;identified at risk by screening criteria     Identified At Risk by Screening Criteria reduced oral intake over the last month                      Labs/Tests/Procedures/Meds       Row Name 06/11/24 1643          Labs/Procedures/Meds    Lab Results Reviewed reviewed        Medications    Pertinent Medications Reviewed reviewed                      Estimated/Assessed Needs - Anthropometrics       Row Name 06/11/24 1649          Anthropometrics    Age for Calculations 89     Weight for Calculation 59.9 kg (132 lb  0.9 oz)        Estimated/Assessed Needs    Additional Documentation Fluid Requirements (Group);[place]-St. Jeor Equation (Group);Protein Requirements (Group)        [place]-St. Jeor Equation    RMR ([place]-St. Jeor Equation) 958.125     [place]-St. Jeor Activity Factors 1.2     Activity Factors ([place]-St. Jeor) 1149.75        Protein Requirements    Weight Used For Protein Calculations 59.9 kg (132 lb 0.9 oz)     Est Protein Requirement Amount (gms/kg) 1.0 gm protein     Estimated Protein Requirements (gms/day) 59.9        Fluid Requirements    Fluid Requirements (mL/day) 1200     Estimated Fluid Requirement Method RDA Method     RDA Method (mL) 1200                    Nutrition Prescription Ordered       Row Name 06/11/24 1644          Nutrition Prescription PO    Current PO Diet Regular     Fluid Consistency Thin                    Evaluation of Received Nutrient/Fluid Intake       Row Name 06/11/24 1648          Calories Evaluation    Total Calorie Target (kcal) 1200        PO Evaluation    Number of Days PO Intake Evaluated Insufficient Data                    Malnutrition Severity Assessment       Row Name 06/11/24 1649          Malnutrition Severity Assessment    Malnutrition Type Chronic Disease - Related Malnutrition        Insufficient Energy Intake     Insufficient Energy Intake Findings Severe     Insufficient Energy Intake  <75% of est. energy requirement for >7d)        Unintentional Weight Loss     Unintentional Weight Loss Findings None        Muscle Loss    Loss of Muscle Mass Findings Moderate     [place] Region Moderate - slight depression     Clavicle Bone Region Moderate - some protrusion in females, visible in males     Acromion Bone Region Severe - squared shoulders, bones, and acromion process protrusion prominent     Scapular Bone Region Moderate - mild depression, bones may show slightly     Dorsal Hand Region Moderate - slight depression     Patellar Region Moderate - patella more  prominent, less muscle definition around patella     Anterior Thigh Region Moderate - mild depression on inner thigh     Posterior Calf Region Moderate - some roundness, slight firmness        Fat Loss    Subcutaneous Fat Loss Findings Mild        Declining Functional Status    Declining Functional Status Findings Measurably Reduced        Criteria Met (Must meet criteria for severity in at least 2 of these categories: M Wasting, Fat Loss, Fluid, Secondary Signs, Wt. Status, Intake)    Patient meets criteria for  Moderate (non-severe) Malnutrition                     Problem/Interventions:   Problem 1       Row Name 06/11/24 1650          Nutrition Diagnoses Problem 1    Problem 1 Malnutrition     Etiology (related to) Medical Diagnosis     Cardiac HTN     Gastrointestinal GERD/Reflux     Infectious Disease Sepsis     Renal WINSTON;CKD     Signs/Symptoms (evidenced by) Report/Observation;Report of Mnimal PO Intake;SLP/Swallow eval     Reported/Observed By Patient;Family;RN;MD;SLP     Swallow eval status Done     Type of SLP Evaluation Bedside                          Intervention Goal       Row Name 06/11/24 1652          Intervention Goal    General Meet nutritional needs for age/condition     PO Establish PO;Meet estimated needs                    Nutrition Intervention       Row Name 06/11/24 1653          Nutrition Intervention    RD/Tech Action Recommend/ordered;Follow Tx progress     Recommended/Ordered Supplement                    Nutrition Prescription       Row Name 06/11/24 1653          Nutrition Prescription PO    PO Prescription Begin/change supplement     Fluid Consistency Thin     Supplement Boost Plus     Supplement Frequency 2 times a day                    Education/Evaluation       Row Name 06/11/24 1653          Monitor/Evaluation    Monitor Per protocol;PO intake;Supplement intake;Pertinent labs     Education Follow-up Other (comment)  will continue to follow                     Electronically  signed by:  Beatrice Gallegos RD  24 16:54 EDT    Electronically signed by Beatrice Gallegos RD at 24 1655          Physical Therapy Notes (most recent note)        Barbi Choi, PT at 24 1514  Version 1 of 1         Acute Care - Physical Therapy Treatment Note   [facility]     Patient Name: Zeenat Dong  : 1928  MRN: [id number]  Today's Date: 2024   Onset of Illness/Injury or Date of Surgery: 06/10/24 (admission date)  Visit Dx:     ICD-10-CM ICD-9-CM   1. Acute renal failure, unspecified acute renal failure type  N17.9 584.9   2. Hypomagnesemia  E83.42 275.2   3. Hallucinations, unspecified  R44.3 780.1   4. Heart failure, unspecified HF chronicity, unspecified heart failure type  I50.9 428.9   5. Pericardial effusion  I31.39 423.9   6. Community acquired pneumonia of left lung, unspecified part of lung  J18.9 486     Patient Active Problem List   Diagnosis    Abdominal distension    Heart failure    Dry skin dermatitis    Gastroesophageal reflux disease without esophagitis    Hypercalcemia    Hyperparathyroidism    Hypertension    Hypokalemia    Hypomagnesemia    Seasonal allergic rhinitis    Shortness of breath    Atrial fibrillation    Weakness    Sepsis    Moderate malnutrition     Past Medical History:   Diagnosis Date    Hyperlipidemia      Past Surgical History:   Procedure Laterality Date    OTHER SURGICAL HISTORY      ear surgery    SKIN BIOPSY       PT Assessment (Last 12 Hours)       PT Evaluation and Treatment       Row Name 24 150          Physical Therapy Time and Intention    Document Type therapy note (daily note)  -     Mode of Treatment physical therapy  -     Patient Effort adequate  -       Row Name 24 1509          General Information    Patient Profile Reviewed yes  -     Patient Observations cooperative;agree to therapy  -     Patient/Family/Caregiver Comments/Observations Daughter present during treatment  -     Existing  Precautions/Restrictions fall  -       Row Name 06/17/24 1507          Cognition    Affect/Mental Status (Cognition) confused  -     Follows Commands (Cognition) follows one-step commands;50-74% accuracy;increased processing time needed;initiation impaired;physical/tactile prompts required;repetition of directions required;verbal cues/prompting required;visual cue  -       Row Name 06/17/24 1507          Bed Mobility    Supine-Sit [place] (Bed Mobility) minimum assist (75% patient effort);moderate assist (50% patient effort);1 person assist  -     Sit-Supine [place] (Bed Mobility) moderate assist (50% patient effort);1 person assist;2 person assist  -     Bed Mobility, Safety Issues cognitive deficits limit understanding;decreased use of arms for pushing/pulling;decreased use of legs for bridging/pushing;impaired trunk control for bed mobility  -     Assistive Device (Bed Mobility) bed rails;draw sheet;head of bed elevated  -[facility] Name 06/17/24 1507          Transfers    Comment, (Transfers) Pt sat EOB two times.  When laying down she kept initiating getting up out of bed but once sitting up she wanted to lay back down and would not attempt transfer training.  -[facility] Name 06/17/24 1507          Gait/Stairs (Locomotion)    Patient was able to Ambulate no, other medical factors prevent ambulation  -     Reason Patient was unable to Ambulate Cognitive Deficit/Severe Dementia;Excessive Weakness  -[facility] Name 06/17/24 1507          Balance    Balance Assessment sitting static balance;sitting dynamic balance  -     Static Sitting Balance minimal assist;1-person assist;contact guard  -     Dynamic Sitting Balance 1-person assist;minimal assist  -     Position, Sitting Balance sitting edge of bed  -       Row Name 06/17/24 1507          Motor Skills    Therapeutic Exercise knee  -[facility] Name 06/17/24 1507          Knee (Therapeutic Exercise)    Knee (Therapeutic  Exercise) AAROM (active assistive range of motion)  [facility]     Knee AAROM (Therapeutic Exercise) left;right;sitting;flexion;extension;other (see comments)  attempted to engage patient in seated therapeutic exercises but patient had difficulty completing and participating due to confusion and fatigue once sitting up  -       Row Name 06/17/24 1507          Plan of Care Review    Plan of Care Reviewed With patient;daughter  -     Outcome Evaluation PT treatment completed.  Patient required min to moderate assistance for mobility and was unable to participate in standing/transfer training today.  Continue PT POC.  -       Row Name 06/17/24 1507          Positioning and Restraints    Pre-Treatment Position in bed  -     Post Treatment Position bed  -     In Bed notified nsg;fowlers;call light within reach;encouraged to call for assist;exit alarm on;with family/caregiver;side rails up x3  Lines in tact and needs in reach  -               User Key  (r) = Recorded By, (t) = Taken By, (c) = Cosigned By      Initials Name Provider Type    Barbi Yen, RODRIGUEZ Physical Therapist                    Physical Therapy Education       Title: PT OT SLP Therapies (Done)       Topic: Physical Therapy (Done)       Point: Mobility training (Done)       Learning Progress Summary             Patient Acceptance, E,TB, VU by FRANCIA at 6/17/2024 0853    Acceptance, TB,E, NL by TRELL at 6/14/2024 0157    Acceptance, TB,E, VU by TRELL at 6/13/2024 0407    Acceptance, E, VU by LESLIE at 6/12/2024 0112                         Point: Home exercise program (Done)       Learning Progress Summary             Patient Acceptance, E,TB, VU by FRANCIA at 6/17/2024 0853    Acceptance, TB,E, NL by TRELL at 6/14/2024 0157    Acceptance, TB,E, VU by TRELL at 6/13/2024 0407    Acceptance, E, VU by LESLIE at 6/12/2024 0112                         Point: Body mechanics (Done)       Learning Progress Summary             Patient Acceptance, E,TB, VU by FRANCIA at 6/17/2024 0853     Acceptance, TB,E, NL by TRELL at 6/14/2024 0157    Acceptance, TB,E, VU by TRELL at 6/13/2024 0407    Acceptance, E, VU by LESLIE at 6/12/2024 0112                         Point: Precautions (Done)       Learning Progress Summary             Patient Acceptance, E,TB, VU by RG at 6/17/2024 0853    Acceptance, TB,E, NL by TRELL at 6/14/2024 0157    Acceptance, TB,E, VU by TRELL at 6/13/2024 0407    Acceptance, E, VU by LESLIE at 6/12/2024 0112                                         User Key       Initials Effective Dates Name Provider Type Discipline     06/06/23 -  Shemar Zheng, RN Registered Nurse Nurse    TRELL 03/26/24 -  Kimber Hinojosa, RN Registered Nurse Nurse    LESLIE 02/12/24 -  Johanna Francisco, RN Registered Nurse Nurse                  PT Recommendation and Plan     Plan of Care Reviewed With: patient, daughter  Outcome Evaluation: PT treatment completed.  Patient required min to moderate assistance for mobility and was unable to participate in standing/transfer training today.  Continue PT POC.       Time Calculation:    PT Charges       Row Name 06/17/24 1513             Time Calculation    PT Received On 06/17/24  -      PT Goal Re-Cert Due Date 06/25/24  -         Timed Charges    56230 - PT Therapeutic Activity Minutes 25  -KP         Total Minutes    Timed Charges Total Minutes 25  -KP       Total Minutes 25  -KP                User Key  (r) = Recorded By, (t) = Taken By, (c) = Cosigned By      Initials Name Provider Type     Barbi Choi, PT Physical Therapist                  Therapy Charges for Today       Code Description Service Date Service Provider Modifiers Qty    70971873525  PT THERAPEUTIC ACT EA 15 MIN 6/17/2024 Barbi Choi, PT GP 2            PT G-Codes  AM-PAC 6 Clicks Score (PT): 15    Barbi Choi PT  6/17/2024      Electronically signed by Barbi Choi PT at 06/17/24 1514          Occupational Therapy Notes (most recent note)        Aspen Deras, OT at 06/14/24 1435          Patient  Name: Zeenat Dong  : 1928    MRN: [id number]                              Today's Date: 2024       Admit Date: 6/10/2024    Visit Dx:     ICD-10-CM ICD-9-CM   1. Acute renal failure, unspecified acute renal failure type  N17.9 584.9   2. Hypomagnesemia  E83.42 275.2   3. Hallucinations, unspecified  R44.3 780.1   4. Heart failure, unspecified HF chronicity, unspecified heart failure type  I50.9 428.9   5. Pericardial effusion  I31.39 423.9   6. Community acquired pneumonia of left lung, unspecified part of lung  J18.9 486     Patient Active Problem List   Diagnosis    Abdominal distension    Heart failure    Dry skin dermatitis    Gastroesophageal reflux disease without esophagitis    Hypercalcemia    Hyperparathyroidism    Hypertension    Hypokalemia    Hypomagnesemia    Seasonal allergic rhinitis    Shortness of breath    Atrial fibrillation    Weakness    Sepsis    Moderate malnutrition     Past Medical History:   Diagnosis Date    Hyperlipidemia      Past Surgical History:   Procedure Laterality Date    OTHER SURGICAL HISTORY      ear surgery    SKIN BIOPSY        General Information       Row Name 24 1420          OT Time and Intention    Document Type evaluation  -LA     Mode of Treatment occupational therapy  -LA       Row Name 24 1420          General Information    Patient Profile Reviewed yes  -LA     Prior Level of Function min assist:;ADL's  Patient ambulated with rolling walker.  -LA     Existing Precautions/Restrictions fall  -LA     Barriers to Rehab medically complex;previous functional deficit  -LA       Row Name 24 1420          Occupational Profile    Reason for Services/Referral (Occupational Profile) OT to assess for changes in level of independence/safety with ADLs.  -LA     Patient Goals (Occupational Profile) Return home with daughter/ VAL  -LA       Row Name 24 1420          Living Environment    People in Home child(juliet), adult  -LA       Row Name  06/14/24 1420          Cognition    Orientation Status (Cognition) oriented to;person;place  -LA       Row Name 06/14/24 1420          Safety Issues, Functional Mobility    Impairments Affecting Function (Mobility) endurance/activity tolerance  Patient refused sitting EOB/transfers this date due to not feeling well. Patient +.  -LA               User Key  (r) = Recorded By, (t) = Taken By, (c) = Cosigned By      Initials Name Provider Type    Aspen Sierra OT Occupational Therapist                     Mobility/ADL's       Row Name 06/14/24 1426          Bed Mobility    Bed Mobility bed mobility (all) activities  -LA     All Activities, [place] (Bed Mobility) dependent (less than 25% patient effort)  -LA     Supine-Sit [place] (Bed Mobility) other (see comments)  patient refused  -LA       Row Name 06/14/24 1426          Transfers    Transfers other (see comments)  patient refused  -LA       Row Name 06/14/24 1426          Functional Mobility    Functional Mobility- Ind. Level not tested  -LA       Row Name 06/14/24 1426          Activities of Daily Living    BADL Assessment/Intervention bathing;upper body dressing;lower body dressing;grooming;feeding;toileting  -LA       Row Name 06/14/24 1426          Bathing Assessment/Intervention    [place] Level (Bathing) dependent (less than 25% patient effort)  -LA       Row Name 06/14/24 1426          Upper Body Dressing Assessment/Training    [place] Level (Upper Body Dressing) maximum assist (25% patient effort);dependent (less than 25% patient effort)  -LA       Row Name 06/14/24 1426          Lower Body Dressing Assessment/Training    [place] Level (Lower Body Dressing) dependent (less than 25% patient effort)  -LA       Row Name 06/14/24 1426          Grooming Assessment/Training    [place] Level (Grooming) moderate assist (50% patient effort);maximum assist (25% patient effort)  -LA       Row Name 06/14/24 1426           Self-Feeding Assessment/Training    [place] Level (Feeding) minimum assist (75% patient effort)  -[facility] Name 06/14/24 1426          Toileting Assessment/Training    [place] Level (Toileting) maximum assist (25% patient effort);dependent (less than 25% patient effort)  -LA               User Key  (r) = Recorded By, (t) = Taken By, (c) = Cosigned By      Initials Name Provider Type    Aspen Sierra OT Occupational Therapist                   Obj/Interventions       [facility] Name 06/14/24 1428          Sensory Assessment (Somatosensory)    Sensory Assessment (Somatosensory) sensation intact  -LA       Row Name 06/14/24 1428          Vision Assessment/Intervention    Visual Impairment/Limitations WFL  -LA       Row Name 06/14/24 1428          Range of Motion Comprehensive    General Range of Motion bilateral upper extremity ROM WFL  -LA     Comment, General Range of Motion BUE PROM/AAROM WFL  -LA       Row Name 06/14/24 1428          Strength Comprehensive (MMT)    General Manual Muscle Testing (MMT) Assessment upper extremity strength deficits identified  -LA     Comment, General Manual Muscle Testing (MMT) Assessment BUE strength grossly 3/5 in all planes  -LA       Row Name 06/14/24 1428          Motor Skills    Motor Skills coordination;functional endurance  -LA     Coordination fine motor deficit;gross motor deficit;bilateral;upper extremity;minimal impairment  limited due to weakness  -LA     Functional Endurance poor  -LA       Row Name 06/14/24 1428          Balance    Comment, Balance --  Patient refused sitting EOB/transfers during evaluation due to not feeling well. Patient HR elevated  -LA               User Key  (r) = Recorded By, (t) = Taken By, (c) = Cosigned By      Initials Name Provider Type    Aspen Sierra OT Occupational Therapist                   Goals/Plan       Row Name 06/14/24 1434          Transfer Goal 1 (OT)    Activity/Assistive Device (Transfer Goal 1, OT) transfers,  all  -LA     [place] Level/Cues Needed (Transfer Goal 1, OT) minimum assist (75% or more patient effort)  -LA     Time Frame (Transfer Goal 1, OT) by discharge  -LA       Row Name 06/14/24 1434          Bathing Goal 1 (OT)    Activity/Device (Bathing Goal 1, OT) bathing skills, all  -LA     [place] Level/Cues Needed (Bathing Goal 1, OT) minimum assist (75% or more patient effort)  -LA     Time Frame (Bathing Goal 1, OT) by discharge  -LA       Row Name 06/14/24 1434          Dressing Goal 1 (OT)    Activity/Device (Dressing Goal 1, OT) dressing skills, all  -LA     [place]/Cues Needed (Dressing Goal 1, OT) minimum assist (75% or more patient effort)  -LA     Time Frame (Dressing Goal 1, OT) by discharge  -LA       Row Name 06/14/24 1434          Toileting Goal 1 (OT)    Activity/Device (Toileting Goal 1, OT) toileting skills, all  -LA     [place] Level/Cues Needed (Toileting Goal 1, OT) minimum assist (75% or more patient effort)  -LA     Time Frame (Toileting Goal 1, OT) by discharge  -LA       Row Name 06/14/24 1434          Therapy Assessment/Plan (OT)    Planned Therapy Interventions (OT) activity tolerance training;patient/caregiver education/training;adaptive equipment training;ROM/therapeutic exercise;BADL retraining;occupation/activity based interventions;strengthening exercise;transfer/mobility retraining;functional balance retraining  -LA               User Key  (r) = Recorded By, (t) = Taken By, (c) = Cosigned By      Initials Name Provider Type    Aspen Sierra OT Occupational Therapist                   Clinical Impression       Row Name 06/14/24 1430          Plan of Care Review    Plan of Care Reviewed With patient  -LA     Outcome Evaluation OT evaluation completed this date. Patient refused sitting EOB/transfer to chair during evaluation. Patient reported no feeling well and HR elevated during evaluation. Patient does present with significant decline in level of  independence/safety with ADLs. Patient would benefit from OT services to maximize independence/safety prior to discharge.  -LA       Row Name 06/14/24 1430          Therapy Assessment/Plan (OT)    Patient/Family Therapy Goal Statement (OT) Return home  -LA     Rehab Potential (OT) fair, will monitor progress closely  -LA     Criteria for Skilled Therapeutic Interventions Met (OT) skilled treatment is necessary;meets criteria  -LA     Therapy Frequency (OT) --  PRN to follow for changes/progress toward goals.  -LA       Row Name 06/14/24 1430          Therapy Plan Review/Discharge Plan (OT)    Equipment Needs Upon Discharge (OT) --  TBD  -LA     Anticipated Discharge Disposition (OT) --  TBD  -LA       Row Name 06/14/24 1430          Positioning and Restraints    Pre-Treatment Position in bed  -LA     Post Treatment Position bed  -LA     In Bed supine;call light within reach;encouraged to call for assist;exit alarm on;with family/caregiver  -LA               User Key  (r) = Recorded By, (t) = Taken By, (c) = Cosigned By      Initials Name Provider Type    Aspen Sierra, ROBERT Occupational Therapist                   Outcome Measures       Row Name 06/14/24 0855          How much help from another person do you currently need...    Turning from your back to your side while in flat bed without using bedrails? 3  -DA     Moving from lying on back to sitting on the side of a flat bed without bedrails? 3  -DA     Moving to and from a bed to a chair (including a wheelchair)? 3  -DA     Standing up from a chair using your arms (e.g., wheelchair, bedside chair)? 3  -DA     Climbing 3-5 steps with a railing? 3  -DA     To walk in hospital room? 3  -DA     AM-PAC 6 Clicks Score (PT) 18  -DA     Highest Level of Mobility Goal 6 --> Walk 10 steps or more  -DA               User Key  (r) = Recorded By, (t) = Taken By, (c) = Cosigned By      Initials Name Provider Type    Adelia Gutierrez, RN Registered Nurse                       OT Recommendation and Plan  Planned Therapy Interventions (OT): activity tolerance training, patient/caregiver education/training, adaptive equipment training, ROM/therapeutic exercise, BADL retraining, occupation/activity based interventions, strengthening exercise, transfer/mobility retraining, functional balance retraining  Therapy Frequency (OT):  (PRN to follow for changes/progress toward goals.)  Plan of Care Review  Plan of Care Reviewed With: patient  Outcome Evaluation: OT evaluation completed this date. Patient refused sitting EOB/transfer to chair during evaluation. Patient reported no feeling well and HR elevated during evaluation. Patient does present with significant decline in level of independence/safety with ADLs. Patient would benefit from OT services to maximize independence/safety prior to discharge.     Time Calculation:          Therapy Charges for Today       Code Description Service Date Service Provider Modifiers Qty    [id number]  OT EVAL MOD COMPLEXITY 4 2024 Aspen Deras OT GO 1                 Aspen Deras OT  2024    Electronically signed by Aspen Deras OT at 24 1435          Speech Language Pathology Notes (most recent note)        Estefany Ahuja MA,CCC-SLP at 24 1442          Acute Care - Speech Language Pathology   Swallow Re-Evaluation  Oz     Patient Name: Zeenat oDng  : 1928  MRN: [id number]  Today's Date: 2024  Onset of Illness/Injury or Date of Surgery: 06/10/24 (admission date)           Admit Date: 6/10/2024    Visit Dx:     ICD-10-CM ICD-9-CM   1. Acute renal failure, unspecified acute renal failure type  N17.9 584.9   2. Hypomagnesemia  E83.42 275.2   3. Hallucinations, unspecified  R44.3 780.1   4. Heart failure, unspecified HF chronicity, unspecified heart failure type  I50.9 428.9   5. Pericardial effusion  I31.39 423.9   6. Community acquired pneumonia of left lung, unspecified part of lung  J18.9 486      Patient Active Problem List   Diagnosis    Abdominal distension    Heart failure    Dry skin dermatitis    Gastroesophageal reflux disease without esophagitis    Hypercalcemia    Hyperparathyroidism    Hypertension    Hypokalemia    Hypomagnesemia    Seasonal allergic rhinitis    Shortness of breath    Atrial fibrillation    Weakness    Sepsis    Moderate malnutrition     Past Medical History:   Diagnosis Date    Hyperlipidemia      Past Surgical History:   Procedure Laterality Date    OTHER SURGICAL HISTORY      ear surgery    SKIN BIOPSY       Zeenat Dong  was seen at bedside this PM on 3N Rm 3348-1 to assess safety/efficacy of swallowing fnx, determine safest/least restrictive diet tolerance.     Social History     Socioeconomic History    Marital status:    Tobacco Use    Smoking status: Never   Vaping Use    Vaping status: Never Used   Substance and Sexual Activity    Alcohol use: No    Drug use: Never      Diet Orders (active) (From admission, onward)       Start     Ordered    06/15/24 1308  DIET MESSAGE Ground meats and veggies  Once        Comments: Ground meats and veggies    06/15/24 1307    06/15/24 1307  Diet: Regular/House; Texture: Mechanical Ground (NDD 2); Fluid Consistency: Thin (IDDSI 0)  Diet Effective Now         06/15/24 1307    06/11/24 0959  DIET MESSAGE Please send apple juice  Once        Comments: Please send apple juice    06/11/24 0959                    Ms. Dong was observed on RA.    Pt's daughter at bedside. She reports pt w/ confusion and agitation this date.     Pt is drowsy, difficulty to understand this date. Pt was positioned upright/centered for po intake. SLP provides pt with few bites of thin ice crea, via tsp and multiple, consecutive sips of thin liquids via straw. Pt with no overt s/s of aspiration with trialed consistencies. Pt does decline further po intake.     Impression: Patient continues with presented w/ premorbid baseline of moderate oral , wfl  pharyngeal swallow w/o s/s aspiration. No s/s indicative of silent aspiration.  No odynophagia reported.      SLP Recommendation and Plan      1. Mechanical soft, ground consistencies w/ thin liquids   2. Medications whole in puree/thins. Single presentation. Crushed or via non oral prn.  3. Upright and centered for all po intake.  4. CLYDE precautions.  5. Oral care protocol.  6. Aspiration precautions.  7. Must be fully a/a for all po intake.  8. Direct 1:1 assistance for all po intake      No further SLP f/u warranted as pt felt to be at baseline for swallow function at this time.      D/w patient and family at bedside results and recommendations w/ verbal agreement.      Thank you for allowing me to participate in the care of your patient-   Estefany Ahuja M.A., CCC-SLP     EDUCATION  The patient has been educated in the following areas:   Oral Care/Hydration Modified Diet Instruction.     Time Calculation:     Therapy Charges for Today       Code Description Service Date Service Provider Modifiers Qty    26850003965  ST EVAL ORAL PHARYNG SWALLOW 4 6/17/2024 Estefany Ahuja MA,CCC-SLP GN 1          Estefany Ahuja MA,CCC-SLP  6/17/2024    Electronically signed by Estefany Ahuja MA,CCC-SLP at 06/17/24 1451       Respiratory Therapy Notes (most recent note)    No notes exist for this encounter.       ADL Documentation (most recent)      Flowsheet Row Most Recent Value   Transferring 3 - assistive equipment and person   Toileting 3 - assistive equipment and person   Bathing 2 - assistive person   Dressing 2 - assistive person   Eating 0 - independent   Communication 2 - difficulty understanding (not related to language barrier)   Swallowing 2 - difficulty swallowing foods   Equipment Currently Used at Home [facility] bed, walker, rolling, shower chair, wheelchair, commode, grab bar, cane, quad

## 2024-06-20 NOTE — THERAPY TREATMENT NOTE
Acute Care - Physical Therapy Treatment Note   [place]     Patient Name: Zeenat Dong  : 1928  MRN: [id number]  Today's Date: 2024   Onset of Illness/Injury or Date of Surgery: 06/10/24 (admission date)  Visit Dx:     ICD-10-CM ICD-9-CM   1. Acute renal failure, unspecified acute renal failure type  N17.9 584.9   2. Hypomagnesemia  E83.42 275.2   3. Hallucinations, unspecified  R44.3 780.1   4. Heart failure, unspecified HF chronicity, unspecified heart failure type  I50.9 428.9   5. Pericardial effusion  I31.39 423.9   6. Community acquired pneumonia of left lung, unspecified part of lung  J18.9 486     Patient Active Problem List   Diagnosis    Abdominal distension    Heart failure    Dry skin dermatitis    Gastroesophageal reflux disease without esophagitis    Hypercalcemia    Hyperparathyroidism    Hypertension    Hypokalemia    Hypomagnesemia    Seasonal allergic rhinitis    Shortness of breath    Atrial fibrillation    Weakness    Moderate malnutrition     Past Medical History:   Diagnosis Date    Hyperlipidemia      Past Surgical History:   Procedure Laterality Date    OTHER SURGICAL HISTORY      ear surgery    SKIN BIOPSY       PT Assessment (Last 12 Hours)       PT Evaluation and Treatment       Row Name 24 7825          Physical Therapy Time and Intention    Subjective Information complains of;weakness;fatigue  -HC     Document Type therapy note (daily note)  -HC     Mode of Treatment physical therapy  -HC     Patient Effort adequate  -HC     Comment Pt and RN in agreement for PT. Pts family in room. Pt did agree to sit EOB but attempted to return to supine multiple times. Pt stood x 2 with Mod A x RW, unbale to tolerate standing very long. Pt did urinate on herself with standing L/R rolling for hygiene once back into bed, max A x 2.  -HC       Row Name 24 6956          General Information    Patient Profile Reviewed yes  -HC     Existing Precautions/Restrictions fall  -HC        Row Name 06/20/24 1334          Cognition    Affect/Mental Status (Cognition) confused  -HC     Follows Commands (Cognition) follows one-step commands;50-74% accuracy;increased processing time needed;initiation impaired;physical/tactile prompts required;repetition of directions required;verbal cues/prompting required;visual cue  -HC     Personal Safety Interventions fall prevention program maintained;gait belt;supervised activity;nonskid shoes/slippers when out of bed  -       Row Name 06/20/24 1334          Bed Mobility    Bed Mobility rolling left;rolling right  -HC     Rolling Left [place] (Bed Mobility) maximum assist (25% patient effort);2 person assist  -HC     Rolling Right [place] (Bed Mobility) maximum assist (25% patient effort);2 person assist  -HC     Scooting/Bridging [place] (Bed Mobility) dependent (less than 25% patient effort);other (see comments)  -     Supine-Sit [place] (Bed Mobility) moderate assist (50% patient effort);1 person assist  -HC     Sit-Supine [place] (Bed Mobility) moderate assist (50% patient effort);1 person assist;2 person assist;maximum assist (25% patient effort)  -     Bed Mobility, Safety Issues cognitive deficits limit understanding;decreased use of arms for pushing/pulling;decreased use of legs for bridging/pushing;impaired trunk control for bed mobility  -     Assistive Device (Bed Mobility) bed rails;draw sheet;head of bed elevated  -       Row Name 06/20/24 1334          Sit-Stand Transfer    Sit-Stand [place] (Transfers) moderate assist (50% patient effort)  -     Assistive Device (Sit-Stand Transfers) walker, front-wheeled  -     Comment, (Sit-Stand Transfer) x 2  -HC       Row Name 06/20/24 1334          Stand-Sit Transfer    Stand-Sit [place] (Transfers) contact guard  -     Assistive Device (Stand-Sit Transfers) walker, front-wheeled  -HC       Row Name             Wound 06/19/24 1842 Bilateral upper thoracic spine  Other (comment)    Wound - Properties Group Placement Date: 06/19/24  -AW Placement Time: 1842 -AW Side: Bilateral  -AW Orientation: upper  -AW Location: thoracic spine  -AW Primary Wound Type: Other  -AW, Rash     Retired Wound - Properties Group Placement Date: 06/19/24  -AW Placement Time: 1842 -AW Side: Bilateral  -AW Orientation: upper  -AW Location: thoracic spine  -AW Primary Wound Type: Other  -AW, Rash     Retired Wound - Properties Group Date first assessed: 06/19/24  -AW Time first assessed: 1842 -AW Side: Bilateral  -AW Location: thoracic spine  -AW Primary Wound Type: Other  -AW, Rash       Row Name 06/20/24 1334          Positioning and Restraints    Pre-Treatment Position in bed  -HC     Post Treatment Position bed  -HC     In Bed fowlers;call light within reach;encouraged to call for assist;exit alarm on;side rails up x2;notified nsg;with family/caregiver  -HC               User Key  (r) = Recorded By, (t) = Taken By, (c) = Cosigned By      Initials Name Provider Type    Melisa Cuellar RN Registered Nurse    Asia Fairchild PTA Physical Therapist Assistant                    Physical Therapy Education       Title: PT OT SLP Therapies (Resolved)       Topic: Physical Therapy (Resolved)       Point: Mobility training (Resolved)       Learning Progress Summary             Patient Acceptance, E,TB, VU by FRANCIA at 6/17/2024 0853    Acceptance, TB,E, NL by TRELL at 6/14/2024 0157    Acceptance, TB,E, VU by TRELL at 6/13/2024 0407    Acceptance, E, VU by LESLIE at 6/12/2024 0112                         Point: Home exercise program (Resolved)       Learning Progress Summary             Patient Acceptance, E,TB, VU by FRANCIA at 6/17/2024 0853    Acceptance, TB,E, NL by TRELL at 6/14/2024 0157    Acceptance, TB,E, VU by TRELL at 6/13/2024 0407    Acceptance, E, VU by LESLIE at 6/12/2024 0112                         Point: Body mechanics (Resolved)       Learning Progress Summary             Patient Acceptance, E,TB, VU  by FRANCIA at 6/17/2024 0853    Acceptance, TB,E, NL by TRELL at 6/14/2024 0157    Acceptance, TB,E, VU by TRELL at 6/13/2024 0407    Acceptance, E, VU by LESLIE at 6/12/2024 0112                         Point: Precautions (Resolved)       Learning Progress Summary             Patient Acceptance, E,TB, VU by FRANCIA at 6/17/2024 0853    Acceptance, TB,E, NL by TRELL at 6/14/2024 0157    Acceptance, TB,E, VU by TRELL at 6/13/2024 0407    Acceptance, E, VU by LESLIE at 6/12/2024 0112                                         User Key       Initials Effective Dates Name Provider Type Discipline     06/06/23 -  Shemar Zheng, RN Registered Nurse Nurse    TRELL 03/26/24 -  Kimber Hinojosa, RN Registered Nurse Nurse    LESLIE 02/12/24 -  Johanna Francisco, RN Registered Nurse Nurse                  PT Recommendation and Plan             Time Calculation:    PT Charges       Row Name 06/20/24 1338             Time Calculation    PT Received On 06/20/24  -HC         Time Calculation- PT    Total Timed Code Minutes- PT 23 minute(s)  -HC                User Key  (r) = Recorded By, (t) = Taken By, (c) = Cosigned By      Initials Name Provider Type     Asia Bowers, ELIZABETH Physical Therapist Assistant                  Therapy Charges for Today       Code Description Service Date Service Provider Modifiers Qty    64955481287 HC PT THERAPEUTIC ACT EA 15 MIN 6/20/2024 Asia Bowers PTA GP, CQ 1    90799394579 HC PT THER PROC EA 15 MIN 6/20/2024 Asia Bowers PTA GP, CQ 1            PT G-Codes  AM-PAC 6 Clicks Score (PT): 12    Asia Bowers PTA  6/20/2024

## 2024-06-20 NOTE — NURSING NOTE
Wound consult for scattered red rash to the back. Individual raised areas a red and round with blanchable randy wound skin. There is not a pattern to the rash but is confined to the back. No pain or swelling is noted. Raised areas remain intact. New order for hydrocortisone BID and leave open to air   06/20/24 1600   Wound 06/19/24 1842 Bilateral upper thoracic spine Other (comment)   Placement Date/Time: 06/19/24 1842   Side: Bilateral  Orientation: upper  Location: thoracic spine  Primary Wound Type: (c) Other (comment)   Dressing Appearance open to air   Base red;moist   Periwound blanchable;pink   Periwound Temperature warm   Periwound Skin Turgor soft   Care, Wound   (cleanse wiht soap and water and pat dry. apply hydorcortisone daily and leave open to air.)   Safety   Safety WDL WDL   Safety Factors wheels locked;upper side rails raised x 2;ID band on;call light in reach;bed in low position;upper side rails raised x 2, lower side rail raised x 1   Safety Management   Safety Promotion/Fall Prevention activity supervised;assistive device/personal items within reach;clutter free environment maintained;fall prevention program maintained;lighting adjusted;nonskid shoes/slippers when out of bed;safety round/check completed   Daily Care   Activity Management activity encouraged   Activity Assistance Provided assistance, 1 person   Positioning   Body Position supine, legs elevated   Head of Bed (HOB) Positioning HOB elevated   Positioning/Transfer Devices pillows;in use   Hygiene Care   Oral Care oral rinse provided

## 2024-06-20 NOTE — CASE MANAGEMENT/SOCIAL WORK
Continued Stay Note   [place]     Patient Name: Zeenat Dong  MRN: [id number]  Today's Date: 6/20/2024    Admit Date: 6/10/2024   Discharge Plan       Row Name 06/20/24 1132       Plan    Plan SS contacted [phone] per Daxa Hermosillo who states she is unable to review pt's spouse by a file #. SS notified dtr, Jeff. Dtr voices that she [facility] in January and services still have not been approved. Dtr will follow up with [facility] the [facility]. SS provided dtr with contact information for April Jay[facility] at [facility]. SS recommended dtr contact April regarding in-home services and [facility] application. Palliative Care is seeing pt today. SS to follow.    Addendum @ 11:54: SS received consult for home with hospice senile brain degeneration, profound weakness, mult hosp readmits, will need new hospital bed and O2 concentrator Daughter Nacho is contact and with whom she lives ph # [phone]. SS spoke to Palliative Care provider. Pallliative Care provider spoke to [facility] per Ronnie regarding referral. SS faxed hospice referral to [facility] [phone]. Palliative Care provider informed SS that hospitalist plans to discharge pt home today and she does not need any DME delivered by hospice on this date. SS to follow.     Addendum @ 15:42: Pt is being discharged home with [facility] today. SS notified [facility] per Ronnie. RN to call report to [facility] per Jessica. SS completed EMS PCS form. SS verified address. Pt is going home with dtr ([address] [place] [address]). SS contacted [facility] per Laura [phone] due to [facility] being unavailable. SS faxed face sheet and EMS PCS form to [facility] [phone]. No other needs identified.                Expected Discharge Date and Time       Expected Discharge Date Expected Discharge Time    Jun 20,  2024         HYACINTH Zuluaga

## 2024-06-20 NOTE — DISCHARGE SUMMARY
[facility] DISCHARGE SUMMARY      Date of Admission: 6/10/2024    Date of Discharge:      PCP: Sravani Chapa APRN    Admission Diagnosis: Sepsis    Discharge Diagnosis:   -Metabolic encephalopathy  -Hypoactive and hyperactive delirium  -Sepsis secondary to community-acquired pneumonia versus aspiration pneumonia  -Stage IIIa atrial fibrillation (paroxysmal)  -Acute on chronic heart failure with preserved ejection fraction  -Acute hypoxemia secondary to acute on chronic heart failure with preserved ejection fraction and pneumonia  -Hypertension  -Large pericardial effusion  -Thickened endometrium  -WINSTON on CKD stage III yea  -Hyponatremia  -Dysphagia  -Acute on chronic physical debility  -Constipation  -Type 2 diabetes mellitus  -Hyperlipidemia  -Hyperparathyroidism  -GERD      Procedures Performed:  None     Consults:   Consults       Date and Time Order Name Status Description    6/19/2024  3:55 PM Inpatient Palliative Care MD Consult Completed     6/14/2024  4:11 PM Inpatient Obstetrics / Gynecology Consult      6/12/2024  7:29 PM Inpatient Infectious Diseases Consult Completed     6/11/2024  6:27 PM Inpatient Cardiology Consult                History of Present Illness:    Patient is a 89 y.o. female with PMHx significant for hypertension, hyperlipidemia, hypercalcemia, hyperparathyroidism, GERD, CHF, atrial fibrillation, chronic kidney disease stage IIIa, type II DM that presented to the [facility] emergency department for evaluation of altered mental status.     Patient examined at bedside in ED.  Patient present presented to the ED with daughter and granddaughter due to concerns of worsening altered mental status and hallucinations over the past week.  Said similar symptoms in the past when diagnosed with UTI, however she was negative for UTI at her PCP earlier this week. Daughter reports the patient has had a poor appetite as well, and does not eat or drink very much.  Reports  patient is typically on 40 mg of Lasix daily, however given worsening weakness there was concern she may have been dehydrated so she was decreased to 20 mg of Lasix daily saw her PCP last week.  However, patient did take full 40 mg the past 2 days.  Granddaughter Notes that patient has become significantly weaker over the past few days.  At the time of my exam, patient was lethargic.  She does arouse to verbal or tactile stimuli, and occasionally responded verbally.  She would not answer any orientation questions or ROS.  Patient does live with her daughter and has multiple other family numbers to help provide care for her.     Upon arrival to the ED, vitals were temperature 98.1, , RR 18, /69, SpO2 97% on room air.  Significant for initial troponin 29, repeat pending.  proBNP 9871.  BUN 29, creatinine 1.60.  Magnesium 1.3.  Ammonia 57, CRP 7.27, lactate 2.1.  WBC 20.64 with left shift.  CT abdomen/pelvis with no acute intra-abdominal process.  CT chest shows focal airspace opacity left lower lobe favors atelectasis over infiltrate.  Also small left pleural effusion.  Cardiomegaly.  Small pericardial effusion.  CT head no acute intracranial process.     Patient has been admitted to the medical telemetry.        Chief Complaint   Patient presents with    Abnormal Lab    Weakness - Generalized    Altered Mental Status          Hospital Course    During the hospital course, the patient presented initially with overall weakness and leukocytosis of 17K. She remained afebrile and hemodynamically stable, with CT chest imaging on Ruma 10, 2024, revealing focal airspace opacity at the left lower lobe suggestive of atelectasis. Blood cultures were negative for Legionella and Streptococcus pneumoniae.    Admitted earlier in the week due to weakness and leukocytosis noted on outpatient labs, she had a history of atrial fibrillation and hyperparathyroidism. Initially alert and oriented, she later became delirious and  intermittently agitated throughout her hospitalization. Atrial fibrillation was managed with Lopressor for rate control.    Cardiology was consulted initially after an echocardiogram showed a pericardial effusion larger than 2 cm, but no intervention was recommended. Instead, Infectious Disease was consulted for possible infectious etiology, ordering pending viral studies including Parvovirus, CMV, and Shi-Barr PCR.    Renal function initially declined but subsequently improved, attributed to oral intake issues, with the family declining percutaneous endoscopic gastrostomy (PEG) tube placement.    A repeat CXR on June 18, 2024, revealed signs of fluid overload with pulmonary vascular congestion and possible anasarca, prompting diuresis. The echocardiogram confirmed a large pericardial effusion without evidence of tamponade physiology. The patient remained asymptomatic for chest pain or pressure, and EKG showed persistent atrial fibrillation without acute ischemic changes. Troponin levels remained stable with minimal change.    Management included continuation of beta-blocker therapy for rate control, and Eliquis at a reduced dose for stroke prophylaxis. Cardiology recommended outpatient follow-up with echocardiography next month to monitor the pericardial effusion.    The patient experienced dysphagia, prompting Speech-Language Pathology (SLP) recommendations for mechanical soft and ground consistencies with thin liquids.    Despite prolonged antibiotic therapy and management of multiple acute and chronic medical conditions, the patient's clinical status did not improve with continuous delirium. After discussing with her daughter, who opted for hospice care at home, the patient's care plan transitioned accordingly.        Pertinent Test Results:     TTE (6/11/2024): LVEF 66 to 70%.  Mild concentric LVH.  Grade 3 diastolic dysfunction.  Moderate dilated left atrium.  Borderline dilated right atrium.  Large  greater than 2 cm pericardial effusion.  No evidence of cardiac tamponade.    Limited TTE (6/14/2024): LVEF 61 to 65%.  Concentric LVH.  Diastolic function indeterminate.  Moderate 1 to 2 cm pericardial fusion.    Condition on Discharge:  Serious    Vital Signs  Vitals:    06/20/24 0734   BP:    Pulse: 116   Resp: 18   Temp:    SpO2: 97%       Physical Exam:  General: Frail Elderly Female somnolent.   Head:Normocephalic, atraumatic  Eyes:PERRLA, EOMI. Conjunctivae and sclerae normal. No icterus or pallor.  Ears:Ears appear intact with no abnormalities noted.  Throat:No oral lesions or thrush. Oral mucosa moist  Heart:Normal S1 and S2. Regular rate and rhythm. No significant murmur, rubs or gallops appreciated.  Lungs:Respirations regular, even and unlabored. Lungs clear to auscultation B/L. No wheezes, rales or rhonchi.  Abdomen: Soft and nontender. No guarding, rebound tenderness or  organomegaly noted. Bowel sounds present x 4.  Musculoskeletal:Muscular strength intact and equal B/L. No joint swelling, deformity, or tenderness.  Extremities:No clubbing, cyanosis or edema noted. Moves UE and LE equally B/L.  Pulses:Pulses palpable and equal bilaterally.  Skin: No bleeding, bruising or rash.  Lymph nodes: No palpable adenopathy.  Neurologic: AAOx1. Cranial nerves 2 - 12 grossly intact. Sensation intact. DTR present and equal bilaterally       Discharge Disposition:   Home with Hospice      Discharge Medications:     Discharge Medications        New Medications        Instructions Start Date   acetaminophen 500 MG tablet  Commonly known as: TYLENOL   1,000 mg, Oral, Every 8 Hours PRN      NIFEdipine CC 30 MG 24 hr tablet  Commonly known as: ADALAT CC   30 mg, Oral, Every 24 Hours Scheduled   Start Date: June 21, 2024     OLANZapine zydis 5 MG disintegrating tablet  Commonly known as: zyPREXA   5 mg, Translingual, Every 8 Hours PRN      polyethylene glycol 17 g packet  Commonly known as: MIRALAX   17 g, Oral, 2 Times  Daily      sennosides-docusate 8.6-50 MG per tablet  Commonly known as: PERICOLACE   2 tablets, Oral, 2 Times Daily             Changes to Medications        Instructions Start Date   apixaban 2.5 MG tablet tablet  Commonly known as: ELIQUIS  What changed:   medication strength  how much to take   2.5 mg, Oral, 2 Times Daily      furosemide 40 MG tablet  Commonly known as: LASIX  What changed: additional instructions   40 mg, Oral, Daily, Take as needed             Continue These Medications        Instructions Start Date   cetirizine 10 MG tablet  Commonly known as: zyrTEC   10 mg, Oral, Daily PRN      cinacalcet 30 MG tablet  Commonly known as: Sensipar   30 mg, Oral, Daily      erythromycin 5 MG/GM ophthalmic ointment  Commonly known as: ROMYCIN   1 g, Both Eyes, 2 Times Daily PRN      ezetimibe 10 MG tablet  Commonly known as: ZETIA   10 mg, Oral, Daily      folic acid 400 MCG tablet  Commonly known as: FOLVITE   400 mcg, Oral, Daily      hydrocortisone 1 % ointment   1 Application, Topical, 2 Times Daily      metoprolol tartrate 100 MG tablet  Commonly known as: LOPRESSOR   100 mg, Oral, 2 Times Daily, For hypertension.       ondansetron 4 MG tablet  Commonly known as: ZOFRAN   4 mg, Oral, Every 8 Hours PRN      pantoprazole 40 MG EC tablet  Commonly known as: PROTONIX   40 mg, Oral, Daily, For gerd without esophagitis.       potassium chloride 10 MEQ CR tablet  Commonly known as: KLOR-CON M10   10 mEq, Oral, Daily, For hypokalemia.       vitamin B-12 1000 MCG tablet  Commonly known as: CYANOCOBALAMIN   1,000 mcg, Oral, 5 Times Weekly      vitamin D 1.25 MG (91751 UT) capsule capsule  Commonly known as: ERGOCALCIFEROL   50,000 Units, Oral, Weekly             Stop These Medications      losartan 50 MG tablet  Commonly known as: COZAAR                Discharge Diet:     Dietary Orders (From admission, onward)       Start     Ordered    06/17/24 1945  Dietary Nutrition Supplements Magic Cup  Daily With Breakfast &  Dinner      Question:  Select Supplement  Answer:  Magic Cup    06/17/24 1945    06/15/24 1308  DIET MESSAGE Ground meats and veggies  Once        Comments: Ground meats and veggies    06/15/24 1307    06/15/24 1307  Diet: Regular/House; Texture: Mechanical Ground (NDD 2); Fluid Consistency: Thin (IDDSI 0)  Diet Effective Now        References:    Diet Order [place]   Question Answer Comment   Diets: Regular/House    Texture: Mechanical Ground (NDD 2)    Fluid Consistency: Thin (IDDSI 0)        06/15/24 1307    06/11/24 0959  DIET MESSAGE Please send apple juice  Once        Comments: Please send apple juice    06/11/24 0959                    Activity at Discharge:  activity as tolerated    Follow-up Appointments:   Contact information for follow-up providers       Sravani Chapa APRN .    Specialty: Nurse Practitioner  Contact information:  [address]  [place] [address] [phone]               Salma Carvalho MD .    Specialty: Internal Medicine  Contact information:  [address]  [facility] [address] [phone]                       Contact information for after-discharge care       Home Medical Care       [facility] .    Service: Home Hospice  Contact information:  [address] [address] [phone]                                       Test Results Pending at Discharge:Parvovirus, CMV, and Shi-Lai PCR.       The ASCVD Risk score (Carlitos CROUCH, et al., 2019) failed to calculate.      Rodolfo Reyes, MD   06/20/24  18:02 EDT      Time: Greater than 30 minutes spent on this discharge.

## 2024-06-20 NOTE — PROGRESS NOTES
PROGRESS NOTE         Patient Identification:  Name:  Zeenat Dong  Age:  89 y.o.  Sex:  female  :  1928  MRN:  [id number]  Visit Number:  [id number]  Primary Care Provider:  Sravani Chapa APRN         LOS: 9 days       ----------------------------------------------------------------------------------------------------------------------  Subjective       Chief Complaints:    Abnormal Lab, Weakness - Generalized, and Altered Mental Status        Interval History:      Resting comfortably in bed.  Grandchildren at bedside.  Currently on 2 L per nasal cannula.  Lungs clear to auscultation bilaterally.  Abdomen mildly distended, nontender upon palpation.  Afebrile, no reported diarrhea.  WBC slightly elevated 19.00.  Procalcitonin normal at 0.18.    Review of Systems:    JOO due to mental status  ----------------------------------------------------------------------------------------------------------------------      Objective       Current Hospital Meds:  apixaban, 2.5 mg, Oral, BID  budesonide-formoterol, 2 puff, Inhalation, BID - RT  cefepime, 2,000 mg, Intravenous, Q24H  cinacalcet, 30 mg, Oral, Daily  folic acid, 500 mcg, Oral, Daily  guaiFENesin, 1,200 mg, Oral, Q12H  hydrocortisone, 1 Application, Topical, BID  insulin lispro, 2-7 Units, Subcutaneous, 4x Daily AC & at Bedtime  iopamidol, 100 mL, Intravenous, Once in imaging  megestrol, 80 mg, Oral, Daily  melatonin, 5 mg, Oral, Nightly  metoprolol tartrate, 100 mg, Oral, Q12H  metroNIDAZOLE, 500 mg, Intravenous, Q8H  NIFEdipine CC, 30 mg, Oral, Q24H  OLANZapine zydis, 5 mg, Oral, Nightly  pantoprazole, 40 mg, Oral, Daily  polyethylene glycol, 17 g, Oral, BID  senna-docusate sodium, 2 tablet, Oral, BID  sodium chloride, 10 mL, Intravenous, Q12H  sodium chloride, 10 mL, Intravenous, Q12H  vitamin B-12, 1,000 mcg, Oral, Once per day on             ----------------------------------------------------------------------------------------------------------------------    Vital Signs:  Temp:  [98 °F (36.7 °C)-98.3 °F (36.8 °C)] 98 °F (36.7 °C)  Heart Rate:  [] 116  Resp:  [18] 18  BP: (121-148)/(67-73) 148/73  No data found.      SpO2 Percentage    06/19/24 1400 06/19/24 1928 06/20/24 0734   SpO2: 98% 98% 97%     SpO2:  [97 %-98 %] 97 %  on  Flow (L/min):  [2] 2;   Device (Oxygen Therapy): nasal cannula    Body mass index is 23.38 kg/m².  Wt Readings from Last 3 Encounters:   06/11/24 59.9 kg (132 lb)   12/14/23 60.7 kg (133 lb 12.8 oz)   06/21/16 67.6 kg (149 lb)        Intake/Output Summary (Last 24 hours) at 6/20/2024 1205  Last data filed at 6/20/2024 0300  Gross per 24 hour   Intake 240 ml   Output --   Net 240 ml     Diet: Regular/House; Texture: Mechanical Ground (NDD 2); Fluid Consistency: Thin (IDDSI 0)  ----------------------------------------------------------------------------------------------------------------------      Physical Exam:    Constitutional: Elderly, frail, chronically ill-appearing female.  Awake, alert. Grandchildren at bedside.  HENT:  Head: Normocephalic and atraumatic.  Mouth:  Moist mucous membranes.    Eyes:  Conjunctivae and EOM are normal.  No scleral icterus.  Neck:  Neck supple.  No JVD present.    Cardiovascular:  Normal rate, regular rhythm and normal heart sounds with no murmur. No edema.  Pulmonary/Chest: Clear to auscultation bilaterally.  Abdominal: Abdomen is distended. Nontender.  Musculoskeletal:  No edema, no tenderness, and no deformity.  No swelling or redness of joints.  Neurological: JOO due to mental status  Skin:  Skin is warm and dry.  No rash noted.  No pallor.           ----------------------------------------------------------------------------------------------------------------------                Results from last 7 days   Lab Units 06/19/24  1009   PH, ARTERIAL pH units 7.444   PO2 ART mm Hg  "53.5*   PCO2, ARTERIAL mm Hg 32.3*   HCO3 ART mmol/L 22.1       Results from last 7 days   Lab Units 06/20/24  0131 06/19/24  0049 06/18/24  0714   WBC 10*3/mm3 19.00* 17.09* 23.41*   HEMOGLOBIN g/dL 11.8* 11.8* 10.8*   HEMATOCRIT % 39.5 39.9 35.5   MCV fL 100.5* 99.3* 95.4   MCHC g/dL 29.9* 29.6* 30.4*   PLATELETS 10*3/mm3 191 198 262     Results from last 7 days   Lab Units 06/20/24  0619 06/19/24  0049 06/18/24  0714   SODIUM mmol/L 146* 139 139   POTASSIUM mmol/L 4.0 3.6 3.9   MAGNESIUM mg/dL 1.9 1.6*  --    CHLORIDE mmol/L 111* 110* 108*   CO2 mmol/L 21.7* 16.5* 19.6*   BUN mg/dL 25* 27* 32*   CREATININE mg/dL 1.33* 1.31* 1.45*   CALCIUM mg/dL 8.5 8.5 8.5   GLUCOSE mg/dL 86 68 219*   ALBUMIN g/dL  --   --  3.1*   Estimated Creatinine Clearance: 23.4 mL/min (A) (by C-G formula based on SCr of 1.33 mg/dL (H)).  No results found for: \"AMMONIA\"    Glucose   Date/Time Value Ref Range Status   06/20/2024 1111 120 70 - 130 mg/dL Final   06/20/2024 0643 77 70 - 130 mg/dL Final   06/19/2024 2133 79 70 - 130 mg/dL Final   06/19/2024 1714 71 70 - 130 mg/dL Final   06/19/2024 1114 182 (H) 70 - 130 mg/dL Final   06/19/2024 0730 83 70 - 130 mg/dL Final   06/18/2024 2140 86 70 - 130 mg/dL Final   06/18/2024 1621 88 70 - 130 mg/dL Final     Lab Results   Component Value Date    HGBA1C 6.60 (H) 06/10/2024     Lab Results   Component Value Date    TSH 3.752 08/17/2015    FREET4 1.09 08/17/2015       Blood Culture   Date Value Ref Range Status   06/11/2024 No growth at 3 days  Preliminary   06/11/2024 No growth at 3 days  Preliminary     No results found for: \"URINECX\"  No results found for: \"WOUNDCX\"  No results found for: \"STOOLCX\"  No results found for: \"RESPCX\"  Pain Management Panel  More data may exist         Latest Ref Rng & Units 6/10/2024 8/17/2015   Pain Management Panel   Creatinine, Urine mg/dL  mg/dL - 129.2  130.0    Amphetamine, Urine Qual Negative Negative  -   Barbiturates Screen, Urine Negative Negative  - "   Benzodiazepine Screen, Urine Negative Negative  -   Buprenorphine, Screen, Urine Negative Negative  -   Cocaine Screen, Urine Negative Negative  -   Fentanyl, Urine Negative Negative  -   Methadone Screen , Urine Negative Negative  -   Methamphetamine, Ur Negative Negative  -         ----------------------------------------------------------------------------------------------------------------------  Imaging Results (Last 24 Hours)       Procedure Component Value Units Date/Time    XR Chest 1 View [[id number]] Collected: 06/20/24 1103     Updated: 06/20/24 1106    Narrative:      EXAM:    XR Chest, 1 View     EXAM DATE:    6/20/2024 9:33 AM     CLINICAL HISTORY:    Fluid [place]od re-evaluatoin; N17.9-Acute kidney failure, unspecified;  E83.42-Hypomagnesemia; R44.3-Hallucinations, unspecified; I50.9-Heart  failure, unspecified; I31.39-Other pericardial effusion  (noninflammatory); J18.9-Pneumonia, unspecified organism     TECHNIQUE:    Frontal view of the chest.     COMPARISON:    6/18/2024     FINDINGS:    Lungs and pleural spaces:  Interstitial edema.  Left basilar airspace  disease.  Pulmonary vascular congestion.  No pneumothorax.    Heart:  Cardiomegaly.    Mediastinum:  Unremarkable as visualized.  Normal mediastinal contour.    Bones/joints:  Small effusions.  No acute fracture.       Impression:        CHF/edema with small effusions and left basilar airspace disease. May  be slightly increased from the previous exam.        This report was finalized on 6/20/2024 11:04 AM by Dr. Marquis Ruiz MD.               ----------------------------------------------------------------------------------------------------------------------    Pertinent Infectious Disease Results    Cardiology note reviewed, due to the patient's age and comorbidities, risk was felt to be greater than benefit for pericardiocentesis at this time.  We are unable to determine if pericardial effusion is infectious at this time.  Would  recommend to treat with NSAIDs, per literature review.    Transthoracic echo from 6/14 reported moderate 1 to 2 cm pericardial effusion.        Assessment/Plan       Assessment     Pericardial effusion, rule out infectious process  Community-acquired pneumonia        Plan          Resting comfortably in bed.  Grandchildren at bedside.  Currently on 2 L per nasal cannula.  Lungs clear to auscultation bilaterally.  Abdomen mildly distended, nontender upon palpation.  Afebrile, no reported diarrhea.  WBC slightly elevated 19.00.  Procalcitonin normal at 0.18.    Continues on cefepime and metronidazole for possible underlying intra-abdominal infection causing leukocytosis.  Plans for possible transition to hospice care.  If patient is transition to hospice care cefepime and metronidazole can be de-escalated to oral cefdinir pharmacy to dose and metronidazole 500 mg p.o. 3 times daily to continue through 6/25/2024.  We will continue to monitor closely and adjust antibiotic coverage as appropriate.    ANTIMICROBIAL THERAPY    cefepime 2000 mg IVPB in 100 mL NS (VTB)  metroNIDAZOLE - 500-0.79 MG/100ML-%     Code Status:   Code Status and Medical Interventions:   Ordered at: 06/11/24 0401     Medical Intervention Limits:    No intubation (DNI)     Level Of Support Discussed With:    Next of Kin (If No Surrogate)     Code Status (Patient has no pulse and is not breathing):    No CPR (Do Not Attempt to Resuscitate)     Medical Interventions (Patient has pulse or is breathing):    Limited Support       FIORELLA Crenshaw  06/20/24  12:05 EDT